# Patient Record
Sex: FEMALE | Race: WHITE | NOT HISPANIC OR LATINO | Employment: FULL TIME | URBAN - METROPOLITAN AREA
[De-identification: names, ages, dates, MRNs, and addresses within clinical notes are randomized per-mention and may not be internally consistent; named-entity substitution may affect disease eponyms.]

---

## 2018-11-24 ENCOUNTER — OFFICE VISIT (OUTPATIENT)
Dept: URGENT CARE | Facility: CLINIC | Age: 53
End: 2018-11-24
Payer: COMMERCIAL

## 2018-11-24 VITALS
BODY MASS INDEX: 35.57 KG/M2 | OXYGEN SATURATION: 98 % | SYSTOLIC BLOOD PRESSURE: 138 MMHG | HEART RATE: 66 BPM | WEIGHT: 181.19 LBS | HEIGHT: 60 IN | TEMPERATURE: 98 F | DIASTOLIC BLOOD PRESSURE: 84 MMHG | RESPIRATION RATE: 14 BRPM

## 2018-11-24 DIAGNOSIS — J32.9 SINUSITIS, UNSPECIFIED CHRONICITY, UNSPECIFIED LOCATION: Primary | ICD-10-CM

## 2018-11-24 PROCEDURE — 99204 OFFICE O/P NEW MOD 45 MIN: CPT | Mod: 25,S$GLB,, | Performed by: NURSE PRACTITIONER

## 2018-11-24 PROCEDURE — 96372 THER/PROPH/DIAG INJ SC/IM: CPT | Mod: S$GLB,,, | Performed by: NURSE PRACTITIONER

## 2018-11-24 PROCEDURE — 3008F BODY MASS INDEX DOCD: CPT | Mod: CPTII,S$GLB,, | Performed by: NURSE PRACTITIONER

## 2018-11-24 RX ORDER — FLUTICASONE PROPIONATE 50 MCG
2 SPRAY, SUSPENSION (ML) NASAL DAILY
Qty: 1 BOTTLE | Refills: 0 | Status: ON HOLD | OUTPATIENT
Start: 2018-11-24 | End: 2019-10-30 | Stop reason: CLARIF

## 2018-11-24 RX ORDER — RAMIPRIL 10 MG/1
10 CAPSULE ORAL DAILY
COMMUNITY
End: 2021-08-10 | Stop reason: SINTOL

## 2018-11-24 RX ORDER — ATENOLOL 25 MG/1
25 TABLET ORAL DAILY
COMMUNITY
End: 2021-02-11

## 2018-11-24 RX ORDER — PREDNISONE 20 MG/1
20 TABLET ORAL 2 TIMES DAILY
Qty: 10 TABLET | Refills: 0 | Status: SHIPPED | OUTPATIENT
Start: 2018-11-24 | End: 2018-11-29

## 2018-11-24 RX ORDER — PROMETHAZINE HYDROCHLORIDE AND DEXTROMETHORPHAN HYDROBROMIDE 6.25; 15 MG/5ML; MG/5ML
5 SYRUP ORAL EVERY 4 HOURS PRN
Qty: 240 ML | Refills: 0 | Status: SHIPPED | OUTPATIENT
Start: 2018-11-24 | End: 2018-12-04

## 2018-11-24 RX ORDER — DEXAMETHASONE SODIUM PHOSPHATE 4 MG/ML
8 INJECTION, SOLUTION INTRA-ARTICULAR; INTRALESIONAL; INTRAMUSCULAR; INTRAVENOUS; SOFT TISSUE
Status: COMPLETED | OUTPATIENT
Start: 2018-11-24 | End: 2018-11-24

## 2018-11-24 RX ORDER — ASPIRIN 81 MG/1
81 TABLET ORAL DAILY
Status: ON HOLD | COMMUNITY
End: 2019-11-01 | Stop reason: HOSPADM

## 2018-11-24 RX ADMIN — DEXAMETHASONE SODIUM PHOSPHATE 8 MG: 4 INJECTION, SOLUTION INTRA-ARTICULAR; INTRALESIONAL; INTRAMUSCULAR; INTRAVENOUS; SOFT TISSUE at 12:11

## 2018-11-24 NOTE — PROGRESS NOTES
Subjective:       Patient ID: Brunilda Bob is a 53 y.o. female.    Vitals:  height is 5' (1.524 m) and weight is 82.2 kg (181 lb 3.2 oz). Her oral temperature is 97.9 °F (36.6 °C). Her blood pressure is 138/84 and her pulse is 66. Her respiration is 14 and oxygen saturation is 98%.     Chief Complaint: Cough; Sore Throat; Chest Congestion; Fever; Generalized Body Aches; and Diarrhea    Presents with sinus pressure and congestion, cough and chest congestion. States she has a lot of brown mucus when she coughs. Reports subjective fever, did not measure      Cough   This is a new problem. The current episode started in the past 7 days. The cough is productive of brown sputum. Associated symptoms include chills, a fever, postnasal drip and a sore throat. Pertinent negatives include no chest pain, headaches, myalgias, rash or shortness of breath.   Sore Throat    This is a new problem. The current episode started in the past 7 days. Associated symptoms include congestion, coughing and diarrhea. Pertinent negatives include no headaches, shortness of breath or vomiting.   Fever    This is a new problem. Associated symptoms include congestion, coughing, diarrhea and a sore throat. Pertinent negatives include no chest pain, headaches, nausea, rash, urinary pain or vomiting.   Diarrhea    This is a new problem. The current episode started in the past 7 days. Associated symptoms include chills, coughing and a fever. Pertinent negatives include no arthralgias, headaches, myalgias or vomiting.       Constitution: Positive for chills, sweating, fatigue and fever.   HENT: Positive for congestion, postnasal drip, sinus pain, sinus pressure and sore throat.    Neck: Negative for painful lymph nodes.   Cardiovascular: Negative for chest pain and leg swelling.   Eyes: Negative for double vision and blurred vision.   Respiratory: Positive for cough. Negative for shortness of breath.    Gastrointestinal: Positive for  diarrhea. Negative for nausea and vomiting.   Genitourinary: Negative for dysuria, frequency, urgency and history of kidney stones.   Musculoskeletal: Negative for joint pain, joint swelling, muscle cramps and muscle ache.   Skin: Negative for color change, pale, rash and bruising.   Allergic/Immunologic: Negative for seasonal allergies.   Neurological: Negative for dizziness, history of vertigo, light-headedness, passing out and headaches.   Hematologic/Lymphatic: Negative for swollen lymph nodes.   Psychiatric/Behavioral: Negative for nervous/anxious, sleep disturbance and depression. The patient is not nervous/anxious.        Objective:      Physical Exam   Constitutional: She is oriented to person, place, and time. Vital signs are normal. She appears well-developed and well-nourished. She is cooperative.   HENT:   Head: Normocephalic.   Right Ear: Hearing, external ear and ear canal normal. A middle ear effusion is present.   Left Ear: Hearing, external ear and ear canal normal. A middle ear effusion is present.   Nose: Mucosal edema, rhinorrhea and sinus tenderness present. Right sinus exhibits maxillary sinus tenderness. Left sinus exhibits maxillary sinus tenderness.   Mouth/Throat: Uvula is midline and mucous membranes are normal. Posterior oropharyngeal erythema present.   Eyes: Conjunctivae, EOM and lids are normal. Pupils are equal, round, and reactive to light.   Neck: Trachea normal, normal range of motion, full passive range of motion without pain and phonation normal. Neck supple.   Cardiovascular: Normal rate, regular rhythm, normal heart sounds, intact distal pulses and normal pulses.   Pulmonary/Chest: Effort normal and breath sounds normal.   Abdominal: Soft. Normal appearance, normal aorta and bowel sounds are normal. There is no tenderness.   Musculoskeletal: Normal range of motion.   Neurological: She is alert and oriented to person, place, and time. She has normal strength. GCS eye subscore is  4. GCS verbal subscore is 5. GCS motor subscore is 6.   Skin: Skin is warm, dry and intact. Capillary refill takes less than 2 seconds.   Psychiatric: She has a normal mood and affect. Her speech is normal and behavior is normal. Judgment and thought content normal. Cognition and memory are normal.   Nursing note and vitals reviewed.      Assessment:       1. Sinusitis, unspecified chronicity, unspecified location        Plan:         Sinusitis, unspecified chronicity, unspecified location  -     dexamethasone injection 8 mg  -     predniSONE (DELTASONE) 20 MG tablet; Take 1 tablet (20 mg total) by mouth 2 (two) times daily. for 5 days  Dispense: 10 tablet; Refill: 0  -     fluticasone (FLONASE) 50 mcg/actuation nasal spray; 2 sprays (100 mcg total) by Each Nare route once daily.  Dispense: 1 Bottle; Refill: 0  -     promethazine-dextromethorphan (PROMETHAZINE-DM) 6.25-15 mg/5 mL Syrp; Take 5 mLs by mouth every 4 (four) hours as needed.  Dispense: 240 mL; Refill: 0

## 2018-11-24 NOTE — PATIENT INSTRUCTIONS
Sinusitis (No Antibiotics)    The sinuses are air-filled spaces within the bones of the face. They connect to the inside of the nose. Sinusitis is an inflammation of the tissue lining the sinus cavity. Sinus inflammation can occur during a cold. It can also be due to allergies to pollens and other particles in the air. It can cause symptoms such as sinus congestion, headache, sore throat, facial swelling and fullness. It may also cause a low-grade fever. No infection is present, and no antibiotic treatment is needed.  Home care  · Drink plenty of water, hot tea, and other liquids. This may help thin mucus. It also may promote sinus drainage.  · Heat may help soothe painful areas of the face. Use a towel soaked in hot water. Or,  the shower and direct the hot spray onto your face. Using a vaporizer along with a menthol rub at night may also help.   · An expectorant containing guaifenesin may help thin the mucus and promote drainage from the sinuses.  · Over-the-counter decongestants may be used unless a similar medicine was prescribed. Nasal sprays work the fastest. Use one that contains phenylephrine or oxymetazoline. First blow the nose gently. Then use the spray. Do not use these medicines more often than directed on the label or symptoms may get worse. You may also use tablets containing pseudoephedrine. Avoid products that combine ingredients, because side effects may be increased. Read labels. You can also ask the pharmacist for help. (NOTE: Persons with high blood pressure should not use decongestants. They can raise blood pressure.)  · Over-the-counter antihistamines may help if allergies contributed to your sinusitis.    · Use acetaminophen or ibuprofen to control pain, unless another pain medicine was prescribed. (If you have chronic liver or kidney disease or ever had a stomach ulcer, talk with your doctor before using these medicines. Aspirin should never be used in anyone under 18 years of age  who is ill with a fever. It may cause severe liver damage.)  · Use nasal rinses or irrigation as instructed by your health care provider.  · Don't smoke. This can worsen symptoms.  Follow-up care  Follow up with your healthcare provider or our staff if you are not improving within the next week.  When to seek medical advice  Call your healthcare provider if any of these occur:  · Green or yellow discharge from the nose or into the throat  · Facial pain or headache becoming more severe  · Stiff neck  · Unusual drowsiness or confusion  · Swelling of the forehead or eyelids  · Vision problems, including blurred or double vision  · Fever of 100.4ºF (38ºC) or higher, or as directed by your healthcare provider  · Seizure  · Breathing problems  · Symptoms not resolving within 10 days  Date Last Reviewed: 4/13/2015  © 2780-1062 [employer]. [address] [place] [address]. All rights reserved. This information is not intended as a substitute for professional medical care. Always follow your healthcare professional's instructions.        Preventing Sinusitis    Colds, flu, and allergies make it more likely for you to get sinusitis. Do your best to prevent sinusitis by preventing these problems. Do what you can to avoid getting colds and other infections. Stay away from things that cause allergies (allergens). Keep your sinuses as moist as you can.  Tips for air travel  When traveling on an airplane, use saline nasal spray to keep your sinuses moist. Drink plenty of fluids. You may also want to take a decongestant before you get on the plane.   Prevent colds  Do what you can to avoid being exposed to colds and flu. When possible, take more time to rest when you feel something coming on.  · Wash your hands often. This is especially important during cold and flu season. Try not to touch your face.  · As much as possible, stay away from infected people.  · Follow these standbys for staying healthy: Eat  balanced meals, exercise regularly, and get plenty of sleep.  Stay away from allergens  First find out what things youre allergic to. Then take steps to stay away from allergens or irritants in the air such as dust, pollution, and pollen.  · Wear a mask when you clean. Or consider hiring a  to help you stay away from dust.  · Sit in the nonsmoking sections of restaurants.  · Don't go outdoors during peak pollution hours such as rush hour.  · Keep an air conditioner on during allergy season. Clean its filter regularly.  · Ask your healthcare provider about a referral to have an allergy evaluation. Or ask for a referral to see an allergy specialist.  Boost moisture  Keeping your sinuses moist makes your mucus thinner. This allows your sinuses to drain better. And this helps prevent infection. Ask your doctor about these suggestions:  · Use a humidifier. Clean it often to remove any mold or mildew.  · Drink several glasses of water a day.  · Stay away from drying beverages such as alcohol and coffee.  · Stay away from all types of smoke, which dries out sinus linings. This includes tobacco smoke and chemical smoke in workplace settings.  · Use saltwater rinses.  Date Last Reviewed: 10/1/2016  © 1177-0984 [employer]. [address] [place] [address], [place] [id number]. All rights reserved. This information is not intended as a substitute for professional medical care. Always follow your healthcare professional's instructions.        Self-Care for Sinusitis     Drinking plenty of water can help sinuses drain.   Sinusitis can often be managed with self-care. Self-care can keep sinuses moist and make you feel more comfortable. Remember to follow your doctor's instructions closely. This can make a big difference in getting your sinus problem under control.  Drink fluids  Drinking extra fluids helps thin your mucus. This lets it drain from your sinuses more easily. Have a glass of water every hour or two. A  humidifier helps in much the same way. Fluids can also offset the drying effects of certain medicines. If you use a humidifier, follow the product maker's instructions on how to use it. Clean it on a regular schedule.  Use saltwater rinses  Rinses help keep your sinuses and nose moist. Mix a teaspoon of salt in 8 ounces of fresh, warm water. Use a bulb syringe to gently squirt the water into your nose a few times a day. You can also buy ready-made saline nasal sprays.  Apply hot or cold packs  Applying heat to the area surrounding your sinuses may make you feel more comfortable. Use a hot water bottle or a hand towel dipped in hot water. Some people also find ice packs effective for relieving pain.  Medicines  Your doctor may prescribe medications to help treat your sinusitis. If you have an infection, antibiotics can help clear it up. If you are prescribed antibiotics, take all pills on schedule until they are gone, even if you feel better. Decongestants help relieve swelling. Use decongestant sprays for short periods only under the direction of your doctor. If you have allergies, your doctor may prescribe medications to help relieve them.   Date Last Reviewed: 10/1/2016  © 8491-8993 [employer]. [address] [place] [address], [place] [address]. All rights reserved. This information is not intended as a substitute for professional medical care. Always follow your healthcare professional's instructions.        Treating Chronic Sinusitis    The sinuses are hollow areas formed by the bones of the face. Sinuses make and drain mucus. This keeps the nasal passages clean and moist. When the sinuses become swollen (inflamed) or infected, the condition is called sinusitis. Symptoms may include:  · Thick, discolored drainage from the nose  · Nasal congestion  · Pain and pressure around the eyes, nose, cheeks, or forehead  · Headache  · Cough  · Thick mucus draining down the back of the throat (postnasal  drainage)  · Fever  · Loss of smell  With chronic sinusitis, the symptoms last more than 12 weeks.     Ongoing prevention  Its important to treat the cause of a sinus problem. If you have allergies, talk with your doctor about treatment. Or ask about getting an evaluation by an allergy specialist. If youre exposed to nasal irritants such as sawdust, use a filter mask. If you smoke, ask your doctor for help with quitting. Smoke irritates the sinuses and can make your sinus problem worse. If you live with smokers, ask them to consider quitting or only smoking outdoors.   Medicine  Medicines for sinusitis may include:  · Antibiotic medicines. You may need to take antibiotic medicine for a longer period. If bacteria aren't the cause, antibiotics won't help.  · Inhaled corticosteroid medicine. Nasal sprays or drops with steroids are often prescribed.  · Other medicines. You may need to take nasal sprays with antihistamines and decongestants, or saltwater (saline) sprays or drops. Your provider may also prescribe mucolytics or expectorants to loosen and clear mucus.  · Allergy shots (immunotherapy). If you have nasal allergies, shots may help reduce your sensitivity to allergens such as pollen, dust mites, or mold.   If your symptoms still do not get better, you may need more testing. This may include a CT scan of the sinuses.  Surgery  If other treatments dont solve the problem, you may need surgery. The type of surgery depends on what is causing your sinusitis. It also depends on which sinuses are involved. Your doctor will tell you more about your options. The types of surgery include:  · Endoscopic surgery. This is often used to clear blockages. The sinuses can then heal on their own. During the surgery, the doctor uses a thin, lighted tube (endoscope). The doctor puts the endoscope into your nose to see into the sinuses. This surgery can be done without incisions on the face.  · Open surgery. This is often used to  clean out a sinus lining that is very damaged. It lets the doctor reach areas that an endoscope may not reach.  Date Last Reviewed: 10/1/2016  © 6482-1044 [employer]. [address] [place] [address], [place] [address]. All rights reserved. This information is not intended as a substitute for professional medical care. Always follow your healthcare professional's instructions.

## 2019-10-30 ENCOUNTER — HOSPITAL ENCOUNTER (INPATIENT)
Facility: HOSPITAL | Age: 54
LOS: 2 days | Discharge: HOME | DRG: 605 | End: 2019-11-01
Attending: EMERGENCY MEDICINE | Admitting: INTERNAL MEDICINE
Payer: COMMERCIAL
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
## 2019-10-30 DIAGNOSIS — R52 PAIN: ICD-10-CM

## 2019-10-30 DIAGNOSIS — S20.01XA POSTTRAUMATIC HEMATOMA OF RIGHT BREAST, INITIAL ENCOUNTER: ICD-10-CM

## 2019-10-30 PROBLEM — K21.00 GERD WITH ESOPHAGITIS: Status: ACTIVE | Noted: 2019-10-30
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
## 2019-10-30 PROBLEM — I10 BENIGN ESSENTIAL HYPERTENSION: Status: ACTIVE | Noted: 2019-10-30

## 2019-10-30 LAB
ABO + RH BLD: NORMAL
ALBUMIN SERPL BCP-MCNC: 3.9 G/DL (ref 3.5–5.2)
ALP SERPL-CCNC: 82 U/L (ref 55–135)
ALT SERPL W/O P-5'-P-CCNC: 22 U/L (ref 10–44)
ANION GAP SERPL CALC-SCNC: 14 MMOL/L (ref 8–16)
ANION GAP SERPL CALC-SCNC: 6 MMOL/L (ref 8–16)
AST SERPL-CCNC: 23 U/L (ref 10–40)
BASOPHILS # BLD AUTO: 0.03 K/UL (ref 0–0.2)
BASOPHILS NFR BLD: 0.3 % (ref 0–1.9)
BILIRUB SERPL-MCNC: 0.6 MG/DL (ref 0.1–1)
BLD GP AB SCN CELLS X3 SERPL QL: NORMAL
BNP SERPL-MCNC: 37 PG/ML (ref 0–99)
BUN SERPL-MCNC: 21 MG/DL (ref 6–30)
BUN SERPL-MCNC: 23 MG/DL (ref 6–20)
CALCIUM SERPL-MCNC: 9.4 MG/DL (ref 8.7–10.5)
CHLORIDE SERPL-SCNC: 101 MMOL/L (ref 95–110)
CHLORIDE SERPL-SCNC: 102 MMOL/L (ref 95–110)
CO2 SERPL-SCNC: 31 MMOL/L (ref 23–29)
CREAT SERPL-MCNC: 0.9 MG/DL (ref 0.5–1.4)
CREAT SERPL-MCNC: 0.9 MG/DL (ref 0.5–1.4)
DIFFERENTIAL METHOD: ABNORMAL
EOSINOPHIL # BLD AUTO: 0.3 K/UL (ref 0–0.5)
EOSINOPHIL NFR BLD: 3.2 % (ref 0–8)
ERYTHROCYTE [DISTWIDTH] IN BLOOD BY AUTOMATED COUNT: 14.2 % (ref 11.5–14.5)
EST. GFR  (AFRICAN AMERICAN): >60 ML/MIN/1.73 M^2
EST. GFR  (NON AFRICAN AMERICAN): >60 ML/MIN/1.73 M^2
GLUCOSE SERPL-MCNC: 119 MG/DL (ref 70–110)
GLUCOSE SERPL-MCNC: 122 MG/DL (ref 70–110)
HCT VFR BLD AUTO: 37.8 % (ref 37–48.5)
HCT VFR BLD CALC: 36 %PCV (ref 36–54)
HGB BLD-MCNC: 11.8 G/DL (ref 12–16)
IMM GRANULOCYTES # BLD AUTO: 0.07 K/UL (ref 0–0.04)
IMM GRANULOCYTES NFR BLD AUTO: 0.8 % (ref 0–0.5)
INR PPP: 1.1
LIPASE SERPL-CCNC: 29 U/L (ref 4–60)
LYMPHOCYTES # BLD AUTO: 1.5 K/UL (ref 1–4.8)
LYMPHOCYTES NFR BLD: 17 % (ref 18–48)
MAGNESIUM SERPL-MCNC: 1.9 MG/DL (ref 1.6–2.6)
MCH RBC QN AUTO: 26.5 PG (ref 27–31)
MCHC RBC AUTO-ENTMCNC: 31.2 G/DL (ref 32–36)
MCV RBC AUTO: 85 FL (ref 82–98)
MONOCYTES # BLD AUTO: 0.6 K/UL (ref 0.3–1)
MONOCYTES NFR BLD: 6.9 % (ref 4–15)
NEUTROPHILS # BLD AUTO: 6.3 K/UL (ref 1.8–7.7)
NEUTROPHILS NFR BLD: 71.8 % (ref 38–73)
NRBC BLD-RTO: 0 /100 WBC
PLATELET # BLD AUTO: 319 K/UL (ref 150–350)
PMV BLD AUTO: 10 FL (ref 9.2–12.9)
POC IONIZED CALCIUM: 1.23 MMOL/L (ref 1.06–1.42)
POC TCO2 (MEASURED): 28 MMOL/L (ref 23–29)
POTASSIUM BLD-SCNC: 3.7 MMOL/L (ref 3.5–5.1)
POTASSIUM SERPL-SCNC: 3.8 MMOL/L (ref 3.5–5.1)
PROT SERPL-MCNC: 6.9 G/DL (ref 6–8.4)
PROTHROMBIN TIME: 13.3 SEC (ref 10.6–14.8)
RBC # BLD AUTO: 4.46 M/UL (ref 4–5.4)
SAMPLE: ABNORMAL
SODIUM BLD-SCNC: 139 MMOL/L (ref 136–145)
SODIUM SERPL-SCNC: 139 MMOL/L (ref 136–145)
TROPONIN I SERPL DL<=0.01 NG/ML-MCNC: <0.03 NG/ML (ref 0.02–0.04)
TROPONIN I SERPL DL<=0.01 NG/ML-MCNC: <0.03 NG/ML (ref 0.02–0.04)
TSH SERPL DL<=0.005 MIU/L-ACNC: 2.49 UIU/ML (ref 0.34–5.6)
WBC # BLD AUTO: 8.75 K/UL (ref 3.9–12.7)

## 2019-10-30 PROCEDURE — 83880 ASSAY OF NATRIURETIC PEPTIDE: CPT

## 2019-10-30 PROCEDURE — 84484 ASSAY OF TROPONIN QUANT: CPT

## 2019-10-30 PROCEDURE — 96374 THER/PROPH/DIAG INJ IV PUSH: CPT

## 2019-10-30 PROCEDURE — 96375 TX/PRO/DX INJ NEW DRUG ADDON: CPT

## 2019-10-30 PROCEDURE — 25500020 PHARM REV CODE 255: Performed by: EMERGENCY MEDICINE

## 2019-10-30 PROCEDURE — 93005 ELECTROCARDIOGRAM TRACING: CPT

## 2019-10-30 PROCEDURE — 86850 RBC ANTIBODY SCREEN: CPT

## 2019-10-30 PROCEDURE — 83735 ASSAY OF MAGNESIUM: CPT

## 2019-10-30 PROCEDURE — 99285 EMERGENCY DEPT VISIT HI MDM: CPT | Mod: 25

## 2019-10-30 PROCEDURE — 84443 ASSAY THYROID STIM HORMONE: CPT

## 2019-10-30 PROCEDURE — 83690 ASSAY OF LIPASE: CPT

## 2019-10-30 PROCEDURE — 85610 PROTHROMBIN TIME: CPT

## 2019-10-30 PROCEDURE — 85025 COMPLETE CBC W/AUTO DIFF WBC: CPT

## 2019-10-30 PROCEDURE — 80053 COMPREHEN METABOLIC PANEL: CPT

## 2019-10-30 PROCEDURE — 63600175 PHARM REV CODE 636 W HCPCS: Performed by: EMERGENCY MEDICINE

## 2019-10-30 PROCEDURE — 96376 TX/PRO/DX INJ SAME DRUG ADON: CPT

## 2019-10-30 PROCEDURE — 21400001 HC TELEMETRY ROOM

## 2019-10-30 PROCEDURE — 25000003 PHARM REV CODE 250: Performed by: INTERNAL MEDICINE

## 2019-10-30 PROCEDURE — 84484 ASSAY OF TROPONIN QUANT: CPT | Mod: 91

## 2019-10-30 RX ORDER — SODIUM,POTASSIUM PHOSPHATES 280-250MG
2 POWDER IN PACKET (EA) ORAL
Status: DISCONTINUED | OUTPATIENT
Start: 2019-10-30 | End: 2019-11-01 | Stop reason: HOSPADM

## 2019-10-30 RX ORDER — ACETAMINOPHEN 325 MG/1
650 TABLET ORAL EVERY 4 HOURS PRN
Status: DISCONTINUED | OUTPATIENT
Start: 2019-10-30 | End: 2019-11-01 | Stop reason: HOSPADM

## 2019-10-30 RX ORDER — POTASSIUM CHLORIDE 20 MEQ/15ML
40 SOLUTION ORAL
Status: DISCONTINUED | OUTPATIENT
Start: 2019-10-30 | End: 2019-11-01 | Stop reason: HOSPADM

## 2019-10-30 RX ORDER — LANOLIN ALCOHOL/MO/W.PET/CERES
800 CREAM (GRAM) TOPICAL
Status: DISCONTINUED | OUTPATIENT
Start: 2019-10-30 | End: 2019-11-01 | Stop reason: HOSPADM

## 2019-10-30 RX ORDER — MORPHINE SULFATE 2 MG/ML
2 INJECTION, SOLUTION INTRAMUSCULAR; INTRAVENOUS
Status: COMPLETED | OUTPATIENT
Start: 2019-10-30 | End: 2019-10-30

## 2019-10-30 RX ORDER — ACETAMINOPHEN 325 MG/1
650 TABLET ORAL EVERY 8 HOURS PRN
Status: DISCONTINUED | OUTPATIENT
Start: 2019-10-30 | End: 2019-11-01 | Stop reason: HOSPADM

## 2019-10-30 RX ORDER — ONDANSETRON 2 MG/ML
4 INJECTION INTRAMUSCULAR; INTRAVENOUS EVERY 8 HOURS PRN
Status: DISCONTINUED | OUTPATIENT
Start: 2019-10-30 | End: 2019-11-01 | Stop reason: HOSPADM

## 2019-10-30 RX ORDER — ONDANSETRON 2 MG/ML
4 INJECTION INTRAMUSCULAR; INTRAVENOUS
Status: COMPLETED | OUTPATIENT
Start: 2019-10-30 | End: 2019-10-30

## 2019-10-30 RX ORDER — MORPHINE SULFATE 2 MG/ML
2 INJECTION, SOLUTION INTRAMUSCULAR; INTRAVENOUS EVERY 4 HOURS PRN
Status: DISCONTINUED | OUTPATIENT
Start: 2019-10-30 | End: 2019-10-31

## 2019-10-30 RX ORDER — FAMOTIDINE 20 MG/1
20 TABLET, FILM COATED ORAL 2 TIMES DAILY
Status: DISCONTINUED | OUTPATIENT
Start: 2019-10-30 | End: 2019-11-01 | Stop reason: HOSPADM

## 2019-10-30 RX ORDER — HYDROCODONE BITARTRATE AND ACETAMINOPHEN 5; 325 MG/1; MG/1
1 TABLET ORAL EVERY 6 HOURS PRN
Status: DISCONTINUED | OUTPATIENT
Start: 2019-10-30 | End: 2019-11-01 | Stop reason: HOSPADM

## 2019-10-30 RX ADMIN — MORPHINE SULFATE 2 MG: 2 INJECTION, SOLUTION INTRAMUSCULAR; INTRAVENOUS at 08:10

## 2019-10-30 RX ADMIN — ONDANSETRON 4 MG: 2 INJECTION INTRAMUSCULAR; INTRAVENOUS at 06:10

## 2019-10-30 RX ADMIN — IOHEXOL 100 ML: 350 INJECTION, SOLUTION INTRAVENOUS at 06:10

## 2019-10-30 RX ADMIN — FAMOTIDINE 20 MG: 20 TABLET, FILM COATED ORAL at 11:10

## 2019-10-30 RX ADMIN — SODIUM CHLORIDE 1000 ML: 0.9 INJECTION, SOLUTION INTRAVENOUS at 09:10

## 2019-10-30 RX ADMIN — MORPHINE SULFATE 2 MG: 2 INJECTION, SOLUTION INTRAMUSCULAR; INTRAVENOUS at 06:10
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
## 2019-10-31 LAB
ANION GAP SERPL CALC-SCNC: 8 MMOL/L (ref 8–16)
BASOPHILS # BLD AUTO: 0.02 K/UL (ref 0–0.2)
BASOPHILS NFR BLD: 0.3 % (ref 0–1.9)
BUN SERPL-MCNC: 17 MG/DL (ref 6–20)
CALCIUM SERPL-MCNC: 8.9 MG/DL (ref 8.7–10.5)
CHLORIDE SERPL-SCNC: 102 MMOL/L (ref 95–110)
CO2 SERPL-SCNC: 29 MMOL/L (ref 23–29)
CREAT SERPL-MCNC: 0.8 MG/DL (ref 0.5–1.4)
DIFFERENTIAL METHOD: ABNORMAL
EOSINOPHIL # BLD AUTO: 0.1 K/UL (ref 0–0.5)
EOSINOPHIL NFR BLD: 0.9 % (ref 0–8)
ERYTHROCYTE [DISTWIDTH] IN BLOOD BY AUTOMATED COUNT: 14.2 % (ref 11.5–14.5)
EST. GFR  (AFRICAN AMERICAN): >60 ML/MIN/1.73 M^2
EST. GFR  (NON AFRICAN AMERICAN): >60 ML/MIN/1.73 M^2
GLUCOSE SERPL-MCNC: 110 MG/DL (ref 70–110)
HCT VFR BLD AUTO: 34.9 % (ref 37–48.5)
HGB BLD-MCNC: 10.7 G/DL (ref 12–16)
IMM GRANULOCYTES # BLD AUTO: 0.03 K/UL (ref 0–0.04)
IMM GRANULOCYTES NFR BLD AUTO: 0.4 % (ref 0–0.5)
LYMPHOCYTES # BLD AUTO: 1 K/UL (ref 1–4.8)
LYMPHOCYTES NFR BLD: 12.7 % (ref 18–48)
MCH RBC QN AUTO: 26 PG (ref 27–31)
MCHC RBC AUTO-ENTMCNC: 30.7 G/DL (ref 32–36)
MCV RBC AUTO: 85 FL (ref 82–98)
MONOCYTES # BLD AUTO: 0.7 K/UL (ref 0.3–1)
MONOCYTES NFR BLD: 8.5 % (ref 4–15)
NEUTROPHILS # BLD AUTO: 5.9 K/UL (ref 1.8–7.7)
NEUTROPHILS NFR BLD: 77.2 % (ref 38–73)
NRBC BLD-RTO: 0 /100 WBC
PLATELET # BLD AUTO: 282 K/UL (ref 150–350)
PMV BLD AUTO: 10 FL (ref 9.2–12.9)
POTASSIUM SERPL-SCNC: 4.4 MMOL/L (ref 3.5–5.1)
RBC # BLD AUTO: 4.11 M/UL (ref 4–5.4)
SODIUM SERPL-SCNC: 139 MMOL/L (ref 136–145)
TROPONIN I SERPL DL<=0.01 NG/ML-MCNC: <0.03 NG/ML (ref 0.02–0.04)
WBC # BLD AUTO: 7.61 K/UL (ref 3.9–12.7)

## 2019-10-31 PROCEDURE — 25000003 PHARM REV CODE 250: Performed by: INTERNAL MEDICINE

## 2019-10-31 PROCEDURE — 21400001 HC TELEMETRY ROOM

## 2019-10-31 PROCEDURE — 63600175 PHARM REV CODE 636 W HCPCS: Performed by: HOSPITALIST

## 2019-10-31 PROCEDURE — 80048 BASIC METABOLIC PNL TOTAL CA: CPT

## 2019-10-31 PROCEDURE — 85025 COMPLETE CBC W/AUTO DIFF WBC: CPT

## 2019-10-31 PROCEDURE — 99222 1ST HOSP IP/OBS MODERATE 55: CPT | Mod: ,,, | Performed by: SURGERY

## 2019-10-31 PROCEDURE — 99222 PR INITIAL HOSPITAL CARE,LEVL II: ICD-10-PCS | Mod: ,,, | Performed by: SURGERY

## 2019-10-31 PROCEDURE — 36415 COLL VENOUS BLD VENIPUNCTURE: CPT

## 2019-10-31 PROCEDURE — 84484 ASSAY OF TROPONIN QUANT: CPT

## 2019-10-31 PROCEDURE — 63600175 PHARM REV CODE 636 W HCPCS: Performed by: INTERNAL MEDICINE

## 2019-10-31 RX ORDER — ONDANSETRON 2 MG/ML
4 INJECTION INTRAMUSCULAR; INTRAVENOUS EVERY 4 HOURS PRN
Status: DISCONTINUED | OUTPATIENT
Start: 2019-10-31 | End: 2019-11-01 | Stop reason: HOSPADM

## 2019-10-31 RX ADMIN — MORPHINE SULFATE 2 MG: 2 INJECTION, SOLUTION INTRAMUSCULAR; INTRAVENOUS at 11:10

## 2019-10-31 RX ADMIN — HYDROCODONE BITARTRATE AND ACETAMINOPHEN 1 TABLET: 5; 325 TABLET ORAL at 11:10

## 2019-10-31 RX ADMIN — FAMOTIDINE 20 MG: 20 TABLET, FILM COATED ORAL at 09:10

## 2019-10-31 RX ADMIN — MORPHINE SULFATE 2 MG: 2 INJECTION, SOLUTION INTRAMUSCULAR; INTRAVENOUS at 05:10

## 2019-10-31 RX ADMIN — ACETAMINOPHEN 650 MG: 325 TABLET ORAL at 09:10

## 2019-10-31 RX ADMIN — HYDROCODONE BITARTRATE AND ACETAMINOPHEN 1 TABLET: 5; 325 TABLET ORAL at 03:10

## 2019-10-31 RX ADMIN — HYDROCODONE BITARTRATE AND ACETAMINOPHEN 1 TABLET: 5; 325 TABLET ORAL at 10:10

## 2019-10-31 RX ADMIN — ONDANSETRON 4 MG: 2 INJECTION INTRAMUSCULAR; INTRAVENOUS at 03:10

## 2019-10-31 RX ADMIN — HYDROCODONE BITARTRATE AND ACETAMINOPHEN 1 TABLET: 5; 325 TABLET ORAL at 05:10
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
## 2019-10-31 NOTE — ASSESSMENT & PLAN NOTE
CT chest with contrast extravasation from active arterial bleeding  Conservative management with ice  Pain control   Monitor clinically for any worsening swelling  Case discussed by ER provider with General surgery on call who agree with conservative management  Will follow up patient next a.m.; formal consult placed  Given history of thoracic aortic bleed repairs will continue to trend troponins x3
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
## 2019-10-31 NOTE — ASSESSMENT & PLAN NOTE
Trauma workup negative except for right breast hematoma  No head trauma - unlikely post-concussion syndrome   Neuro checks q.4 hours x 1 day

## 2019-10-31 NOTE — ASSESSMENT & PLAN NOTE
Conservative management with ice  Pain control   Appreciate surgical evaluation.  Plan as outlined
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
## 2019-10-31 NOTE — ED NOTES
Pain continues and swelling right breast increased with one ice pack present. Informed Dr. Sarmiento with additional pain meds and ice applied to right breas area. Hospitalist and spouse  @ bedside

## 2019-10-31 NOTE — SUBJECTIVE & OBJECTIVE
Past Medical History:   Diagnosis Date    Broken heart syndrome        Past Surgical History:   Procedure Laterality Date    CLAVICLE SURGERY  2012    TONSILLECTOMY         Review of patient's allergies indicates:   Allergen Reactions    Codeine Nausea And Vomiting    Crestor [rosuvastatin] Other (See Comments)     paralysis       No current facility-administered medications on file prior to encounter.      Current Outpatient Medications on File Prior to Encounter   Medication Sig    aspirin (ECOTRIN) 81 MG EC tablet Take 81 mg by mouth once daily.    atenolol (TENORMIN) 25 MG tablet Take 25 mg by mouth once daily.    fluticasone (FLONASE) 50 mcg/actuation nasal spray 2 sprays (100 mcg total) by Each Nare route once daily.    ramipril (ALTACE) 10 MG capsule Take 10 mg by mouth once daily.    ranitidine (ZANTAC) 75 MG tablet Take 75 mg by mouth as needed for Heartburn.     Family History     Reviewed and non-contributory        Tobacco Use    Smoking status: Never Smoker    Smokeless tobacco: Never Used   Substance and Sexual Activity    Alcohol use: Yes     Frequency: Monthly or less    Drug use: Not on file    Sexual activity: Not on file       Objective:     Vital Signs (Most Recent):  Temp: 98.2 °F (36.8 °C) (10/30/19 1800)  Pulse: 74 (10/30/19 2045)  Resp: 12 (10/30/19 2045)  BP: 107/65 (10/30/19 2045)  SpO2: (!) 94 % (10/30/19 2045) Vital Signs (24h Range):  Temp:  [98.2 °F (36.8 °C)] 98.2 °F (36.8 °C)  Pulse:  [65-81] 74  Resp:  [12-28] 12  SpO2:  [94 %-99 %] 94 %  BP: (107-176)/() 107/65     Weight: 83.9 kg (185 lb)  Body mass index is 36.13 kg/m².    Physical Exam   Constitutional: She is oriented to person, place, and time. She appears well-developed and well-nourished. She is cooperative.   HENT:   Head: Normocephalic and atraumatic.   Mouth/Throat: Oropharynx is clear and moist.   Eyes: Pupils are equal, round, and reactive to light. Conjunctivae and EOM are normal.   Neck: Neck  supple. No JVD present. No thyromegaly present.   Cardiovascular: Normal rate, regular rhythm, S1 normal, S2 normal and intact distal pulses. Exam reveals no gallop and no friction rub.   No murmur heard.  Pulmonary/Chest: Effort normal and breath sounds normal. No accessory muscle usage. No tachypnea. No respiratory distress. She has no wheezes. She has no rales.   Abdominal: Soft. Bowel sounds are normal. She exhibits no distension and no mass. There is no tenderness. There is no rebound and no guarding.   Musculoskeletal: Normal range of motion. She exhibits tenderness. She exhibits no edema or deformity.   Swelling and bruising of right breast involving the upper and outer quadrant. Area is exquisitely tender   Neurological: She is alert and oriented to person, place, and time. She has normal strength and normal reflexes. No cranial nerve deficit or sensory deficit.   Skin: Skin is warm and dry. No rash noted. There is erythema.   Right breast swelling and bruising    Psychiatric: She has a normal mood and affect. Her speech is normal and behavior is normal. Cognition and memory are normal.   Nursing note and vitals reviewed.        CRANIAL NERVES     CN III, IV, VI   Pupils are equal, round, and reactive to light.  Extraocular motions are normal.        Significant Labs:   CBC:   Recent Labs   Lab 10/30/19  1816 10/30/19  1821   WBC 8.75  --    HGB 11.8*  --    HCT 37.8 36     --      CMP:   Recent Labs   Lab 10/30/19  1816      K 3.8      CO2 31*   *   BUN 23*   CREATININE 0.9   CALCIUM 9.4   PROT 6.9   ALBUMIN 3.9   BILITOT 0.6   ALKPHOS 82   AST 23   ALT 22   ANIONGAP 6*   EGFRNONAA >60.0     Troponin:   Recent Labs   Lab 10/30/19  1816   TROPONINI <0.030       Significant Imaging: I have reviewed all pertinent imaging results/findings within the past 24 hours.      X-ray tibia fibula left:  Negative for acute fracture or dislocation    CT head:  Negative noncontrast head CT.     CT  cervical spine without contrast:  Negative for acute cervical spine fracture or subluxation.    CT chest, abdomen and pelvis with contrast:   Impression:       1. Large hematoma in the right breast, with evidence of active arterial bleeding.  RESULT NOTIFICATION: These findings were discussed by Dr Goff with, and acknowledged by TOD OBREGON MD at 19:07 hours.  2. Otherwise no evidence of acute traumatic injury in the chest, abdomen, or the pelvis.

## 2019-10-31 NOTE — SUBJECTIVE & OBJECTIVE
Interval History:  Breast pain has improved    Review of Systems patient denies fever chills shortness of breath or abdominal pain. She does have breast pain were hematoma is.  Right knee pain and left wrist pain.  Otherwise comprehensive review of systems are unchanged.  Objective:     Vital Signs (Most Recent):  Temp: 97.8 °F (36.6 °C) (10/31/19 0809)  Pulse: 67 (10/31/19 0809)  Resp: 18 (10/31/19 0809)  BP: (!) 160/71 (10/31/19 0809)  SpO2: 97 % (10/31/19 0809) Vital Signs (24h Range):  Temp:  [97.7 °F (36.5 °C)-98.2 °F (36.8 °C)] 97.8 °F (36.6 °C)  Pulse:  [61-82] 67  Resp:  [12-28] 18  SpO2:  [93 %-100 %] 97 %  BP: (101-176)/() 160/71     Weight: 84.3 kg (185 lb 12.5 oz)  Body mass index is 36.28 kg/m².    Intake/Output Summary (Last 24 hours) at 10/31/2019 1602  Last data filed at 10/31/2019 0600  Gross per 24 hour   Intake 300 ml   Output 1200 ml   Net -900 ml      Physical Exam patient appears in no acute distress.  Lying in bed  Neck is supple nontender  Lungs are clear  Heart is regular rate rhythm  Abdomen soft nontender  Extremities no edema  Neuro patient is alert oriented x3    Significant Labs:   BMP:   Recent Labs   Lab 10/30/19  1816 10/31/19  0457   * 110    139   K 3.8 4.4    102   CO2 31* 29   BUN 23* 17   CREATININE 0.9 0.8   CALCIUM 9.4 8.9   MG 1.9  --      CBC:   Recent Labs   Lab 10/30/19  1816 10/30/19  1821 10/31/19  0457   WBC 8.75  --  7.61   HGB 11.8*  --  10.7*   HCT 37.8 36 34.9*     --  282       Significant Imaging:  X-rays reviewed left wrist and right knee showed no evidence of fracture.

## 2019-11-01 ENCOUNTER — TELEPHONE (OUTPATIENT)
Dept: FAMILY MEDICINE | Facility: CLINIC | Age: 54
End: 2019-11-01

## 2019-11-01 VITALS
DIASTOLIC BLOOD PRESSURE: 77 MMHG | WEIGHT: 181 LBS | RESPIRATION RATE: 20 BRPM | OXYGEN SATURATION: 100 % | TEMPERATURE: 98 F | HEART RATE: 71 BPM | SYSTOLIC BLOOD PRESSURE: 122 MMHG | BODY MASS INDEX: 35.53 KG/M2 | HEIGHT: 60 IN

## 2019-11-01 LAB
ANION GAP SERPL CALC-SCNC: 6 MMOL/L (ref 8–16)
BASOPHILS # BLD AUTO: 0.02 K/UL (ref 0–0.2)
BASOPHILS NFR BLD: 0.3 % (ref 0–1.9)
BUN SERPL-MCNC: 21 MG/DL (ref 6–20)
CALCIUM SERPL-MCNC: 8.6 MG/DL (ref 8.7–10.5)
CHLORIDE SERPL-SCNC: 102 MMOL/L (ref 95–110)
CO2 SERPL-SCNC: 30 MMOL/L (ref 23–29)
CREAT SERPL-MCNC: 0.9 MG/DL (ref 0.5–1.4)
DIFFERENTIAL METHOD: ABNORMAL
EOSINOPHIL # BLD AUTO: 0.3 K/UL (ref 0–0.5)
EOSINOPHIL NFR BLD: 5.5 % (ref 0–8)
ERYTHROCYTE [DISTWIDTH] IN BLOOD BY AUTOMATED COUNT: 14.8 % (ref 11.5–14.5)
ERYTHROCYTE [DISTWIDTH] IN BLOOD BY AUTOMATED COUNT: 14.8 % (ref 11.5–14.5)
EST. GFR  (AFRICAN AMERICAN): >60 ML/MIN/1.73 M^2
EST. GFR  (NON AFRICAN AMERICAN): >60 ML/MIN/1.73 M^2
GLUCOSE SERPL-MCNC: 106 MG/DL (ref 70–110)
HCT VFR BLD AUTO: 30.7 % (ref 37–48.5)
HCT VFR BLD AUTO: 30.7 % (ref 37–48.5)
HGB BLD-MCNC: 9.5 G/DL (ref 12–16)
HGB BLD-MCNC: 9.5 G/DL (ref 12–16)
IMM GRANULOCYTES # BLD AUTO: 0.02 K/UL (ref 0–0.04)
IMM GRANULOCYTES NFR BLD AUTO: 0.3 % (ref 0–0.5)
LYMPHOCYTES # BLD AUTO: 1 K/UL (ref 1–4.8)
LYMPHOCYTES NFR BLD: 16.8 % (ref 18–48)
MCH RBC QN AUTO: 26.2 PG (ref 27–31)
MCH RBC QN AUTO: 26.2 PG (ref 27–31)
MCHC RBC AUTO-ENTMCNC: 30.9 G/DL (ref 32–36)
MCHC RBC AUTO-ENTMCNC: 30.9 G/DL (ref 32–36)
MCV RBC AUTO: 85 FL (ref 82–98)
MCV RBC AUTO: 85 FL (ref 82–98)
MONOCYTES # BLD AUTO: 0.5 K/UL (ref 0.3–1)
MONOCYTES NFR BLD: 8.7 % (ref 4–15)
NEUTROPHILS # BLD AUTO: 4.1 K/UL (ref 1.8–7.7)
NEUTROPHILS NFR BLD: 68.4 % (ref 38–73)
NRBC BLD-RTO: 0 /100 WBC
PLATELET # BLD AUTO: 257 K/UL (ref 150–350)
PLATELET # BLD AUTO: 257 K/UL (ref 150–350)
PMV BLD AUTO: 10.4 FL (ref 9.2–12.9)
PMV BLD AUTO: 10.4 FL (ref 9.2–12.9)
POTASSIUM SERPL-SCNC: 4 MMOL/L (ref 3.5–5.1)
RBC # BLD AUTO: 3.62 M/UL (ref 4–5.4)
RBC # BLD AUTO: 3.62 M/UL (ref 4–5.4)
SODIUM SERPL-SCNC: 138 MMOL/L (ref 136–145)
WBC # BLD AUTO: 5.97 K/UL (ref 3.9–12.7)
WBC # BLD AUTO: 5.97 K/UL (ref 3.9–12.7)

## 2019-11-01 PROCEDURE — 97116 GAIT TRAINING THERAPY: CPT

## 2019-11-01 PROCEDURE — 25000003 PHARM REV CODE 250: Performed by: INTERNAL MEDICINE

## 2019-11-01 PROCEDURE — 85025 COMPLETE CBC W/AUTO DIFF WBC: CPT

## 2019-11-01 PROCEDURE — 97161 PT EVAL LOW COMPLEX 20 MIN: CPT

## 2019-11-01 PROCEDURE — 36415 COLL VENOUS BLD VENIPUNCTURE: CPT

## 2019-11-01 PROCEDURE — 80048 BASIC METABOLIC PNL TOTAL CA: CPT

## 2019-11-01 RX ORDER — TRAMADOL HYDROCHLORIDE 50 MG/1
50 TABLET ORAL EVERY 6 HOURS PRN
Qty: 12 TABLET | Refills: 0
Start: 2019-11-01 | End: 2019-11-06 | Stop reason: SDUPTHER

## 2019-11-01 RX ADMIN — FAMOTIDINE 20 MG: 20 TABLET, FILM COATED ORAL at 08:11

## 2019-11-01 RX ADMIN — HYDROCODONE BITARTRATE AND ACETAMINOPHEN 1 TABLET: 5; 325 TABLET ORAL at 12:11

## 2019-11-01 RX ADMIN — HYDROCODONE BITARTRATE AND ACETAMINOPHEN 1 TABLET: 5; 325 TABLET ORAL at 05:11

## 2019-11-01 RX ADMIN — ACETAMINOPHEN 650 MG: 325 TABLET ORAL at 03:11
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
## 2019-11-01 NOTE — PLAN OF CARE
Problem: Physical Therapy Goal  Goal: Physical Therapy Goal  Description  Goals to be met by: 19    Patient will increase functional independence with mobility by performin. Supine to sit with [place]  2. Sit to supine with [place]  3. Sit to stand transfer with [place]  4. Bed to chair transfer with [place] using no AD.  5. Gait  x 150 feet with Modified [place] using no AD.      Outcome: Ongoing, Progressing
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
## 2019-11-06 ENCOUNTER — TELEPHONE (OUTPATIENT)
Dept: SURGERY | Facility: CLINIC | Age: 54
End: 2019-11-06

## 2019-11-06 ENCOUNTER — TELEPHONE (OUTPATIENT)
Dept: FAMILY MEDICINE | Facility: CLINIC | Age: 54
End: 2019-11-06

## 2019-11-06 DIAGNOSIS — Z12.31 VISIT FOR SCREENING MAMMOGRAM: Primary | ICD-10-CM

## 2019-11-06 RX ORDER — TRAMADOL HYDROCHLORIDE 50 MG/1
50 TABLET ORAL EVERY 6 HOURS PRN
Qty: 12 TABLET | Refills: 0 | Status: SHIPPED | OUTPATIENT
Start: 2019-11-06 | End: 2019-11-12 | Stop reason: SDUPTHER
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
## 2019-11-08 ENCOUNTER — OFFICE VISIT (OUTPATIENT)
Dept: SURGERY | Facility: CLINIC | Age: 54
End: 2019-11-08
Payer: COMMERCIAL

## 2019-11-08 VITALS
SYSTOLIC BLOOD PRESSURE: 138 MMHG | WEIGHT: 181 LBS | DIASTOLIC BLOOD PRESSURE: 84 MMHG | TEMPERATURE: 98 F | BODY MASS INDEX: 35.53 KG/M2 | HEIGHT: 60 IN | HEART RATE: 98 BPM

## 2019-11-08 DIAGNOSIS — S20.01XD POSTTRAUMATIC HEMATOMA OF RIGHT BREAST, SUBSEQUENT ENCOUNTER: Primary | ICD-10-CM

## 2019-11-08 PROCEDURE — 99213 OFFICE O/P EST LOW 20 MIN: CPT | Mod: S$GLB,,, | Performed by: SURGERY

## 2019-11-08 PROCEDURE — 99213 PR OFFICE/OUTPT VISIT, EST, LEVL III, 20-29 MIN: ICD-10-PCS | Mod: S$GLB,,, | Performed by: SURGERY
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
## 2019-11-11 DIAGNOSIS — E04.1 NONTOXIC UNINODULAR GOITER: Primary | ICD-10-CM

## 2019-11-12 ENCOUNTER — OFFICE VISIT (OUTPATIENT)
Dept: FAMILY MEDICINE | Facility: CLINIC | Age: 54
End: 2019-11-12
Payer: COMMERCIAL

## 2019-11-12 VITALS
HEIGHT: 60 IN | SYSTOLIC BLOOD PRESSURE: 138 MMHG | WEIGHT: 181 LBS | BODY MASS INDEX: 35.53 KG/M2 | HEART RATE: 80 BPM | DIASTOLIC BLOOD PRESSURE: 86 MMHG
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
## 2019-11-12 DIAGNOSIS — S20.01XA POSTTRAUMATIC HEMATOMA OF RIGHT BREAST, INITIAL ENCOUNTER: Primary | ICD-10-CM

## 2019-11-12 PROCEDURE — 3008F PR BODY MASS INDEX (BMI) DOCUMENTED: ICD-10-PCS | Mod: S$GLB,,, | Performed by: PHYSICIAN ASSISTANT

## 2019-11-12 PROCEDURE — 99214 OFFICE O/P EST MOD 30 MIN: CPT | Mod: S$GLB,,, | Performed by: PHYSICIAN ASSISTANT

## 2019-11-12 PROCEDURE — 3008F BODY MASS INDEX DOCD: CPT | Mod: S$GLB,,, | Performed by: PHYSICIAN ASSISTANT

## 2019-11-12 PROCEDURE — 1111F DSCHRG MED/CURRENT MED MERGE: CPT | Mod: S$GLB,,, | Performed by: PHYSICIAN ASSISTANT

## 2019-11-12 PROCEDURE — 99214 PR OFFICE/OUTPT VISIT, EST, LEVL IV, 30-39 MIN: ICD-10-PCS | Mod: S$GLB,,, | Performed by: PHYSICIAN ASSISTANT

## 2019-11-12 PROCEDURE — 1111F PR DISCHARGE MEDS RECONCILED W/ CURRENT OUTPATIENT MED LIST: ICD-10-PCS | Mod: S$GLB,,, | Performed by: PHYSICIAN ASSISTANT

## 2019-11-12 RX ORDER — TRAMADOL HYDROCHLORIDE 50 MG/1
50 TABLET ORAL EVERY 6 HOURS PRN
Qty: 12 TABLET | Refills: 0 | Status: CANCELLED | OUTPATIENT
Start: 2019-11-12

## 2019-11-12 RX ORDER — CLINDAMYCIN HYDROCHLORIDE 300 MG/1
CAPSULE ORAL
Refills: 0 | COMMUNITY
Start: 2019-11-08 | End: 2019-11-25 | Stop reason: ALTCHOICE

## 2019-11-12 RX ORDER — TRAMADOL HYDROCHLORIDE 50 MG/1
50 TABLET ORAL EVERY 6 HOURS PRN
Qty: 28 TABLET | Refills: 0 | Status: SHIPPED | OUTPATIENT
Start: 2019-11-12 | End: 2019-11-26

## 2019-11-12 RX ORDER — LEVOTHYROXINE SODIUM 50 UG/1
50 TABLET ORAL NIGHTLY
Status: ON HOLD | COMMUNITY
End: 2022-11-24 | Stop reason: HOSPADM

## 2019-11-12 NOTE — PATIENT INSTRUCTIONS
Chest Contusion    A contusion is a bruise to the skin, muscle, or ribs. It may cause pain, tenderness, and swelling. It may turn the skin purple until it heals. Contusions take a few days to a few weeks to heal.  Home care  Follow these guidelines when caring for yourself at home:  · Rest. Dont do any heavy lifting or strenuous activity. Dont do any activity that causes pain.  · Put an ice pack on the injured area. Do this for 20 minutes every 1 to 2 hours the first day. You can make an ice pack by wrapping a plastic bag of ice cubes in a thin towel. Continue to use the ice pack 3 to 4 times a day for the next 2 days. Then use the ice pack as needed to ease pain and swelling.  · After 1 to 2 days you may put a warm compress on the area. Do this for 10 minutes several times a day. A warm compress is a clean cloth thats damp with warm water.  · Hold a pillow to the affected area when you cough. This will help ease pain.  · You may use acetaminophen or ibuprofen to control pain, unless another pain medicine was prescribed. If you have chronic liver or kidney disease, talk with your health care provider before using these medicines. Also talk with your provider if youve had a stomach ulcer or GI bleeding.  Follow-up care  Follow up with your health care provider during the next week, or as advised.  When to seek medical advice  Call your health care provider right away if any of these occur:  · Shortness of breath, difficulty breathing, or breathing fast  · Chest pain gets worse when you breathe  · Severe pain that comes on suddenly or lasts more than an hour  · Dizziness, weakness, or fainting  · New abdominal pain or abdominal pain that gets worse  ·  Fever of 101ºF (38.3ºC) or higher, or as directed by your health care provider  Date Last Reviewed: 2/15/2015  © 9455-1277 [employer]. [address] [place] [address], [place] [address]. All rights reserved. This information is not intended as a substitute  for professional medical care. Always follow your healthcare professional's instructions.
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
## 2019-11-25 ENCOUNTER — OFFICE VISIT (OUTPATIENT)
Dept: SURGERY | Facility: CLINIC | Age: 54
End: 2019-11-25
Payer: COMMERCIAL

## 2019-11-25 VITALS
HEIGHT: 60 IN | BODY MASS INDEX: 35.53 KG/M2 | HEART RATE: 76 BPM | WEIGHT: 181 LBS | TEMPERATURE: 99 F | DIASTOLIC BLOOD PRESSURE: 84 MMHG | SYSTOLIC BLOOD PRESSURE: 132 MMHG

## 2019-11-25 DIAGNOSIS — S20.01XD POSTTRAUMATIC HEMATOMA OF RIGHT BREAST, SUBSEQUENT ENCOUNTER: Primary | ICD-10-CM

## 2019-11-25 PROCEDURE — 99212 PR OFFICE/OUTPT VISIT, EST, LEVL II, 10-19 MIN: ICD-10-PCS | Mod: S$GLB,,, | Performed by: SURGERY

## 2019-11-25 PROCEDURE — 99212 OFFICE O/P EST SF 10 MIN: CPT | Mod: S$GLB,,, | Performed by: SURGERY

## 2019-11-25 NOTE — LETTER
November 25, 2019      Sadiq Titus MD  [address]  [address]  [facility]  [place] [address]           [facility]  [address]  [place] [address]  Phone: [phone]  Fax: [phone]          Patient: Brunilda Bob   MR Number: [id number]   YOB: 1965   Date of Visit: 11/25/2019       Dear Dr. Sadiq Titus:    Thank you for referring Brunilda Bob to me for evaluation. Attached you will find relevant portions of my assessment and plan of care.    If you have questions, please do not hesitate to call me. I look forward to following Brunilda Bob along with you.    Sincerely,    Patrick Felder MD    Enclosure  CC:  No Recipients    If you would like to receive this communication electronically, please contact [email or web address] or [phone] to request more information on [employer] access.    For providers and/or their staff who would like to refer a patient to [facility], please contact us through our one-stop-shop provider referral line, [facility] [place], at [phone].    If you feel you have received this communication in error or would no longer like to receive these types of communications, please e-mail [email or web address]

## 2019-11-25 NOTE — PROGRESS NOTES
Patient presents for office follow-up.  She is feeling much better with much less pain.    Patient examined. Still has a hematoma in the right breast which is progressing as expected.  No evidence of skin necrosis or any other deterioration.    Impression/plan:  Hematoma of the right breast.  Will obtain right breast ultrasound to establish a new baseline and plan to follow that up with another ultrasound 3 months from now.  Will decide on follow-up here in the office based on progression of patient's symptoms and results of these next 2 ultrasounds.

## 2019-12-11 ENCOUNTER — HOSPITAL ENCOUNTER (OUTPATIENT)
Dept: RADIOLOGY | Facility: HOSPITAL | Age: 54
Discharge: HOME | End: 2019-12-11
Attending: INTERNAL MEDICINE
Payer: COMMERCIAL

## 2019-12-11 ENCOUNTER — HOSPITAL ENCOUNTER (OUTPATIENT)
Dept: RADIOLOGY | Facility: HOSPITAL | Age: 54
Discharge: HOME | End: 2019-12-11
Attending: SURGERY
Payer: COMMERCIAL

## 2019-12-11 DIAGNOSIS — E04.1 NONTOXIC UNINODULAR GOITER: ICD-10-CM

## 2019-12-11 DIAGNOSIS — S20.01XD POSTTRAUMATIC HEMATOMA OF RIGHT BREAST, SUBSEQUENT ENCOUNTER: ICD-10-CM

## 2019-12-11 PROCEDURE — 76641 ULTRASOUND BREAST COMPLETE: CPT | Mod: TC,PO,RT

## 2019-12-11 PROCEDURE — 76536 US EXAM OF HEAD AND NECK: CPT | Mod: TC,PO

## 2019-12-12 ENCOUNTER — OFFICE VISIT (OUTPATIENT)
Dept: FAMILY MEDICINE | Facility: CLINIC | Age: 54
End: 2019-12-12
Payer: COMMERCIAL

## 2019-12-12 VITALS
DIASTOLIC BLOOD PRESSURE: 88 MMHG | HEART RATE: 72 BPM | SYSTOLIC BLOOD PRESSURE: 134 MMHG | WEIGHT: 179 LBS | BODY MASS INDEX: 35.14 KG/M2 | HEIGHT: 60 IN | TEMPERATURE: 98 F

## 2019-12-12 DIAGNOSIS — L03.116 CELLULITIS OF LEFT LOWER EXTREMITY: ICD-10-CM

## 2019-12-12 DIAGNOSIS — K21.00 GERD WITH ESOPHAGITIS: ICD-10-CM

## 2019-12-12 DIAGNOSIS — I10 BENIGN ESSENTIAL HYPERTENSION: Primary | ICD-10-CM

## 2019-12-12 DIAGNOSIS — J32.9 SINUSITIS, UNSPECIFIED CHRONICITY, UNSPECIFIED LOCATION: ICD-10-CM

## 2019-12-12 PROCEDURE — 99214 PR OFFICE/OUTPT VISIT, EST, LEVL IV, 30-39 MIN: ICD-10-PCS | Mod: 25,S$GLB,, | Performed by: NURSE PRACTITIONER

## 2019-12-12 PROCEDURE — 96372 THER/PROPH/DIAG INJ SC/IM: CPT | Mod: S$GLB,,, | Performed by: NURSE PRACTITIONER

## 2019-12-12 PROCEDURE — 3008F PR BODY MASS INDEX (BMI) DOCUMENTED: ICD-10-PCS | Mod: S$GLB,,, | Performed by: NURSE PRACTITIONER

## 2019-12-12 PROCEDURE — 96372 PR INJECTION,THERAP/PROPH/DIAG2ST, IM OR SUBCUT: ICD-10-PCS | Mod: S$GLB,,, | Performed by: NURSE PRACTITIONER

## 2019-12-12 PROCEDURE — 3008F BODY MASS INDEX DOCD: CPT | Mod: S$GLB,,, | Performed by: NURSE PRACTITIONER

## 2019-12-12 PROCEDURE — 99214 OFFICE O/P EST MOD 30 MIN: CPT | Mod: 25,S$GLB,, | Performed by: NURSE PRACTITIONER

## 2019-12-12 RX ORDER — SULFAMETHOXAZOLE AND TRIMETHOPRIM 800; 160 MG/1; MG/1
1 TABLET ORAL 2 TIMES DAILY
Qty: 20 TABLET | Refills: 0 | Status: SHIPPED | OUTPATIENT
Start: 2019-12-12 | End: 2020-01-07

## 2019-12-12 RX ORDER — DEXAMETHASONE SODIUM PHOSPHATE 4 MG/ML
8 INJECTION, SOLUTION INTRA-ARTICULAR; INTRALESIONAL; INTRAMUSCULAR; INTRAVENOUS; SOFT TISSUE ONCE
Status: COMPLETED | OUTPATIENT
Start: 2019-12-12 | End: 2019-12-12

## 2019-12-12 RX ADMIN — DEXAMETHASONE SODIUM PHOSPHATE 8 MG: 4 INJECTION, SOLUTION INTRA-ARTICULAR; INTRALESIONAL; INTRAMUSCULAR; INTRAVENOUS; SOFT TISSUE at 11:12

## 2019-12-12 NOTE — PROGRESS NOTES
SUBJECTIVE:    Patient ID: Brunilda Bob is a 54 y.o. female.    Chief Complaint: Sinus Problem (did not bring bottles SW)    Presents with complaints of not feeling well x 3 weeks. Seen at [facility] urgent care. Given rocephin, steroids and amoxil. Feeling better but not 100%. Still has productive cough. No fever. Sore throat. Has hoarseness.   Was in [facility] October 30. Has sore to left ankle that will not heal. Still has drainage from site. No pain.       Admission on 10/30/2019, Discharged on 11/01/2019   Component Date Value Ref Range Status    BNP 10/30/2019 37  0 - 99 pg/mL Final    WBC 10/30/2019 8.75  3.90 - 12.70 K/uL Final    RBC 10/30/2019 4.46  4.00 - 5.40 M/uL Final    Hemoglobin 10/30/2019 11.8* 12.0 - 16.0 g/dL Final    Hematocrit 10/30/2019 37.8  37.0 - 48.5 % Final    Mean Corpuscular Volume 10/30/2019 85  82 - 98 fL Final    Mean Corpuscular Hemoglobin 10/30/2019 26.5* 27.0 - 31.0 pg Final    Mean Corpuscular Hemoglobin Conc 10/30/2019 31.2* 32.0 - 36.0 g/dL Final    RDW 10/30/2019 14.2  11.5 - 14.5 % Final    Platelets 10/30/2019 319  150 - 350 K/uL Final    MPV 10/30/2019 10.0  9.2 - 12.9 fL Final    Immature Granulocytes 10/30/2019 0.8* 0.0 - 0.5 % Final    Gran # (ANC) 10/30/2019 6.3  1.8 - 7.7 K/uL Final    Immature Grans (Abs) 10/30/2019 0.07* 0.00 - 0.04 K/uL Final    Lymph # 10/30/2019 1.5  1.0 - 4.8 K/uL Final    Mono # 10/30/2019 0.6  0.3 - 1.0 K/uL Final    Eos # 10/30/2019 0.3  0.0 - 0.5 K/uL Final    Baso # 10/30/2019 0.03  0.00 - 0.20 K/uL Final    nRBC 10/30/2019 0  0 /100 WBC Final    Gran% 10/30/2019 71.8  38.0 - 73.0 % Final    Lymph% 10/30/2019 17.0* 18.0 - 48.0 % Final    Mono% 10/30/2019 6.9  4.0 - 15.0 % Final    Eosinophil% 10/30/2019 3.2  0.0 - 8.0 % Final    Basophil% 10/30/2019 0.3  0.0 - 1.9 % Final    Differential Method 10/30/2019 Automated   Final    Sodium 10/30/2019 139  136 - 145 mmol/L Final    Potassium 10/30/2019 3.8  3.5 - 5.1  mmol/L Final    Chloride 10/30/2019 102  95 - 110 mmol/L Final    CO2 10/30/2019 31* 23 - 29 mmol/L Final    Glucose 10/30/2019 122* 70 - 110 mg/dL Final    BUN, Bld 10/30/2019 23* 6 - 20 mg/dL Final    Creatinine 10/30/2019 0.9  0.5 - 1.4 mg/dL Final    Calcium 10/30/2019 9.4  8.7 - 10.5 mg/dL Final    Total Protein 10/30/2019 6.9  6.0 - 8.4 g/dL Final    Albumin 10/30/2019 3.9  3.5 - 5.2 g/dL Final    Total Bilirubin 10/30/2019 0.6  0.1 - 1.0 mg/dL Final    Alkaline Phosphatase 10/30/2019 82  55 - 135 U/L Final    AST 10/30/2019 23  10 - 40 U/L Final    ALT 10/30/2019 22  10 - 44 U/L Final    Anion Gap 10/30/2019 6* 8 - 16 mmol/L Final    eGFR if African American 10/30/2019 >60.0  >60 mL/min/1.73 m^2 Final    eGFR if non African American 10/30/2019 >60.0  >60 mL/min/1.73 m^2 Final    Magnesium 10/30/2019 1.9  1.6 - 2.6 mg/dL Final    PT 10/30/2019 13.3  10.6 - 14.8 sec Final    INR 10/30/2019 1.1   Final    Troponin I 10/30/2019 <0.030  0.020 - 0.040 ng/mL Final    TSH 10/30/2019 2.490  0.340 - 5.600 uIU/mL Final    Lipase 10/30/2019 29  4 - 60 U/L Final    POC Glucose 10/30/2019 119* 70 - 110 mg/dL Final    POC BUN 10/30/2019 21  6 - 30 mg/dL Final    POC Creatinine 10/30/2019 0.9  0.5 - 1.4 mg/dL Final    POC Sodium 10/30/2019 139  136 - 145 mmol/L Final    POC Potassium 10/30/2019 3.7  3.5 - 5.1 mmol/L Final    POC Chloride 10/30/2019 101  95 - 110 mmol/L Final    POC TCO2 (MEASURED) 10/30/2019 28  23 - 29 mmol/L Final    POC Anion Gap 10/30/2019 14  8 - 16 mmol/L Final    POC Ionized Calcium 10/30/2019 1.23  1.06 - 1.42 mmol/L Final    POC Hematocrit 10/30/2019 36  36 - 54 %PCV Final    Sample 10/30/2019 VENOUS   Final    Group & Rh 10/30/2019 A POS   Final    Indirect Surya 10/30/2019 NEG   Final    Troponin I 10/30/2019 <0.030  0.020 - 0.040 ng/mL Final    Sodium 10/31/2019 139  136 - 145 mmol/L Final    Potassium 10/31/2019 4.4  3.5 - 5.1 mmol/L Final    Chloride  10/31/2019 102  95 - 110 mmol/L Final    CO2 10/31/2019 29  23 - 29 mmol/L Final    Glucose 10/31/2019 110  70 - 110 mg/dL Final    BUN, Bld 10/31/2019 17  6 - 20 mg/dL Final    Creatinine 10/31/2019 0.8  0.5 - 1.4 mg/dL Final    Calcium 10/31/2019 8.9  8.7 - 10.5 mg/dL Final    Anion Gap 10/31/2019 8  8 - 16 mmol/L Final    eGFR if African American 10/31/2019 >60.0  >60 mL/min/1.73 m^2 Final    eGFR if non African American 10/31/2019 >60.0  >60 mL/min/1.73 m^2 Final    WBC 10/31/2019 7.61  3.90 - 12.70 K/uL Final    RBC 10/31/2019 4.11  4.00 - 5.40 M/uL Final    Hemoglobin 10/31/2019 10.7* 12.0 - 16.0 g/dL Final    Hematocrit 10/31/2019 34.9* 37.0 - 48.5 % Final    Mean Corpuscular Volume 10/31/2019 85  82 - 98 fL Final    Mean Corpuscular Hemoglobin 10/31/2019 26.0* 27.0 - 31.0 pg Final    Mean Corpuscular Hemoglobin Conc 10/31/2019 30.7* 32.0 - 36.0 g/dL Final    RDW 10/31/2019 14.2  11.5 - 14.5 % Final    Platelets 10/31/2019 282  150 - 350 K/uL Final    MPV 10/31/2019 10.0  9.2 - 12.9 fL Final    Immature Granulocytes 10/31/2019 0.4  0.0 - 0.5 % Final    Gran # (ANC) 10/31/2019 5.9  1.8 - 7.7 K/uL Final    Immature Grans (Abs) 10/31/2019 0.03  0.00 - 0.04 K/uL Final    Lymph # 10/31/2019 1.0  1.0 - 4.8 K/uL Final    Mono # 10/31/2019 0.7  0.3 - 1.0 K/uL Final    Eos # 10/31/2019 0.1  0.0 - 0.5 K/uL Final    Baso # 10/31/2019 0.02  0.00 - 0.20 K/uL Final    nRBC 10/31/2019 0  0 /100 WBC Final    Gran% 10/31/2019 77.2* 38.0 - 73.0 % Final    Lymph% 10/31/2019 12.7* 18.0 - 48.0 % Final    Mono% 10/31/2019 8.5  4.0 - 15.0 % Final    Eosinophil% 10/31/2019 0.9  0.0 - 8.0 % Final    Basophil% 10/31/2019 0.3  0.0 - 1.9 % Final    Differential Method 10/31/2019 Automated   Final    Troponin I 10/31/2019 <0.030  0.020 - 0.040 ng/mL Final    Sodium 11/01/2019 138  136 - 145 mmol/L Final    Potassium 11/01/2019 4.0  3.5 - 5.1 mmol/L Final    Chloride 11/01/2019 102  95 - 110 mmol/L  Final    CO2 11/01/2019 30* 23 - 29 mmol/L Final    Glucose 11/01/2019 106  70 - 110 mg/dL Final    BUN, Bld 11/01/2019 21* 6 - 20 mg/dL Final    Creatinine 11/01/2019 0.9  0.5 - 1.4 mg/dL Final    Calcium 11/01/2019 8.6* 8.7 - 10.5 mg/dL Final    Anion Gap 11/01/2019 6* 8 - 16 mmol/L Final    eGFR if African American 11/01/2019 >60.0  >60 mL/min/1.73 m^2 Final    eGFR if non African American 11/01/2019 >60.0  >60 mL/min/1.73 m^2 Final    WBC 11/01/2019 5.97  3.90 - 12.70 K/uL Final    RBC 11/01/2019 3.62* 4.00 - 5.40 M/uL Final    Hemoglobin 11/01/2019 9.5* 12.0 - 16.0 g/dL Final    Hematocrit 11/01/2019 30.7* 37.0 - 48.5 % Final    Mean Corpuscular Volume 11/01/2019 85  82 - 98 fL Final    Mean Corpuscular Hemoglobin 11/01/2019 26.2* 27.0 - 31.0 pg Final    Mean Corpuscular Hemoglobin Conc 11/01/2019 30.9* 32.0 - 36.0 g/dL Final    RDW 11/01/2019 14.8* 11.5 - 14.5 % Final    Platelets 11/01/2019 257  150 - 350 K/uL Final    MPV 11/01/2019 10.4  9.2 - 12.9 fL Final    Immature Granulocytes 11/01/2019 0.3  0.0 - 0.5 % Final    Gran # (ANC) 11/01/2019 4.1  1.8 - 7.7 K/uL Final    Immature Grans (Abs) 11/01/2019 0.02  0.00 - 0.04 K/uL Final    Lymph # 11/01/2019 1.0  1.0 - 4.8 K/uL Final    Mono # 11/01/2019 0.5  0.3 - 1.0 K/uL Final    Eos # 11/01/2019 0.3  0.0 - 0.5 K/uL Final    Baso # 11/01/2019 0.02  0.00 - 0.20 K/uL Final    nRBC 11/01/2019 0  0 /100 WBC Final    Gran% 11/01/2019 68.4  38.0 - 73.0 % Final    Lymph% 11/01/2019 16.8* 18.0 - 48.0 % Final    Mono% 11/01/2019 8.7  4.0 - 15.0 % Final    Eosinophil% 11/01/2019 5.5  0.0 - 8.0 % Final    Basophil% 11/01/2019 0.3  0.0 - 1.9 % Final    Differential Method 11/01/2019 Automated   Final    WBC 11/01/2019 5.97  3.90 - 12.70 K/uL Final    RBC 11/01/2019 3.62* 4.00 - 5.40 M/uL Final    Hemoglobin 11/01/2019 9.5* 12.0 - 16.0 g/dL Final    Hematocrit 11/01/2019 30.7* 37.0 - 48.5 % Final    Mean Corpuscular Volume 11/01/2019 85   82 - 98 fL Final    Mean Corpuscular Hemoglobin 11/01/2019 26.2* 27.0 - 31.0 pg Final    Mean Corpuscular Hemoglobin Conc 11/01/2019 30.9* 32.0 - 36.0 g/dL Final    RDW 11/01/2019 14.8* 11.5 - 14.5 % Final    Platelets 11/01/2019 257  150 - 350 K/uL Final    MPV 11/01/2019 10.4  9.2 - 12.9 fL Final       Past Medical History:   Diagnosis Date    Broken heart syndrome      Past Surgical History:   Procedure Laterality Date    CLAVICLE SURGERY  2012    TONSILLECTOMY       No family history on file.    Marital Status:   Alcohol History:  reports that she drinks alcohol.  Tobacco History:  reports that she has never smoked. She has never used smokeless tobacco.  Drug History:  has no drug history on file.    Review of patient's allergies indicates:   Allergen Reactions    Codeine Nausea And Vomiting    Crestor [rosuvastatin] Other (See Comments)     paralysis    Strawberry flavor      Other reaction(s): Unknown       Current Outpatient Medications:     atenolol (TENORMIN) 25 MG tablet, Take 25 mg by mouth once daily., Disp: , Rfl:     ergocalciferol, vitamin D2, (VITAMIN D2 ORAL), Take 5,000 mg by mouth nightly., Disp: , Rfl:     iron carb,gl/FA/B12/C/docusate (FERRALET 90 DUAL-IRON DELIVERY ORAL), Take 90 mg by mouth nightly., Disp: , Rfl:     levothyroxine (UNITHROID) 50 MCG tablet, Take 50 mcg by mouth once daily., Disp: , Rfl:     ramipril (ALTACE) 10 MG capsule, Take 10 mg by mouth once daily., Disp: , Rfl:     ranitidine (ZANTAC) 75 MG tablet, Take 75 mg by mouth as needed for Heartburn., Disp: , Rfl:     sulfamethoxazole-trimethoprim 800-160mg (BACTRIM DS) 800-160 mg Tab, Take 1 tablet by mouth 2 (two) times daily., Disp: 20 tablet, Rfl: 0  No current facility-administered medications for this visit.     Review of Systems   Constitutional: Negative for chills and fever.   HENT: Positive for congestion and sinus pressure. Negative for ear discharge, ear pain and sore throat.    Eyes:  Negative for discharge.   Respiratory: Positive for cough. Negative for shortness of breath.    Cardiovascular: Negative for chest pain and leg swelling.   Skin: Positive for wound.          Objective:      Vitals:    12/12/19 0930   BP: 134/88   Pulse: 72   Temp: 98.3 °F (36.8 °C)   Weight: 81.2 kg (179 lb)   Height: 5' (1.524 m)     Body mass index is 34.96 kg/m².  Physical Exam   Constitutional: She appears well-developed and well-nourished. She does not appear ill.   HENT:   Right Ear: External ear normal.   Left Ear: External ear normal.   Nose: Rhinorrhea present. Right sinus exhibits frontal sinus tenderness. Left sinus exhibits frontal sinus tenderness.   Mouth/Throat: Mucous membranes are normal. Posterior oropharyngeal erythema present. No tonsillar exudate.   Cardiovascular: Normal rate, regular rhythm and normal heart sounds.   Pulmonary/Chest: Breath sounds normal.   Lymphadenopathy:     She has no cervical adenopathy.   Skin:              Assessment:       1. Benign essential hypertension    2. Sinusitis, unspecified chronicity, unspecified location    3. Cellulitis of left lower extremity    4. GERD with esophagitis         Plan:       Benign essential hypertension    Sinusitis, unspecified chronicity, unspecified location  -     dexamethasone injection 8 mg    Cellulitis of left lower extremity  Comments:  keep site clean and dry  Orders:  -     sulfamethoxazole-trimethoprim 800-160mg (BACTRIM DS) 800-160 mg Tab; Take 1 tablet by mouth 2 (two) times daily.  Dispense: 20 tablet; Refill: 0    GERD with esophagitis      Follow up in about 1 week (around 12/19/2019) for Follow up.

## 2019-12-18 ENCOUNTER — OFFICE VISIT (OUTPATIENT)
Dept: FAMILY MEDICINE | Facility: CLINIC | Age: 54
End: 2019-12-18
Payer: COMMERCIAL

## 2019-12-18 VITALS
HEART RATE: 76 BPM | HEIGHT: 60 IN | WEIGHT: 177 LBS | SYSTOLIC BLOOD PRESSURE: 98 MMHG | BODY MASS INDEX: 34.75 KG/M2 | DIASTOLIC BLOOD PRESSURE: 68 MMHG

## 2019-12-18 DIAGNOSIS — I10 BENIGN ESSENTIAL HYPERTENSION: Primary | ICD-10-CM

## 2019-12-18 DIAGNOSIS — R60.9 EDEMA, UNSPECIFIED TYPE: ICD-10-CM

## 2019-12-18 DIAGNOSIS — S81.801D OPEN WOUND OF RIGHT LOWER EXTREMITY, SUBSEQUENT ENCOUNTER: ICD-10-CM

## 2019-12-18 DIAGNOSIS — K21.00 GERD WITH ESOPHAGITIS: ICD-10-CM

## 2019-12-18 DIAGNOSIS — J06.9 UPPER RESPIRATORY TRACT INFECTION, UNSPECIFIED TYPE: ICD-10-CM

## 2019-12-18 PROCEDURE — 99214 PR OFFICE/OUTPT VISIT, EST, LEVL IV, 30-39 MIN: ICD-10-PCS | Mod: S$GLB,,, | Performed by: NURSE PRACTITIONER

## 2019-12-18 PROCEDURE — 99214 OFFICE O/P EST MOD 30 MIN: CPT | Mod: S$GLB,,, | Performed by: NURSE PRACTITIONER

## 2019-12-18 PROCEDURE — 3008F BODY MASS INDEX DOCD: CPT | Mod: S$GLB,,, | Performed by: NURSE PRACTITIONER

## 2019-12-18 PROCEDURE — 3008F PR BODY MASS INDEX (BMI) DOCUMENTED: ICD-10-PCS | Mod: S$GLB,,, | Performed by: NURSE PRACTITIONER

## 2019-12-18 RX ORDER — FUROSEMIDE 20 MG/1
20 TABLET ORAL DAILY
Qty: 30 TABLET | Refills: 0 | Status: SHIPPED | OUTPATIENT
Start: 2019-12-18 | End: 2020-07-13
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
## 2019-12-26 ENCOUNTER — TELEPHONE (OUTPATIENT)
Dept: FAMILY MEDICINE | Facility: CLINIC | Age: 54
End: 2019-12-26

## 2019-12-26 DIAGNOSIS — S81.809D NON-HEALING WOUND OF LOWER EXTREMITY, UNSPECIFIED LATERALITY, SUBSEQUENT ENCOUNTER: Primary | ICD-10-CM

## 2019-12-26 NOTE — TELEPHONE ENCOUNTER
Spoke with pt, no impovements on the wound. Pt ok with you ordering the doppler and wound care. I told her they would call her to schedule.

## 2019-12-26 NOTE — TELEPHONE ENCOUNTER
----- Message from Mariano Steve sent at 12/26/2019  9:38 AM CST -----  Contact: Brunilda Spann  Pt would like to come in today . She says it's in regards to the wound on her leg. She says she isn't sure if Ana wants to see the wound first before referring0 her to Wound Care? She says she isn't sure if the lasix is working or not.   Pt# [phone]

## 2019-12-30 ENCOUNTER — TELEPHONE (OUTPATIENT)
Dept: FAMILY MEDICINE | Facility: CLINIC | Age: 54
End: 2019-12-30

## 2019-12-30 NOTE — TELEPHONE ENCOUNTER
----- Message from Azul Zapien sent at 12/30/2019 11:56 AM CST -----  vm- patient calling stating that the sierra study that seanjade rehmanpadma is written incorrectly and they wont schedule it call back number is [phone]

## 2019-12-31 ENCOUNTER — HOSPITAL ENCOUNTER (OUTPATIENT)
Dept: RADIOLOGY | Facility: HOSPITAL | Age: 54
Discharge: HOME | End: 2019-12-31
Attending: NURSE PRACTITIONER
Payer: COMMERCIAL

## 2019-12-31 DIAGNOSIS — S81.809D NON-HEALING WOUND OF LOWER EXTREMITY, UNSPECIFIED LATERALITY, SUBSEQUENT ENCOUNTER: ICD-10-CM

## 2019-12-31 PROCEDURE — 93925 LOWER EXTREMITY STUDY: CPT | Mod: TC

## 2020-01-06 ENCOUNTER — OFFICE VISIT (OUTPATIENT)
Dept: WOUND CARE | Facility: HOSPITAL | Age: 55
End: 2020-01-06
Attending: DENTIST
Payer: COMMERCIAL

## 2020-01-06 VITALS
HEART RATE: 78 BPM | SYSTOLIC BLOOD PRESSURE: 139 MMHG | TEMPERATURE: 98 F | RESPIRATION RATE: 17 BRPM | DIASTOLIC BLOOD PRESSURE: 86 MMHG

## 2020-01-06 DIAGNOSIS — L97.822 NON-PRS CHRONIC ULCER OTH PRT L LOW LEG W FAT LAYER EXPOSED: Primary | ICD-10-CM

## 2020-01-06 PROCEDURE — 11042 DBRDMT SUBQ TIS 1ST 20SQCM/<: CPT | Performed by: INTERNAL MEDICINE

## 2020-01-06 PROCEDURE — 99203 OFFICE O/P NEW LOW 30 MIN: CPT | Mod: 25 | Performed by: INTERNAL MEDICINE

## 2020-01-06 RX ORDER — LORATADINE 10 MG/1
10 TABLET ORAL NIGHTLY
Status: ON HOLD | COMMUNITY
End: 2022-11-24 | Stop reason: HOSPADM

## 2020-01-06 RX ORDER — LANOLIN ALCOHOL/MO/W.PET/CERES
400 CREAM (GRAM) TOPICAL NIGHTLY
Status: ON HOLD | COMMUNITY
End: 2022-11-24 | Stop reason: HOSPADM

## 2020-01-06 RX ORDER — ASCORBIC ACID 500 MG
2 TABLET ORAL DAILY
COMMUNITY
End: 2022-06-30

## 2020-01-06 RX ORDER — MULTIVITAMIN
1 TABLET ORAL NIGHTLY
Status: ON HOLD | COMMUNITY
End: 2022-11-24 | Stop reason: HOSPADM

## 2020-01-06 RX ORDER — ACETAMINOPHEN 120 MG/1
500 SUPPOSITORY RECTAL EVERY 4 HOURS PRN
COMMUNITY
End: 2020-01-07

## 2020-01-06 RX ORDER — ONDANSETRON HYDROCHLORIDE 8 MG/1
8 TABLET, FILM COATED ORAL EVERY 8 HOURS PRN
COMMUNITY
End: 2022-06-30
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
## 2020-01-07 ENCOUNTER — OFFICE VISIT (OUTPATIENT)
Dept: FAMILY MEDICINE | Facility: CLINIC | Age: 55
End: 2020-01-07
Payer: COMMERCIAL

## 2020-01-07 VITALS
HEIGHT: 60 IN | HEART RATE: 72 BPM | DIASTOLIC BLOOD PRESSURE: 78 MMHG | BODY MASS INDEX: 35.7 KG/M2 | SYSTOLIC BLOOD PRESSURE: 132 MMHG | WEIGHT: 181.81 LBS

## 2020-01-07 DIAGNOSIS — J06.9 UPPER RESPIRATORY TRACT INFECTION, UNSPECIFIED TYPE: ICD-10-CM

## 2020-01-07 DIAGNOSIS — I10 BENIGN ESSENTIAL HYPERTENSION: ICD-10-CM

## 2020-01-07 DIAGNOSIS — S20.01XA POSTTRAUMATIC HEMATOMA OF RIGHT BREAST, INITIAL ENCOUNTER: Primary | ICD-10-CM

## 2020-01-07 PROCEDURE — 99213 PR OFFICE/OUTPT VISIT, EST, LEVL III, 20-29 MIN: ICD-10-PCS | Mod: S$GLB,,, | Performed by: PHYSICIAN ASSISTANT

## 2020-01-07 PROCEDURE — 3078F PR MOST RECENT DIASTOLIC BLOOD PRESSURE < 80 MM HG: ICD-10-PCS | Mod: S$GLB,,, | Performed by: PHYSICIAN ASSISTANT

## 2020-01-07 PROCEDURE — 3008F BODY MASS INDEX DOCD: CPT | Mod: S$GLB,,, | Performed by: PHYSICIAN ASSISTANT

## 2020-01-07 PROCEDURE — 3075F SYST BP GE 130 - 139MM HG: CPT | Mod: S$GLB,,, | Performed by: PHYSICIAN ASSISTANT

## 2020-01-07 PROCEDURE — 3078F DIAST BP <80 MM HG: CPT | Mod: S$GLB,,, | Performed by: PHYSICIAN ASSISTANT

## 2020-01-07 PROCEDURE — 99213 OFFICE O/P EST LOW 20 MIN: CPT | Mod: S$GLB,,, | Performed by: PHYSICIAN ASSISTANT

## 2020-01-07 PROCEDURE — 3075F PR MOST RECENT SYSTOLIC BLOOD PRESS GE 130-139MM HG: ICD-10-PCS | Mod: S$GLB,,, | Performed by: PHYSICIAN ASSISTANT

## 2020-01-07 PROCEDURE — 3008F PR BODY MASS INDEX (BMI) DOCUMENTED: ICD-10-PCS | Mod: S$GLB,,, | Performed by: PHYSICIAN ASSISTANT

## 2020-01-07 RX ORDER — ACETAMINOPHEN 500 MG
1000 TABLET ORAL 3 TIMES DAILY
Status: ON HOLD | COMMUNITY
End: 2022-07-02 | Stop reason: HOSPADM

## 2020-01-07 NOTE — PROGRESS NOTES
"  SUBJECTIVE:    Patient ID: Brunilda Bob is a 54 y.o. female.    Chief Complaint: Hypertension (2 month follow up.....mlr) and Sore Throat (Pt reports sore throat and "ear crackling".....mlr)    [age] year-old white female presents for 2 month checkup.  I saw her a few months back after she sustained a posttraumatic hematoma to her right breast.  She has been seen since that time twice by Ana Galindo and her pressure seemed to be normal at those visits.  Today she reports that she has had some upper respiratory symptoms that include a sore throat and ear cracking. She says that she has been back on her normal BP meds since she is off toradol. Has machine at home and says that it has been looking good. Checked by me after she settled down in the room and it appears to be very well controlled. She was also at the woundcare specialist yesterday and had left lower leg wound debrided. Wrapped now to followup in one week.      Admission on 10/30/2019, Discharged on 11/01/2019   Component Date Value Ref Range Status    BNP 10/30/2019 37  0 - 99 pg/mL Final    WBC 10/30/2019 8.75  3.90 - 12.70 K/uL Final    RBC 10/30/2019 4.46  4.00 - 5.40 M/uL Final    Hemoglobin 10/30/2019 11.8* 12.0 - 16.0 g/dL Final    Hematocrit 10/30/2019 37.8  37.0 - 48.5 % Final    Mean Corpuscular Volume 10/30/2019 85  82 - 98 fL Final    Mean Corpuscular Hemoglobin 10/30/2019 26.5* 27.0 - 31.0 pg Final    Mean Corpuscular Hemoglobin Conc 10/30/2019 31.2* 32.0 - 36.0 g/dL Final    RDW 10/30/2019 14.2  11.5 - 14.5 % Final    Platelets 10/30/2019 319  150 - 350 K/uL Final    MPV 10/30/2019 10.0  9.2 - 12.9 fL Final    Immature Granulocytes 10/30/2019 0.8* 0.0 - 0.5 % Final    Gran # (ANC) 10/30/2019 6.3  1.8 - 7.7 K/uL Final    Immature Grans (Abs) 10/30/2019 0.07* 0.00 - 0.04 K/uL Final    Lymph # 10/30/2019 1.5  1.0 - 4.8 K/uL Final    Mono # 10/30/2019 0.6  0.3 - 1.0 K/uL Final    Eos # 10/30/2019 0.3  0.0 - 0.5 " K/uL Final    Baso # 10/30/2019 0.03  0.00 - 0.20 K/uL Final    nRBC 10/30/2019 0  0 /100 WBC Final    Gran% 10/30/2019 71.8  38.0 - 73.0 % Final    Lymph% 10/30/2019 17.0* 18.0 - 48.0 % Final    Mono% 10/30/2019 6.9  4.0 - 15.0 % Final    Eosinophil% 10/30/2019 3.2  0.0 - 8.0 % Final    Basophil% 10/30/2019 0.3  0.0 - 1.9 % Final    Differential Method 10/30/2019 Automated   Final    Sodium 10/30/2019 139  136 - 145 mmol/L Final    Potassium 10/30/2019 3.8  3.5 - 5.1 mmol/L Final    Chloride 10/30/2019 102  95 - 110 mmol/L Final    CO2 10/30/2019 31* 23 - 29 mmol/L Final    Glucose 10/30/2019 122* 70 - 110 mg/dL Final    BUN, Bld 10/30/2019 23* 6 - 20 mg/dL Final    Creatinine 10/30/2019 0.9  0.5 - 1.4 mg/dL Final    Calcium 10/30/2019 9.4  8.7 - 10.5 mg/dL Final    Total Protein 10/30/2019 6.9  6.0 - 8.4 g/dL Final    Albumin 10/30/2019 3.9  3.5 - 5.2 g/dL Final    Total Bilirubin 10/30/2019 0.6  0.1 - 1.0 mg/dL Final    Alkaline Phosphatase 10/30/2019 82  55 - 135 U/L Final    AST 10/30/2019 23  10 - 40 U/L Final    ALT 10/30/2019 22  10 - 44 U/L Final    Anion Gap 10/30/2019 6* 8 - 16 mmol/L Final    eGFR if African American 10/30/2019 >60.0  >60 mL/min/1.73 m^2 Final    eGFR if non African American 10/30/2019 >60.0  >60 mL/min/1.73 m^2 Final    Magnesium 10/30/2019 1.9  1.6 - 2.6 mg/dL Final    PT 10/30/2019 13.3  10.6 - 14.8 sec Final    INR 10/30/2019 1.1   Final    Troponin I 10/30/2019 <0.030  0.020 - 0.040 ng/mL Final    TSH 10/30/2019 2.490  0.340 - 5.600 uIU/mL Final    Lipase 10/30/2019 29  4 - 60 U/L Final    POC Glucose 10/30/2019 119* 70 - 110 mg/dL Final    POC BUN 10/30/2019 21  6 - 30 mg/dL Final    POC Creatinine 10/30/2019 0.9  0.5 - 1.4 mg/dL Final    POC Sodium 10/30/2019 139  136 - 145 mmol/L Final    POC Potassium 10/30/2019 3.7  3.5 - 5.1 mmol/L Final    POC Chloride 10/30/2019 101  95 - 110 mmol/L Final    POC TCO2 (MEASURED) 10/30/2019 28  23 - 29  mmol/L Final    POC Anion Gap 10/30/2019 14  8 - 16 mmol/L Final    POC Ionized Calcium 10/30/2019 1.23  1.06 - 1.42 mmol/L Final    POC Hematocrit 10/30/2019 36  36 - 54 %PCV Final    Sample 10/30/2019 VENOUS   Final    Group & Rh 10/30/2019 A POS   Final    Indirect Surya 10/30/2019 NEG   Final    Troponin I 10/30/2019 <0.030  0.020 - 0.040 ng/mL Final    Sodium 10/31/2019 139  136 - 145 mmol/L Final    Potassium 10/31/2019 4.4  3.5 - 5.1 mmol/L Final    Chloride 10/31/2019 102  95 - 110 mmol/L Final    CO2 10/31/2019 29  23 - 29 mmol/L Final    Glucose 10/31/2019 110  70 - 110 mg/dL Final    BUN, Bld 10/31/2019 17  6 - 20 mg/dL Final    Creatinine 10/31/2019 0.8  0.5 - 1.4 mg/dL Final    Calcium 10/31/2019 8.9  8.7 - 10.5 mg/dL Final    Anion Gap 10/31/2019 8  8 - 16 mmol/L Final    eGFR if African American 10/31/2019 >60.0  >60 mL/min/1.73 m^2 Final    eGFR if non African American 10/31/2019 >60.0  >60 mL/min/1.73 m^2 Final    WBC 10/31/2019 7.61  3.90 - 12.70 K/uL Final    RBC 10/31/2019 4.11  4.00 - 5.40 M/uL Final    Hemoglobin 10/31/2019 10.7* 12.0 - 16.0 g/dL Final    Hematocrit 10/31/2019 34.9* 37.0 - 48.5 % Final    Mean Corpuscular Volume 10/31/2019 85  82 - 98 fL Final    Mean Corpuscular Hemoglobin 10/31/2019 26.0* 27.0 - 31.0 pg Final    Mean Corpuscular Hemoglobin Conc 10/31/2019 30.7* 32.0 - 36.0 g/dL Final    RDW 10/31/2019 14.2  11.5 - 14.5 % Final    Platelets 10/31/2019 282  150 - 350 K/uL Final    MPV 10/31/2019 10.0  9.2 - 12.9 fL Final    Immature Granulocytes 10/31/2019 0.4  0.0 - 0.5 % Final    Gran # (ANC) 10/31/2019 5.9  1.8 - 7.7 K/uL Final    Immature Grans (Abs) 10/31/2019 0.03  0.00 - 0.04 K/uL Final    Lymph # 10/31/2019 1.0  1.0 - 4.8 K/uL Final    Mono # 10/31/2019 0.7  0.3 - 1.0 K/uL Final    Eos # 10/31/2019 0.1  0.0 - 0.5 K/uL Final    Baso # 10/31/2019 0.02  0.00 - 0.20 K/uL Final    nRBC 10/31/2019 0  0 /100 WBC Final    Gran% 10/31/2019  77.2* 38.0 - 73.0 % Final    Lymph% 10/31/2019 12.7* 18.0 - 48.0 % Final    Mono% 10/31/2019 8.5  4.0 - 15.0 % Final    Eosinophil% 10/31/2019 0.9  0.0 - 8.0 % Final    Basophil% 10/31/2019 0.3  0.0 - 1.9 % Final    Differential Method 10/31/2019 Automated   Final    Troponin I 10/31/2019 <0.030  0.020 - 0.040 ng/mL Final    Sodium 11/01/2019 138  136 - 145 mmol/L Final    Potassium 11/01/2019 4.0  3.5 - 5.1 mmol/L Final    Chloride 11/01/2019 102  95 - 110 mmol/L Final    CO2 11/01/2019 30* 23 - 29 mmol/L Final    Glucose 11/01/2019 106  70 - 110 mg/dL Final    BUN, Bld 11/01/2019 21* 6 - 20 mg/dL Final    Creatinine 11/01/2019 0.9  0.5 - 1.4 mg/dL Final    Calcium 11/01/2019 8.6* 8.7 - 10.5 mg/dL Final    Anion Gap 11/01/2019 6* 8 - 16 mmol/L Final    eGFR if African American 11/01/2019 >60.0  >60 mL/min/1.73 m^2 Final    eGFR if non African American 11/01/2019 >60.0  >60 mL/min/1.73 m^2 Final    WBC 11/01/2019 5.97  3.90 - 12.70 K/uL Final    RBC 11/01/2019 3.62* 4.00 - 5.40 M/uL Final    Hemoglobin 11/01/2019 9.5* 12.0 - 16.0 g/dL Final    Hematocrit 11/01/2019 30.7* 37.0 - 48.5 % Final    Mean Corpuscular Volume 11/01/2019 85  82 - 98 fL Final    Mean Corpuscular Hemoglobin 11/01/2019 26.2* 27.0 - 31.0 pg Final    Mean Corpuscular Hemoglobin Conc 11/01/2019 30.9* 32.0 - 36.0 g/dL Final    RDW 11/01/2019 14.8* 11.5 - 14.5 % Final    Platelets 11/01/2019 257  150 - 350 K/uL Final    MPV 11/01/2019 10.4  9.2 - 12.9 fL Final    Immature Granulocytes 11/01/2019 0.3  0.0 - 0.5 % Final    Gran # (ANC) 11/01/2019 4.1  1.8 - 7.7 K/uL Final    Immature Grans (Abs) 11/01/2019 0.02  0.00 - 0.04 K/uL Final    Lymph # 11/01/2019 1.0  1.0 - 4.8 K/uL Final    Mono # 11/01/2019 0.5  0.3 - 1.0 K/uL Final    Eos # 11/01/2019 0.3  0.0 - 0.5 K/uL Final    Baso # 11/01/2019 0.02  0.00 - 0.20 K/uL Final    nRBC 11/01/2019 0  0 /100 WBC Final    Gran% 11/01/2019 68.4  38.0 - 73.0 % Final    Lymph%  11/01/2019 16.8* 18.0 - 48.0 % Final    Mono% 11/01/2019 8.7  4.0 - 15.0 % Final    Eosinophil% 11/01/2019 5.5  0.0 - 8.0 % Final    Basophil% 11/01/2019 0.3  0.0 - 1.9 % Final    Differential Method 11/01/2019 Automated   Final    WBC 11/01/2019 5.97  3.90 - 12.70 K/uL Final    RBC 11/01/2019 3.62* 4.00 - 5.40 M/uL Final    Hemoglobin 11/01/2019 9.5* 12.0 - 16.0 g/dL Final    Hematocrit 11/01/2019 30.7* 37.0 - 48.5 % Final    Mean Corpuscular Volume 11/01/2019 85  82 - 98 fL Final    Mean Corpuscular Hemoglobin 11/01/2019 26.2* 27.0 - 31.0 pg Final    Mean Corpuscular Hemoglobin Conc 11/01/2019 30.9* 32.0 - 36.0 g/dL Final    RDW 11/01/2019 14.8* 11.5 - 14.5 % Final    Platelets 11/01/2019 257  150 - 350 K/uL Final    MPV 11/01/2019 10.4  9.2 - 12.9 fL Final       Past Medical History:   Diagnosis Date    Broken heart syndrome     Hypertension      Past Surgical History:   Procedure Laterality Date    CLAVICLE SURGERY  2012    TONSILLECTOMY       History reviewed. No pertinent family history.    Marital Status:   Alcohol History:  reports that she drinks alcohol.  Tobacco History:  reports that she has never smoked. She has never used smokeless tobacco.  Drug History:  reports that she does not use drugs.    Review of patient's allergies indicates:   Allergen Reactions    Crestor [rosuvastatin] Other (See Comments)     Paralysis    Codeine Nausea And Vomiting    Strawberries [strawberry] Hives       Current Outpatient Medications:     acetaminophen (TYLENOL) 500 MG tablet, Take 1,000 mg by mouth 3 (three) times daily., Disp: , Rfl:     ascorbic acid, vitamin C, (VITAMIN C) 500 MG tablet, Take 2 mg by mouth once daily., Disp: , Rfl:     atenolol (TENORMIN) 25 MG tablet, Take 25 mg by mouth once daily., Disp: , Rfl:     ergocalciferol, vitamin D2, (VITAMIN D2 ORAL), Take 5,000 mg by mouth nightly., Disp: , Rfl:     furosemide (LASIX) 20 MG tablet, Take 1 tablet (20 mg total) by mouth  once daily., Disp: 30 tablet, Rfl: 0    iron carb,gl/FA/B12/C/docusate (FERRALET 90 DUAL-IRON DELIVERY ORAL), Take 90 mg by mouth 4 (four) times a week. , Disp: , Rfl:     Lactobacillus rhamnosus GG (CULTURELLE) 10 billion cell capsule, Take 1 capsule by mouth once daily., Disp: , Rfl:     levothyroxine (UNITHROID) 50 MCG tablet, Take 50 mcg by mouth once daily., Disp: , Rfl:     loratadine (CLARITIN) 10 mg tablet, Take 10 mg by mouth once daily., Disp: , Rfl:     magnesium oxide (MAG-OX) 400 mg (241.3 mg magnesium) tablet, Take 400 mg by mouth once daily., Disp: , Rfl:     multivitamin (ONE DAILY MULTIVITAMIN) per tablet, Take 1 tablet by mouth once daily., Disp: , Rfl:     ondansetron (ZOFRAN) 8 MG tablet, Take 8 mg by mouth every 8 (eight) hours as needed for Nausea., Disp: , Rfl:     ramipril (ALTACE) 10 MG capsule, Take 10 mg by mouth once daily., Disp: , Rfl:     ranitidine (ZANTAC) 75 MG tablet, Take 150 mg by mouth 2 (two) times daily. , Disp: , Rfl:     Review of Systems   Constitutional: Negative for chills, fatigue and fever.   HENT: Positive for congestion, rhinorrhea and sore throat. Negative for ear discharge, ear pain, sinus pressure, sinus pain, sneezing and trouble swallowing.    Eyes: Negative for pain, discharge, redness and itching.   Respiratory: Negative for cough, chest tightness and shortness of breath.    Cardiovascular: Negative for chest pain.   Gastrointestinal: Negative for abdominal distention and abdominal pain.   Neurological: Negative for weakness and headaches.          Objective:      Vitals:    01/07/20 0816 01/07/20 0848   BP: (!) 148/88 132/78   Pulse: 72    Weight: 82.5 kg (181 lb 12.8 oz)    Height: 5' (1.524 m)      Physical Exam   Constitutional: She appears well-developed and well-nourished. No distress.   HENT:   Head: Normocephalic and atraumatic.   Eyes: Pupils are equal, round, and reactive to light. Conjunctivae and EOM are normal. Right eye exhibits no  discharge. Left eye exhibits no discharge.   Neck: Normal range of motion. Neck supple. No thyromegaly present.   Cardiovascular: Normal rate, regular rhythm and normal heart sounds.   Pulmonary/Chest: Effort normal and breath sounds normal. No respiratory distress. She has no wheezes. She exhibits no tenderness.   Abdominal: Soft. Bowel sounds are normal.         Assessment:       1. Posttraumatic hematoma of right breast, initial encounter    2. Benign essential hypertension    3. Upper respiratory tract infection, unspecified type         Plan:       Posttraumatic hematoma of right breast, initial encounter  Comments:  She is scheduled to have a recheck ultrasound for DR. Felder next month    Benign essential hypertension  Comments:  BP checked by me and well controlled. I have reviewed recent OV pressures as well and all are at goal. NO changes today.    Upper respiratory tract infection, unspecified type  Comments:  treat conservatively. viral sxs. flonase, antihistamine. If sxs worsen will let me know.      Follow up in about 6 months (around 7/7/2020) for Annual Physical.        1/7/2020 Wilfredo Hernández PA-C

## 2020-01-13 ENCOUNTER — OFFICE VISIT (OUTPATIENT)
Dept: WOUND CARE | Facility: HOSPITAL | Age: 55
End: 2020-01-13
Attending: INTERNAL MEDICINE
Payer: COMMERCIAL

## 2020-01-13 VITALS
DIASTOLIC BLOOD PRESSURE: 70 MMHG | HEART RATE: 71 BPM | TEMPERATURE: 98 F | RESPIRATION RATE: 16 BRPM | SYSTOLIC BLOOD PRESSURE: 128 MMHG

## 2020-01-13 DIAGNOSIS — L97.822 NON-PRS CHRONIC ULCER OTH PRT L LOW LEG W FAT LAYER EXPOSED: Primary | ICD-10-CM
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
## 2020-01-21 ENCOUNTER — OFFICE VISIT (OUTPATIENT)
Dept: WOUND CARE | Facility: HOSPITAL | Age: 55
End: 2020-01-21
Attending: INTERNAL MEDICINE
Payer: COMMERCIAL

## 2020-01-21 VITALS
DIASTOLIC BLOOD PRESSURE: 78 MMHG | HEART RATE: 86 BPM | SYSTOLIC BLOOD PRESSURE: 126 MMHG | RESPIRATION RATE: 16 BRPM | TEMPERATURE: 97 F

## 2020-01-21 DIAGNOSIS — L97.822 NON-PRS CHRONIC ULCER OTH PRT L LOW LEG W FAT LAYER EXPOSED: Primary | ICD-10-CM

## 2020-01-21 PROCEDURE — 11042 DBRDMT SUBQ TIS 1ST 20SQCM/<: CPT | Performed by: INTERNAL MEDICINE
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
## 2020-01-27 ENCOUNTER — OFFICE VISIT (OUTPATIENT)
Dept: WOUND CARE | Facility: HOSPITAL | Age: 55
End: 2020-01-27
Attending: INTERNAL MEDICINE
Payer: COMMERCIAL

## 2020-01-27 VITALS
TEMPERATURE: 98 F | SYSTOLIC BLOOD PRESSURE: 128 MMHG | HEART RATE: 74 BPM | RESPIRATION RATE: 16 BRPM | DIASTOLIC BLOOD PRESSURE: 68 MMHG

## 2020-01-27 DIAGNOSIS — L97.822 NON-PRS CHRONIC ULCER OTH PRT L LOW LEG W FAT LAYER EXPOSED: Primary | ICD-10-CM

## 2020-01-27 PROCEDURE — 99213 OFFICE O/P EST LOW 20 MIN: CPT | Performed by: INTERNAL MEDICINE
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
## 2020-02-10 ENCOUNTER — OFFICE VISIT (OUTPATIENT)
Dept: WOUND CARE | Facility: HOSPITAL | Age: 55
End: 2020-02-10
Attending: INTERNAL MEDICINE
Payer: COMMERCIAL

## 2020-02-10 VITALS
SYSTOLIC BLOOD PRESSURE: 128 MMHG | RESPIRATION RATE: 16 BRPM | TEMPERATURE: 98 F | DIASTOLIC BLOOD PRESSURE: 78 MMHG | HEART RATE: 74 BPM

## 2020-02-10 DIAGNOSIS — L97.822 NON-PRS CHRONIC ULCER OTH PRT L LOW LEG W FAT LAYER EXPOSED: Primary | ICD-10-CM

## 2020-02-10 PROCEDURE — 99214 OFFICE O/P EST MOD 30 MIN: CPT | Performed by: INTERNAL MEDICINE
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
## 2020-03-02 ENCOUNTER — OFFICE VISIT (OUTPATIENT)
Dept: WOUND CARE | Facility: HOSPITAL | Age: 55
End: 2020-03-02
Attending: INTERNAL MEDICINE
Payer: COMMERCIAL

## 2020-03-02 ENCOUNTER — TELEPHONE (OUTPATIENT)
Dept: SURGERY | Facility: CLINIC | Age: 55
End: 2020-03-02

## 2020-03-02 VITALS
HEART RATE: 82 BPM | SYSTOLIC BLOOD PRESSURE: 124 MMHG | RESPIRATION RATE: 16 BRPM | DIASTOLIC BLOOD PRESSURE: 78 MMHG | TEMPERATURE: 98 F

## 2020-03-02 DIAGNOSIS — L97.822 NON-PRS CHRONIC ULCER OTH PRT L LOW LEG W FAT LAYER EXPOSED: Primary | ICD-10-CM

## 2020-03-02 DIAGNOSIS — S20.01XD POSTTRAUMATIC HEMATOMA OF RIGHT BREAST, SUBSEQUENT ENCOUNTER: Primary | ICD-10-CM

## 2020-03-02 PROCEDURE — 99213 OFFICE O/P EST LOW 20 MIN: CPT | Performed by: INTERNAL MEDICINE

## 2020-03-02 NOTE — TELEPHONE ENCOUNTER
Pt came to office requesting order for f/u right breast u/s to re-evaluate hematoma. Previous RVS pt. Order placed, please sign.
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
## 2020-03-06 ENCOUNTER — HOSPITAL ENCOUNTER (OUTPATIENT)
Dept: RADIOLOGY | Facility: HOSPITAL | Age: 55
Discharge: HOME | End: 2020-03-06
Attending: SURGERY
Payer: COMMERCIAL

## 2020-03-06 DIAGNOSIS — S20.01XD POSTTRAUMATIC HEMATOMA OF RIGHT BREAST, SUBSEQUENT ENCOUNTER: ICD-10-CM

## 2020-03-06 PROCEDURE — 76641 ULTRASOUND BREAST COMPLETE: CPT | Mod: TC,PO,RT

## 2020-03-09 ENCOUNTER — TELEPHONE (OUTPATIENT)
Dept: SURGERY | Facility: CLINIC | Age: 55
End: 2020-03-09

## 2020-03-09 NOTE — TELEPHONE ENCOUNTER
----- Message from Mi Valdez MD sent at 3/9/2020  1:49 PM CDT -----  Call patient - test result is ok, RTO to discuss

## 2020-03-23 ENCOUNTER — OFFICE VISIT (OUTPATIENT)
Dept: SURGERY | Facility: CLINIC | Age: 55
End: 2020-03-23
Payer: COMMERCIAL

## 2020-03-23 DIAGNOSIS — S20.01XD POSTTRAUMATIC HEMATOMA OF RIGHT BREAST, SUBSEQUENT ENCOUNTER: Primary | ICD-10-CM

## 2020-03-23 PROCEDURE — 99212 PR OFFICE/OUTPT VISIT, EST, LEVL II, 10-19 MIN: ICD-10-PCS | Mod: 95,,, | Performed by: SURGERY

## 2020-03-23 PROCEDURE — 99212 OFFICE O/P EST SF 10 MIN: CPT | Mod: 95,,, | Performed by: SURGERY

## 2020-03-23 RX ORDER — PANTOPRAZOLE SODIUM 40 MG/1
TABLET, DELAYED RELEASE ORAL
COMMUNITY
Start: 2020-02-06 | End: 2021-02-09

## 2020-03-23 NOTE — PROGRESS NOTES
Subjective:     The patient location is: car   The chief complaint leading to consultation is:  Follow up right breast ultrasound  Visit type: Virtual visit with synchronous audio and video       Patient ID: Brunilda Bob is a 54 y.o. female.    Chief Complaint: No chief complaint on file.      HPI:  Patient continues to feel mass in her right breast, perhaps a little smaller.  Still has some occasional tenderness with certain positions.    US -   FINDINGS:  There is an irregular hypoechoic focus seen at the 12-1 o'clock location of the right breast 6 cm distance from the nipple.  This measures approximately 3.1 x 1.4 x 4.4 cm in craniocaudal AP and transverse dimension.  This compares to approximately 4.2 x 3.9 x 6.8 cm respectively on 12/11/2019. findings are consistent with resolving hematoma.  In adjacent circumscribed echogenic focus at the 9-10 o'clock location 5 cm distance from the nipple is again seen.  This measures approximately 2.4 x 0.9 x 2.5 cm and craniocaudal AP and transverse dimension.  This is not significantly changed compared to the prior study which time it measured 2.3 x 1.2 x 2.9 cm respectively.  I suspect this is normal glandular tissue.    Review of Systems   Constitutional: Negative for appetite change, chills, fever and unexpected weight change.   HENT: Negative for hearing loss, rhinorrhea, sore throat and voice change.    Eyes: Negative for photophobia and visual disturbance.   Respiratory: Negative for cough, choking and shortness of breath.    Cardiovascular: Negative for chest pain, palpitations and leg swelling.   Gastrointestinal: Negative for abdominal pain, blood in stool, constipation, diarrhea, nausea and vomiting.   Endocrine: Negative for cold intolerance, heat intolerance, polydipsia and polyuria.   Musculoskeletal: Negative for arthralgias, back pain, joint swelling and neck stiffness.   Skin: Negative for color change, pallor and rash.   Neurological: Negative  for dizziness, seizures, syncope and headaches.   Hematological: Negative for adenopathy. Does not bruise/bleed easily.   Psychiatric/Behavioral: Negative for agitation, behavioral problems and confusion.       Objective:      Physical Exam   Constitutional: She is oriented to person, place, and time. She appears well-developed and well-nourished.  Non-toxic appearance. No distress.   HENT:   Head: Normocephalic and atraumatic. Head is without abrasion and without laceration.   Right Ear: External ear normal.   Left Ear: External ear normal.   Nose: Nose normal.   Mouth/Throat: Oropharynx is clear and moist.   Eyes: EOM are normal. No scleral icterus.   Pulmonary/Chest: Effort normal. No accessory muscle usage. No tachypnea. No respiratory distress.   Abdominal: Normal appearance.   Neurological: She is alert and oriented to person, place, and time.   Skin: Skin is intact.   Psychiatric: She has a normal mood and affect. Her speech is normal and behavior is normal.       Assessment/Plan:   Posttraumatic hematoma of right breast, subsequent encounter  -     Mammo Digital Diagnostic Bilat w/ Noam; Future; Expected date: 06/23/2020  -     US Breast Right Limited; Future; Expected date: 06/23/2020      US - reviewed    Hematoma is smaller but not resolved.  Hopefully, patient can tolerate mammogram in 3 more months as hematoma should be significantly smaller.  She was due in November 2019 but it was delayed due to the hematoma.      Follow up in about 3 months (around 6/23/2020) for F/U - Make appt after diagnostic tests.      Total time spent with patient: 10 min

## 2020-05-28 ENCOUNTER — TELEPHONE (OUTPATIENT)
Dept: SURGERY | Facility: CLINIC | Age: 55
End: 2020-05-28

## 2020-05-28 DIAGNOSIS — N63.12 UNSPECIFIED LUMP IN THE RIGHT BREAST, UPPER INNER QUADRANT: Primary | ICD-10-CM

## 2020-05-28 DIAGNOSIS — N63.10 UNSPECIFIED LUMP IN THE RIGHT BREAST, UNSPECIFIED QUADRANT: Primary | ICD-10-CM

## 2020-05-28 NOTE — TELEPHONE ENCOUNTER
----- Message from Fabby Caldera sent at 5/25/2020 10:28 AM CDT -----  Regarding: NON-PAYABLE DIAGNOSIS CODE  Please submit an order for DIAGNOSTIC MAMMOGRAM & BREAST ULTRASOUND with a payable Diagnosis Code  S20.01XD is non-payable for this exam.    Thank You,

## 2020-06-09 ENCOUNTER — TELEPHONE (OUTPATIENT)
Dept: FAMILY MEDICINE | Facility: CLINIC | Age: 55
End: 2020-06-09

## 2020-06-09 DIAGNOSIS — Z79.899 ENCOUNTER FOR LONG-TERM (CURRENT) USE OF OTHER MEDICATIONS: Primary | ICD-10-CM

## 2020-06-09 NOTE — TELEPHONE ENCOUNTER
----- Message from Talya Wilkinson sent at 6/9/2020  7:50 AM CDT -----  Contact: walked in  Ms. Yu came in to thinking she had a appt and she dont have appt till 7/7/20 she wants to know if she needs labs before. [phone]

## 2020-06-09 NOTE — TELEPHONE ENCOUNTER
I would also add a microalbumin creatinine ratio given that she has hypertension.  These look to be loaded for [facility] too if that is where she wants to go

## 2020-06-12 ENCOUNTER — HOSPITAL ENCOUNTER (OUTPATIENT)
Dept: RADIOLOGY | Facility: HOSPITAL | Age: 55
Discharge: HOME | End: 2020-06-12
Attending: SURGERY
Payer: COMMERCIAL

## 2020-06-12 VITALS — WEIGHT: 181.88 LBS | HEIGHT: 60 IN | BODY MASS INDEX: 35.71 KG/M2

## 2020-06-12 DIAGNOSIS — N63.12 UNSPECIFIED LUMP IN THE RIGHT BREAST, UPPER INNER QUADRANT: ICD-10-CM

## 2020-06-12 DIAGNOSIS — N63.10 UNSPECIFIED LUMP IN THE RIGHT BREAST, UNSPECIFIED QUADRANT: ICD-10-CM

## 2020-06-12 LAB
ALBUMIN/CREAT UR: ABNORMAL MCG/MG CREAT
APPEARANCE UR: CLEAR
BACTERIA #/AREA URNS HPF: NORMAL /HPF
BACTERIA UR CULT: NORMAL
BILIRUB UR QL STRIP: NEGATIVE
COLOR UR: YELLOW
CREAT UR-MCNC: 9 MG/DL (ref 20–275)
GLUCOSE UR QL STRIP: NEGATIVE
HGB UR QL STRIP: NEGATIVE
HYALINE CASTS #/AREA URNS LPF: NORMAL /LPF
KETONES UR QL STRIP: NEGATIVE
LEUKOCYTE ESTERASE UR QL STRIP: NEGATIVE
MICROALBUMIN UR-MCNC: <0.2 MG/DL
NITRITE UR QL STRIP: NEGATIVE
PH UR STRIP: 7.5 [PH] (ref 5–8)
PROT UR QL STRIP: NEGATIVE
RBC #/AREA URNS HPF: NORMAL /HPF
SP GR UR STRIP: 1 (ref 1–1.03)
SQUAMOUS #/AREA URNS HPF: NORMAL /HPF
WBC #/AREA URNS HPF: NORMAL /HPF

## 2020-06-12 PROCEDURE — 77062 BREAST TOMOSYNTHESIS BI: CPT | Mod: TC,PO

## 2020-06-12 PROCEDURE — 76642 ULTRASOUND BREAST LIMITED: CPT | Mod: TC,PO,RT

## 2020-06-18 ENCOUNTER — TELEPHONE (OUTPATIENT)
Dept: SURGERY | Facility: CLINIC | Age: 55
End: 2020-06-18

## 2020-06-18 NOTE — TELEPHONE ENCOUNTER
----- Message from Mi Valdez MD sent at 6/17/2020  6:15 PM CDT -----  MMG/US ok - looks like getting smaller.  RTO to discuss/examine

## 2020-06-30 ENCOUNTER — OFFICE VISIT (OUTPATIENT)
Dept: SURGERY | Facility: CLINIC | Age: 55
End: 2020-06-30
Payer: COMMERCIAL

## 2020-06-30 VITALS
SYSTOLIC BLOOD PRESSURE: 135 MMHG | WEIGHT: 181 LBS | HEIGHT: 60 IN | HEART RATE: 66 BPM | DIASTOLIC BLOOD PRESSURE: 82 MMHG | TEMPERATURE: 98 F | BODY MASS INDEX: 35.53 KG/M2

## 2020-06-30 DIAGNOSIS — N63.12 UNSPECIFIED LUMP IN THE RIGHT BREAST, UPPER INNER QUADRANT: ICD-10-CM

## 2020-06-30 DIAGNOSIS — S20.01XD POSTTRAUMATIC HEMATOMA OF RIGHT BREAST, SUBSEQUENT ENCOUNTER: Primary | ICD-10-CM

## 2020-06-30 PROCEDURE — 3008F BODY MASS INDEX DOCD: CPT | Mod: S$GLB,,, | Performed by: SURGERY

## 2020-06-30 PROCEDURE — 3075F PR MOST RECENT SYSTOLIC BLOOD PRESS GE 130-139MM HG: ICD-10-PCS | Mod: S$GLB,,, | Performed by: SURGERY

## 2020-06-30 PROCEDURE — 99213 PR OFFICE/OUTPT VISIT, EST, LEVL III, 20-29 MIN: ICD-10-PCS | Mod: S$GLB,,, | Performed by: SURGERY

## 2020-06-30 PROCEDURE — 3008F PR BODY MASS INDEX (BMI) DOCUMENTED: ICD-10-PCS | Mod: S$GLB,,, | Performed by: SURGERY

## 2020-06-30 PROCEDURE — 3075F SYST BP GE 130 - 139MM HG: CPT | Mod: S$GLB,,, | Performed by: SURGERY

## 2020-06-30 PROCEDURE — 99213 OFFICE O/P EST LOW 20 MIN: CPT | Mod: S$GLB,,, | Performed by: SURGERY

## 2020-06-30 PROCEDURE — 3079F PR MOST RECENT DIASTOLIC BLOOD PRESSURE 80-89 MM HG: ICD-10-PCS | Mod: S$GLB,,, | Performed by: SURGERY

## 2020-06-30 PROCEDURE — 3079F DIAST BP 80-89 MM HG: CPT | Mod: S$GLB,,, | Performed by: SURGERY

## 2020-06-30 NOTE — LETTER
June 30, 2020      Sadiq Titus MD  [address]  [address]  [facility]  [place] [address]           [facility]  [address]  [place] [address]  Phone: [phone]  Fax: [phone]          Patient: Brunilda Bob   MR Number: [id number]   YOB: 1965   Date of Visit: 6/30/2020       Dear Dr. Sadiq Titus:    Thank you for referring Brunilda Bob to me for evaluation. Attached you will find relevant portions of my assessment and plan of care.    If you have questions, please do not hesitate to call me. I look forward to following Brunilda Bob along with you.    Sincerely,    Mi Valdez MD    Enclosure  CC:  No Recipients    If you would like to receive this communication electronically, please contact [email or web address] or [phone] to request more information on [employer] access.    For providers and/or their staff who would like to refer a patient to [facility], please contact us through our one-stop-shop provider referral line, [facility], at [phone].    If you feel you have received this communication in error or would no longer like to receive these types of communications, please e-mail [email or web address]

## 2020-06-30 NOTE — PROGRESS NOTES
Subjective:       Patient ID: Brunilda Bob is a 54 y.o. female.    Chief Complaint: Other (3 month MMG FU)      HPI:  Here to follow-up after 3 month mammogram    Patient is having less tenderness to the area on the right breast and feels like the area is smaller and less prominent    MMG - interval decrease in size of right breast hematoma now measuring 2.3 cm.  In addition there is a small nodule at 9 to 10:00 position which is stable        Review of Systems   Constitutional: Negative for appetite change, chills, fever and unexpected weight change.   HENT: Negative for hearing loss, rhinorrhea, sore throat and voice change.    Eyes: Negative for photophobia and visual disturbance.   Respiratory: Negative for cough, choking and shortness of breath.    Cardiovascular: Negative for chest pain, palpitations and leg swelling.   Gastrointestinal: Negative for abdominal pain, blood in stool, constipation, diarrhea, nausea and vomiting.   Endocrine: Negative for cold intolerance, heat intolerance, polydipsia and polyuria.   Musculoskeletal: Negative for arthralgias, back pain, joint swelling and neck stiffness.   Skin: Negative for color change, pallor and rash.   Neurological: Negative for dizziness, seizures, syncope and headaches.   Hematological: Negative for adenopathy. Does not bruise/bleed easily.   Psychiatric/Behavioral: Negative for agitation, behavioral problems and confusion.       Objective:      Physical Exam  Constitutional:       General: She is not in acute distress.     Appearance: Normal appearance. She is well-developed. She is not toxic-appearing.   HENT:      Head: Normocephalic and atraumatic. No abrasion or laceration.      Right Ear: External ear normal.      Left Ear: External ear normal.      Nose: Nose normal.   Eyes:      Pupils: Pupils are equal, round, and reactive to light.   Neck:      Musculoskeletal: Normal range of motion.      Thyroid: No thyroid mass or thyromegaly.       Trachea: Trachea normal. No tracheal deviation.   Cardiovascular:      Rate and Rhythm: Normal rate and regular rhythm.   Pulmonary:      Effort: Pulmonary effort is normal. No tachypnea, accessory muscle usage or respiratory distress.   Chest:      Breasts: Breasts are symmetrical.         Right: Mass and skin change (mild bruising) present. No inverted nipple.         Left: No inverted nipple, mass or skin change.       Abdominal:      General: Bowel sounds are normal. There is no distension.      Palpations: Abdomen is soft. There is no mass.      Tenderness: There is no abdominal tenderness. Negative signs include Benites's sign and McBurney's sign.      Hernia: No hernia is present.   Lymphadenopathy:      Cervical: No cervical adenopathy.   Skin:     General: Skin is warm.   Neurological:      Mental Status: She is alert.      Coordination: Coordination normal.      Gait: Gait normal.   Psychiatric:         Speech: Speech normal.         Behavior: Behavior normal.         Assessment/Plan:   Posttraumatic hematoma of right breast, subsequent encounter  -     Mammo Digital Diagnostic Bilat w/ Noam; Future; Expected date: 06/15/2021    Unspecified lump in the right breast, upper inner quadrant  -     Mammo Digital Diagnostic Bilat w/ Noam; Future; Expected date: 06/15/2021      MMG/US reviewed      Follow up in about 1 year (around 6/30/2021) for F/U - Make appt after diagnostic tests.

## 2020-07-13 ENCOUNTER — OFFICE VISIT (OUTPATIENT)
Dept: FAMILY MEDICINE | Facility: CLINIC | Age: 55
End: 2020-07-13
Payer: COMMERCIAL

## 2020-07-13 VITALS
TEMPERATURE: 99 F | WEIGHT: 185 LBS | DIASTOLIC BLOOD PRESSURE: 80 MMHG | SYSTOLIC BLOOD PRESSURE: 124 MMHG | HEART RATE: 68 BPM | HEIGHT: 60 IN | BODY MASS INDEX: 36.32 KG/M2

## 2020-07-13 DIAGNOSIS — I10 BENIGN ESSENTIAL HYPERTENSION: Primary | ICD-10-CM

## 2020-07-13 DIAGNOSIS — B95.8 STAPHYLOCOCCUS INFECTION OF NOSE: ICD-10-CM

## 2020-07-13 DIAGNOSIS — J34.89 STAPHYLOCOCCUS INFECTION OF NOSE: ICD-10-CM

## 2020-07-13 PROCEDURE — 3074F PR MOST RECENT SYSTOLIC BLOOD PRESSURE < 130 MM HG: ICD-10-PCS | Mod: S$GLB,,, | Performed by: PHYSICIAN ASSISTANT

## 2020-07-13 PROCEDURE — 3079F PR MOST RECENT DIASTOLIC BLOOD PRESSURE 80-89 MM HG: ICD-10-PCS | Mod: S$GLB,,, | Performed by: PHYSICIAN ASSISTANT

## 2020-07-13 PROCEDURE — 3008F BODY MASS INDEX DOCD: CPT | Mod: S$GLB,,, | Performed by: PHYSICIAN ASSISTANT

## 2020-07-13 PROCEDURE — 99214 OFFICE O/P EST MOD 30 MIN: CPT | Mod: S$GLB,,, | Performed by: PHYSICIAN ASSISTANT

## 2020-07-13 PROCEDURE — 3008F PR BODY MASS INDEX (BMI) DOCUMENTED: ICD-10-PCS | Mod: S$GLB,,, | Performed by: PHYSICIAN ASSISTANT

## 2020-07-13 PROCEDURE — 99214 PR OFFICE/OUTPT VISIT, EST, LEVL IV, 30-39 MIN: ICD-10-PCS | Mod: S$GLB,,, | Performed by: PHYSICIAN ASSISTANT

## 2020-07-13 PROCEDURE — 3074F SYST BP LT 130 MM HG: CPT | Mod: S$GLB,,, | Performed by: PHYSICIAN ASSISTANT

## 2020-07-13 PROCEDURE — 3079F DIAST BP 80-89 MM HG: CPT | Mod: S$GLB,,, | Performed by: PHYSICIAN ASSISTANT

## 2020-07-13 RX ORDER — MUPIROCIN 20 MG/G
OINTMENT TOPICAL 3 TIMES DAILY
Qty: 30 G | Refills: 0 | Status: SHIPPED | OUTPATIENT
Start: 2020-07-13 | End: 2020-08-12

## 2020-07-13 NOTE — PATIENT INSTRUCTIONS
Established High Blood Pressure    High blood pressure (hypertension) is a chronic disease. Often, healthcare providers dont know what causes it. But it can be caused by certain health conditions and medicines.  If you have high blood pressure, you may not have any symptoms. If you do have symptoms, they may include headache, dizziness, changes in your vision, chest pain, and shortness of breath. But even without symptoms, high blood pressure thats not treated raises your risk for heart attack and stroke. High blood pressure is a serious health risk and shouldnt be ignored.  A blood pressure reading is made up of two numbers: a higher number over a lower number. The top number is the systolic pressure. The bottom number is the diastolic pressure. A normal blood pressure is a systolic pressure of  less than 120 over a diastolic pressure of less than 80. You will see your blood pressure readings written together. For example, a person with a systolic pressure of 188 and a diastolic pressure of 78 will have 118/78 written in the medical record.  High blood pressure is when either the top number is 140 or higher, or the bottom number is 90 or higher. This must be the result when taking your blood pressure a number of times. The blood pressures between normal and high are called prehypertension.  Home care  If you have high blood pressure, you should do what is listed below to lower your blood pressure. If you are taking medicines for high blood pressure, these methods may reduce or end your need for medicines in the future.  · Begin a weight-loss program if you are overweight.  · Cut back on how much salt you get in your diet. Heres how to do this:  ¨ Dont eat foods that have a lot of salt. These include olives, pickles, smoked meats, and salted potato chips.  ¨ Dont add salt to your food at the table.  ¨ Use only small amounts of salt when cooking.  · Start an exercise program. Talk with your healthcare  provider about the type of exercise program that would be best for you. It doesn't have to be hard. Even brisk walking for 20 minutes 3 times a week is a good form of exercise.  · Dont take medicines that stimulate the heart. This includes many over-the-counter cold and sinus decongestant pills and sprays, as well as diet pills. Check the warnings about hypertension on the label. Before buying any over-the-counter medicines or supplements, always ask the pharmacist about the product's potential interaction with your high blood pressure and your high blood pressure medicines.  · Stimulants such as amphetamine or cocaine could be deadly for someone with high blood pressure. Never take these.  · Limit how much caffeine you get in your diet. Switch to caffeine-free products.  · Stop smoking. If you are a long-time smoker, this can be hard. Talk to your healthcare provider about medicines and nicotine replacement options to help you. Also, enroll in a stop-smoking program to make it more likely that you will quit for good.  · Learn how to handle stress. This is an important part of any program to lower blood pressure. Learn about relaxation methods like meditation, yoga, or biofeedback.  · If your provider prescribed medicines, take them exactly as directed. Missing doses may cause your blood pressure get out of control.  · If you miss a dose or doses, check with your healthcare provider or pharmacist about what to do.  · Consider buying an automatic blood pressure machine. Ask your provider for a recommendation. You can get one of these at most pharmacies.     The American Heart Association recommends the following guidelines for home blood pressure monitoring:  · Don't smoke or drink coffee for 30 minutes before taking your blood pressure.  · Go to the bathroom before the test.  · Relax for 5 minutes before taking the measurement.  · Sit with your back supported (don't sit on a couch or soft chair); keep your feet on  the floor uncrossed. Place your arm on a solid flat surface (like a table) with the upper part of the arm at heart level. Place the middle of the cuff directly above the eye of the elbow. Check the monitor's instruction manual for an illustration.  · Take multiple readings. When you measure, take 2 to 3 readings one minute apart and record all of the results.  · Take your blood pressure at the same time every day, or as your healthcare provider recommends.  · Record the date, time, and blood pressure reading.  · Take the record with you to your next medical appointment. If your blood pressure monitor has a built-in memory, simply take the monitor with you to your next appointment.  · Call your provider if you have several high readings. Don't be frightened by a single high blood pressure reading, but if you get several high readings, check in with your healthcare provider.  · Note: When blood pressure reaches a systolic (top number) of 180 or higher OR diastolic (bottom number) of 110 or higher, seek emergency medical treatment.  Follow-up care  You will need to see your healthcare provider regularly. This is to check your blood pressure and to make changes to your medicines. Make a follow-up appointment as directed. Bring the record of your home blood pressure readings to the appointment.  When to seek medical advice  Call your healthcare provider right away if any of these occur:  · Blood pressure reaches a systolic (upper number) of 180 or higher OR a diastolic (bottom number) of 110 or higher  · Chest pain or shortness of breath  · Severe headache  · Throbbing or rushing sound in the ears  · Nosebleed  · Sudden severe pain in your belly (abdomen)  · Extreme drowsiness, confusion, or fainting  · Dizziness or spinning sensation (vertigo)  · Weakness of an arm or leg or one side of the face  · You have problems speaking or seeing   Date Last Reviewed: 12/1/2016  © 0655-9827 [employer]. [address] [place] [address]  [place] [address]. All rights reserved. This information is not intended as a substitute for professional medical care. Always follow your healthcare professional's instructions.

## 2020-07-13 NOTE — PROGRESS NOTES
SUBJECTIVE:    Patient ID: Brunilda Bob is a 54 y.o. female.    Chief Complaint: Follow-up (6mth, no bottles, brought list// SW)    This is a 54-year-old female who presents today for regular 6 month checkup.  Most recently we have been monitoring her pressure.  It appears to be very well managed today in clinic.  She reports good pressure readings at home as well.  She does have recent history of posttraumatic hematoma to the right breast.  Dr. Valdez is also following her from a surgical standpoint.  Appears to be much smaller on recent imaging.  Has not required evacuation.  Today she reports that she is doing pretty well overall. Does have a slight sore to the Rt inner nare. TTP with very minimal drainage.      Orders Only on 06/11/2020   Component Date Value Ref Range Status    Creatinine, Random Ur 06/11/2020 9* 20 - 275 mg/dL Final    Microalb, Ur 06/11/2020 <0.2  See Note: mg/dL Final    Microalb Creat Ratio 06/11/2020 NOTE  <30 mcg/mg creat Final    Color, UA 06/11/2020 YELLOW  YELLOW Final    Appearance, UA 06/11/2020 CLEAR  CLEAR Final    Specific [place], UA 06/11/2020 1.003  1.001 - 1.035 Final    pH, UA 06/11/2020 7.5  5.0 - 8.0 Final    Glucose, UA 06/11/2020 NEGATIVE  NEGATIVE Final    Bilirubin, UA 06/11/2020 NEGATIVE  NEGATIVE Final    Ketones, UA 06/11/2020 NEGATIVE  NEGATIVE Final    Occult Blood UA 06/11/2020 NEGATIVE  NEGATIVE Final    Protein, UA 06/11/2020 NEGATIVE  NEGATIVE Final    Nitrite, UA 06/11/2020 NEGATIVE  NEGATIVE Final    Leukocytes, UA 06/11/2020 NEGATIVE  NEGATIVE Final    WBC Casts, UA 06/11/2020 NONE SEEN  < OR = 5 /HPF Final    RBC Casts, UA 06/11/2020 NONE SEEN  < OR = 2 /HPF Final    Squam Epithel, UA 06/11/2020 NONE SEEN  < OR = 5 /HPF Final    Bacteria, UA 06/11/2020 NONE SEEN  NONE SEEN /HPF Final    Hyaline Casts, UA 06/11/2020 NONE SEEN  NONE SEEN /LPF Final    Reflexive Urine Culture 06/11/2020 NO CULTURE INDICATED   Final        Past Medical History:   Diagnosis Date    Broken heart syndrome     Hypertension      Past Surgical History:   Procedure Laterality Date    CLAVICLE SURGERY  2012    TONSILLECTOMY       Family History   Problem Relation Age of Onset    Colon cancer Mother     Uterine cancer Mother     Hypertension Father     Testicular cancer Son        Marital Status:   Alcohol History:  reports current alcohol use.  Tobacco History:  reports that she has never smoked. She has never used smokeless tobacco.  Drug History:  reports no history of drug use.    Review of patient's allergies indicates:   Allergen Reactions    Crestor [rosuvastatin] Other (See Comments)     Paralysis    Codeine Nausea And Vomiting    Strawberries [strawberry] Hives       Current Outpatient Medications:     acetaminophen (TYLENOL) 500 MG tablet, Take 1,000 mg by mouth 3 (three) times daily., Disp: , Rfl:     ascorbic acid, vitamin C, (VITAMIN C) 500 MG tablet, Take 2 mg by mouth once daily., Disp: , Rfl:     atenolol (TENORMIN) 25 MG tablet, Take 25 mg by mouth once daily., Disp: , Rfl:     ergocalciferol, vitamin D2, (VITAMIN D2 ORAL), Take 5,000 mg by mouth nightly., Disp: , Rfl:     Lactobacillus rhamnosus GG (CULTURELLE) 10 billion cell capsule, Take 1 capsule by mouth once daily., Disp: , Rfl:     levothyroxine (UNITHROID) 50 MCG tablet, Take 50 mcg by mouth once daily., Disp: , Rfl:     loratadine (CLARITIN) 10 mg tablet, Take 10 mg by mouth once daily., Disp: , Rfl:     magnesium oxide (MAG-OX) 400 mg (241.3 mg magnesium) tablet, Take 400 mg by mouth once daily., Disp: , Rfl:     multivitamin (ONE DAILY MULTIVITAMIN) per tablet, Take 1 tablet by mouth once daily., Disp: , Rfl:     ondansetron (ZOFRAN) 8 MG tablet, Take 8 mg by mouth every 8 (eight) hours as needed for Nausea., Disp: , Rfl:     pantoprazole (PROTONIX) 40 MG tablet, , Disp: , Rfl:     ramipril (ALTACE) 10 MG capsule, Take 10 mg by mouth once daily.,  Disp: , Rfl:     iron carb,gl/FA/B12/C/docusate (FERRALET 90 DUAL-IRON DELIVERY ORAL), Take 90 mg by mouth 4 (four) times a week. , Disp: , Rfl:     mupirocin (BACTROBAN) 2 % ointment, Apply topically 3 (three) times daily., Disp: 30 g, Rfl: 0    Review of Systems   Constitutional: Negative for appetite change, chills, fatigue, fever and unexpected weight change.   HENT: Negative for congestion.    Respiratory: Negative for cough, chest tightness and shortness of breath.    Cardiovascular: Negative for chest pain and palpitations.   Gastrointestinal: Negative for abdominal distention and abdominal pain.   Endocrine: Negative for cold intolerance and heat intolerance.   Genitourinary: Negative for difficulty urinating and dysuria.   Musculoskeletal: Negative for arthralgias and back pain.   Neurological: Negative for dizziness, weakness and headaches.          Objective:      Vitals:    07/13/20 0816   BP: 124/80   Pulse: 68   Temp: 98.6 °F (37 °C)   Weight: 83.9 kg (185 lb)   Height: 5' (1.524 m)     Physical Exam  Constitutional:       General: She is not in acute distress.     Appearance: She is well-developed.   HENT:      Head: Normocephalic and atraumatic.   Eyes:      Conjunctiva/sclera: Conjunctivae normal.      Pupils: Pupils are equal, round, and reactive to light.   Neck:      Musculoskeletal: Normal range of motion and neck supple.      Thyroid: No thyromegaly.   Cardiovascular:      Rate and Rhythm: Normal rate and regular rhythm.      Heart sounds: Normal heart sounds.   Pulmonary:      Effort: Pulmonary effort is normal.      Breath sounds: Normal breath sounds.   Abdominal:      General: Bowel sounds are normal. There is no distension.      Palpations: Abdomen is soft.      Tenderness: There is no abdominal tenderness.   Musculoskeletal: Normal range of motion.   Skin:     General: Skin is warm and dry.      Findings: No erythema.      Comments: Rt nare: sore present. TTP and with minimal drainage.    Neurological:      Mental Status: She is alert and oriented to person, place, and time.      Cranial Nerves: No cranial nerve deficit.           Assessment:       1. Benign essential hypertension    2. Staphylococcus infection of nose         Plan:       Benign essential hypertension  Comments:  Very well controlled. continue as is.    Staphylococcus infection of nose  Comments:  Going to recommend that she uses mupirocin BID. Let me know if sxs worsen.    Other orders  -     mupirocin (BACTROBAN) 2 % ointment; Apply topically 3 (three) times daily.  Dispense: 30 g; Refill: 0      Follow up in about 6 months (around 1/13/2021) for BP Check-Up.        7/13/2020 Wilfredo Hernández PA-C

## 2020-07-16 DIAGNOSIS — E04.2 NONTOXIC MULTINODULAR GOITER: ICD-10-CM

## 2020-07-16 DIAGNOSIS — Z78.0 ASYMPTOMATIC MENOPAUSAL STATE: ICD-10-CM

## 2020-07-16 DIAGNOSIS — Z13.820 ENCOUNTER FOR SCREENING FOR OSTEOPOROSIS: Primary | ICD-10-CM

## 2020-11-19 ENCOUNTER — TELEPHONE (OUTPATIENT)
Dept: SURGERY | Facility: CLINIC | Age: 55
End: 2020-11-19

## 2020-11-19 RX ORDER — CIPROFLOXACIN HYDROCHLORIDE 3 MG/ML
SOLUTION/ DROPS OPHTHALMIC
COMMUNITY
Start: 2020-10-24 | End: 2021-08-10

## 2020-12-23 ENCOUNTER — TELEPHONE (OUTPATIENT)
Dept: CARDIOLOGY | Facility: CLINIC | Age: 55
End: 2020-12-23

## 2020-12-23 NOTE — TELEPHONE ENCOUNTER
----- Message from Sana Cisneros sent at 12/23/2020 11:31 AM CST -----  Regarding: covid  [phone]  Questions about covid vaccine

## 2021-01-06 ENCOUNTER — HOSPITAL ENCOUNTER (OUTPATIENT)
Dept: RADIOLOGY | Facility: HOSPITAL | Age: 56
Discharge: HOME | End: 2021-01-06
Attending: INTERNAL MEDICINE
Payer: COMMERCIAL

## 2021-01-06 ENCOUNTER — TELEPHONE (OUTPATIENT)
Dept: FAMILY MEDICINE | Facility: CLINIC | Age: 56
End: 2021-01-06

## 2021-01-06 DIAGNOSIS — Z78.0 ASYMPTOMATIC MENOPAUSAL STATE: ICD-10-CM

## 2021-01-06 DIAGNOSIS — Z79.899 ENCOUNTER FOR LONG-TERM (CURRENT) USE OF OTHER MEDICATIONS: Primary | ICD-10-CM

## 2021-01-06 DIAGNOSIS — Z00.00 ROUTINE GENERAL MEDICAL EXAMINATION AT A HEALTH CARE FACILITY: ICD-10-CM

## 2021-01-06 DIAGNOSIS — E04.2 NONTOXIC MULTINODULAR GOITER: ICD-10-CM

## 2021-01-06 DIAGNOSIS — Z13.820 ENCOUNTER FOR SCREENING FOR OSTEOPOROSIS: ICD-10-CM

## 2021-01-06 PROCEDURE — 76536 US EXAM OF HEAD AND NECK: CPT | Mod: TC,PO

## 2021-01-09 LAB
ALBUMIN/CREAT UR: 4 MCG/MG CREAT
APPEARANCE UR: CLEAR
BACTERIA #/AREA URNS HPF: ABNORMAL /HPF
BACTERIA UR CULT: ABNORMAL
BILIRUB UR QL STRIP: NEGATIVE
CAOX CRY #/AREA URNS HPF: ABNORMAL /HPF
COLOR UR: ABNORMAL
CREAT UR-MCNC: 224 MG/DL (ref 20–275)
GLUCOSE UR QL STRIP: NEGATIVE
HGB UR QL STRIP: ABNORMAL
HYALINE CASTS #/AREA URNS LPF: ABNORMAL /LPF
KETONES UR QL STRIP: NEGATIVE
LEUKOCYTE ESTERASE UR QL STRIP: NEGATIVE
MICROALBUMIN UR-MCNC: 0.8 MG/DL
NITRITE UR QL STRIP: NEGATIVE
PH UR STRIP: 5.5 [PH] (ref 5–8)
PROT UR QL STRIP: NEGATIVE
RBC #/AREA URNS HPF: ABNORMAL /HPF
SP GR UR STRIP: 1.03 (ref 1–1.03)
SQUAMOUS #/AREA URNS HPF: ABNORMAL /HPF
WBC #/AREA URNS HPF: ABNORMAL /HPF

## 2021-01-11 ENCOUNTER — TELEPHONE (OUTPATIENT)
Dept: FAMILY MEDICINE | Facility: CLINIC | Age: 56
End: 2021-01-11

## 2021-01-12 ENCOUNTER — OFFICE VISIT (OUTPATIENT)
Dept: CARDIOLOGY | Facility: CLINIC | Age: 56
End: 2021-01-12
Payer: COMMERCIAL

## 2021-01-12 VITALS
DIASTOLIC BLOOD PRESSURE: 80 MMHG | HEART RATE: 63 BPM | BODY MASS INDEX: 37.69 KG/M2 | WEIGHT: 192 LBS | SYSTOLIC BLOOD PRESSURE: 132 MMHG | HEIGHT: 60 IN | RESPIRATION RATE: 18 BRPM | OXYGEN SATURATION: 98 %

## 2021-01-12 DIAGNOSIS — K21.00 GASTROESOPHAGEAL REFLUX DISEASE WITH ESOPHAGITIS WITHOUT HEMORRHAGE: ICD-10-CM

## 2021-01-12 DIAGNOSIS — E07.9 THYROID DYSFUNCTION: Primary | ICD-10-CM

## 2021-01-12 DIAGNOSIS — S20.01XA POSTTRAUMATIC HEMATOMA OF RIGHT BREAST, INITIAL ENCOUNTER: ICD-10-CM

## 2021-01-12 DIAGNOSIS — I10 BENIGN ESSENTIAL HYPERTENSION: ICD-10-CM

## 2021-01-12 PROCEDURE — 1126F PR PAIN SEVERITY QUANTIFIED, NO PAIN PRESENT: ICD-10-PCS | Mod: S$GLB,,, | Performed by: NURSE PRACTITIONER

## 2021-01-12 PROCEDURE — 3079F PR MOST RECENT DIASTOLIC BLOOD PRESSURE 80-89 MM HG: ICD-10-PCS | Mod: CPTII,S$GLB,, | Performed by: NURSE PRACTITIONER

## 2021-01-12 PROCEDURE — 3008F BODY MASS INDEX DOCD: CPT | Mod: CPTII,S$GLB,, | Performed by: NURSE PRACTITIONER

## 2021-01-12 PROCEDURE — 3008F PR BODY MASS INDEX (BMI) DOCUMENTED: ICD-10-PCS | Mod: CPTII,S$GLB,, | Performed by: NURSE PRACTITIONER

## 2021-01-12 PROCEDURE — 99204 PR OFFICE/OUTPT VISIT, NEW, LEVL IV, 45-59 MIN: ICD-10-PCS | Mod: S$GLB,,, | Performed by: NURSE PRACTITIONER

## 2021-01-12 PROCEDURE — 3075F SYST BP GE 130 - 139MM HG: CPT | Mod: CPTII,S$GLB,, | Performed by: NURSE PRACTITIONER

## 2021-01-12 PROCEDURE — 99204 OFFICE O/P NEW MOD 45 MIN: CPT | Mod: S$GLB,,, | Performed by: NURSE PRACTITIONER

## 2021-01-12 PROCEDURE — 1126F AMNT PAIN NOTED NONE PRSNT: CPT | Mod: S$GLB,,, | Performed by: NURSE PRACTITIONER

## 2021-01-12 PROCEDURE — 3079F DIAST BP 80-89 MM HG: CPT | Mod: CPTII,S$GLB,, | Performed by: NURSE PRACTITIONER

## 2021-01-12 PROCEDURE — 3075F PR MOST RECENT SYSTOLIC BLOOD PRESS GE 130-139MM HG: ICD-10-PCS | Mod: CPTII,S$GLB,, | Performed by: NURSE PRACTITIONER

## 2021-01-14 ENCOUNTER — OFFICE VISIT (OUTPATIENT)
Dept: FAMILY MEDICINE | Facility: CLINIC | Age: 56
End: 2021-01-14
Payer: COMMERCIAL

## 2021-01-14 ENCOUNTER — TELEPHONE (OUTPATIENT)
Dept: FAMILY MEDICINE | Facility: CLINIC | Age: 56
End: 2021-01-14

## 2021-01-14 VITALS
HEIGHT: 60 IN | DIASTOLIC BLOOD PRESSURE: 82 MMHG | SYSTOLIC BLOOD PRESSURE: 138 MMHG | HEART RATE: 76 BPM | BODY MASS INDEX: 37.3 KG/M2 | WEIGHT: 190 LBS

## 2021-01-14 DIAGNOSIS — R31.9 HEMATURIA OF UNKNOWN ETIOLOGY: Primary | ICD-10-CM

## 2021-01-14 DIAGNOSIS — R31.9 HEMATURIA OF UNKNOWN ETIOLOGY: ICD-10-CM

## 2021-01-14 DIAGNOSIS — E78.5 HYPERLIPIDEMIA, UNSPECIFIED HYPERLIPIDEMIA TYPE: ICD-10-CM

## 2021-01-14 DIAGNOSIS — I10 BENIGN ESSENTIAL HYPERTENSION: Primary | ICD-10-CM

## 2021-01-14 DIAGNOSIS — S20.01XA POSTTRAUMATIC HEMATOMA OF RIGHT BREAST, INITIAL ENCOUNTER: ICD-10-CM

## 2021-01-14 LAB
BILIRUB UR QL STRIP: NEGATIVE
GLUCOSE UR QL STRIP: NEGATIVE
KETONES UR QL STRIP: NEGATIVE
LEUKOCYTE ESTERASE UR QL STRIP: NEGATIVE
PH, POC UA: 6
POC BLOOD, URINE: POSITIVE
POC NITRATES, URINE: NEGATIVE
PROT UR QL STRIP: NEGATIVE
SP GR UR STRIP: 1.02 (ref 1–1.03)
UROBILINOGEN UR STRIP-ACNC: ABNORMAL (ref 0.1–1.1)

## 2021-01-14 PROCEDURE — 99214 PR OFFICE/OUTPT VISIT, EST, LEVL IV, 30-39 MIN: ICD-10-PCS | Mod: S$GLB,,, | Performed by: PHYSICIAN ASSISTANT

## 2021-01-14 PROCEDURE — 3075F SYST BP GE 130 - 139MM HG: CPT | Mod: S$GLB,,, | Performed by: PHYSICIAN ASSISTANT

## 2021-01-14 PROCEDURE — 3008F PR BODY MASS INDEX (BMI) DOCUMENTED: ICD-10-PCS | Mod: S$GLB,,, | Performed by: PHYSICIAN ASSISTANT

## 2021-01-14 PROCEDURE — 81003 URINALYSIS AUTO W/O SCOPE: CPT | Mod: QW,S$GLB,, | Performed by: PHYSICIAN ASSISTANT

## 2021-01-14 PROCEDURE — 99214 OFFICE O/P EST MOD 30 MIN: CPT | Mod: S$GLB,,, | Performed by: PHYSICIAN ASSISTANT

## 2021-01-14 PROCEDURE — 81003 POCT URINALYSIS, DIPSTICK, AUTOMATED, W/O SCOPE: ICD-10-PCS | Mod: QW,S$GLB,, | Performed by: PHYSICIAN ASSISTANT

## 2021-01-14 PROCEDURE — 3079F PR MOST RECENT DIASTOLIC BLOOD PRESSURE 80-89 MM HG: ICD-10-PCS | Mod: S$GLB,,, | Performed by: PHYSICIAN ASSISTANT

## 2021-01-14 PROCEDURE — 3075F PR MOST RECENT SYSTOLIC BLOOD PRESS GE 130-139MM HG: ICD-10-PCS | Mod: S$GLB,,, | Performed by: PHYSICIAN ASSISTANT

## 2021-01-14 PROCEDURE — 3008F BODY MASS INDEX DOCD: CPT | Mod: S$GLB,,, | Performed by: PHYSICIAN ASSISTANT

## 2021-01-14 PROCEDURE — 3079F DIAST BP 80-89 MM HG: CPT | Mod: S$GLB,,, | Performed by: PHYSICIAN ASSISTANT

## 2021-01-16 LAB — BACTERIA UR CULT: NORMAL

## 2021-01-18 LAB
CLINICAL INFO: NORMAL
PATH REPORT.FINAL DX SPEC: NORMAL
PATH REPORT.GROSS SPEC: NORMAL
PATHOLOGIST NAME: NORMAL
SCREENER: NORMAL
SPECIMEN SOURCE: NORMAL

## 2021-01-21 ENCOUNTER — TELEPHONE (OUTPATIENT)
Dept: FAMILY MEDICINE | Facility: CLINIC | Age: 56
End: 2021-01-21

## 2021-01-21 DIAGNOSIS — R31.9 HEMATURIA, UNSPECIFIED TYPE: Primary | ICD-10-CM

## 2021-02-24 ENCOUNTER — OFFICE VISIT (OUTPATIENT)
Dept: UROLOGY | Facility: CLINIC | Age: 56
End: 2021-02-24
Payer: COMMERCIAL

## 2021-02-24 VITALS — HEIGHT: 60 IN | WEIGHT: 190.06 LBS | BODY MASS INDEX: 37.31 KG/M2

## 2021-02-24 DIAGNOSIS — R31.9 HEMATURIA, UNSPECIFIED TYPE: Primary | ICD-10-CM

## 2021-02-24 DIAGNOSIS — R31.29 MICROSCOPIC HEMATURIA: ICD-10-CM

## 2021-02-24 LAB
BILIRUB SERPL-MCNC: ABNORMAL MG/DL
BLOOD URINE, POC: ABNORMAL
CLARITY, POC UA: CLEAR
COLOR, POC UA: YELLOW
GLUCOSE UR QL STRIP: ABNORMAL
KETONES UR QL STRIP: ABNORMAL
LEUKOCYTE ESTERASE URINE, POC: ABNORMAL
NITRITE, POC UA: ABNORMAL
PH, POC UA: 5.5
PROTEIN, POC: ABNORMAL
SPECIFIC GRAVITY, POC UA: 1.02
UROBILINOGEN, POC UA: 0.2

## 2021-02-24 PROCEDURE — 81002 URINALYSIS NONAUTO W/O SCOPE: CPT | Mod: S$GLB,,, | Performed by: UROLOGY

## 2021-02-24 PROCEDURE — 99999 PR PBB SHADOW E&M-EST. PATIENT-LVL IV: CPT | Mod: PBBFAC,,, | Performed by: UROLOGY

## 2021-02-24 PROCEDURE — 1126F PR PAIN SEVERITY QUANTIFIED, NO PAIN PRESENT: ICD-10-PCS | Mod: S$GLB,,, | Performed by: UROLOGY

## 2021-02-24 PROCEDURE — 88112 PR  CYTOPATH, CELL ENHANCE TECH: ICD-10-PCS | Mod: 26,,, | Performed by: PATHOLOGY

## 2021-02-24 PROCEDURE — 81002 POCT URINE DIPSTICK WITHOUT MICROSCOPE: ICD-10-PCS | Mod: S$GLB,,, | Performed by: UROLOGY

## 2021-02-24 PROCEDURE — 99204 PR OFFICE/OUTPT VISIT, NEW, LEVL IV, 45-59 MIN: ICD-10-PCS | Mod: 25,S$GLB,, | Performed by: UROLOGY

## 2021-02-24 PROCEDURE — 3008F BODY MASS INDEX DOCD: CPT | Mod: CPTII,S$GLB,, | Performed by: UROLOGY

## 2021-02-24 PROCEDURE — 3008F PR BODY MASS INDEX (BMI) DOCUMENTED: ICD-10-PCS | Mod: CPTII,S$GLB,, | Performed by: UROLOGY

## 2021-02-24 PROCEDURE — 99204 OFFICE O/P NEW MOD 45 MIN: CPT | Mod: 25,S$GLB,, | Performed by: UROLOGY

## 2021-02-24 PROCEDURE — 99999 PR PBB SHADOW E&M-EST. PATIENT-LVL IV: ICD-10-PCS | Mod: PBBFAC,,, | Performed by: UROLOGY

## 2021-02-24 PROCEDURE — 88112 CYTOPATH CELL ENHANCE TECH: CPT | Mod: 26,,, | Performed by: PATHOLOGY

## 2021-02-24 PROCEDURE — 1126F AMNT PAIN NOTED NONE PRSNT: CPT | Mod: S$GLB,,, | Performed by: UROLOGY

## 2021-02-24 PROCEDURE — 88112 CYTOPATH CELL ENHANCE TECH: CPT | Performed by: PATHOLOGY

## 2021-02-24 RX ORDER — ATORVASTATIN CALCIUM 10 MG/1
10 TABLET, FILM COATED ORAL NIGHTLY
COMMUNITY
Start: 2021-01-20 | End: 2022-10-13 | Stop reason: SDUPTHER

## 2021-02-25 LAB — FINAL PATHOLOGIC DIAGNOSIS: NORMAL

## 2021-03-04 ENCOUNTER — TELEPHONE (OUTPATIENT)
Dept: UROLOGY | Facility: CLINIC | Age: 56
End: 2021-03-04

## 2021-03-04 DIAGNOSIS — R31.9 HEMATURIA, UNSPECIFIED TYPE: Primary | ICD-10-CM

## 2021-03-04 DIAGNOSIS — R31.29 MICROSCOPIC HEMATURIA: ICD-10-CM

## 2021-03-09 ENCOUNTER — HOSPITAL ENCOUNTER (OUTPATIENT)
Dept: RADIOLOGY | Facility: HOSPITAL | Age: 56
Discharge: HOME | End: 2021-03-09
Attending: UROLOGY
Payer: COMMERCIAL

## 2021-03-09 DIAGNOSIS — R31.29 MICROSCOPIC HEMATURIA: ICD-10-CM

## 2021-03-09 PROCEDURE — 76770 US EXAM ABDO BACK WALL COMP: CPT | Mod: 26,,, | Performed by: RADIOLOGY

## 2021-03-09 PROCEDURE — 76770 US EXAM ABDO BACK WALL COMP: CPT | Mod: TC

## 2021-03-09 PROCEDURE — 76770 US RETROPERITONEAL COMPLETE: ICD-10-PCS | Mod: 26,,, | Performed by: RADIOLOGY

## 2021-03-22 ENCOUNTER — HOSPITAL ENCOUNTER (OUTPATIENT)
Dept: RADIOLOGY | Facility: HOSPITAL | Age: 56
Discharge: HOME | End: 2021-03-22
Attending: INTERNAL MEDICINE
Payer: COMMERCIAL

## 2021-03-22 DIAGNOSIS — Z78.0 ASYMPTOMATIC MENOPAUSAL STATE: ICD-10-CM

## 2021-03-22 DIAGNOSIS — E04.2 NONTOXIC MULTINODULAR GOITER: ICD-10-CM

## 2021-03-22 DIAGNOSIS — Z13.820 ENCOUNTER FOR SCREENING FOR OSTEOPOROSIS: ICD-10-CM

## 2021-03-22 PROCEDURE — 77080 DXA BONE DENSITY AXIAL: CPT | Mod: TC,PO

## 2021-04-13 ENCOUNTER — PROCEDURE VISIT (OUTPATIENT)
Dept: UROLOGY | Facility: CLINIC | Age: 56
End: 2021-04-13
Payer: COMMERCIAL

## 2021-04-13 VITALS — WEIGHT: 190.06 LBS | HEIGHT: 60 IN | BODY MASS INDEX: 37.31 KG/M2

## 2021-04-13 DIAGNOSIS — R33.9 INCOMPLETE BLADDER EMPTYING: ICD-10-CM

## 2021-04-13 DIAGNOSIS — R31.29 MICROSCOPIC HEMATURIA: ICD-10-CM

## 2021-04-13 LAB — POC RESIDUAL URINE VOLUME: 1 ML (ref 0–100)

## 2021-04-13 PROCEDURE — 52000 PR CYSTOURETHROSCOPY: ICD-10-PCS | Mod: S$GLB,,, | Performed by: UROLOGY

## 2021-04-13 PROCEDURE — 51798 US URINE CAPACITY MEASURE: CPT | Mod: S$GLB,,, | Performed by: UROLOGY

## 2021-04-13 PROCEDURE — 52000 CYSTOURETHROSCOPY: CPT | Mod: S$GLB,,, | Performed by: UROLOGY

## 2021-04-13 PROCEDURE — 51798 POCT BLADDER SCAN: ICD-10-PCS | Mod: S$GLB,,, | Performed by: UROLOGY

## 2021-05-04 ENCOUNTER — OFFICE VISIT (OUTPATIENT)
Dept: FAMILY MEDICINE | Facility: CLINIC | Age: 56
End: 2021-05-04
Payer: COMMERCIAL

## 2021-05-04 VITALS
SYSTOLIC BLOOD PRESSURE: 138 MMHG | BODY MASS INDEX: 36.52 KG/M2 | DIASTOLIC BLOOD PRESSURE: 80 MMHG | HEART RATE: 56 BPM | WEIGHT: 186 LBS | HEIGHT: 60 IN

## 2021-05-04 DIAGNOSIS — B02.9 HERPES ZOSTER WITHOUT COMPLICATION: Primary | ICD-10-CM

## 2021-05-04 PROCEDURE — 99213 OFFICE O/P EST LOW 20 MIN: CPT | Mod: S$GLB,,, | Performed by: PHYSICIAN ASSISTANT

## 2021-05-04 PROCEDURE — 3008F PR BODY MASS INDEX (BMI) DOCUMENTED: ICD-10-PCS | Mod: S$GLB,,, | Performed by: PHYSICIAN ASSISTANT

## 2021-05-04 PROCEDURE — 3008F BODY MASS INDEX DOCD: CPT | Mod: S$GLB,,, | Performed by: PHYSICIAN ASSISTANT

## 2021-05-04 PROCEDURE — 99213 PR OFFICE/OUTPT VISIT, EST, LEVL III, 20-29 MIN: ICD-10-PCS | Mod: S$GLB,,, | Performed by: PHYSICIAN ASSISTANT

## 2021-05-04 RX ORDER — VALACYCLOVIR HYDROCHLORIDE 1 G/1
1000 TABLET, FILM COATED ORAL EVERY 8 HOURS
Qty: 21 TABLET | Refills: 0 | Status: SHIPPED | OUTPATIENT
Start: 2021-05-04 | End: 2021-08-10

## 2021-05-06 ENCOUNTER — TELEPHONE (OUTPATIENT)
Dept: FAMILY MEDICINE | Facility: CLINIC | Age: 56
End: 2021-05-06

## 2021-05-06 DIAGNOSIS — B02.9 HERPES ZOSTER WITHOUT COMPLICATION: Primary | ICD-10-CM

## 2021-05-17 ENCOUNTER — OFFICE VISIT (OUTPATIENT)
Dept: FAMILY MEDICINE | Facility: CLINIC | Age: 56
End: 2021-05-17
Payer: COMMERCIAL

## 2021-05-17 VITALS
HEIGHT: 60 IN | TEMPERATURE: 98 F | WEIGHT: 190 LBS | DIASTOLIC BLOOD PRESSURE: 80 MMHG | HEART RATE: 80 BPM | BODY MASS INDEX: 37.3 KG/M2 | SYSTOLIC BLOOD PRESSURE: 138 MMHG

## 2021-05-17 DIAGNOSIS — J40 BRONCHITIS: ICD-10-CM

## 2021-05-17 DIAGNOSIS — S20.01XD CONTUSION OF RIGHT BREAST, SUBSEQUENT ENCOUNTER: Primary | ICD-10-CM

## 2021-05-17 DIAGNOSIS — J01.40 ACUTE NON-RECURRENT PANSINUSITIS: Primary | ICD-10-CM

## 2021-05-17 DIAGNOSIS — R11.0 NAUSEA: ICD-10-CM

## 2021-05-17 DIAGNOSIS — N63.12 UNSPECIFIED LUMP IN THE RIGHT BREAST, UPPER INNER QUADRANT: ICD-10-CM

## 2021-05-17 PROCEDURE — 96372 PR INJECTION,THERAP/PROPH/DIAG2ST, IM OR SUBCUT: ICD-10-PCS | Mod: S$GLB,,, | Performed by: NURSE PRACTITIONER

## 2021-05-17 PROCEDURE — 99213 OFFICE O/P EST LOW 20 MIN: CPT | Mod: 25,S$GLB,, | Performed by: NURSE PRACTITIONER

## 2021-05-17 PROCEDURE — 96372 THER/PROPH/DIAG INJ SC/IM: CPT | Mod: S$GLB,,, | Performed by: NURSE PRACTITIONER

## 2021-05-17 PROCEDURE — 99213 PR OFFICE/OUTPT VISIT, EST, LEVL III, 20-29 MIN: ICD-10-PCS | Mod: 25,S$GLB,, | Performed by: NURSE PRACTITIONER

## 2021-05-17 PROCEDURE — 3008F BODY MASS INDEX DOCD: CPT | Mod: S$GLB,,, | Performed by: NURSE PRACTITIONER

## 2021-05-17 PROCEDURE — 3008F PR BODY MASS INDEX (BMI) DOCUMENTED: ICD-10-PCS | Mod: S$GLB,,, | Performed by: NURSE PRACTITIONER

## 2021-05-17 RX ORDER — ONDANSETRON 4 MG/1
4 TABLET, ORALLY DISINTEGRATING ORAL EVERY 6 HOURS PRN
Qty: 20 TABLET | Refills: 1 | Status: SHIPPED | OUTPATIENT
Start: 2021-05-17 | End: 2021-10-19

## 2021-05-17 RX ORDER — METHYLPREDNISOLONE 4 MG/1
TABLET ORAL
Qty: 1 PACKAGE | Refills: 0 | Status: SHIPPED | OUTPATIENT
Start: 2021-05-17 | End: 2021-06-07

## 2021-05-17 RX ORDER — HYDROCODONE POLISTIREX AND CHLORPHENIRAMINE POLISTIREX 10; 8 MG/5ML; MG/5ML
5 SUSPENSION, EXTENDED RELEASE ORAL EVERY 12 HOURS PRN
Qty: 40 ML | Refills: 0 | Status: SHIPPED | OUTPATIENT
Start: 2021-05-17 | End: 2021-08-10

## 2021-05-17 RX ORDER — AMOXICILLIN AND CLAVULANATE POTASSIUM 875; 125 MG/1; MG/1
1 TABLET, FILM COATED ORAL EVERY 12 HOURS
Qty: 20 TABLET | Refills: 0 | Status: SHIPPED | OUTPATIENT
Start: 2021-05-17 | End: 2021-05-27

## 2021-05-17 RX ORDER — DEXAMETHASONE SODIUM PHOSPHATE 4 MG/ML
8 INJECTION, SOLUTION INTRA-ARTICULAR; INTRALESIONAL; INTRAMUSCULAR; INTRAVENOUS; SOFT TISSUE ONCE
Status: COMPLETED | OUTPATIENT
Start: 2021-05-17 | End: 2021-05-17

## 2021-05-17 RX ADMIN — DEXAMETHASONE SODIUM PHOSPHATE 8 MG: 4 INJECTION, SOLUTION INTRA-ARTICULAR; INTRALESIONAL; INTRAMUSCULAR; INTRAVENOUS; SOFT TISSUE at 10:05

## 2021-06-21 ENCOUNTER — TELEPHONE (OUTPATIENT)
Dept: SURGERY | Facility: CLINIC | Age: 56
End: 2021-06-21

## 2021-06-21 DIAGNOSIS — R92.8 ABNORMAL MAMMOGRAM: Primary | ICD-10-CM

## 2021-06-30 DIAGNOSIS — N64.59 INVERTED NIPPLE: Primary | ICD-10-CM

## 2021-07-01 DIAGNOSIS — N64.59 INVERTED NIPPLE: Primary | ICD-10-CM

## 2021-07-22 ENCOUNTER — HOSPITAL ENCOUNTER (OUTPATIENT)
Dept: RADIOLOGY | Facility: HOSPITAL | Age: 56
Discharge: HOME | End: 2021-07-22
Attending: OBSTETRICS & GYNECOLOGY
Payer: COMMERCIAL

## 2021-07-22 VITALS — WEIGHT: 190.06 LBS | HEIGHT: 60 IN | BODY MASS INDEX: 37.31 KG/M2

## 2021-07-22 DIAGNOSIS — N64.59 INVERTED NIPPLE: ICD-10-CM

## 2021-07-22 PROCEDURE — 77062 BREAST TOMOSYNTHESIS BI: CPT | Mod: TC,PO

## 2021-08-05 ENCOUNTER — TELEPHONE (OUTPATIENT)
Dept: FAMILY MEDICINE | Facility: CLINIC | Age: 56
End: 2021-08-05

## 2021-08-05 RX ORDER — DOXYCYCLINE 100 MG/1
100 CAPSULE ORAL EVERY 12 HOURS
Qty: 14 CAPSULE | Refills: 0 | Status: SHIPPED | OUTPATIENT
Start: 2021-08-05 | End: 2021-08-12

## 2021-08-05 RX ORDER — PREDNISONE 10 MG/1
20 TABLET ORAL DAILY
Qty: 10 TABLET | Refills: 0 | Status: SHIPPED | OUTPATIENT
Start: 2021-08-05 | End: 2021-08-10

## 2021-08-10 ENCOUNTER — OFFICE VISIT (OUTPATIENT)
Dept: CARDIOLOGY | Facility: CLINIC | Age: 56
End: 2021-08-10
Payer: COMMERCIAL

## 2021-08-10 VITALS
HEART RATE: 80 BPM | RESPIRATION RATE: 16 BRPM | HEIGHT: 60 IN | BODY MASS INDEX: 36.91 KG/M2 | WEIGHT: 188 LBS | SYSTOLIC BLOOD PRESSURE: 122 MMHG | DIASTOLIC BLOOD PRESSURE: 80 MMHG | OXYGEN SATURATION: 96 %

## 2021-08-10 DIAGNOSIS — E07.9 THYROID DYSFUNCTION: Primary | ICD-10-CM

## 2021-08-10 DIAGNOSIS — E78.5 DYSLIPIDEMIA: ICD-10-CM

## 2021-08-10 DIAGNOSIS — K21.00 GASTROESOPHAGEAL REFLUX DISEASE WITH ESOPHAGITIS WITHOUT HEMORRHAGE: ICD-10-CM

## 2021-08-10 DIAGNOSIS — J04.0 ACUTE LARYNGITIS: ICD-10-CM

## 2021-08-10 DIAGNOSIS — I10 BENIGN ESSENTIAL HYPERTENSION: ICD-10-CM

## 2021-08-10 PROBLEM — J06.9 URI (UPPER RESPIRATORY INFECTION): Status: ACTIVE | Noted: 2021-08-10

## 2021-08-10 PROCEDURE — 3079F PR MOST RECENT DIASTOLIC BLOOD PRESSURE 80-89 MM HG: ICD-10-PCS | Mod: CPTII,S$GLB,, | Performed by: INTERNAL MEDICINE

## 2021-08-10 PROCEDURE — 1159F PR MEDICATION LIST DOCUMENTED IN MEDICAL RECORD: ICD-10-PCS | Mod: CPTII,S$GLB,, | Performed by: INTERNAL MEDICINE

## 2021-08-10 PROCEDURE — 1126F AMNT PAIN NOTED NONE PRSNT: CPT | Mod: CPTII,S$GLB,, | Performed by: INTERNAL MEDICINE

## 2021-08-10 PROCEDURE — 1126F PR PAIN SEVERITY QUANTIFIED, NO PAIN PRESENT: ICD-10-PCS | Mod: CPTII,S$GLB,, | Performed by: INTERNAL MEDICINE

## 2021-08-10 PROCEDURE — 3008F PR BODY MASS INDEX (BMI) DOCUMENTED: ICD-10-PCS | Mod: CPTII,S$GLB,, | Performed by: INTERNAL MEDICINE

## 2021-08-10 PROCEDURE — 3008F BODY MASS INDEX DOCD: CPT | Mod: CPTII,S$GLB,, | Performed by: INTERNAL MEDICINE

## 2021-08-10 PROCEDURE — 1159F MED LIST DOCD IN RCRD: CPT | Mod: CPTII,S$GLB,, | Performed by: INTERNAL MEDICINE

## 2021-08-10 PROCEDURE — 1160F PR REVIEW ALL MEDS BY PRESCRIBER/CLIN PHARMACIST DOCUMENTED: ICD-10-PCS | Mod: CPTII,S$GLB,, | Performed by: INTERNAL MEDICINE

## 2021-08-10 PROCEDURE — 3074F SYST BP LT 130 MM HG: CPT | Mod: CPTII,S$GLB,, | Performed by: INTERNAL MEDICINE

## 2021-08-10 PROCEDURE — 3079F DIAST BP 80-89 MM HG: CPT | Mod: CPTII,S$GLB,, | Performed by: INTERNAL MEDICINE

## 2021-08-10 PROCEDURE — 99214 PR OFFICE/OUTPT VISIT, EST, LEVL IV, 30-39 MIN: ICD-10-PCS | Mod: S$GLB,,, | Performed by: INTERNAL MEDICINE

## 2021-08-10 PROCEDURE — 3074F PR MOST RECENT SYSTOLIC BLOOD PRESSURE < 130 MM HG: ICD-10-PCS | Mod: CPTII,S$GLB,, | Performed by: INTERNAL MEDICINE

## 2021-08-10 PROCEDURE — 99214 OFFICE O/P EST MOD 30 MIN: CPT | Mod: S$GLB,,, | Performed by: INTERNAL MEDICINE

## 2021-08-10 PROCEDURE — 1160F RVW MEDS BY RX/DR IN RCRD: CPT | Mod: CPTII,S$GLB,, | Performed by: INTERNAL MEDICINE

## 2021-08-10 RX ORDER — LOSARTAN POTASSIUM 50 MG/1
50 TABLET ORAL DAILY
Qty: 90 TABLET | Refills: 3 | Status: SHIPPED | OUTPATIENT
Start: 2021-08-10 | End: 2022-02-24

## 2021-08-11 ENCOUNTER — TELEPHONE (OUTPATIENT)
Dept: FAMILY MEDICINE | Facility: CLINIC | Age: 56
End: 2021-08-11

## 2021-08-17 ENCOUNTER — TELEPHONE (OUTPATIENT)
Dept: FAMILY MEDICINE | Facility: CLINIC | Age: 56
End: 2021-08-17

## 2021-09-07 DIAGNOSIS — E04.2 NONTOXIC MULTINODULAR GOITER: Primary | ICD-10-CM

## 2021-09-28 ENCOUNTER — TELEPHONE (OUTPATIENT)
Dept: FAMILY MEDICINE | Facility: CLINIC | Age: 56
End: 2021-09-28

## 2021-09-29 ENCOUNTER — TELEPHONE (OUTPATIENT)
Dept: FAMILY MEDICINE | Facility: CLINIC | Age: 56
End: 2021-09-29

## 2021-09-29 ENCOUNTER — HOSPITAL ENCOUNTER (OUTPATIENT)
Dept: RADIOLOGY | Facility: HOSPITAL | Age: 56
Discharge: HOME | End: 2021-09-29
Attending: NURSE PRACTITIONER
Payer: COMMERCIAL

## 2021-09-29 ENCOUNTER — OFFICE VISIT (OUTPATIENT)
Dept: FAMILY MEDICINE | Facility: CLINIC | Age: 56
End: 2021-09-29
Payer: COMMERCIAL

## 2021-09-29 VITALS
TEMPERATURE: 98 F | HEIGHT: 60 IN | DIASTOLIC BLOOD PRESSURE: 80 MMHG | BODY MASS INDEX: 37.5 KG/M2 | HEART RATE: 80 BPM | SYSTOLIC BLOOD PRESSURE: 138 MMHG | WEIGHT: 191 LBS

## 2021-09-29 DIAGNOSIS — Z23 NEED FOR INFLUENZA VACCINATION: ICD-10-CM

## 2021-09-29 DIAGNOSIS — R22.41 LEG MASS, RIGHT: ICD-10-CM

## 2021-09-29 DIAGNOSIS — R22.41 LEG MASS, RIGHT: Primary | ICD-10-CM

## 2021-09-29 DIAGNOSIS — I77.6 VASCULITIS: ICD-10-CM

## 2021-09-29 PROCEDURE — 3008F BODY MASS INDEX DOCD: CPT | Mod: S$GLB,,, | Performed by: NURSE PRACTITIONER

## 2021-09-29 PROCEDURE — 90471 FLU VACCINE - QUADRIVALENT (RECOMBINANT) PRESERVATIVE FREE: ICD-10-PCS | Mod: S$GLB,,, | Performed by: NURSE PRACTITIONER

## 2021-09-29 PROCEDURE — 4010F PR ACE/ARB THEARPY RXD/TAKEN: ICD-10-PCS | Mod: S$GLB,,, | Performed by: NURSE PRACTITIONER

## 2021-09-29 PROCEDURE — 90682 FLU VACCINE - QUADRIVALENT (RECOMBINANT) PRESERVATIVE FREE: ICD-10-PCS | Mod: S$GLB,,, | Performed by: NURSE PRACTITIONER

## 2021-09-29 PROCEDURE — 3008F PR BODY MASS INDEX (BMI) DOCUMENTED: ICD-10-PCS | Mod: S$GLB,,, | Performed by: NURSE PRACTITIONER

## 2021-09-29 PROCEDURE — 3066F PR DOCUMENTATION OF TREATMENT FOR NEPHROPATHY: ICD-10-PCS | Mod: S$GLB,,, | Performed by: NURSE PRACTITIONER

## 2021-09-29 PROCEDURE — 90471 IMMUNIZATION ADMIN: CPT | Mod: S$GLB,,, | Performed by: NURSE PRACTITIONER

## 2021-09-29 PROCEDURE — 99213 PR OFFICE/OUTPT VISIT, EST, LEVL III, 20-29 MIN: ICD-10-PCS | Mod: 25,S$GLB,, | Performed by: NURSE PRACTITIONER

## 2021-09-29 PROCEDURE — 99213 OFFICE O/P EST LOW 20 MIN: CPT | Mod: 25,S$GLB,, | Performed by: NURSE PRACTITIONER

## 2021-09-29 PROCEDURE — 1160F RVW MEDS BY RX/DR IN RCRD: CPT | Mod: S$GLB,,, | Performed by: NURSE PRACTITIONER

## 2021-09-29 PROCEDURE — 3066F NEPHROPATHY DOC TX: CPT | Mod: S$GLB,,, | Performed by: NURSE PRACTITIONER

## 2021-09-29 PROCEDURE — 76882 US LMTD JT/FCL EVL NVASC XTR: CPT | Mod: TC,RT

## 2021-09-29 PROCEDURE — 3061F NEG MICROALBUMINURIA REV: CPT | Mod: S$GLB,,, | Performed by: NURSE PRACTITIONER

## 2021-09-29 PROCEDURE — 4010F ACE/ARB THERAPY RXD/TAKEN: CPT | Mod: S$GLB,,, | Performed by: NURSE PRACTITIONER

## 2021-09-29 PROCEDURE — 3061F PR NEG MICROALBUMINURIA RESULT DOCUMENTED/REVIEW: ICD-10-PCS | Mod: S$GLB,,, | Performed by: NURSE PRACTITIONER

## 2021-09-29 PROCEDURE — 1160F PR REVIEW ALL MEDS BY PRESCRIBER/CLIN PHARMACIST DOCUMENTED: ICD-10-PCS | Mod: S$GLB,,, | Performed by: NURSE PRACTITIONER

## 2021-09-29 PROCEDURE — 90682 RIV4 VACC RECOMBINANT DNA IM: CPT | Mod: S$GLB,,, | Performed by: NURSE PRACTITIONER

## 2021-09-29 RX ORDER — CLINDAMYCIN HYDROCHLORIDE 300 MG/1
300 CAPSULE ORAL EVERY 8 HOURS
Qty: 30 CAPSULE | Refills: 0 | Status: SHIPPED | OUTPATIENT
Start: 2021-09-29 | End: 2021-10-09

## 2021-10-19 ENCOUNTER — OFFICE VISIT (OUTPATIENT)
Dept: FAMILY MEDICINE | Facility: CLINIC | Age: 56
End: 2021-10-19
Payer: COMMERCIAL

## 2021-10-19 ENCOUNTER — HOSPITAL ENCOUNTER (OUTPATIENT)
Dept: RADIOLOGY | Facility: HOSPITAL | Age: 56
Discharge: HOME | End: 2021-10-19
Attending: PHYSICIAN ASSISTANT
Payer: COMMERCIAL

## 2021-10-19 VITALS
BODY MASS INDEX: 37.03 KG/M2 | HEART RATE: 72 BPM | SYSTOLIC BLOOD PRESSURE: 120 MMHG | HEIGHT: 60 IN | DIASTOLIC BLOOD PRESSURE: 82 MMHG | WEIGHT: 188.63 LBS

## 2021-10-19 DIAGNOSIS — I80.01 CHRONIC SUPERFICIAL PHLEBITIS OF RIGHT LOWER EXTREMITY: Primary | ICD-10-CM

## 2021-10-19 DIAGNOSIS — M79.89 OTHER SPECIFIED SOFT TISSUE DISORDERS: ICD-10-CM

## 2021-10-19 DIAGNOSIS — I80.01 CHRONIC SUPERFICIAL PHLEBITIS OF RIGHT LOWER EXTREMITY: ICD-10-CM

## 2021-10-19 LAB
CREAT SERPL-MCNC: 1 MG/DL (ref 0.5–1.4)
SAMPLE: NORMAL

## 2021-10-19 PROCEDURE — 1160F PR REVIEW ALL MEDS BY PRESCRIBER/CLIN PHARMACIST DOCUMENTED: ICD-10-PCS | Mod: S$GLB,,, | Performed by: PHYSICIAN ASSISTANT

## 2021-10-19 PROCEDURE — 99214 PR OFFICE/OUTPT VISIT, EST, LEVL IV, 30-39 MIN: ICD-10-PCS | Mod: S$GLB,,, | Performed by: PHYSICIAN ASSISTANT

## 2021-10-19 PROCEDURE — 3008F PR BODY MASS INDEX (BMI) DOCUMENTED: ICD-10-PCS | Mod: S$GLB,,, | Performed by: PHYSICIAN ASSISTANT

## 2021-10-19 PROCEDURE — 4010F ACE/ARB THERAPY RXD/TAKEN: CPT | Mod: S$GLB,,, | Performed by: PHYSICIAN ASSISTANT

## 2021-10-19 PROCEDURE — 3079F DIAST BP 80-89 MM HG: CPT | Mod: S$GLB,,, | Performed by: PHYSICIAN ASSISTANT

## 2021-10-19 PROCEDURE — 3074F PR MOST RECENT SYSTOLIC BLOOD PRESSURE < 130 MM HG: ICD-10-PCS | Mod: S$GLB,,, | Performed by: PHYSICIAN ASSISTANT

## 2021-10-19 PROCEDURE — 99214 OFFICE O/P EST MOD 30 MIN: CPT | Mod: S$GLB,,, | Performed by: PHYSICIAN ASSISTANT

## 2021-10-19 PROCEDURE — 73701 CT LOWER EXTREMITY W/DYE: CPT | Mod: TC,PO,RT

## 2021-10-19 PROCEDURE — 3061F PR NEG MICROALBUMINURIA RESULT DOCUMENTED/REVIEW: ICD-10-PCS | Mod: S$GLB,,, | Performed by: PHYSICIAN ASSISTANT

## 2021-10-19 PROCEDURE — 3066F NEPHROPATHY DOC TX: CPT | Mod: S$GLB,,, | Performed by: PHYSICIAN ASSISTANT

## 2021-10-19 PROCEDURE — 3074F SYST BP LT 130 MM HG: CPT | Mod: S$GLB,,, | Performed by: PHYSICIAN ASSISTANT

## 2021-10-19 PROCEDURE — 3008F BODY MASS INDEX DOCD: CPT | Mod: S$GLB,,, | Performed by: PHYSICIAN ASSISTANT

## 2021-10-19 PROCEDURE — 3066F PR DOCUMENTATION OF TREATMENT FOR NEPHROPATHY: ICD-10-PCS | Mod: S$GLB,,, | Performed by: PHYSICIAN ASSISTANT

## 2021-10-19 PROCEDURE — 3079F PR MOST RECENT DIASTOLIC BLOOD PRESSURE 80-89 MM HG: ICD-10-PCS | Mod: S$GLB,,, | Performed by: PHYSICIAN ASSISTANT

## 2021-10-19 PROCEDURE — 1160F RVW MEDS BY RX/DR IN RCRD: CPT | Mod: S$GLB,,, | Performed by: PHYSICIAN ASSISTANT

## 2021-10-19 PROCEDURE — 4010F PR ACE/ARB THEARPY RXD/TAKEN: ICD-10-PCS | Mod: S$GLB,,, | Performed by: PHYSICIAN ASSISTANT

## 2021-10-19 PROCEDURE — 25500020 PHARM REV CODE 255: Mod: PO | Performed by: PHYSICIAN ASSISTANT

## 2021-10-19 PROCEDURE — 3061F NEG MICROALBUMINURIA REV: CPT | Mod: S$GLB,,, | Performed by: PHYSICIAN ASSISTANT

## 2021-10-19 RX ORDER — SULFAMETHOXAZOLE AND TRIMETHOPRIM 800; 160 MG/1; MG/1
1 TABLET ORAL 2 TIMES DAILY
Qty: 20 TABLET | Refills: 0 | Status: SHIPPED | OUTPATIENT
Start: 2021-10-19 | End: 2021-10-29

## 2021-10-19 RX ADMIN — IOHEXOL 100 ML: 350 INJECTION, SOLUTION INTRAVENOUS at 09:10

## 2021-10-25 ENCOUNTER — TELEPHONE (OUTPATIENT)
Dept: FAMILY MEDICINE | Facility: CLINIC | Age: 56
End: 2021-10-25
Payer: COMMERCIAL

## 2021-11-11 ENCOUNTER — PATIENT MESSAGE (OUTPATIENT)
Dept: FAMILY MEDICINE | Facility: CLINIC | Age: 56
End: 2021-11-11
Payer: COMMERCIAL

## 2021-11-29 ENCOUNTER — TELEPHONE (OUTPATIENT)
Dept: FAMILY MEDICINE | Facility: CLINIC | Age: 56
End: 2021-11-29
Payer: COMMERCIAL

## 2021-11-29 DIAGNOSIS — R22.41 LEG MASS, RIGHT: Primary | ICD-10-CM

## 2021-12-06 ENCOUNTER — TELEPHONE (OUTPATIENT)
Dept: CARDIOLOGY | Facility: CLINIC | Age: 56
End: 2021-12-06
Payer: COMMERCIAL

## 2021-12-06 NOTE — LETTER
2021    Brunilda Bob  [address]  [place] [address]             [facility]  [address], [address]  [place] [address]  Phone: [phone]  Fax: [phone] Patient: Brunilda Bob  : 1965  Referring Doctor: Dr. Munoz  Procedure: Biopsy Right Leg Mass    Current Outpatient Medications   Medication Sig    acetaminophen (TYLENOL) 500 MG tablet Take 1,000 mg by mouth 3 (three) times daily.    ascorbic acid, vitamin C, (VITAMIN C) 500 MG tablet Take 2 mg by mouth once daily.    atenoloL (TENORMIN) 25 MG tablet TAKE ONE TABLET BY MOUTH ONCE DAILY    atorvastatin (LIPITOR) 10 MG tablet     ergocalciferol, vitamin D2, (VITAMIN D2 ORAL) Take 5,000 mg by mouth nightly.    iron carb,gl/FA/B12/C/docusate (FERRALET 90 DUAL-IRON DELIVERY ORAL) Take 90 mg by mouth 4 (four) times a week.     Lactobacillus rhamnosus GG (CULTURELLE) 10 billion cell capsule Take 1 capsule by mouth once daily.    levothyroxine (UNITHROID) 50 MCG tablet Take 50 mcg by mouth once daily.    loratadine (CLARITIN) 10 mg tablet Take 10 mg by mouth once daily.    losartan (COZAAR) 50 MG tablet Take 1 tablet (50 mg total) by mouth once daily.    magnesium oxide (MAG-OX) 400 mg (241.3 mg magnesium) tablet Take 400 mg by mouth once daily.    multivitamin (ONE DAILY MULTIVITAMIN) per tablet Take 1 tablet by mouth once daily.    ondansetron (ZOFRAN) 8 MG tablet Take 8 mg by mouth every 8 (eight) hours as needed for Nausea.    pantoprazole (PROTONIX) 40 MG tablet TAKE ONE TABLET BY MOUTH ONCE DAILY    sars-cov-2, covid-19, (MODERNA COVID-19) 100 mcg/0.5 ml injection      No current facility-administered medications for this visit.       This patient has been assessed for risk factors for clearance of surgery with the following stipulations:    x__ No contraindications  _x__ Recommendations for antiplatelet/anticoagulant medications: NONE  _x__ Cleared for surgery with moderate  risks      If you have any questions regarding the above, please contact my office at [phone].    Sincerely,  Otilia Rojas NP

## 2021-12-06 NOTE — TELEPHONE ENCOUNTER
----- Message from Faizan Davis sent at 12/6/2021  9:40 AM CST -----  Type: Needs Medical Advice  Who Called: Patient  Symptoms (please be specific):   How long has patient had these symptoms:    Pharmacy name and phone #:    Best Call Back Number: [phone]  Additional Information: Pt requesting a call back concerning getting a medical clearance for a procedure. Pt denied first available for 04/19/22.Pt is trying to do her procedure in the next two weeks.

## 2021-12-28 ENCOUNTER — TELEPHONE (OUTPATIENT)
Dept: FAMILY MEDICINE | Facility: CLINIC | Age: 56
End: 2021-12-28
Payer: COMMERCIAL

## 2022-01-31 ENCOUNTER — TELEPHONE (OUTPATIENT)
Dept: FAMILY MEDICINE | Facility: CLINIC | Age: 57
End: 2022-01-31
Payer: COMMERCIAL

## 2022-01-31 ENCOUNTER — OFFICE VISIT (OUTPATIENT)
Dept: FAMILY MEDICINE | Facility: CLINIC | Age: 57
End: 2022-01-31
Payer: COMMERCIAL

## 2022-01-31 VITALS
WEIGHT: 190 LBS | BODY MASS INDEX: 37.3 KG/M2 | TEMPERATURE: 98 F | OXYGEN SATURATION: 97 % | DIASTOLIC BLOOD PRESSURE: 80 MMHG | SYSTOLIC BLOOD PRESSURE: 134 MMHG | HEART RATE: 84 BPM | HEIGHT: 60 IN

## 2022-01-31 DIAGNOSIS — R68.89 FLU-LIKE SYMPTOMS: ICD-10-CM

## 2022-01-31 DIAGNOSIS — J06.9 UPPER RESPIRATORY TRACT INFECTION, UNSPECIFIED TYPE: Primary | ICD-10-CM

## 2022-01-31 LAB
CTP QC/QA: YES
SARS-COV-2 RDRP RESP QL NAA+PROBE: NEGATIVE

## 2022-01-31 PROCEDURE — 99213 OFFICE O/P EST LOW 20 MIN: CPT | Mod: 25,S$GLB,, | Performed by: PHYSICIAN ASSISTANT

## 2022-01-31 PROCEDURE — 99213 PR OFFICE/OUTPT VISIT, EST, LEVL III, 20-29 MIN: ICD-10-PCS | Mod: 25,S$GLB,, | Performed by: PHYSICIAN ASSISTANT

## 2022-01-31 PROCEDURE — 3079F DIAST BP 80-89 MM HG: CPT | Mod: S$GLB,,, | Performed by: PHYSICIAN ASSISTANT

## 2022-01-31 PROCEDURE — 3075F PR MOST RECENT SYSTOLIC BLOOD PRESS GE 130-139MM HG: ICD-10-PCS | Mod: S$GLB,,, | Performed by: PHYSICIAN ASSISTANT

## 2022-01-31 PROCEDURE — U0002: ICD-10-PCS | Mod: QW,S$GLB,, | Performed by: PHYSICIAN ASSISTANT

## 2022-01-31 PROCEDURE — 3075F SYST BP GE 130 - 139MM HG: CPT | Mod: S$GLB,,, | Performed by: PHYSICIAN ASSISTANT

## 2022-01-31 PROCEDURE — 96372 PR INJECTION,THERAP/PROPH/DIAG2ST, IM OR SUBCUT: ICD-10-PCS | Mod: S$GLB,,, | Performed by: PHYSICIAN ASSISTANT

## 2022-01-31 PROCEDURE — 96372 THER/PROPH/DIAG INJ SC/IM: CPT | Mod: S$GLB,,, | Performed by: PHYSICIAN ASSISTANT

## 2022-01-31 PROCEDURE — 3008F BODY MASS INDEX DOCD: CPT | Mod: S$GLB,,, | Performed by: PHYSICIAN ASSISTANT

## 2022-01-31 PROCEDURE — 3008F PR BODY MASS INDEX (BMI) DOCUMENTED: ICD-10-PCS | Mod: S$GLB,,, | Performed by: PHYSICIAN ASSISTANT

## 2022-01-31 PROCEDURE — 3079F PR MOST RECENT DIASTOLIC BLOOD PRESSURE 80-89 MM HG: ICD-10-PCS | Mod: S$GLB,,, | Performed by: PHYSICIAN ASSISTANT

## 2022-01-31 PROCEDURE — U0002 COVID-19 LAB TEST NON-CDC: HCPCS | Mod: QW,S$GLB,, | Performed by: PHYSICIAN ASSISTANT

## 2022-01-31 RX ORDER — DEXAMETHASONE SODIUM PHOSPHATE 4 MG/ML
8 INJECTION, SOLUTION INTRA-ARTICULAR; INTRALESIONAL; INTRAMUSCULAR; INTRAVENOUS; SOFT TISSUE ONCE
Status: COMPLETED | OUTPATIENT
Start: 2022-01-31 | End: 2022-01-31

## 2022-01-31 RX ADMIN — DEXAMETHASONE SODIUM PHOSPHATE 8 MG: 4 INJECTION, SOLUTION INTRA-ARTICULAR; INTRALESIONAL; INTRAMUSCULAR; INTRAVENOUS; SOFT TISSUE at 04:01

## 2022-01-31 NOTE — TELEPHONE ENCOUNTER
----- Message from Annalisa Taylor sent at 1/31/2022 12:29 PM CST -----  Patient called and stated that she would like to be seen today she has chest congestion and sinus infection her eyes hurt she took a covid test and it came back negative  she would like to have an xray to make sure she does not have pneumonia please give her a call at [phone]

## 2022-01-31 NOTE — PROGRESS NOTES
SUBJECTIVE:    Patient ID: Brunilda Bob is a 56 y.o. female.    Chief Complaint: Sinusitis (Sinus and chest congestion//complains of left ear pain//covid rapid test ordered//covid negative at home test this morning//tc)    This is a 56-year-old female who presents today with chief complaint URI symptoms since Saturday morning.  Two days prior.  Reports positive contact at work with co-worker who works 1 cubicle over from her.  Reports terrible cough, congestion, left ear pain, headache and back pain.  Subjective fever at home.      Office Visit on 01/31/2022   Component Date Value Ref Range Status    POC Rapid COVID 01/31/2022 Negative  Negative Final     Acceptable 01/31/2022 Yes   Final   Hospital Outpatient Visit on 10/19/2021   Component Date Value Ref Range Status    POC Creatinine 10/19/2021 1.0  0.5 - 1.4 mg/dL Final    Sample 10/19/2021 VENOUS   Final       Past Medical History:   Diagnosis Date    Broken heart syndrome     Hypertension      Past Surgical History:   Procedure Laterality Date    CLAVICLE SURGERY  2012    TONSILLECTOMY       Family History   Problem Relation Age of Onset    Colon cancer Mother     Uterine cancer Mother     Hypertension Father     Testicular cancer Son        Marital Status:   Alcohol History:  reports current alcohol use.  Tobacco History:  reports that she has never smoked. She has never used smokeless tobacco.  Drug History:  reports no history of drug use.    Review of patient's allergies indicates:   Allergen Reactions    Crestor [rosuvastatin] Other (See Comments)     Paralysis    Codeine Nausea And Vomiting    Strawberries [strawberry] Hives       Current Outpatient Medications:     acetaminophen (TYLENOL) 500 MG tablet, Take 1,000 mg by mouth 3 (three) times daily., Disp: , Rfl:     ascorbic acid, vitamin C, (VITAMIN C) 500 MG tablet, Take 2 mg by mouth once daily., Disp: , Rfl:     atenoloL (TENORMIN) 25 MG  tablet, TAKE ONE TABLET BY MOUTH ONCE DAILY, Disp: 90 tablet, Rfl: 3    atorvastatin (LIPITOR) 10 MG tablet, , Disp: , Rfl:     ergocalciferol, vitamin D2, (VITAMIN D2 ORAL), Take 5,000 mg by mouth nightly., Disp: , Rfl:     iron carb,gl/FA/B12/C/docusate (FERRALET 90 DUAL-IRON DELIVERY ORAL), Take 90 mg by mouth 4 (four) times a week. , Disp: , Rfl:     Lactobacillus rhamnosus GG (CULTURELLE) 10 billion cell capsule, Take 1 capsule by mouth once daily., Disp: , Rfl:     levothyroxine (UNITHROID) 50 MCG tablet, Take 50 mcg by mouth once daily., Disp: , Rfl:     loratadine (CLARITIN) 10 mg tablet, Take 10 mg by mouth once daily., Disp: , Rfl:     losartan (COZAAR) 50 MG tablet, Take 1 tablet (50 mg total) by mouth once daily., Disp: 90 tablet, Rfl: 3    magnesium oxide (MAG-OX) 400 mg (241.3 mg magnesium) tablet, Take 400 mg by mouth once daily., Disp: , Rfl:     multivitamin (ONE DAILY MULTIVITAMIN) per tablet, Take 1 tablet by mouth once daily., Disp: , Rfl:     ondansetron (ZOFRAN) 8 MG tablet, Take 8 mg by mouth every 8 (eight) hours as needed for Nausea., Disp: , Rfl:     pantoprazole (PROTONIX) 40 MG tablet, TAKE ONE TABLET BY MOUTH ONCE DAILY, Disp: 90 tablet, Rfl: 3    sars-cov-2, covid-19, (MODERNA COVID-19) 100 mcg/0.5 ml injection, , Disp: , Rfl:     Current Facility-Administered Medications:     dexamethasone injection 8 mg, 8 mg, Intramuscular, Once, Wilfredo Hernández PA-C    Review of Systems   Constitutional: Negative for chills, fatigue and fever.   HENT: Positive for congestion, sinus pressure and sinus pain. Negative for ear discharge, ear pain, rhinorrhea, sneezing, sore throat and trouble swallowing.    Eyes: Negative for pain, discharge, redness and itching.   Respiratory: Positive for cough. Negative for chest tightness and shortness of breath.    Cardiovascular: Negative for chest pain.   Gastrointestinal: Negative for abdominal distention and abdominal pain.   Musculoskeletal:  Positive for back pain.   Neurological: Negative for weakness and headaches.          Objective:      Vitals:    01/31/22 1509   BP: 134/80   Pulse: 84   Temp: 97.9 °F (36.6 °C)   SpO2: 97%   Weight: 86.2 kg (190 lb)   Height: 5' (1.524 m)     Physical Exam  Constitutional:       General: She is not in acute distress.     Appearance: She is well-developed and well-nourished.   HENT:      Head: Normocephalic and atraumatic.   Eyes:      Extraocular Movements: EOM normal.      Conjunctiva/sclera: Conjunctivae normal.      Pupils: Pupils are equal, round, and reactive to light.   Neck:      Thyroid: No thyromegaly.   Cardiovascular:      Rate and Rhythm: Normal rate and regular rhythm.      Pulses: Intact distal pulses.      Heart sounds: Normal heart sounds.   Pulmonary:      Effort: Pulmonary effort is normal.      Breath sounds: Normal breath sounds.   Abdominal:      General: Bowel sounds are normal. There is no distension.      Palpations: Abdomen is soft.      Tenderness: There is no abdominal tenderness.   Musculoskeletal:         General: Normal range of motion.      Cervical back: Normal range of motion and neck supple.   Skin:     General: Skin is warm and dry.      Findings: No erythema.   Neurological:      Mental Status: She is alert and oriented to person, place, and time.      Cranial Nerves: No cranial nerve deficit.   Psychiatric:         Mood and Affect: Mood and affect normal.           Assessment:       1. Upper respiratory tract infection, unspecified type    2. Flu-like symptoms         Plan:       Upper respiratory tract infection, unspecified type  Comments:  Rapid COVID test negative.  Treat symptoms.  Decadron given in clinic.  Rest/push fluids.  Should symptoms worsen or persist would consider antibiotic then  Orders:  -     dexamethasone injection 8 mg    Flu-like symptoms  -     POCT COVID-19 Rapid Screening      Follow up if symptoms worsen or fail to improve.        1/31/2022 Wilfredo  JAY Hernández

## 2022-02-21 ENCOUNTER — TELEPHONE (OUTPATIENT)
Dept: CARDIOLOGY | Facility: CLINIC | Age: 57
End: 2022-02-21
Payer: COMMERCIAL

## 2022-02-21 NOTE — TELEPHONE ENCOUNTER
----- Message from Chavo Andujar sent at 2/21/2022 10:51 AM CST -----  Contact: pt  Pt has jury duty needs appt needs excuse to get out of Jury duty please call back pt     [phone]

## 2022-02-21 NOTE — TELEPHONE ENCOUNTER
----- Message from Duane Westbrook sent at 2/21/2022  2:25 PM CST -----  Type:  Needs Medical Advice    Who Called: pt   Symptoms (please be specific):   How long has patient had these symptoms:   Pharmacy name and phone #:    Would the patient rather a call back or a response via [facility]? Call back   Best Call Back Number: primary on file   Additional Information: requesting to adjust upcoming appt if possible, see prev in baskets

## 2022-02-22 ENCOUNTER — TELEPHONE (OUTPATIENT)
Dept: CARDIOLOGY | Facility: CLINIC | Age: 57
End: 2022-02-22
Payer: COMMERCIAL

## 2022-02-22 DIAGNOSIS — I10 HYPERTENSION, UNSPECIFIED TYPE: Primary | ICD-10-CM

## 2022-02-22 NOTE — TELEPHONE ENCOUNTER
----- Message from Tamie Barroso sent at 2/22/2022  2:28 PM CST -----  Pt came in wondering if she had lab work to do she did not , was wondering if some needs to be put in the computer

## 2022-02-24 ENCOUNTER — OFFICE VISIT (OUTPATIENT)
Dept: CARDIOLOGY | Facility: CLINIC | Age: 57
End: 2022-02-24
Payer: COMMERCIAL

## 2022-02-24 VITALS
DIASTOLIC BLOOD PRESSURE: 90 MMHG | SYSTOLIC BLOOD PRESSURE: 122 MMHG | HEART RATE: 60 BPM | WEIGHT: 188 LBS | BODY MASS INDEX: 36.91 KG/M2 | HEIGHT: 60 IN

## 2022-02-24 DIAGNOSIS — E78.5 DYSLIPIDEMIA: ICD-10-CM

## 2022-02-24 DIAGNOSIS — I10 HYPERTENSION, UNSPECIFIED TYPE: ICD-10-CM

## 2022-02-24 DIAGNOSIS — R07.89 OTHER CHEST PAIN: Primary | ICD-10-CM

## 2022-02-24 DIAGNOSIS — R94.31 NONSPECIFIC ABNORMAL ELECTROCARDIOGRAM (ECG) (EKG): ICD-10-CM

## 2022-02-24 DIAGNOSIS — E07.9 THYROID DYSFUNCTION: ICD-10-CM

## 2022-02-24 LAB
ALBUMIN SERPL-MCNC: 4 G/DL (ref 3.6–5.1)
ALBUMIN/GLOB SERPL: 1.7 (CALC) (ref 1–2.5)
ALP SERPL-CCNC: 86 U/L (ref 37–153)
ALT SERPL-CCNC: 16 U/L (ref 6–29)
AST SERPL-CCNC: 14 U/L (ref 10–35)
BASOPHILS # BLD AUTO: 18 CELLS/UL (ref 0–200)
BASOPHILS NFR BLD AUTO: 0.3 %
BILIRUB SERPL-MCNC: 0.5 MG/DL (ref 0.2–1.2)
BUN SERPL-MCNC: 23 MG/DL (ref 7–25)
BUN/CREAT SERPL: ABNORMAL (CALC) (ref 6–22)
CALCIUM SERPL-MCNC: 9.7 MG/DL (ref 8.6–10.4)
CHLORIDE SERPL-SCNC: 104 MMOL/L (ref 98–110)
CHOLEST SERPL-MCNC: 144 MG/DL
CHOLEST/HDLC SERPL: 3.1 (CALC)
CO2 SERPL-SCNC: 31 MMOL/L (ref 20–32)
CREAT SERPL-MCNC: 0.83 MG/DL (ref 0.5–1.05)
EOSINOPHIL # BLD AUTO: 118 CELLS/UL (ref 15–500)
EOSINOPHIL NFR BLD AUTO: 2 %
ERYTHROCYTE [DISTWIDTH] IN BLOOD BY AUTOMATED COUNT: 14.3 % (ref 11–15)
GLOBULIN SER CALC-MCNC: 2.4 G/DL (CALC) (ref 1.9–3.7)
GLUCOSE SERPL-MCNC: 112 MG/DL (ref 65–99)
HCT VFR BLD AUTO: 38.1 % (ref 35–45)
HDLC SERPL-MCNC: 46 MG/DL
HGB BLD-MCNC: 12 G/DL (ref 11.7–15.5)
LDLC SERPL CALC-MCNC: 80 MG/DL (CALC)
LYMPHOCYTES # BLD AUTO: 897 CELLS/UL (ref 850–3900)
LYMPHOCYTES NFR BLD AUTO: 15.2 %
MCH RBC QN AUTO: 26.5 PG (ref 27–33)
MCHC RBC AUTO-ENTMCNC: 31.5 G/DL (ref 32–36)
MCV RBC AUTO: 84.3 FL (ref 80–100)
MONOCYTES # BLD AUTO: 431 CELLS/UL (ref 200–950)
MONOCYTES NFR BLD AUTO: 7.3 %
NEUTROPHILS # BLD AUTO: 4437 CELLS/UL (ref 1500–7800)
NEUTROPHILS NFR BLD AUTO: 75.2 %
NONHDLC SERPL-MCNC: 98 MG/DL (CALC)
PLATELET # BLD AUTO: 240 THOUSAND/UL (ref 140–400)
PMV BLD REES-ECKER: 9.7 FL (ref 7.5–12.5)
POTASSIUM SERPL-SCNC: 4.5 MMOL/L (ref 3.5–5.3)
PROT SERPL-MCNC: 6.4 G/DL (ref 6.1–8.1)
RBC # BLD AUTO: 4.52 MILLION/UL (ref 3.8–5.1)
SODIUM SERPL-SCNC: 141 MMOL/L (ref 135–146)
TRIGL SERPL-MCNC: 99 MG/DL
TSH SERPL-ACNC: 1.84 MIU/L (ref 0.4–4.5)
WBC # BLD AUTO: 5.9 THOUSAND/UL (ref 3.8–10.8)

## 2022-02-24 PROCEDURE — 1159F PR MEDICATION LIST DOCUMENTED IN MEDICAL RECORD: ICD-10-PCS | Mod: CPTII,S$GLB,, | Performed by: NURSE PRACTITIONER

## 2022-02-24 PROCEDURE — 3080F DIAST BP >= 90 MM HG: CPT | Mod: CPTII,S$GLB,, | Performed by: NURSE PRACTITIONER

## 2022-02-24 PROCEDURE — 93010 EKG 12-LEAD: ICD-10-PCS | Mod: S$GLB,,, | Performed by: GENERAL PRACTICE

## 2022-02-24 PROCEDURE — 99214 OFFICE O/P EST MOD 30 MIN: CPT | Mod: S$GLB,,, | Performed by: NURSE PRACTITIONER

## 2022-02-24 PROCEDURE — 1160F PR REVIEW ALL MEDS BY PRESCRIBER/CLIN PHARMACIST DOCUMENTED: ICD-10-PCS | Mod: CPTII,S$GLB,, | Performed by: NURSE PRACTITIONER

## 2022-02-24 PROCEDURE — 3008F BODY MASS INDEX DOCD: CPT | Mod: CPTII,S$GLB,, | Performed by: NURSE PRACTITIONER

## 2022-02-24 PROCEDURE — 4010F ACE/ARB THERAPY RXD/TAKEN: CPT | Mod: CPTII,S$GLB,, | Performed by: NURSE PRACTITIONER

## 2022-02-24 PROCEDURE — 93005 EKG 12-LEAD: ICD-10-PCS | Mod: S$GLB,,, | Performed by: NURSE PRACTITIONER

## 2022-02-24 PROCEDURE — 3008F PR BODY MASS INDEX (BMI) DOCUMENTED: ICD-10-PCS | Mod: CPTII,S$GLB,, | Performed by: NURSE PRACTITIONER

## 2022-02-24 PROCEDURE — 93005 ELECTROCARDIOGRAM TRACING: CPT | Mod: S$GLB,,, | Performed by: NURSE PRACTITIONER

## 2022-02-24 PROCEDURE — 3074F SYST BP LT 130 MM HG: CPT | Mod: CPTII,S$GLB,, | Performed by: NURSE PRACTITIONER

## 2022-02-24 PROCEDURE — 3080F PR MOST RECENT DIASTOLIC BLOOD PRESSURE >= 90 MM HG: ICD-10-PCS | Mod: CPTII,S$GLB,, | Performed by: NURSE PRACTITIONER

## 2022-02-24 PROCEDURE — 99214 PR OFFICE/OUTPT VISIT, EST, LEVL IV, 30-39 MIN: ICD-10-PCS | Mod: S$GLB,,, | Performed by: NURSE PRACTITIONER

## 2022-02-24 PROCEDURE — 1159F MED LIST DOCD IN RCRD: CPT | Mod: CPTII,S$GLB,, | Performed by: NURSE PRACTITIONER

## 2022-02-24 PROCEDURE — 4010F PR ACE/ARB THEARPY RXD/TAKEN: ICD-10-PCS | Mod: CPTII,S$GLB,, | Performed by: NURSE PRACTITIONER

## 2022-02-24 PROCEDURE — 3074F PR MOST RECENT SYSTOLIC BLOOD PRESSURE < 130 MM HG: ICD-10-PCS | Mod: CPTII,S$GLB,, | Performed by: NURSE PRACTITIONER

## 2022-02-24 PROCEDURE — 93010 ELECTROCARDIOGRAM REPORT: CPT | Mod: S$GLB,,, | Performed by: GENERAL PRACTICE

## 2022-02-24 PROCEDURE — 1160F RVW MEDS BY RX/DR IN RCRD: CPT | Mod: CPTII,S$GLB,, | Performed by: NURSE PRACTITIONER

## 2022-02-24 RX ORDER — ISOSORBIDE MONONITRATE 30 MG/1
30 TABLET, EXTENDED RELEASE ORAL DAILY
Qty: 30 TABLET | Refills: 11 | Status: SHIPPED | OUTPATIENT
Start: 2022-02-24 | End: 2022-05-03

## 2022-02-24 RX ORDER — RAMIPRIL 10 MG/1
10 CAPSULE ORAL DAILY
COMMUNITY
End: 2022-02-24

## 2022-02-24 RX ORDER — LOSARTAN POTASSIUM 50 MG/1
50 TABLET ORAL DAILY
Qty: 90 TABLET | Refills: 3 | Status: ON HOLD | OUTPATIENT
Start: 2022-02-24 | End: 2022-07-04 | Stop reason: SDUPTHER

## 2022-02-24 NOTE — PROGRESS NOTES
Subjective:    Patient ID:  Brunilda Bob is a 56 y.o. female patient here for evaluation Hypertension      History of Present Illness:         Ms. Vazquez is here today for a follow-up visit.  She has been having atypical like chest pains that radiate to the back she also has an abnormal EKG today.  She did have COVID 2 times in believes that this all started after this.  She denies any recent fever cough chills or congestion.  No neck pain no nausea vomiting.  Does have some fatigue and tiredness some mild edema in the legs.    Review of patient's allergies indicates:   Allergen Reactions    Crestor [rosuvastatin] Other (See Comments)     Paralysis    Codeine Nausea And Vomiting    Strawberries [strawberry] Hives       Past Medical History:   Diagnosis Date    Broken heart syndrome     Hypertension      Past Surgical History:   Procedure Laterality Date    CLAVICLE SURGERY  2012    TONSILLECTOMY       Social History     Tobacco Use    Smoking status: Never Smoker    Smokeless tobacco: Never Used   Substance Use Topics    Alcohol use: Yes    Drug use: Never        Review of Systems:    As noted in HPI in addition                   Objective        Vitals:    02/24/22 1520   BP: (!) 122/90   Pulse: 60       LIPIDS - LAST 2   Lab Results   Component Value Date    CHOL 144 02/23/2022    CHOL 200 (H) 05/10/2008    HDL 46 (L) 02/23/2022    HDL 52 05/10/2008    LDLCALC 80 02/23/2022    LDLCALC 134.4 (H) 05/10/2008    TRIG 99 02/23/2022    TRIG 68 05/10/2008    CHOLHDL 3.1 02/23/2022    CHOLHDL 26.0 05/10/2008       CBC - LAST 2  Lab Results   Component Value Date    WBC 5.9 02/23/2022    WBC 5.97 11/01/2019    WBC 5.97 11/01/2019    RBC 4.52 02/23/2022    RBC 3.62 (L) 11/01/2019    RBC 3.62 (L) 11/01/2019    HGB 12.0 02/23/2022    HGB 9.5 (L) 11/01/2019    HGB 9.5 (L) 11/01/2019    HCT 38.1 02/23/2022    HCT 30.7 (L) 11/01/2019    HCT 30.7 (L) 11/01/2019    MCV 84.3 02/23/2022    MCV 85  11/01/2019    MCV 85 11/01/2019    MCH 26.5 (L) 02/23/2022    MCH 26.2 (L) 11/01/2019    MCH 26.2 (L) 11/01/2019    MCHC 31.5 (L) 02/23/2022    MCHC 30.9 (L) 11/01/2019    MCHC 30.9 (L) 11/01/2019    RDW 14.3 02/23/2022    RDW 14.8 (H) 11/01/2019    RDW 14.8 (H) 11/01/2019     02/23/2022     11/01/2019     11/01/2019    MPV 9.7 02/23/2022    MPV 10.4 11/01/2019    MPV 10.4 11/01/2019    GRAN 4.1 11/01/2019    GRAN 68.4 11/01/2019    LYMPH 897 02/23/2022    LYMPH 15.2 02/23/2022    MONO 431 02/23/2022    MONO 7.3 02/23/2022    BASO 18 02/23/2022    BASO 0.02 11/01/2019    NRBC 0 11/01/2019    NRBC 0 10/31/2019       CHEMISTRY & LIVER FUNCTION - LAST 2  Lab Results   Component Value Date     02/23/2022     11/01/2019    K 4.5 02/23/2022    K 4.0 11/01/2019     02/23/2022     11/01/2019    CO2 31 02/23/2022    CO2 30 (H) 11/01/2019    ANIONGAP 6 (L) 11/01/2019    ANIONGAP 8 10/31/2019    BUN 23 02/23/2022    BUN 21 (H) 11/01/2019    CREATININE 0.83 02/23/2022    CREATININE 0.9 11/01/2019     (H) 02/23/2022     11/01/2019    CALCIUM 9.7 02/23/2022    CALCIUM 8.6 (L) 11/01/2019    MG 1.9 10/30/2019    ALBUMIN 4.0 02/23/2022    ALBUMIN 3.9 10/30/2019    PROT 6.4 02/23/2022    PROT 6.9 10/30/2019    ALKPHOS 82 10/30/2019    ALKPHOS 103 05/10/2008    ALT 16 02/23/2022    ALT 22 10/30/2019    AST 14 02/23/2022    AST 23 10/30/2019    BILITOT 0.5 02/23/2022    BILITOT 0.6 10/30/2019        CARDIAC PROFILE - LAST 2  Lab Results   Component Value Date    BNP 37 10/30/2019    TROPONINI <0.030 10/31/2019    TROPONINI <0.030 10/30/2019        COAGULATION - LAST 2  Lab Results   Component Value Date    LABPT 13.3 10/30/2019    INR 1.1 10/30/2019       ENDOCRINE & PSA - LAST 2  Lab Results   Component Value Date    MICROALBUR 0.8 01/08/2021    MICROALBUR <0.2 06/11/2020    TSH 1.84 02/23/2022    TSH 2.490 10/30/2019        ECHOCARDIOGRAM RESULTS  No results found for this or  any previous visit.      CURRENT/PREVIOUS VISIT EKG  Results for orders placed or performed during the hospital encounter of 10/30/19   EKG 12-lead    Collection Time: 10/30/19  6:12 PM    Narrative    Test Reason : V87.7XXA,    Vent. Rate : 079 BPM     Atrial Rate : 079 BPM     P-R Int : 174 ms          QRS Dur : 088 ms      QT Int : 352 ms       P-R-T Axes : 019 -21 050 degrees     QTc Int : 403 ms    Normal sinus rhythm  Nonspecific ST and T wave abnormality  Abnormal ECG  No previous ECGs available  Confirmed by Ermias MARTINEZ, Stephane ([id number]) on 10/31/2019 8:47:24 AM    Referred By: AMRIT   SELF           Confirmed By:Stephane Monson MD         PHYSICAL EXAM  CONSTITUTIONAL: Well built, well nourished in no apparent distress  NECK: no carotid bruit, no JVD  LUNGS: CTA  CHEST WALL: no tenderness  HEART: regular rate and rhythm, S1, S2 normal, no murmur, click, rub or gallop   ABDOMEN: soft, non-tender; bowel sounds normal; no masses,  no organomegaly  EXTREMITIES: Extremities normal, no edema, no calf tenderness noted  NEURO: AAO X 3    I HAVE REVIEWED :    The vital signs, nurses notes, and all the pertinent radiology and labs.        Current Outpatient Medications   Medication Instructions    acetaminophen (TYLENOL) 1,000 mg, Oral, 3 times daily    ascorbic acid (vitamin C) (VITAMIN C) 2 mg, Oral, Daily    atenoloL (TENORMIN) 25 MG tablet TAKE ONE TABLET BY MOUTH ONCE DAILY    atorvastatin (LIPITOR) 10 MG tablet No dose, route, or frequency recorded.    ergocalciferol, vitamin D2, (VITAMIN D2 ORAL) 5,000 mg, Oral, Nightly    iron carb,gl/FA/B12/C/docusate (FERRALET 90 DUAL-IRON DELIVERY ORAL) 90 mg, Oral, Four times weekly    isosorbide mononitrate (IMDUR) 30 mg, Oral, Daily    Lactobacillus rhamnosus GG (CULTURELLE) 10 billion cell capsule 1 capsule, Oral, Daily    levothyroxine (SYNTHROID) 50 mcg, Oral, Daily    loratadine (CLARITIN) 10 mg, Oral, Daily    losartan (COZAAR) 50 mg, Oral, Daily     magnesium oxide (MAG-OX) 400 mg, Oral, Daily    multivitamin (THERAGRAN) per tablet 1 tablet, Oral, Daily    ondansetron (ZOFRAN) 8 mg, Oral, Every 8 hours PRN    pantoprazole (PROTONIX) 40 MG tablet TAKE ONE TABLET BY MOUTH ONCE DAILY    sars-cov-2, covid-19, (MODERNA COVID-19) 100 mcg/0.5 ml injection No dose, route, or frequency recorded.          Assessment & Plan     Hypertension  Continue atenolol 25 mg daily    losartan 50 mg daily  Add isosorbide mononitrate 30 mg daily    Dyslipidemia  Continue Lipitor 10 mg daily    Nonspecific abnormal electrocardiogram (ECG) (EKG)  EKG has T-wave inversion in anterior lateral leads  Now with atypical like chest pain  Recommend isosorbide mononitrate 30 mg daily  Obtain stress test and echo cardiac    Thyroid dysfunction  Continue current regimen and have her follow-up with her endocrinologist    Other chest pain  See the above          No follow-ups on file.

## 2022-02-24 NOTE — ASSESSMENT & PLAN NOTE
EKG has T-wave inversion in anterior lateral leads  Now with atypical like chest pain  Recommend isosorbide mononitrate 30 mg daily  Obtain stress test and echo cardiac   Cigarettes

## 2022-02-28 ENCOUNTER — HOSPITAL ENCOUNTER (OUTPATIENT)
Dept: RADIOLOGY | Facility: HOSPITAL | Age: 57
Discharge: HOME | End: 2022-02-28
Attending: INTERNAL MEDICINE
Payer: COMMERCIAL

## 2022-02-28 DIAGNOSIS — E04.2 NONTOXIC MULTINODULAR GOITER: ICD-10-CM

## 2022-02-28 PROCEDURE — 76536 US EXAM OF HEAD AND NECK: CPT | Mod: TC,PO

## 2022-03-24 ENCOUNTER — TELEPHONE (OUTPATIENT)
Dept: FAMILY MEDICINE | Facility: CLINIC | Age: 57
End: 2022-03-24
Payer: COMMERCIAL

## 2022-03-24 NOTE — TELEPHONE ENCOUNTER
Spoke with pt, about scheduling an appointment with Dr. Titus for a B/P check. Appointment made 06/21/2022 @ 11:20. Pt acknowledge understanding.

## 2022-04-27 ENCOUNTER — HOSPITAL ENCOUNTER (OUTPATIENT)
Dept: CARDIOLOGY | Facility: CLINIC | Age: 57
Discharge: HOME | End: 2022-04-27
Attending: NURSE PRACTITIONER
Payer: COMMERCIAL

## 2022-04-27 ENCOUNTER — HOSPITAL ENCOUNTER (OUTPATIENT)
Dept: RADIOLOGY | Facility: CLINIC | Age: 57
Discharge: HOME | End: 2022-04-27
Attending: NURSE PRACTITIONER
Payer: COMMERCIAL

## 2022-04-27 DIAGNOSIS — R07.89 OTHER CHEST PAIN: ICD-10-CM

## 2022-04-27 PROCEDURE — 78452 HT MUSCLE IMAGE SPECT MULT: CPT | Mod: S$GLB,,, | Performed by: INTERNAL MEDICINE

## 2022-04-27 PROCEDURE — 93015 CV STRESS TEST SUPVJ I&R: CPT | Mod: S$GLB,,, | Performed by: INTERNAL MEDICINE

## 2022-04-27 PROCEDURE — 93015 STRESS TEST WITH MYOCARDIAL PERFUSION (CUPID ONLY): ICD-10-PCS | Mod: S$GLB,,, | Performed by: INTERNAL MEDICINE

## 2022-04-27 PROCEDURE — A9502 TC99M TETROFOSMIN: HCPCS | Mod: S$GLB,,, | Performed by: INTERNAL MEDICINE

## 2022-04-27 PROCEDURE — 78452 STRESS TEST WITH MYOCARDIAL PERFUSION (CUPID ONLY): ICD-10-PCS | Mod: S$GLB,,, | Performed by: INTERNAL MEDICINE

## 2022-04-27 PROCEDURE — A9502 STRESS TEST WITH MYOCARDIAL PERFUSION (CUPID ONLY): ICD-10-PCS | Mod: S$GLB,,, | Performed by: INTERNAL MEDICINE

## 2022-04-28 LAB
CV STRESS BASE HR: 65 BPM
DIASTOLIC BLOOD PRESSURE: 90 MMHG
EJECTION FRACTION- HIGH: 65 %
END DIASTOLIC INDEX-HIGH: 158 ML/M2
END DIASTOLIC INDEX-LOW: 94 ML/M2
END SYSTOLIC INDEX-HIGH: 71 ML/M2
END SYSTOLIC INDEX-LOW: 33 ML/M2
NUC STRESS DIASTOLIC VOLUME INDEX: 69
NUC STRESS EJECTION FRACTION: 79 %
NUC STRESS SYSTOLIC VOLUME INDEX: 14
OHS CV CPX 1 MINUTE RECOVERY HEART RATE: 122 BPM
OHS CV CPX 85 PERCENT MAX PREDICTED HEART RATE MALE: 132
OHS CV CPX ESTIMATED METS: 10
OHS CV CPX MAX PREDICTED HEART RATE: 155
OHS CV CPX PATIENT IS FEMALE: 1
OHS CV CPX PATIENT IS MALE: 0
OHS CV CPX PEAK DIASTOLIC BLOOD PRESSURE: 90 MMHG
OHS CV CPX PEAK HEAR RATE: 148 BPM
OHS CV CPX PEAK RATE PRESSURE PRODUCT: NORMAL
OHS CV CPX PEAK SYSTOLIC BLOOD PRESSURE: 164 MMHG
OHS CV CPX PERCENT MAX PREDICTED HEART RATE ACHIEVED: 96
OHS CV CPX RATE PRESSURE PRODUCT PRESENTING: 7800
RETIRED EF AND QEF - SEE NOTES: 53 %
STRESS ECHO POST EXERCISE DUR MIN: 8 MINUTES
STRESS ECHO POST EXERCISE DUR SEC: 20 SECONDS
SYSTOLIC BLOOD PRESSURE: 120 MMHG

## 2022-05-03 ENCOUNTER — OFFICE VISIT (OUTPATIENT)
Dept: CARDIOLOGY | Facility: CLINIC | Age: 57
End: 2022-05-03
Payer: COMMERCIAL

## 2022-05-03 VITALS
SYSTOLIC BLOOD PRESSURE: 118 MMHG | BODY MASS INDEX: 35.53 KG/M2 | HEART RATE: 72 BPM | HEIGHT: 60 IN | DIASTOLIC BLOOD PRESSURE: 80 MMHG | WEIGHT: 181 LBS

## 2022-05-03 DIAGNOSIS — R94.31 ABNORMAL ELECTROCARDIOGRAM (ECG) (EKG): ICD-10-CM

## 2022-05-03 DIAGNOSIS — E07.9 THYROID DYSFUNCTION: ICD-10-CM

## 2022-05-03 DIAGNOSIS — R94.31 NONSPECIFIC ABNORMAL ELECTROCARDIOGRAM (ECG) (EKG): Primary | ICD-10-CM

## 2022-05-03 DIAGNOSIS — E78.5 DYSLIPIDEMIA: ICD-10-CM

## 2022-05-03 PROCEDURE — 3008F PR BODY MASS INDEX (BMI) DOCUMENTED: ICD-10-PCS | Mod: CPTII,S$GLB,, | Performed by: NURSE PRACTITIONER

## 2022-05-03 PROCEDURE — 1160F RVW MEDS BY RX/DR IN RCRD: CPT | Mod: CPTII,S$GLB,, | Performed by: NURSE PRACTITIONER

## 2022-05-03 PROCEDURE — 1160F PR REVIEW ALL MEDS BY PRESCRIBER/CLIN PHARMACIST DOCUMENTED: ICD-10-PCS | Mod: CPTII,S$GLB,, | Performed by: NURSE PRACTITIONER

## 2022-05-03 PROCEDURE — 3079F PR MOST RECENT DIASTOLIC BLOOD PRESSURE 80-89 MM HG: ICD-10-PCS | Mod: CPTII,S$GLB,, | Performed by: NURSE PRACTITIONER

## 2022-05-03 PROCEDURE — 1159F MED LIST DOCD IN RCRD: CPT | Mod: CPTII,S$GLB,, | Performed by: NURSE PRACTITIONER

## 2022-05-03 PROCEDURE — 3008F BODY MASS INDEX DOCD: CPT | Mod: CPTII,S$GLB,, | Performed by: NURSE PRACTITIONER

## 2022-05-03 PROCEDURE — 99214 PR OFFICE/OUTPT VISIT, EST, LEVL IV, 30-39 MIN: ICD-10-PCS | Mod: S$GLB,,, | Performed by: NURSE PRACTITIONER

## 2022-05-03 PROCEDURE — 3074F SYST BP LT 130 MM HG: CPT | Mod: CPTII,S$GLB,, | Performed by: NURSE PRACTITIONER

## 2022-05-03 PROCEDURE — 3074F PR MOST RECENT SYSTOLIC BLOOD PRESSURE < 130 MM HG: ICD-10-PCS | Mod: CPTII,S$GLB,, | Performed by: NURSE PRACTITIONER

## 2022-05-03 PROCEDURE — 3079F DIAST BP 80-89 MM HG: CPT | Mod: CPTII,S$GLB,, | Performed by: NURSE PRACTITIONER

## 2022-05-03 PROCEDURE — 4010F PR ACE/ARB THEARPY RXD/TAKEN: ICD-10-PCS | Mod: CPTII,S$GLB,, | Performed by: NURSE PRACTITIONER

## 2022-05-03 PROCEDURE — 4010F ACE/ARB THERAPY RXD/TAKEN: CPT | Mod: CPTII,S$GLB,, | Performed by: NURSE PRACTITIONER

## 2022-05-03 PROCEDURE — 1159F PR MEDICATION LIST DOCUMENTED IN MEDICAL RECORD: ICD-10-PCS | Mod: CPTII,S$GLB,, | Performed by: NURSE PRACTITIONER

## 2022-05-03 PROCEDURE — 99214 OFFICE O/P EST MOD 30 MIN: CPT | Mod: S$GLB,,, | Performed by: NURSE PRACTITIONER

## 2022-05-03 NOTE — ASSESSMENT & PLAN NOTE
Now with No RI on Stress  Continues to have chest pain  Obtain Cardiac CTA  If negative follow up GI

## 2022-05-03 NOTE — PROGRESS NOTES
Subjective:    Patient ID:  Brunilda Bob is a 56 y.o. female patient here for evaluation Results and Hypertension      History of Present Illness:     Ms. Spann is here today for her follow up visit. She is still having atypical CP. Stress test is negative for RI. Imdur has not helped much. We will stop this. She denies SOB or palpitations.        Review of patient's allergies indicates:   Allergen Reactions    Crestor [rosuvastatin] Other (See Comments)     Paralysis    Codeine Nausea And Vomiting    Strawberries [strawberry] Hives       Past Medical History:   Diagnosis Date    Broken heart syndrome     Hypertension      Past Surgical History:   Procedure Laterality Date    CLAVICLE SURGERY  2012    TONSILLECTOMY       Social History     Tobacco Use    Smoking status: Never Smoker    Smokeless tobacco: Never Used   Substance Use Topics    Alcohol use: Yes    Drug use: Never        Review of Systems:    As noted in HPI in addition      REVIEW OF SYSTEMS  CARDIOVASCULAR: No recent chest pain, palpitations, arm, neck, or jaw pain  RESPIRATORY: No recent fever, cough chills, SOB or congestion  : No blood in the urine  GI: No Nausea, vomiting, constipation, diarrhea, blood, or reflux.  MUSCULOSKELETAL: No myalgias  NEURO: No lightheadedness or dizziness  EYES: No Double vision, blurry, vision or headache              Objective        Vitals:    05/03/22 1305   BP: 118/80   Pulse: 72       LIPIDS - LAST 2   Lab Results   Component Value Date    CHOL 144 02/23/2022    CHOL 200 (H) 05/10/2008    HDL 46 (L) 02/23/2022    HDL 52 05/10/2008    LDLCALC 80 02/23/2022    LDLCALC 134.4 (H) 05/10/2008    TRIG 99 02/23/2022    TRIG 68 05/10/2008    CHOLHDL 3.1 02/23/2022    CHOLHDL 26.0 05/10/2008       CBC - LAST 2  Lab Results   Component Value Date    WBC 5.9 02/23/2022    WBC 5.97 11/01/2019    WBC 5.97 11/01/2019    RBC 4.52 02/23/2022    RBC 3.62 (L) 11/01/2019    RBC 3.62 (L) 11/01/2019     HGB 12.0 02/23/2022    HGB 9.5 (L) 11/01/2019    HGB 9.5 (L) 11/01/2019    HCT 38.1 02/23/2022    HCT 30.7 (L) 11/01/2019    HCT 30.7 (L) 11/01/2019    MCV 84.3 02/23/2022    MCV 85 11/01/2019    MCV 85 11/01/2019    MCH 26.5 (L) 02/23/2022    MCH 26.2 (L) 11/01/2019    MCH 26.2 (L) 11/01/2019    MCHC 31.5 (L) 02/23/2022    MCHC 30.9 (L) 11/01/2019    MCHC 30.9 (L) 11/01/2019    RDW 14.3 02/23/2022    RDW 14.8 (H) 11/01/2019    RDW 14.8 (H) 11/01/2019     02/23/2022     11/01/2019     11/01/2019    MPV 9.7 02/23/2022    MPV 10.4 11/01/2019    MPV 10.4 11/01/2019    GRAN 4.1 11/01/2019    GRAN 68.4 11/01/2019    LYMPH 897 02/23/2022    LYMPH 15.2 02/23/2022    MONO 431 02/23/2022    MONO 7.3 02/23/2022    BASO 18 02/23/2022    BASO 0.02 11/01/2019    NRBC 0 11/01/2019    NRBC 0 10/31/2019       CHEMISTRY & LIVER FUNCTION - LAST 2  Lab Results   Component Value Date     02/23/2022     11/01/2019    K 4.5 02/23/2022    K 4.0 11/01/2019     02/23/2022     11/01/2019    CO2 31 02/23/2022    CO2 30 (H) 11/01/2019    ANIONGAP 6 (L) 11/01/2019    ANIONGAP 8 10/31/2019    BUN 23 02/23/2022    BUN 21 (H) 11/01/2019    CREATININE 0.83 02/23/2022    CREATININE 0.9 11/01/2019     (H) 02/23/2022     11/01/2019    CALCIUM 9.7 02/23/2022    CALCIUM 8.6 (L) 11/01/2019    MG 1.9 10/30/2019    ALBUMIN 4.0 02/23/2022    ALBUMIN 3.9 10/30/2019    PROT 6.4 02/23/2022    PROT 6.9 10/30/2019    ALKPHOS 82 10/30/2019    ALKPHOS 103 05/10/2008    ALT 16 02/23/2022    ALT 22 10/30/2019    AST 14 02/23/2022    AST 23 10/30/2019    BILITOT 0.5 02/23/2022    BILITOT 0.6 10/30/2019        CARDIAC PROFILE - LAST 2  Lab Results   Component Value Date    BNP 37 10/30/2019    TROPONINI <0.030 10/31/2019    TROPONINI <0.030 10/30/2019        COAGULATION - LAST 2  Lab Results   Component Value Date    LABPT 13.3 10/30/2019    INR 1.1 10/30/2019       ENDOCRINE & PSA - LAST 2  Lab Results    Component Value Date    MICROALBUR 0.8 01/08/2021    MICROALBUR <0.2 06/11/2020    TSH 1.84 02/23/2022    TSH 2.490 10/30/2019        ECHOCARDIOGRAM RESULTS  No results found for this or any previous visit.      CURRENT/PREVIOUS VISIT EKG  Results for orders placed or performed in visit on 02/24/22   IN OFFICE EKG 12-LEAD (to [place])    Collection Time: 02/24/22  3:34 PM    Narrative    Test Reason : R07.89,    Vent. Rate : 058 BPM     Atrial Rate : 058 BPM     P-R Int : 174 ms          QRS Dur : 084 ms      QT Int : 410 ms       P-R-T Axes : 047 -05 042 degrees     QTc Int : 402 ms    Sinus bradycardia  T wave abnormality, consider lateral ischemia  Abnormal ECG  When compared with ECG of 30-OCT-2019 18:12,  T wave inversion now evident in Lateral leads  Confirmed by Leonardo MARTINEZ, Rickey MIN ([id number]) on 2/27/2022 3:16:06 PM    Referred By:             Confirmed By:Rickey Patterson MD     No valid procedures specified.   Results for orders placed during the hospital encounter of 04/27/22    Nuclear Stress - Cardiology Interpreted    Interpretation Summary    Normal myocardial perfusion scan. There is no evidence of myocardial ischemia or infarction.    There is a moderate to severe intensity perfusion abnormality in the anterior wall of the left ventricle, secondary to breast attenuation.    The gated perfusion images showed an ejection fraction of 79% post stress. Normal ejection fraction is greater than 53%.    There is normal wall motion post stress.    LV cavity size is  and normal at stress.    The EKG portion of this study is negative for ischemia.    The patient reported no chest pain during the stress test.    There were no arrhythmias during stress.      PHYSICAL EXAM  CONSTITUTIONAL: Well built, well nourished in no apparent distress  NECK: no carotid bruit, no JVD  LUNGS: CTA  CHEST WALL: no tenderness  HEART: regular rate and rhythm, S1, S2 normal, no murmur, click, rub or gallop   ABDOMEN: soft,  non-tender; bowel sounds normal; no masses,  no organomegaly  EXTREMITIES: Extremities normal, no edema, no calf tenderness noted  NEURO: AAO X 3    I HAVE REVIEWED :    The vital signs, nurses notes, and all the pertinent radiology and labs.        Current Outpatient Medications   Medication Instructions    acetaminophen (TYLENOL) 1,000 mg, Oral, 3 times daily    ascorbic acid (vitamin C) (VITAMIN C) 2 mg, Oral, Daily    atenoloL (TENORMIN) 25 MG tablet TAKE ONE TABLET BY MOUTH ONCE DAILY    atorvastatin (LIPITOR) 10 MG tablet No dose, route, or frequency recorded.    ergocalciferol, vitamin D2, (VITAMIN D2 ORAL) 5,000 mg, Oral, Nightly    iron carb,gl/FA/B12/C/docusate (FERRALET 90 DUAL-IRON DELIVERY ORAL) 90 mg, Oral, Four times weekly    Lactobacillus rhamnosus GG (CULTURELLE) 10 billion cell capsule 1 capsule, Oral, Daily    levothyroxine (SYNTHROID) 50 mcg, Oral, Daily    loratadine (CLARITIN) 10 mg, Oral, Daily    losartan (COZAAR) 50 mg, Oral, Daily    magnesium oxide (MAG-OX) 400 mg, Oral, Daily    multivitamin (THERAGRAN) per tablet 1 tablet, Oral, Daily    ondansetron (ZOFRAN) 8 mg, Oral, Every 8 hours PRN    pantoprazole (PROTONIX) 40 MG tablet TAKE ONE TABLET BY MOUTH ONCE DAILY    sars-cov-2, covid-19, (MODERNA COVID-19) 100 mcg/0.5 ml injection No dose, route, or frequency recorded.          Assessment & Plan     Hypertension  Continue current regimen BP Stable      Dyslipidemia  Continue Lipitor 10 mg daily    Nonspecific abnormal electrocardiogram (ECG) (EKG)  Now with No RI on Stress  Continues to have chest pain  Obtain Cardiac CTA  If negative follow up GI      Thyroid dysfunction  Continue current regimen and have her follow-up with her endocrinologist          No follow-ups on file.

## 2022-05-04 ENCOUNTER — TELEPHONE (OUTPATIENT)
Dept: RADIOLOGY | Facility: HOSPITAL | Age: 57
End: 2022-05-04

## 2022-05-04 NOTE — NURSING
CTA Cardiac scheduled @ [facility] on 5/19 @ 9am with arival @ 815.  Pre-testing instructions given and understanding verbalized.

## 2022-06-02 ENCOUNTER — OFFICE VISIT (OUTPATIENT)
Dept: CARDIOLOGY | Facility: CLINIC | Age: 57
End: 2022-06-02
Payer: COMMERCIAL

## 2022-06-02 VITALS
HEIGHT: 60 IN | BODY MASS INDEX: 35.34 KG/M2 | SYSTOLIC BLOOD PRESSURE: 120 MMHG | WEIGHT: 180 LBS | HEART RATE: 80 BPM | DIASTOLIC BLOOD PRESSURE: 98 MMHG

## 2022-06-02 DIAGNOSIS — E78.5 DYSLIPIDEMIA: ICD-10-CM

## 2022-06-02 DIAGNOSIS — R07.89 OTHER CHEST PAIN: ICD-10-CM

## 2022-06-02 DIAGNOSIS — R94.31 NONSPECIFIC ABNORMAL ELECTROCARDIOGRAM (ECG) (EKG): ICD-10-CM

## 2022-06-02 DIAGNOSIS — I10 PRIMARY HYPERTENSION: Primary | ICD-10-CM

## 2022-06-02 DIAGNOSIS — E07.9 THYROID DYSFUNCTION: ICD-10-CM

## 2022-06-02 PROCEDURE — 1159F MED LIST DOCD IN RCRD: CPT | Mod: CPTII,S$GLB,, | Performed by: NURSE PRACTITIONER

## 2022-06-02 PROCEDURE — 3080F PR MOST RECENT DIASTOLIC BLOOD PRESSURE >= 90 MM HG: ICD-10-PCS | Mod: CPTII,S$GLB,, | Performed by: NURSE PRACTITIONER

## 2022-06-02 PROCEDURE — 1159F PR MEDICATION LIST DOCUMENTED IN MEDICAL RECORD: ICD-10-PCS | Mod: CPTII,S$GLB,, | Performed by: NURSE PRACTITIONER

## 2022-06-02 PROCEDURE — 4010F ACE/ARB THERAPY RXD/TAKEN: CPT | Mod: CPTII,S$GLB,, | Performed by: NURSE PRACTITIONER

## 2022-06-02 PROCEDURE — 3008F BODY MASS INDEX DOCD: CPT | Mod: CPTII,S$GLB,, | Performed by: NURSE PRACTITIONER

## 2022-06-02 PROCEDURE — 3074F PR MOST RECENT SYSTOLIC BLOOD PRESSURE < 130 MM HG: ICD-10-PCS | Mod: CPTII,S$GLB,, | Performed by: NURSE PRACTITIONER

## 2022-06-02 PROCEDURE — 99214 PR OFFICE/OUTPT VISIT, EST, LEVL IV, 30-39 MIN: ICD-10-PCS | Mod: S$GLB,,, | Performed by: NURSE PRACTITIONER

## 2022-06-02 PROCEDURE — 99214 OFFICE O/P EST MOD 30 MIN: CPT | Mod: S$GLB,,, | Performed by: NURSE PRACTITIONER

## 2022-06-02 PROCEDURE — 3008F PR BODY MASS INDEX (BMI) DOCUMENTED: ICD-10-PCS | Mod: CPTII,S$GLB,, | Performed by: NURSE PRACTITIONER

## 2022-06-02 PROCEDURE — 4010F PR ACE/ARB THEARPY RXD/TAKEN: ICD-10-PCS | Mod: CPTII,S$GLB,, | Performed by: NURSE PRACTITIONER

## 2022-06-02 PROCEDURE — 3080F DIAST BP >= 90 MM HG: CPT | Mod: CPTII,S$GLB,, | Performed by: NURSE PRACTITIONER

## 2022-06-02 PROCEDURE — 3074F SYST BP LT 130 MM HG: CPT | Mod: CPTII,S$GLB,, | Performed by: NURSE PRACTITIONER

## 2022-06-02 NOTE — PROGRESS NOTES
Subjective:    Patient ID:  Brunilda Bob is a 56 y.o. female patient here for evaluation Hypertension      History of Present Illness:     Ms. Vazquez is here today for her follow up visit. She denies SOB.She has atypical CP. She has gerd like symptoms.  She denies blood in the urine or stool. NO recent fever or chills. NO arm neck or jaw pain. No lightheaded or dizziness. Could not complete CTA 2/2 [place] wide storage of contrast. ECHO is WNL.        Review of patient's allergies indicates:   Allergen Reactions    Crestor [rosuvastatin] Other (See Comments)     Paralysis    Codeine Nausea And Vomiting    Strawberries [strawberry] Hives       Past Medical History:   Diagnosis Date    Broken heart syndrome     Hypertension      Past Surgical History:   Procedure Laterality Date    CLAVICLE SURGERY  2012    TONSILLECTOMY       Social History     Tobacco Use    Smoking status: Never Smoker    Smokeless tobacco: Never Used   Substance Use Topics    Alcohol use: Yes    Drug use: Never        Review of Systems:    As noted in HPI in addition      REVIEW OF SYSTEMS  CARDIOVASCULAR: atypical cp  RESPIRATORY: No recent fever, cough chills, SOB or congestion  : No blood in the urine  GI: No Nausea, vomiting, constipation, diarrhea, blood, +reflux.  MUSCULOSKELETAL: No myalgias  NEURO: No lightheadedness or dizziness  EYES: No Double vision, blurry, vision or headache              Objective        Vitals:    06/02/22 0917   BP: (!) 120/98   Pulse: 80       LIPIDS - LAST 2   Lab Results   Component Value Date    CHOL 144 02/23/2022    CHOL 200 (H) 05/10/2008    HDL 46 (L) 02/23/2022    HDL 52 05/10/2008    LDLCALC 80 02/23/2022    LDLCALC 134.4 (H) 05/10/2008    TRIG 99 02/23/2022    TRIG 68 05/10/2008    CHOLHDL 3.1 02/23/2022    CHOLHDL 26.0 05/10/2008       CBC - LAST 2  Lab Results   Component Value Date    WBC 5.9 02/23/2022    WBC 5.97 11/01/2019    WBC 5.97 11/01/2019    RBC 4.52  02/23/2022    RBC 3.62 (L) 11/01/2019    RBC 3.62 (L) 11/01/2019    HGB 12.0 02/23/2022    HGB 9.5 (L) 11/01/2019    HGB 9.5 (L) 11/01/2019    HCT 38.1 02/23/2022    HCT 30.7 (L) 11/01/2019    HCT 30.7 (L) 11/01/2019    MCV 84.3 02/23/2022    MCV 85 11/01/2019    MCV 85 11/01/2019    MCH 26.5 (L) 02/23/2022    MCH 26.2 (L) 11/01/2019    MCH 26.2 (L) 11/01/2019    MCHC 31.5 (L) 02/23/2022    MCHC 30.9 (L) 11/01/2019    MCHC 30.9 (L) 11/01/2019    RDW 14.3 02/23/2022    RDW 14.8 (H) 11/01/2019    RDW 14.8 (H) 11/01/2019     02/23/2022     11/01/2019     11/01/2019    MPV 9.7 02/23/2022    MPV 10.4 11/01/2019    MPV 10.4 11/01/2019    GRAN 4.1 11/01/2019    GRAN 68.4 11/01/2019    LYMPH 897 02/23/2022    LYMPH 15.2 02/23/2022    MONO 431 02/23/2022    MONO 7.3 02/23/2022    BASO 18 02/23/2022    BASO 0.02 11/01/2019    NRBC 0 11/01/2019    NRBC 0 10/31/2019       CHEMISTRY & LIVER FUNCTION - LAST 2  Lab Results   Component Value Date     02/23/2022     11/01/2019    K 4.5 02/23/2022    K 4.0 11/01/2019     02/23/2022     11/01/2019    CO2 31 02/23/2022    CO2 30 (H) 11/01/2019    ANIONGAP 6 (L) 11/01/2019    ANIONGAP 8 10/31/2019    BUN 23 02/23/2022    BUN 21 (H) 11/01/2019    CREATININE 0.83 02/23/2022    CREATININE 0.9 11/01/2019     (H) 02/23/2022     11/01/2019    CALCIUM 9.7 02/23/2022    CALCIUM 8.6 (L) 11/01/2019    MG 1.9 10/30/2019    ALBUMIN 4.0 02/23/2022    ALBUMIN 3.9 10/30/2019    PROT 6.4 02/23/2022    PROT 6.9 10/30/2019    ALKPHOS 82 10/30/2019    ALKPHOS 103 05/10/2008    ALT 16 02/23/2022    ALT 22 10/30/2019    AST 14 02/23/2022    AST 23 10/30/2019    BILITOT 0.5 02/23/2022    BILITOT 0.6 10/30/2019        CARDIAC PROFILE - LAST 2  Lab Results   Component Value Date    BNP 37 10/30/2019    TROPONINI <0.030 10/31/2019    TROPONINI <0.030 10/30/2019        COAGULATION - LAST 2  Lab Results   Component Value Date    LABPT 13.3 10/30/2019     INR 1.1 10/30/2019       ENDOCRINE & PSA - LAST 2  Lab Results   Component Value Date    MICROALBUR 0.8 01/08/2021    MICROALBUR <0.2 06/11/2020    TSH 1.84 02/23/2022    TSH 2.490 10/30/2019        ECHOCARDIOGRAM RESULTS  Results for orders placed during the hospital encounter of 04/27/22    Echo    Interpretation Summary  · The left ventricle is normal in size with mild concentric hypertrophy and normal systolic function.  · The estimated ejection fraction is 60%.  · Normal left ventricular diastolic function.  · Normal right ventricular size with normal right ventricular systolic function.  · Normal central venous pressure (3 mmHg).  · The estimated PA systolic pressure is 26 mmHg.      CURRENT/PREVIOUS VISIT EKG  Results for orders placed or performed in visit on 02/24/22   IN OFFICE EKG 12-LEAD (to [employer])    Collection Time: 02/24/22  3:34 PM    Narrative    Test Reason : R07.89,    Vent. Rate : 058 BPM     Atrial Rate : 058 BPM     P-R Int : 174 ms          QRS Dur : 084 ms      QT Int : 410 ms       P-R-T Axes : 047 -05 042 degrees     QTc Int : 402 ms    Sinus bradycardia  T wave abnormality, consider lateral ischemia  Abnormal ECG  When compared with ECG of 30-OCT-2019 18:12,  T wave inversion now evident in Lateral leads  Confirmed by Leonardo MARTINEZ, Rickey MIN ([id number]) on 2/27/2022 3:16:06 PM    Referred By:             Confirmed By:Rickey Patterson MD     No valid procedures specified.   Results for orders placed during the hospital encounter of 04/27/22    Nuclear Stress - Cardiology Interpreted    Interpretation Summary    Normal myocardial perfusion scan. There is no evidence of myocardial ischemia or infarction.    There is a moderate to severe intensity perfusion abnormality in the anterior wall of the left ventricle, secondary to breast attenuation.    The gated perfusion images showed an ejection fraction of 79% post stress. Normal ejection fraction is greater than 53%.    There is normal wall motion  post stress.    LV cavity size is  and normal at stress.    The EKG portion of this study is negative for ischemia.    The patient reported no chest pain during the stress test.    There were no arrhythmias during stress.        PHYSICAL EXAM  CONSTITUTIONAL: Well built, well nourished in no apparent distress  NECK: no carotid bruit, no JVD  LUNGS: CTA  CHEST WALL: no tenderness  HEART: regular rate and rhythm, S1, S2 normal, no murmur, click, rub or gallop   ABDOMEN: soft, non-tender; bowel sounds normal; no masses,  no organomegaly  EXTREMITIES: Extremities normal, no edema, no calf tenderness noted  NEURO: AAO X 3    I HAVE REVIEWED :    The vital signs, nurses notes, and all the pertinent radiology and labs.        Current Outpatient Medications   Medication Instructions    acetaminophen (TYLENOL) 1,000 mg, Oral, 3 times daily    ascorbic acid (vitamin C) (VITAMIN C) 2 mg, Oral, Daily    atenoloL (TENORMIN) 25 MG tablet TAKE ONE TABLET BY MOUTH ONCE DAILY    atorvastatin (LIPITOR) 10 mg, Oral, Daily    ergocalciferol, vitamin D2, (VITAMIN D2 ORAL) 5,000 mg, Oral, Nightly    iron carb,gl/FA/B12/C/docusate (FERRALET 90 DUAL-IRON DELIVERY ORAL) 90 mg, Oral, Four times weekly    Lactobacillus rhamnosus GG (CULTURELLE) 10 billion cell capsule 1 capsule, Oral, Daily    levothyroxine (SYNTHROID) 50 mcg, Oral, Daily    loratadine (CLARITIN) 10 mg, Oral, Daily    losartan (COZAAR) 50 mg, Oral, Daily    magnesium oxide (MAG-OX) 400 mg, Oral, Daily    multivitamin (THERAGRAN) per tablet 1 tablet, Oral, Daily    ondansetron (ZOFRAN) 8 mg, Oral, Every 8 hours PRN    pantoprazole (PROTONIX) 40 MG tablet TAKE ONE TABLET BY MOUTH ONCE DAILY    sars-cov-2, covid-19, (MODERNA COVID-19) 100 mcg/0.5 ml injection No dose, route, or frequency recorded.          Assessment & Plan     Nonspecific abnormal electrocardiogram (ECG) (EKG)  Myoview is negative      Other chest pain  Recommend to increase protonix to PO  BID X 7 days and follow up with GI    Dyslipidemia  Continue Lipitor daily    Thyroid dysfunction  Continue Synthroid daily    Hypertension  Continue Atenolol and Losartan at present dosing  Low salt diet and keep log          No follow-ups on file.

## 2022-06-07 ENCOUNTER — TELEPHONE (OUTPATIENT)
Dept: RADIOLOGY | Facility: HOSPITAL | Age: 57
End: 2022-06-07

## 2022-06-07 NOTE — NURSING
CTA Cardiac scheduled @ [facility] on 6/9 @ 10am with arrival @9:15. Pre-testing instructions given and understanding verbalized.

## 2022-06-17 ENCOUNTER — TELEPHONE (OUTPATIENT)
Dept: RADIOLOGY | Facility: HOSPITAL | Age: 57
End: 2022-06-17

## 2022-06-21 ENCOUNTER — OFFICE VISIT (OUTPATIENT)
Dept: FAMILY MEDICINE | Facility: CLINIC | Age: 57
End: 2022-06-21
Payer: COMMERCIAL

## 2022-06-21 VITALS
HEIGHT: 60 IN | HEART RATE: 60 BPM | BODY MASS INDEX: 35.93 KG/M2 | SYSTOLIC BLOOD PRESSURE: 128 MMHG | WEIGHT: 183 LBS | DIASTOLIC BLOOD PRESSURE: 84 MMHG

## 2022-06-21 DIAGNOSIS — K21.00 GASTROESOPHAGEAL REFLUX DISEASE WITH ESOPHAGITIS WITHOUT HEMORRHAGE: ICD-10-CM

## 2022-06-21 DIAGNOSIS — R07.89 OTHER CHEST PAIN: ICD-10-CM

## 2022-06-21 DIAGNOSIS — E78.5 DYSLIPIDEMIA: ICD-10-CM

## 2022-06-21 DIAGNOSIS — E07.9 THYROID DYSFUNCTION: ICD-10-CM
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
## 2022-06-21 DIAGNOSIS — L90.5 FIBROSIS OF SKIN OF LOWER EXTREMITY: ICD-10-CM

## 2022-06-21 DIAGNOSIS — I10 PRIMARY HYPERTENSION: ICD-10-CM

## 2022-06-21 DIAGNOSIS — S20.01XA POSTTRAUMATIC HEMATOMA OF RIGHT BREAST, INITIAL ENCOUNTER: ICD-10-CM

## 2022-06-21 DIAGNOSIS — Z00.00 WELLNESS EXAMINATION: Primary | ICD-10-CM

## 2022-06-21 DIAGNOSIS — Z12.31 OTHER SCREENING MAMMOGRAM: ICD-10-CM

## 2022-06-21 PROCEDURE — 99396 PREV VISIT EST AGE 40-64: CPT | Mod: S$GLB,,, | Performed by: FAMILY MEDICINE

## 2022-06-21 PROCEDURE — 4010F ACE/ARB THERAPY RXD/TAKEN: CPT | Mod: CPTII,S$GLB,, | Performed by: FAMILY MEDICINE

## 2022-06-21 PROCEDURE — 3008F BODY MASS INDEX DOCD: CPT | Mod: CPTII,S$GLB,, | Performed by: FAMILY MEDICINE

## 2022-06-21 PROCEDURE — 3008F PR BODY MASS INDEX (BMI) DOCUMENTED: ICD-10-PCS | Mod: CPTII,S$GLB,, | Performed by: FAMILY MEDICINE

## 2022-06-21 PROCEDURE — 3074F SYST BP LT 130 MM HG: CPT | Mod: CPTII,S$GLB,, | Performed by: FAMILY MEDICINE

## 2022-06-21 PROCEDURE — 3079F PR MOST RECENT DIASTOLIC BLOOD PRESSURE 80-89 MM HG: ICD-10-PCS | Mod: CPTII,S$GLB,, | Performed by: FAMILY MEDICINE

## 2022-06-21 PROCEDURE — 3074F PR MOST RECENT SYSTOLIC BLOOD PRESSURE < 130 MM HG: ICD-10-PCS | Mod: CPTII,S$GLB,, | Performed by: FAMILY MEDICINE

## 2022-06-21 PROCEDURE — 1159F MED LIST DOCD IN RCRD: CPT | Mod: CPTII,S$GLB,, | Performed by: FAMILY MEDICINE

## 2022-06-21 PROCEDURE — 99396 PR PREVENTIVE VISIT,EST,40-64: ICD-10-PCS | Mod: S$GLB,,, | Performed by: FAMILY MEDICINE

## 2022-06-21 PROCEDURE — 3079F DIAST BP 80-89 MM HG: CPT | Mod: CPTII,S$GLB,, | Performed by: FAMILY MEDICINE

## 2022-06-21 PROCEDURE — 1159F PR MEDICATION LIST DOCUMENTED IN MEDICAL RECORD: ICD-10-PCS | Mod: CPTII,S$GLB,, | Performed by: FAMILY MEDICINE

## 2022-06-21 PROCEDURE — 4010F PR ACE/ARB THEARPY RXD/TAKEN: ICD-10-PCS | Mod: CPTII,S$GLB,, | Performed by: FAMILY MEDICINE

## 2022-06-25 PROBLEM — L90.5: Status: ACTIVE | Noted: 2022-06-25

## 2022-06-25 PROBLEM — Z00.00 WELLNESS EXAMINATION: Status: ACTIVE | Noted: 2022-06-25
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
## 2022-06-30 ENCOUNTER — HOSPITAL ENCOUNTER (OUTPATIENT)
Dept: RADIOLOGY | Facility: HOSPITAL | Age: 57
Discharge: HOME | End: 2022-06-30
Attending: NURSE PRACTITIONER
Payer: COMMERCIAL

## 2022-06-30 ENCOUNTER — HOSPITAL ENCOUNTER (OUTPATIENT)
Facility: HOSPITAL | Age: 57
Discharge: ANOTHER HOSPITAL | End: 2022-07-02
Attending: EMERGENCY MEDICINE | Admitting: FAMILY MEDICINE
Payer: COMMERCIAL

## 2022-06-30 VITALS
OXYGEN SATURATION: 99 % | HEART RATE: 59 BPM | RESPIRATION RATE: 18 BRPM | SYSTOLIC BLOOD PRESSURE: 111 MMHG | DIASTOLIC BLOOD PRESSURE: 75 MMHG

## 2022-06-30 DIAGNOSIS — I72.9 PSEUDOANEURYSM: ICD-10-CM

## 2022-06-30 DIAGNOSIS — I25.3 PSEUDOANEURYSM OF LEFT VENTRICLE OF HEART: Primary | ICD-10-CM

## 2022-06-30 DIAGNOSIS — R94.31 ABNORMAL ELECTROCARDIOGRAM (ECG) (EKG): ICD-10-CM

## 2022-06-30 DIAGNOSIS — R07.9 CHEST PAIN: ICD-10-CM

## 2022-06-30 LAB
ALBUMIN SERPL BCP-MCNC: 3.9 G/DL (ref 3.5–5.2)
ALP SERPL-CCNC: 95 U/L (ref 55–135)
ALT SERPL W/O P-5'-P-CCNC: 24 U/L (ref 10–44)
ANION GAP SERPL CALC-SCNC: 7 MMOL/L (ref 8–16)
AST SERPL-CCNC: 24 U/L (ref 10–40)
BASOPHILS # BLD AUTO: 0.03 K/UL (ref 0–0.2)
BASOPHILS NFR BLD: 0.5 % (ref 0–1.9)
BILIRUB SERPL-MCNC: 0.3 MG/DL (ref 0.1–1)
BNP SERPL-MCNC: 120 PG/ML (ref 0–99)
BUN SERPL-MCNC: 22 MG/DL (ref 6–20)
CALCIUM SERPL-MCNC: 9 MG/DL (ref 8.7–10.5)
CHLORIDE SERPL-SCNC: 103 MMOL/L (ref 95–110)
CO2 SERPL-SCNC: 29 MMOL/L (ref 23–29)
CREAT SERPL-MCNC: 0.8 MG/DL (ref 0.5–1.4)
DIFFERENTIAL METHOD: ABNORMAL
EOSINOPHIL # BLD AUTO: 0.2 K/UL (ref 0–0.5)
EOSINOPHIL NFR BLD: 3.1 % (ref 0–8)
ERYTHROCYTE [DISTWIDTH] IN BLOOD BY AUTOMATED COUNT: 14.8 % (ref 11.5–14.5)
EST. GFR  (AFRICAN AMERICAN): >60 ML/MIN/1.73 M^2
EST. GFR  (NON AFRICAN AMERICAN): >60 ML/MIN/1.73 M^2
GLUCOSE SERPL-MCNC: 110 MG/DL (ref 70–110)
HCT VFR BLD AUTO: 37.4 % (ref 37–48.5)
HGB BLD-MCNC: 11.6 G/DL (ref 12–16)
IMM GRANULOCYTES # BLD AUTO: 0.02 K/UL (ref 0–0.04)
IMM GRANULOCYTES NFR BLD AUTO: 0.3 % (ref 0–0.5)
LYMPHOCYTES # BLD AUTO: 1.2 K/UL (ref 1–4.8)
LYMPHOCYTES NFR BLD: 20.1 % (ref 18–48)
MAGNESIUM SERPL-MCNC: 2 MG/DL (ref 1.6–2.6)
MCH RBC QN AUTO: 26.3 PG (ref 27–31)
MCHC RBC AUTO-ENTMCNC: 31 G/DL (ref 32–36)
MCV RBC AUTO: 85 FL (ref 82–98)
MONOCYTES # BLD AUTO: 0.5 K/UL (ref 0.3–1)
MONOCYTES NFR BLD: 7.7 % (ref 4–15)
NEUTROPHILS # BLD AUTO: 4.2 K/UL (ref 1.8–7.7)
NEUTROPHILS NFR BLD: 68.3 % (ref 38–73)
NRBC BLD-RTO: 0 /100 WBC
PLATELET # BLD AUTO: 201 K/UL (ref 150–450)
PMV BLD AUTO: 10.1 FL (ref 9.2–12.9)
POTASSIUM SERPL-SCNC: 3.9 MMOL/L (ref 3.5–5.1)
PROT SERPL-MCNC: 7 G/DL (ref 6–8.4)
RBC # BLD AUTO: 4.41 M/UL (ref 4–5.4)
SARS-COV-2 RDRP RESP QL NAA+PROBE: NEGATIVE
SODIUM SERPL-SCNC: 139 MMOL/L (ref 136–145)
TROPONIN I SERPL DL<=0.01 NG/ML-MCNC: <0.03 NG/ML
TROPONIN I SERPL DL<=0.01 NG/ML-MCNC: <0.03 NG/ML
WBC # BLD AUTO: 6.08 K/UL (ref 3.9–12.7)

## 2022-06-30 PROCEDURE — 99285 EMERGENCY DEPT VISIT HI MDM: CPT | Mod: 25

## 2022-06-30 PROCEDURE — G0378 HOSPITAL OBSERVATION PER HR: HCPCS

## 2022-06-30 PROCEDURE — 93005 ELECTROCARDIOGRAM TRACING: CPT | Performed by: INTERNAL MEDICINE

## 2022-06-30 PROCEDURE — 83880 ASSAY OF NATRIURETIC PEPTIDE: CPT | Performed by: STUDENT IN AN ORGANIZED HEALTH CARE EDUCATION/TRAINING PROGRAM

## 2022-06-30 PROCEDURE — U0002 COVID-19 LAB TEST NON-CDC: HCPCS | Performed by: EMERGENCY MEDICINE

## 2022-06-30 PROCEDURE — 25000003 PHARM REV CODE 250: Performed by: NURSE PRACTITIONER

## 2022-06-30 PROCEDURE — 93010 ELECTROCARDIOGRAM REPORT: CPT | Mod: ,,, | Performed by: INTERNAL MEDICINE

## 2022-06-30 PROCEDURE — 85025 COMPLETE CBC W/AUTO DIFF WBC: CPT | Performed by: STUDENT IN AN ORGANIZED HEALTH CARE EDUCATION/TRAINING PROGRAM

## 2022-06-30 PROCEDURE — 83735 ASSAY OF MAGNESIUM: CPT | Performed by: STUDENT IN AN ORGANIZED HEALTH CARE EDUCATION/TRAINING PROGRAM

## 2022-06-30 PROCEDURE — 93010 EKG 12-LEAD: ICD-10-PCS | Mod: ,,, | Performed by: INTERNAL MEDICINE

## 2022-06-30 PROCEDURE — 75574 CT ANGIO HRT W/3D IMAGE: CPT | Mod: TC

## 2022-06-30 PROCEDURE — 25000003 PHARM REV CODE 250: Performed by: FAMILY MEDICINE

## 2022-06-30 PROCEDURE — 25500020 PHARM REV CODE 255: Performed by: NURSE PRACTITIONER

## 2022-06-30 PROCEDURE — 80053 COMPREHEN METABOLIC PANEL: CPT | Performed by: STUDENT IN AN ORGANIZED HEALTH CARE EDUCATION/TRAINING PROGRAM

## 2022-06-30 PROCEDURE — 84484 ASSAY OF TROPONIN QUANT: CPT | Performed by: STUDENT IN AN ORGANIZED HEALTH CARE EDUCATION/TRAINING PROGRAM

## 2022-06-30 RX ORDER — HYDRALAZINE HYDROCHLORIDE 20 MG/ML
10 INJECTION INTRAMUSCULAR; INTRAVENOUS
Status: DISCONTINUED | OUTPATIENT
Start: 2022-06-30 | End: 2022-06-30

## 2022-06-30 RX ORDER — IPRATROPIUM BROMIDE AND ALBUTEROL SULFATE 2.5; .5 MG/3ML; MG/3ML
3 SOLUTION RESPIRATORY (INHALATION) EVERY 4 HOURS PRN
Status: DISCONTINUED | OUTPATIENT
Start: 2022-06-30 | End: 2022-07-02 | Stop reason: HOSPADM

## 2022-06-30 RX ORDER — PANTOPRAZOLE SODIUM 40 MG/1
40 TABLET, DELAYED RELEASE ORAL NIGHTLY
Status: DISCONTINUED | OUTPATIENT
Start: 2022-06-30 | End: 2022-07-02 | Stop reason: HOSPADM

## 2022-06-30 RX ORDER — ACETAMINOPHEN 325 MG/1
650 TABLET ORAL EVERY 8 HOURS PRN
Status: DISCONTINUED | OUTPATIENT
Start: 2022-06-30 | End: 2022-07-02 | Stop reason: HOSPADM

## 2022-06-30 RX ORDER — SODIUM CHLORIDE 0.9 % (FLUSH) 0.9 %
10 SYRINGE (ML) INJECTION
Status: DISCONTINUED | OUTPATIENT
Start: 2022-06-30 | End: 2022-07-02 | Stop reason: HOSPADM

## 2022-06-30 RX ORDER — NITROGLYCERIN 400 UG/1
1 SPRAY ORAL ONCE
Status: COMPLETED | OUTPATIENT
Start: 2022-06-30 | End: 2022-06-30

## 2022-06-30 RX ORDER — TALC
6 POWDER (GRAM) TOPICAL NIGHTLY PRN
Status: DISCONTINUED | OUTPATIENT
Start: 2022-06-30 | End: 2022-07-02 | Stop reason: HOSPADM

## 2022-06-30 RX ORDER — LEVOTHYROXINE SODIUM 25 UG/1
50 TABLET ORAL NIGHTLY
Status: DISCONTINUED | OUTPATIENT
Start: 2022-06-30 | End: 2022-07-02 | Stop reason: HOSPADM

## 2022-06-30 RX ORDER — IBUPROFEN 200 MG
16 TABLET ORAL
Status: DISCONTINUED | OUTPATIENT
Start: 2022-06-30 | End: 2022-07-02 | Stop reason: HOSPADM

## 2022-06-30 RX ORDER — ATORVASTATIN CALCIUM 10 MG/1
10 TABLET, FILM COATED ORAL NIGHTLY
Status: DISCONTINUED | OUTPATIENT
Start: 2022-06-30 | End: 2022-07-02 | Stop reason: HOSPADM

## 2022-06-30 RX ORDER — HYDROCODONE BITARTRATE AND ACETAMINOPHEN 5; 325 MG/1; MG/1
1 TABLET ORAL EVERY 4 HOURS PRN
Status: DISCONTINUED | OUTPATIENT
Start: 2022-06-30 | End: 2022-07-02 | Stop reason: HOSPADM

## 2022-06-30 RX ORDER — MORPHINE SULFATE 4 MG/ML
4 INJECTION, SOLUTION INTRAMUSCULAR; INTRAVENOUS EVERY 4 HOURS PRN
Status: DISCONTINUED | OUTPATIENT
Start: 2022-06-30 | End: 2022-07-02 | Stop reason: HOSPADM

## 2022-06-30 RX ORDER — ASPIRIN 325 MG
325 TABLET ORAL
Status: DISCONTINUED | OUTPATIENT
Start: 2022-06-30 | End: 2022-06-30

## 2022-06-30 RX ORDER — AMOXICILLIN 250 MG
1 CAPSULE ORAL 2 TIMES DAILY PRN
Status: DISCONTINUED | OUTPATIENT
Start: 2022-06-30 | End: 2022-07-02 | Stop reason: HOSPADM

## 2022-06-30 RX ORDER — SIMETHICONE 80 MG
1 TABLET,CHEWABLE ORAL 4 TIMES DAILY PRN
Status: DISCONTINUED | OUTPATIENT
Start: 2022-06-30 | End: 2022-07-02 | Stop reason: HOSPADM

## 2022-06-30 RX ORDER — POLYETHYLENE GLYCOL 3350 17 G/17G
17 POWDER, FOR SOLUTION ORAL 2 TIMES DAILY PRN
Status: DISCONTINUED | OUTPATIENT
Start: 2022-06-30 | End: 2022-07-02 | Stop reason: HOSPADM

## 2022-06-30 RX ORDER — IBUPROFEN 200 MG
24 TABLET ORAL
Status: DISCONTINUED | OUTPATIENT
Start: 2022-06-30 | End: 2022-07-02 | Stop reason: HOSPADM

## 2022-06-30 RX ORDER — ONDANSETRON 2 MG/ML
4 INJECTION INTRAMUSCULAR; INTRAVENOUS EVERY 6 HOURS PRN
Status: DISCONTINUED | OUTPATIENT
Start: 2022-06-30 | End: 2022-07-02 | Stop reason: HOSPADM

## 2022-06-30 RX ORDER — ATENOLOL 25 MG/1
25 TABLET ORAL NIGHTLY
Status: DISCONTINUED | OUTPATIENT
Start: 2022-06-30 | End: 2022-07-02 | Stop reason: HOSPADM

## 2022-06-30 RX ORDER — GLUCAGON 1 MG
1 KIT INJECTION
Status: DISCONTINUED | OUTPATIENT
Start: 2022-06-30 | End: 2022-07-02 | Stop reason: HOSPADM

## 2022-06-30 RX ORDER — NALOXONE HCL 0.4 MG/ML
0.02 VIAL (ML) INJECTION
Status: DISCONTINUED | OUTPATIENT
Start: 2022-06-30 | End: 2022-07-02 | Stop reason: HOSPADM

## 2022-06-30 RX ORDER — LOSARTAN POTASSIUM 50 MG/1
50 TABLET ORAL NIGHTLY
Status: DISCONTINUED | OUTPATIENT
Start: 2022-06-30 | End: 2022-07-02 | Stop reason: HOSPADM

## 2022-06-30 RX ADMIN — PANTOPRAZOLE SODIUM 40 MG: 40 TABLET, DELAYED RELEASE ORAL at 11:06

## 2022-06-30 RX ADMIN — ATORVASTATIN CALCIUM 10 MG: 10 TABLET, FILM COATED ORAL at 11:06

## 2022-06-30 RX ADMIN — IOHEXOL 90 ML: 350 INJECTION, SOLUTION INTRAVENOUS at 08:06

## 2022-06-30 RX ADMIN — LEVOTHYROXINE SODIUM 50 MCG: 0.03 TABLET ORAL at 11:06

## 2022-06-30 RX ADMIN — NITROGLYCERIN 1 SPRAY: 400 SPRAY ORAL at 08:06

## 2022-06-30 RX ADMIN — LOSARTAN POTASSIUM 50 MG: 50 TABLET, FILM COATED ORAL at 11:06

## 2022-06-30 RX ADMIN — ATENOLOL 25 MG: 25 TABLET ORAL at 11:06

## 2022-06-30 NOTE — ED PROVIDER NOTES
Encounter Date: 6/30/2022       History     Chief Complaint   Patient presents with    Chest Pain     Pt here with sharp intermittent left chest pain that radiates to the left shoulder blade x 2 months. Pt has followed up with Cards but found a pseudoaneurysm of the left ventricle on CT recently. She was told to come here to get admitted by Dr. MARILU Osborn.       56-year-old female history of hypertension, takotsubo cardiomyopathy, thoracic aortic bleed repair presents emergency room after receiving a call from her cardiologist referring her here.  She states that she underwent cardiac CT which demonstrated a left ventricular wall pseudoaneurysm with akinesis.  Patient reports 2 months of intermittent episodes of chest pain radiating to the left shoulder blade.  Patient is asymptomatic today.  She has no chest pain breath.  No abdominal pain, nausea, vomiting.  No recent trauma.        Review of patient's allergies indicates:   Allergen Reactions    Crestor [rosuvastatin] Other (See Comments)     Paralysis    Codeine Nausea And Vomiting    Strawberries [strawberry] Hives     Past Medical History:   Diagnosis Date    Broken heart syndrome     Hypertension      Past Surgical History:   Procedure Laterality Date    CLAVICLE SURGERY  2012    TONSILLECTOMY       Family History   Problem Relation Age of Onset    Colon cancer Mother     Uterine cancer Mother     Hypertension Father     Testicular cancer Son      Social History     Tobacco Use    Smoking status: Never Smoker    Smokeless tobacco: Never Used   Substance Use Topics    Alcohol use: Yes    Drug use: Never     Review of Systems   Constitutional: Negative for chills, fatigue and fever.   HENT: Negative for congestion, hearing loss, sore throat and trouble swallowing.    Eyes: Negative for visual disturbance.   Respiratory: Negative for cough, chest tightness and shortness of breath.    Cardiovascular: Positive for chest pain.   Gastrointestinal:  Negative for abdominal pain and nausea.   Endocrine: Negative for polyuria.   Genitourinary: Negative for difficulty urinating.   Musculoskeletal: Negative for arthralgias and myalgias.   Skin: Negative for rash.   Neurological: Negative for dizziness and headaches.   Psychiatric/Behavioral: The patient is not nervous/anxious.    All other systems reviewed and are negative.      Physical Exam     Initial Vitals [06/30/22 1722]   BP Pulse Resp Temp SpO2   (!) 173/106 62 18 98.7 °F (37.1 °C) 98 %      MAP       --         Physical Exam    Nursing note and vitals reviewed.  Constitutional: She appears well-developed and well-nourished.   HENT:   Head: Normocephalic and atraumatic.   Eyes: Conjunctivae and EOM are normal.   Neck: Neck supple.   Cardiovascular: Normal rate, regular rhythm, normal heart sounds and intact distal pulses.   Pulmonary/Chest: Breath sounds normal. No respiratory distress.   Abdominal: Abdomen is soft. She exhibits no distension. There is no abdominal tenderness.   Musculoskeletal:         General: Normal range of motion.      Cervical back: Neck supple.     Neurological: She is alert and oriented to person, place, and time. GCS score is 15. GCS eye subscore is 4. GCS verbal subscore is 5. GCS motor subscore is 6.   Skin: Skin is warm and dry. Capillary refill takes less than 2 seconds.   Psychiatric: She has a normal mood and affect. Her behavior is normal. Judgment and thought content normal.         ED Course   Procedures  Labs Reviewed   CBC W/ AUTO DIFFERENTIAL - Abnormal; Notable for the following components:       Result Value    Hemoglobin 11.6 (*)     MCH 26.3 (*)     MCHC 31.0 (*)     RDW 14.8 (*)     All other components within normal limits   COMPREHENSIVE METABOLIC PANEL - Abnormal; Notable for the following components:    BUN 22 (*)     Anion Gap 7 (*)     All other components within normal limits   B-TYPE NATRIURETIC PEPTIDE - Abnormal; Notable for the following components:      (*)     All other components within normal limits   TROPONIN I   TROPONIN I   MAGNESIUM   SARS-COV-2 RNA AMPLIFICATION, QUAL        ECG Results          EKG 12-lead (In process)  Result time 06/30/22 18:21:27    In process by Interface, Lab In [facility] (06/30/22 18:21:27)                 Narrative:    Test Reason : R07.9,    Vent. Rate : 055 BPM     Atrial Rate : 055 BPM     P-R Int : 180 ms          QRS Dur : 094 ms      QT Int : 396 ms       P-R-T Axes : 046 -19 055 degrees     QTc Int : 378 ms    Sinus bradycardia  Otherwise normal ECG  When compared with ECG of 24-FEB-2022 15:34,  T wave inversion no longer evident in Anterior leads    Referred By: [email or web address]   SELF           Confirmed By:                             Imaging Results          X-Ray Chest AP Portable (Final result)  Result time 06/30/22 18:03:20    Final result by Inna Pratt MD (06/30/22 18:03:20)                 Narrative:    XR CHEST 1 VIEW    CLINICAL HISTORY:  56 years Female Chest Pain    COMPARISON: None    FINDINGS: Cardiomediastinal silhouette is within normal limits. Lungs are normally expanded with no airspace consolidation. No pleural effusion or pneumothorax. No acute osseous abnormality.    IMPRESSION: No acute pulmonary process.      Electronically signed by:  Inna Pratt MD  6/30/2022 6:03 PM CDT Workstation: [id number]                               Medications   pantoprazole EC tablet 40 mg (has no administration in time range)   losartan tablet 50 mg (has no administration in time range)   levothyroxine tablet 50 mcg (has no administration in time range)   atorvastatin tablet 10 mg (has no administration in time range)   atenoloL tablet 25 mg (has no administration in time range)   sodium chloride 0.9% flush 10 mL (has no administration in time range)   albuterol-ipratropium 2.5 mg-0.5 mg/3 mL nebulizer solution 3 mL (has no administration in time range)   melatonin tablet 6 mg (has no administration in time  range)   ondansetron injection 4 mg (has no administration in time range)   polyethylene glycol packet 17 g (has no administration in time range)   senna-docusate 8.6-50 mg per tablet 1 tablet (has no administration in time range)   acetaminophen tablet 650 mg (has no administration in time range)   simethicone chewable tablet 80 mg (has no administration in time range)   HYDROcodone-acetaminophen 5-325 mg per tablet 1 tablet (has no administration in time range)   morphine injection 4 mg (has no administration in time range)   naloxone 0.4 mg/mL injection 0.02 mg (has no administration in time range)   glucose chewable tablet 16 g (has no administration in time range)   glucose chewable tablet 24 g (has no administration in time range)   dextrose 50% injection 12.5 g (has no administration in time range)   dextrose 50% injection 25 g (has no administration in time range)   glucagon (human recombinant) injection 1 mg (has no administration in time range)     Medical Decision Making:   Initial Assessment:   Nontoxic, well-appearing and in no acute distress.  ED Management:  48-year-old female presents emergency room for evaluation after cardiac CT demonstrated a left ventricular wall pseudoaneurysm.  Patient has had 2 months of intermittent episodes of chest pain radiating to her left shoulder.  Patient is asymptomatic on presentation she is nontoxic, well appearing and in no acute distress.  Labs are largely unremarkable.  No troponin elevation.  Chest x-ray normal.  EKG shows sinus bradycardia with no acute ischemic changes.  Discussed patient case with Hospital Medicine who agrees to admit cardiology consult.    Disposition:  Stable, admit.    I discuss this patient case with the cosigning physician, who agrees with diagnosis and plan of care. This note was written using the assistance of a dictation program and may contain grammatical errors.              ED Course as of 06/30/22 2206   Thu Jun 30, 2022   0145  EKG demonstrates sinus bradycardia rate of 55, no acute ischemic changes.  Normal intervals, normal axis. [AN]   1955 Discussed patient case with Hospital Medicine who agrees to admit. 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

## 2022-06-30 NOTE — PLAN OF CARE
Tolerated procedure well. No immediate complications. Tolerated juice and able to ambulate on own. Encouraged to increase fluid intake today. Did not have any post questions. Discharged to home via private vehicle.

## 2022-06-30 NOTE — ED NOTES
The patient stated she had a CT of her heart done today. She received a call from her cardiologist that stated they found a pseudo-aneurysm near her heart. The patient stated she is nervous, but is in no pain.

## 2022-07-01 ENCOUNTER — TELEPHONE (OUTPATIENT)
Dept: VASCULAR SURGERY | Facility: CLINIC | Age: 57
End: 2022-07-01
Payer: COMMERCIAL

## 2022-07-01 ENCOUNTER — CLINICAL SUPPORT (OUTPATIENT)
Dept: CARDIOLOGY | Facility: HOSPITAL | Age: 57
End: 2022-07-01
Attending: FAMILY MEDICINE
Payer: COMMERCIAL

## 2022-07-01 ENCOUNTER — TELEPHONE (OUTPATIENT)
Dept: FAMILY MEDICINE | Facility: CLINIC | Age: 57
End: 2022-07-01

## 2022-07-01 VITALS — BODY MASS INDEX: 35.73 KG/M2 | WEIGHT: 182 LBS | HEIGHT: 60 IN

## 2022-07-01 LAB
ANION GAP SERPL CALC-SCNC: 7 MMOL/L (ref 8–16)
AV INDEX (PROSTH): 0.72
AV MEAN GRADIENT: 7 MMHG
AV VALVE AREA: 2.55 CM2
BASOPHILS # BLD AUTO: 0.02 K/UL (ref 0–0.2)
BASOPHILS NFR BLD: 0.4 % (ref 0–1.9)
BSA FOR ECHO PROCEDURE: 1.87 M2
BUN SERPL-MCNC: 22 MG/DL (ref 6–20)
CALCIUM SERPL-MCNC: 8.7 MG/DL (ref 8.7–10.5)
CHLORIDE SERPL-SCNC: 103 MMOL/L (ref 95–110)
CO2 SERPL-SCNC: 29 MMOL/L (ref 23–29)
CREAT SERPL-MCNC: 0.8 MG/DL (ref 0.5–1.4)
DIFFERENTIAL METHOD: ABNORMAL
DOP CALC AO VTI: 40.55 CM
DOP CALC LVOT AREA: 3.6 CM2
DOP CALC LVOT DIAMETER: 2.13 CM
DOP CALC LVOT PEAK VEL: 119.78 M/S
DOP CALC LVOT STROKE VOLUME: 103.46 CM3
DOP CALCLVOT PEAK VEL VTI: 29.05 CM
E WAVE DECELERATION TIME: 215.02 MSEC
E/A RATIO: 0.87
EJECTION FRACTION: 64 %
EOSINOPHIL # BLD AUTO: 0.2 K/UL (ref 0–0.5)
EOSINOPHIL NFR BLD: 3.3 % (ref 0–8)
ERYTHROCYTE [DISTWIDTH] IN BLOOD BY AUTOMATED COUNT: 14.6 % (ref 11.5–14.5)
EST. GFR  (AFRICAN AMERICAN): >60 ML/MIN/1.73 M^2
EST. GFR  (NON AFRICAN AMERICAN): >60 ML/MIN/1.73 M^2
ESTIMATED AVG GLUCOSE: 131 MG/DL (ref 68–131)
FRACTIONAL SHORTENING: 34 % (ref 28–44)
GLUCOSE SERPL-MCNC: 121 MG/DL (ref 70–110)
HBA1C MFR BLD: 6.2 % (ref 4.5–6.2)
HCT VFR BLD AUTO: 37.1 % (ref 37–48.5)
HGB BLD-MCNC: 11.5 G/DL (ref 12–16)
IMM GRANULOCYTES # BLD AUTO: 0.02 K/UL (ref 0–0.04)
IMM GRANULOCYTES NFR BLD AUTO: 0.4 % (ref 0–0.5)
IVRT: 95.31 MSEC
LEFT INTERNAL DIMENSION IN SYSTOLE: 2.82 CM (ref 2.1–4)
LEFT VENTRICLE DIASTOLIC VOLUME INDEX: 44.3 ML/M2
LEFT VENTRICLE DIASTOLIC VOLUME: 79.29 ML
LEFT VENTRICLE SYSTOLIC VOLUME INDEX: 12.5 ML/M2
LEFT VENTRICLE SYSTOLIC VOLUME: 22.37 ML
LEFT VENTRICULAR INTERNAL DIMENSION IN DIASTOLE: 4.3 CM (ref 3.5–6)
LYMPHOCYTES # BLD AUTO: 1.2 K/UL (ref 1–4.8)
LYMPHOCYTES NFR BLD: 22.3 % (ref 18–48)
MAGNESIUM SERPL-MCNC: 2.1 MG/DL (ref 1.6–2.6)
MCH RBC QN AUTO: 26.6 PG (ref 27–31)
MCHC RBC AUTO-ENTMCNC: 31 G/DL (ref 32–36)
MCV RBC AUTO: 86 FL (ref 82–98)
MONOCYTES # BLD AUTO: 0.5 K/UL (ref 0.3–1)
MONOCYTES NFR BLD: 8.5 % (ref 4–15)
MV PEAK A VEL: 1.19 M/S
MV PEAK E VEL: 1.04 M/S
NEUTROPHILS # BLD AUTO: 3.5 K/UL (ref 1.8–7.7)
NEUTROPHILS NFR BLD: 65.1 % (ref 38–73)
NRBC BLD-RTO: 0 /100 WBC
PISA TR MAX VEL: 2.56 M/S
PLATELET # BLD AUTO: 178 K/UL (ref 150–450)
PMV BLD AUTO: 10.4 FL (ref 9.2–12.9)
POTASSIUM SERPL-SCNC: 3.6 MMOL/L (ref 3.5–5.1)
PULM VEIN S/D RATIO: 1.24
PV PEAK D VEL: 57.88 M/S
PV PEAK S VEL: 71.96 M/S
RA PRESSURE: 3 MMHG
RBC # BLD AUTO: 4.32 M/UL (ref 4–5.4)
RIGHT VENTRICULAR END-DIASTOLIC DIMENSION: 323 CM
SODIUM SERPL-SCNC: 139 MMOL/L (ref 136–145)
TR MAX PG: 26 MMHG
TROPONIN I SERPL DL<=0.01 NG/ML-MCNC: <0.03 NG/ML
TROPONIN I SERPL DL<=0.01 NG/ML-MCNC: <0.03 NG/ML
TV REST PULMONARY ARTERY PRESSURE: 29 MMHG
WBC # BLD AUTO: 5.43 K/UL (ref 3.9–12.7)

## 2022-07-01 PROCEDURE — 99223 1ST HOSP IP/OBS HIGH 75: CPT | Mod: 25,,, | Performed by: INTERNAL MEDICINE

## 2022-07-01 PROCEDURE — 83735 ASSAY OF MAGNESIUM: CPT | Performed by: FAMILY MEDICINE

## 2022-07-01 PROCEDURE — 93306 TTE W/DOPPLER COMPLETE: CPT

## 2022-07-01 PROCEDURE — 83036 HEMOGLOBIN GLYCOSYLATED A1C: CPT | Performed by: FAMILY MEDICINE

## 2022-07-01 PROCEDURE — 25000003 PHARM REV CODE 250: Performed by: FAMILY MEDICINE

## 2022-07-01 PROCEDURE — 25000003 PHARM REV CODE 250: Performed by: INTERNAL MEDICINE

## 2022-07-01 PROCEDURE — 93306 ECHO (CUPID ONLY): ICD-10-PCS | Mod: 26,,, | Performed by: INTERNAL MEDICINE

## 2022-07-01 PROCEDURE — 93306 TTE W/DOPPLER COMPLETE: CPT | Mod: 26,,, | Performed by: INTERNAL MEDICINE

## 2022-07-01 PROCEDURE — G0378 HOSPITAL OBSERVATION PER HR: HCPCS

## 2022-07-01 PROCEDURE — 80048 BASIC METABOLIC PNL TOTAL CA: CPT | Performed by: FAMILY MEDICINE

## 2022-07-01 PROCEDURE — 99223 PR INITIAL HOSPITAL CARE,LEVL III: ICD-10-PCS | Mod: 25,,, | Performed by: INTERNAL MEDICINE

## 2022-07-01 PROCEDURE — 85025 COMPLETE CBC W/AUTO DIFF WBC: CPT | Performed by: FAMILY MEDICINE

## 2022-07-01 PROCEDURE — 94761 N-INVAS EAR/PLS OXIMETRY MLT: CPT

## 2022-07-01 PROCEDURE — 84484 ASSAY OF TROPONIN QUANT: CPT | Mod: 91 | Performed by: FAMILY MEDICINE

## 2022-07-01 PROCEDURE — 36415 COLL VENOUS BLD VENIPUNCTURE: CPT | Performed by: FAMILY MEDICINE

## 2022-07-01 RX ORDER — LANOLIN ALCOHOL/MO/W.PET/CERES
800 CREAM (GRAM) TOPICAL
Status: DISCONTINUED | OUTPATIENT
Start: 2022-07-01 | End: 2022-07-02 | Stop reason: HOSPADM

## 2022-07-01 RX ORDER — POTASSIUM CHLORIDE 20 MEQ/1
40 TABLET, EXTENDED RELEASE ORAL
Status: DISCONTINUED | OUTPATIENT
Start: 2022-07-01 | End: 2022-07-02 | Stop reason: HOSPADM

## 2022-07-01 RX ORDER — MAGNESIUM SULFATE HEPTAHYDRATE 40 MG/ML
2 INJECTION, SOLUTION INTRAVENOUS
Status: DISCONTINUED | OUTPATIENT
Start: 2022-07-01 | End: 2022-07-02 | Stop reason: HOSPADM

## 2022-07-01 RX ORDER — POTASSIUM CHLORIDE 20 MEQ/1
20 TABLET, EXTENDED RELEASE ORAL
Status: DISCONTINUED | OUTPATIENT
Start: 2022-07-01 | End: 2022-07-02 | Stop reason: HOSPADM

## 2022-07-01 RX ORDER — MAGNESIUM SULFATE 1 G/100ML
1 INJECTION INTRAVENOUS
Status: DISCONTINUED | OUTPATIENT
Start: 2022-07-01 | End: 2022-07-02 | Stop reason: HOSPADM

## 2022-07-01 RX ORDER — IBUPROFEN 400 MG/1
800 TABLET ORAL EVERY 6 HOURS PRN
Status: DISCONTINUED | OUTPATIENT
Start: 2022-07-01 | End: 2022-07-02 | Stop reason: HOSPADM

## 2022-07-01 RX ORDER — MAGNESIUM SULFATE HEPTAHYDRATE 40 MG/ML
4 INJECTION, SOLUTION INTRAVENOUS
Status: DISCONTINUED | OUTPATIENT
Start: 2022-07-01 | End: 2022-07-02 | Stop reason: HOSPADM

## 2022-07-01 RX ADMIN — ACETAMINOPHEN 650 MG: 325 TABLET ORAL at 05:07

## 2022-07-01 RX ADMIN — ATENOLOL 25 MG: 25 TABLET ORAL at 08:07

## 2022-07-01 RX ADMIN — POTASSIUM CHLORIDE 40 MEQ: 1500 TABLET, EXTENDED RELEASE ORAL at 10:07

## 2022-07-01 RX ADMIN — LEVOTHYROXINE SODIUM 50 MCG: 0.03 TABLET ORAL at 08:07

## 2022-07-01 RX ADMIN — LOSARTAN POTASSIUM 50 MG: 50 TABLET, FILM COATED ORAL at 08:07

## 2022-07-01 RX ADMIN — ATORVASTATIN CALCIUM 10 MG: 10 TABLET, FILM COATED ORAL at 08:07

## 2022-07-01 RX ADMIN — IBUPROFEN 800 MG: 400 TABLET ORAL at 08:07

## 2022-07-01 RX ADMIN — PANTOPRAZOLE SODIUM 40 MG: 40 TABLET, DELAYED RELEASE ORAL at 08:07

## 2022-07-01 RX ADMIN — NITROGLYCERIN 0.5 INCH: 20 OINTMENT TOPICAL at 05:07

## 2022-07-01 NOTE — TELEPHONE ENCOUNTER
----- Message from Rimma Jung sent at 7/1/2022 10:32 AM CDT -----  Contact: Kaylene  Type:  Needs Medical Advice    Who Called:  Kaylene with [facility]     Would the patient rather a call back or a response via [facility]?  Call    Best Call Back Number: [phone]    Additional Information:  Needing a consult with Dr Deng    Please call

## 2022-07-01 NOTE — H&P
Critical access hospital Medicine   History & Physical     Patient Name: Brunilda Bob  MRN: [id number]  Admission Date: 6/30/2022  5:17 PM  Attending Physician: Tarsha Medrano MD  Primary Care Provider: Sadiq Titus MD  Face-to-Face encounter date: 06/30/2022    Patient information was obtained from patient, past medical records, ER physician, and ER records.     HISTORY OF PRESENT ILLNESS:     Brunilda Bob is a 56 y.o. White female   With PMH of recently diagnosed cardiac pseudoaneurysm,  who presents with chest pain.    No CP currently; no CP today.  CP onset 2 months ago  Occurring intermittently  Located L chest radiating to L shoulder  She has been following outpt with cardiology  Cardiac CT today revealed LV pseudoaneurysm   She was instructed by her cardiologist Dr GRISELDA Osborn to come to ER  No SOB  No dizziness  No n/v    REVIEW OF SYSTEMS:     All systems reviewed and are negative except as noted per above.    PAST MEDICAL HISTORY:     Past Medical History:   Diagnosis Date    Broken heart syndrome     Hypertension        PAST SURGICAL HISTORY:     Past Surgical History:   Procedure Laterality Date    CLAVICLE SURGERY  2012    TONSILLECTOMY         ALLERGIES:   Crestor [rosuvastatin], Codeine, and Strawberries [strawberry]    FAMILY HISTORY:     Family History   Problem Relation Age of Onset    Colon cancer Mother     Uterine cancer Mother     Hypertension Father     Testicular cancer Son        SOCIAL HISTORY:     Social History     Tobacco Use    Smoking status: Never Smoker    Smokeless tobacco: Never Used   Substance Use Topics    Alcohol use: Yes        Social History     Substance and Sexual Activity   Sexual Activity Yes    Partners: Male        HOME MEDICATIONS:     Prior to Admission medications    Medication Sig Start Date End Date Taking? Authorizing Provider   acetaminophen (TYLENOL) 500 MG tablet Take 1,000 mg by mouth 3 (three) times daily.    Yes Historical Provider   atenoloL (TENORMIN) 25 MG tablet TAKE ONE TABLET BY MOUTH ONCE DAILY  Patient taking differently: Take 25 mg by mouth every evening. 3/4/22  Yes Jay Osborn MD   atorvastatin (LIPITOR) 10 MG tablet Take 10 mg by mouth every evening. 1/20/21  Yes Historical Provider   ergocalciferol, vitamin D2, (VITAMIN D2 ORAL) Take 5,000 mg by mouth nightly.   Yes Historical Provider   iron carb,gl/FA/B12/C/docusate (FERRALET 90 DUAL-IRON DELIVERY ORAL) Take 90 mg by mouth 4 (four) times a week.    Yes Historical Provider   Lactobacillus rhamnosus GG (CULTURELLE) 10 billion cell capsule Take 1 capsule by mouth every evening.   Yes Historical Provider   levothyroxine (SYNTHROID) 50 MCG tablet Take 50 mcg by mouth every evening.   Yes Historical Provider   loratadine (CLARITIN) 10 mg tablet Take 10 mg by mouth every evening.   Yes Historical Provider   losartan (COZAAR) 50 MG tablet Take 1 tablet (50 mg total) by mouth once daily.  Patient taking differently: Take 50 mg by mouth every evening. 2/24/22 2/24/23 Yes Otilia Rojas NP   magnesium oxide (MAG-OX) 400 mg (241.3 mg magnesium) tablet Take 400 mg by mouth every evening.   Yes Historical Provider   multivitamin (THERAGRAN) per tablet Take 1 tablet by mouth every evening.   Yes Historical Provider   pantoprazole (PROTONIX) 40 MG tablet TAKE ONE TABLET BY MOUTH ONCE DAILY  Patient taking differently: Take 40 mg by mouth every evening. 3/7/22  Yes Otilia Rojas NP   ascorbic acid, vitamin C, (VITAMIN C) 500 MG tablet Take 2 mg by mouth once daily.  6/30/22  Historical Provider   ondansetron (ZOFRAN) 8 MG tablet Take 8 mg by mouth every 8 (eight) hours as needed for Nausea.  6/30/22  Historical Provider   sars-cov-2, covid-19, (MODERNA COVID-19) 100 mcg/0.5 ml injection  1/15/21 6/30/22  Historical Provider       PHYSICAL EXAM:     BP (!) 155/88 (BP Location: Right arm, Patient Position: Lying)   Pulse (!) 58   Temp 98 °F (36.7 °C) (Oral)    Resp 18   Ht 5' (1.524 m)   Wt 82.6 kg (182 lb 3.2 oz)   LMP  (LMP Unknown)   SpO2 97%   BMI 35.58 kg/m²     Gen: alert, responsive  HEENT:  Eyes - no pallor  External ears with no lesions  Nares patent  Mouth - lips chapped  CV: RRR  Lungs: nl effort  Abd: ND  Ext: no atrophy or edema  Skin: warm, dry  Neuro: grossly intact  Psych: pleasant     LABS AND IMAGING:     Labs Reviewed   CBC W/ AUTO DIFFERENTIAL - Abnormal; Notable for the following components:       Result Value    Hemoglobin 11.6 (*)     MCH 26.3 (*)     MCHC 31.0 (*)     RDW 14.8 (*)     All other components within normal limits   COMPREHENSIVE METABOLIC PANEL - Abnormal; Notable for the following components:    BUN 22 (*)     Anion Gap 7 (*)     All other components within normal limits   B-TYPE NATRIURETIC PEPTIDE - Abnormal; Notable for the following components:     (*)     All other components within normal limits   TROPONIN I   TROPONIN I   MAGNESIUM   SARS-COV-2 RNA AMPLIFICATION, QUAL     Imaging Results          X-Ray Chest AP Portable (Final result)  Result time 06/30/22 18:03:20    Final result by Inna Pratt MD (06/30/22 18:03:20)                 Narrative:    XR CHEST 1 VIEW    CLINICAL HISTORY:  56 years Female Chest Pain    COMPARISON: None    FINDINGS: Cardiomediastinal silhouette is within normal limits. Lungs are normally expanded with no airspace consolidation. No pleural effusion or pneumothorax. No acute osseous abnormality.    IMPRESSION: No acute pulmonary process.      Electronically signed by:  Inna Pratt MD  6/30/2022 6:03 PM CDT Workstation: [id number]                                ASSESSMENT & PLAN:   Brunilda Bob is a 56 y.o. 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        Plan    LV Pseudoaneurysm  - telemetry  - trending troponin  - echo  - afterload reduction, continue losartan  - cardiology consulted, thank you    Chronic conditions as noted above/below; home medications reviewed personally by me and restarted as appropriate  Electrolyte derangement:  Trending BMP; Mg; replacement prn  DVT ppx: lovenox held for possible procedure; SCDs  FULL CODE    Tarsha Medrano MD  [facility] Hospitalist  06/30/2022

## 2022-07-01 NOTE — PLAN OF CARE
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 No  Secondary Emergency Contact: Tom Santiago  Mobile Phone: [phone]  Relation: Son  Preferred language: English   needed? No    Discharge Plan A: (P) Home with family  Discharge Plan B: (P) Home with family    Met with pt and her spouse Juan Carlos at bedside and introduced role. Pt denies any discharge needs. CM to continue to monitor.     [id number] - [place], [place] - [address] [facility]  [address] [facility] [address]  Phone: [phone] Fax: [phone]      Initial Assessment (most recent)       Adult Discharge Assessment - 07/01/22 1245          Discharge Assessment    Assessment Type Discharge Planning Assessment     Confirmed/corrected address, phone number and insurance Yes (P)      Confirmed Demographics Correct on Facesheet (P)    Address:  [address] [place] [id number]     Home Phone:  [phone]    Source of Information patient;family (P)      Does patient/caregiver understand observation status Yes (P)      Lives With spouse;grandchild(gabi) (P)      Do you expect to return to your current living situation? Yes (P)      Do you have help at home or someone to help you manage your care at home? Yes (P)      Who are your caregiver(s) and their phone number(s)? Juan Carlos Vazquez (Spouse)   [phone] (Mobile) (P)      Prior to hospitilization  cognitive status: Alert/Oriented (P)      Current cognitive status: Alert/Oriented (P)      Walking or Climbing Stairs Difficulty none (P)      Dressing/Bathing Difficulty none (P)      Home Accessibility stairs to enter home (P)      Number of Stairs, Main Entrance one (P)      Equipment Currently Used at Home none (P)      Readmission within 30 days? No (P)      Patient currently being followed by outpatient case management? No (P)      Do you currently have service(s) that help you manage your care at home? No (P)      Do you take prescription medications? Yes (P)      Do you have prescription coverage? Yes (P)      Coverage [employer]BS (P)      Do you have any problems affording any of your prescribed medications? No (P)      Is the patient taking medications as prescribed? yes (P)      Who is going to help you get home at discharge? Pt's spouse Juan Carlos (P)      How do you get to doctors appointments? car, drives self (P)      Are you on dialysis? No (P)      Do you take coumadin? No (P)      Discharge Plan A Home with family (P)      Discharge Plan B Home with family (P)      DME Needed Upon Discharge  none (P)      Discharge Plan discussed with: Spouse/sig other;Patient (P)      Name(s) and Number(s) Juan Carlos Vazquez (Spouse)   [phone] (Mobile) (P)      Discharge Barriers Identified None (P)         Relationship/Environment    Name(s) of Who Lives With Patient Shade Rangel (P)

## 2022-07-01 NOTE — TELEPHONE ENCOUNTER
----- Message from José Rodriguez sent at 7/1/2022 12:41 PM CDT -----  Regarding: Call Back  Who Called: Michelle myrick          What is the reason for the call: calling in regards to a call back in regards to patient          Can patient be contacted on Mychart: n/a         Call back number: [phone]

## 2022-07-01 NOTE — PLAN OF CARE
Problem: Adult Inpatient Plan of Care  Goal: Plan of Care Review  Outcome: Ongoing, Progressing  Goal: Patient-Specific Goal (Individualized)  Outcome: Ongoing, Progressing  Goal: Absence of Hospital-Acquired Illness or Injury  Outcome: Ongoing, Progressing  Goal: Optimal Comfort and Wellbeing  Outcome: Ongoing, Progressing  Goal: Readiness for Transition of Care  Outcome: Ongoing, Progressing      [de-identified] : Physical exam shows the patient to be alert and oriented x3, capable of ambulation. The patient is well-developed and well-nourished in no apparent respiratory distress. Majority of the skin is intact bilaterally in the upper extremities without lymphadenopathy at the elbows.\par \par There is a negative Spurling test. There is no sensitivity or Tinel's over the axilla bilaterally in the area of the brachial plexus.\par \par The elbows have a symmetric and full range of motion with no pain upon forced flexion or extension bilaterally. There is no tenderness over the medial or lateral epicondyles bilaterally. The biceps and triceps are intact bilaterally with 5 over 5 strength. There is no joint effusion or instability of the medial and lateral collateral ligament complex bilaterally. There is no sensitivity of the median ulnar or radial nerves with provocative tests bilaterally.\par \par The wrists have a symmetric range of motion bilaterally. There is no tenderness over the scaphoid, scapholunate or lunotriquetral ligaments bilaterally. There is a negative Urbano test bilaterally. There is negative tenderness over the radial ulnar joint or TFCC and no evidence of instability bilaterally. No tenderness over the pisotriquetral or hamate hook or CMC joint bilaterally. There is 5 over 5 strength of the wrists bilaterally.\par \par There is a negative Finkelstein test bilaterally and no tenderness over the A1 pulleys. There is no tenderness or instability of the MP PIP or DIP joints in the digits bilaterally with no evidence of digital malrotation.\par \par There is no sensitivity or masses around the ulnar nerve at the level of the wrist bilaterally.\par \par \par There is 2+ pulses over the radial arteries bilaterally with good capillary refill.\par \par There is a negative Tinel's and a positive Phalen's test at 15 seconds bilaterally. There is mild thenar atrophy on the right and negative on the left but good opposition is present. The remaining intrinsic musculature has good strength bilaterally.\par \par Sensation is intact in the median nerve distribution on the affected side. Ulnar nerve sensation is grossly intact\par

## 2022-07-01 NOTE — CARE UPDATE
07/01/22 0713   Patient Assessment/Suction   Level of Consciousness (AVPU) alert   All Lung Fields Breath Sounds clear   PRE-TX-O2   O2 Device (Oxygen Therapy) room air   SpO2 95 %   Pulse Oximetry Type Intermittent   $ Pulse Oximetry - Multiple Charge Pulse Oximetry - Multiple   Pulse (!) 56   Resp 18

## 2022-07-01 NOTE — CONSULTS
[facility]  Department of Cardiology  Consult Note      PATIENT NAME: Brunilda Bob  MRN: [id number]  TODAY'S DATE: 07/01/2022  ADMIT DATE: 6/30/2022                          CONSULT REQUESTED BY: Oumar Aranda MD    SUBJECTIVE     PRINCIPAL PROBLEM: Pseudoaneurysm of left ventricle of heart      REASON FOR CONSULT:  LV Pseudoaneurysm    HPI:      56 year old female known to me. She has been having on and off chest pain for about 2 months. She underwent ECHO and stress both did not show a reason for chest pain, Described as elephant sitting on her chest. CTA ordered showed LV Pseudoaneurysm. Patient has a history fo Broken heart syndrome in 2012 but has been ok since this time. She was told to come to ER and be admitted. Patient will have repeat ECHO. Patient will be sent to [facility] for evaluation and treatment.        Review of patient's allergies indicates:   Allergen Reactions    Crestor [rosuvastatin] Other (See Comments)     Paralysis    Codeine Nausea And Vomiting    Strawberries [strawberry] Hives       Past Medical History:   Diagnosis Date    Broken heart syndrome     Hypertension      Past Surgical History:   Procedure Laterality Date    CLAVICLE SURGERY  2012    TONSILLECTOMY       Social History     Tobacco Use    Smoking status: Never Smoker    Smokeless tobacco: Never Used   Substance Use Topics    Alcohol use: Yes    Drug use: Never        REVIEW OF SYSTEMS  CONSTITUTIONAL: +fatigue   EYES: No double vision, No blurred vision  NEURO: No headaches, No dizziness  RESPIRATORY: Negative for cough, shortness of breath and wheezing.    CARDIOVASCULAR: +cp  GI: Negative for abdominal pain, No melena, diarrhea, nausea and vomiting.   : Negative for dysuria and frequency, Negative for hematuria  SKIN: Negative for bruising, Negative for edema or discoloration noted.   ENDOCRINE: Negative for polyphagia, Negative for heat intolerance, Negative for cold intolerance  PSYCHIATRIC:  Negative for depression, Negative for anxiety, Negative for memory loss  MUSCULOSKELETAL: Negative for neck pain, Negative for muscle weakness, Negative for back pain     OBJECTIVE     VITAL SIGNS (Most Recent)  Temp: 97.6 °F (36.4 °C) (07/01/22 0803)  Pulse: (!) 55 (07/01/22 0803)  Resp: 18 (07/01/22 0803)  BP: (!) 145/80 (07/01/22 0803)  SpO2: 98 % (07/01/22 0803)    VENTILATION STATUS  Resp: 18 (07/01/22 0803)  SpO2: 98 % (07/01/22 0803)       I & O (Last 24H):    Intake/Output Summary (Last 24 hours) at 7/1/2022 1101  Last data filed at 7/1/2022 1058  Gross per 24 hour   Intake 120 ml   Output 1000 ml   Net -880 ml       WEIGHTS  Wt Readings from Last 3 Encounters:   07/01/22 0334 82.6 kg (182 lb 3.2 oz)   06/30/22 2200 82.6 kg (182 lb 3.2 oz)   06/30/22 1722 81.6 kg (180 lb)   06/21/22 1135 83 kg (183 lb)   06/02/22 0917 81.6 kg (180 lb)       PHYSICAL EXAM  GENERAL: well built, well nourished, well-developed in no apparent distress alert and oriented.   HEENT: Normocephalic. Pupils normal and conjunctivae normal.  Mucous membranes normal, no cyanosis or icterus, trachea central,no pallor or icterus is noted..   NECK: No JVD. No bruit..   THYROID: Thyroid not enlarged. No nodules present..   CARDIAC: Regular rate and rhythm. S1 is normal.S2 is normal.No gallops, clicks or murmurs noted at this time.  CHEST ANATOMY: normal.   LUNGS: Clear to auscultation. No wheezing or rhonchi..   ABDOMEN: Soft no masses or organomegaly.  No abdomen pulsations or bruits.  Normal bowel sounds. No pulsations and no masses felt, No guarding or rebound.   URINARY: No french catheter   EXTREMITIES: No cyanosis, clubbing or edema noted at this time., no calf tenderness bilaterally.   PERIPHERAL VASCULAR SYSTEM: Good palpable distal pulses.   CENTRAL NERVOUS SYSTEM: No focal motor or sensory deficits noted.   SKIN: Skin without lesions, moist, well perfused.   MUSCLE STRENGTH & TONE: No noteable weakness, atrophy or abnormal movement.      HOME MEDICATIONS:  No current facility-administered medications on file prior to encounter.     Current Outpatient Medications on File Prior to Encounter   Medication Sig Dispense Refill    acetaminophen (TYLENOL) 500 MG tablet Take 1,000 mg by mouth 3 (three) times daily.      atenoloL (TENORMIN) 25 MG tablet TAKE ONE TABLET BY MOUTH ONCE DAILY (Patient taking differently: Take 25 mg by mouth every evening.) 90 tablet 3    atorvastatin (LIPITOR) 10 MG tablet Take 10 mg by mouth every evening.      ergocalciferol, vitamin D2, (VITAMIN D2 ORAL) Take 5,000 mg by mouth nightly.      iron carb,gl/FA/B12/C/docusate (FERRALET 90 DUAL-IRON DELIVERY ORAL) Take 90 mg by mouth 4 (four) times a week.       Lactobacillus rhamnosus GG (CULTURELLE) 10 billion cell capsule Take 1 capsule by mouth every evening.      levothyroxine (SYNTHROID) 50 MCG tablet Take 50 mcg by mouth every evening.      loratadine (CLARITIN) 10 mg tablet Take 10 mg by mouth every evening.      losartan (COZAAR) 50 MG tablet Take 1 tablet (50 mg total) by mouth once daily. (Patient taking differently: Take 50 mg by mouth every evening.) 90 tablet 3    magnesium oxide (MAG-OX) 400 mg (241.3 mg magnesium) tablet Take 400 mg by mouth every evening.      multivitamin (THERAGRAN) per tablet Take 1 tablet by mouth every evening.      pantoprazole (PROTONIX) 40 MG tablet TAKE ONE TABLET BY MOUTH ONCE DAILY (Patient taking differently: Take 40 mg by mouth every evening.) 90 tablet 3       SCHEDULED MEDS:   atenoloL  25 mg Oral QHS    atorvastatin  10 mg Oral QHS    levothyroxine  50 mcg Oral QHS    losartan  50 mg Oral QHS    pantoprazole  40 mg Oral QHS       CONTINUOUS INFUSIONS:    PRN MEDS:acetaminophen, albuterol-ipratropium, calcium chloride IVPB, calcium chloride IVPB, calcium chloride IVPB, dextrose 50%, dextrose 50%, glucagon (human recombinant), glucose, glucose, HYDROcodone-acetaminophen, magnesium oxide, magnesium sulfate IVPB, magnesium sulfate  IVPB, magnesium sulfate IVPB, magnesium sulfate IVPB, melatonin, morphine, naloxone, ondansetron, polyethylene glycol, potassium chloride, potassium chloride, potassium chloride, potassium chloride, senna-docusate 8.6-50 mg, simethicone, sodium chloride 0.9%, sodium phosphate IVPB, sodium phosphate IVPB, sodium phosphate IVPB, sodium phosphate IVPB, sodium phosphate IVPB    LABS AND DIAGNOSTICS     CBC LAST 3 DAYS  Recent Labs   Lab 06/30/22 1835 07/01/22  0255   WBC 6.08 5.43   RBC 4.41 4.32   HGB 11.6* 11.5*   HCT 37.4 37.1   MCV 85 86   MCH 26.3* 26.6*   MCHC 31.0* 31.0*   RDW 14.8* 14.6*    178   MPV 10.1 10.4   GRAN 68.3  4.2 65.1  3.5   LYMPH 20.1  1.2 22.3  1.2   MONO 7.7  0.5 8.5  0.5   BASO 0.03 0.02   NRBC 0 0       COAGULATION LAST 3 DAYS  No results for input(s): LABPT, INR, APTT in the last 168 hours.    CHEMISTRY LAST 3 DAYS  Recent Labs   Lab 06/30/22 1835 07/01/22  0255    139   K 3.9 3.6    103   CO2 29 29   ANIONGAP 7* 7*   BUN 22* 22*   CREATININE 0.8 0.8    121*   CALCIUM 9.0 8.7   MG 2.0 2.1   ALBUMIN 3.9  --    PROT 7.0  --    ALKPHOS 95  --    ALT 24  --    AST 24  --    BILITOT 0.3  --        CARDIAC PROFILE LAST 3 DAYS  Recent Labs   Lab 06/30/22 1835 06/30/22 2035 07/01/22  0256 07/01/22  0653   *  --   --   --    TROPONINI <0.030 <0.030 <0.030 <0.030       ENDOCRINE LAST 3 DAYS  No results for input(s): TSH, PROCAL in the last 168 hours.    LAST ARTERIAL BLOOD GAS  ABG  No results for input(s): PH, PO2, PCO2, HCO3, BE in the last 168 hours.    LAST 7 DAYS MICROBIOLOGY   Microbiology Results (last 7 days)       ** No results found for the last 168 hours. **            MOST RECENT IMAGING  X-Ray Chest AP Portable  XR CHEST 1 VIEW    CLINICAL HISTORY:  56 years Female Chest Pain    COMPARISON: None    FINDINGS: Cardiomediastinal silhouette is within normal limits. Lungs are normally expanded with no airspace consolidation. No pleural effusion or  pneumothorax. No acute osseous abnormality.    IMPRESSION: No acute pulmonary process.    Electronically signed by:  Inna Pratt MD  6/30/2022 6:03 PM CDT Workstation: [id number]  CTA Cardiac  Narrative: CMS MANDATED QUALITY DATA - CT RADIATION - 436    All CT scans at this facility utilize dose modulation, iterative reconstruction, and/or weight based dosing when appropriate to reduce radiation dose to as low as reasonably achievable.    EXAMINATION:  CTA CARDIAC    CLINICAL HISTORY:  ECG abnormal, high CAD risk; Abnormal electrocardiogram (ECG) (EKG)    PATIENT DATA:    Age: 57 y/o    Gender: F    Weight: 184 pounds    Heart Rate: 51 beats per minute    Blood pressure: 111/61    TECHNIQUE:  CT angiography of the coronary arteries obtained with 90 mL Omnipaque 350.    Retrospective ECG gating was utilized during image acquisition.    3D volume rendered imaging and multi-planar maximum intensity projection reformations were created at a separate workstation under the supervision of the radiologist and stored in the patient's permanent medical record.    Dose of IV metoprolol: None    Dose of sublingual nitroglycerin: 400 mcg    COMPARISON:  Myocardial perfusion scan 04/27/2022    FINDINGS:  Image quality: Excellent    Coronary artery calcium score: 0 Agatston method    Left main: 0    LAD: 0    Left circumflex: 0    RCA: 0    PDA: 0    CORONARY ARTERY ANATOMY:    Left Main: Left main coronary artery is a medium-size vessel which arises normally from the left aortic sinus.  It is patent with no plaque or stenosis.    Ramus intermedius: Not present.    Left anterior descending: Tiny focus of calcified plaque affects the proximal LAD.  LAD remains widely patent with no stenosis or additional plaque.  LAD gives rise to 2 patent diagonal branches.    Left circumflex: Left circumflex artery is patent without plaque or stenosis.  It gives rise to 3 patent marginal branches.    Right coronary: Right coronary artery  arises normally from the right aortic sinus and is patent with no plaque or stenosis.  RCA is dominant, giving rise to patent posterior descending artery and patent posterior left ventricular branch.    CARDIAC DIMENSIONS:    EF: 64 %    LV EDV: 140 mL    LV ESV: 50 mL    SV: 89 mL    Cardiac output: 4.5 liters/minute    Left ventricular wall motion: Akinesis of left ventricular apex    CARDIAC ANATOMY:    Ventricles: Left ventricular apical pseudoaneurysm measures 18 mm.    Atria: No abnormality identified.    Cardiac valves: 3 cusp aortic and mitral valves show no thickening or calcification.    Pericardium: Negative for pericardial effusion, thickening, or calcification.    Venous anatomy: Coronary sinus and its tributaries are unremarkable.    EXTRACARDIAC FINDINGS:    Thoracic aorta: Visualized thoracic aorta is of normal caliber.    Pulmonary artery: Visualized pulmonary artery is of normal caliber.    Multifocal left hepatic lobe cysts incidentally noted.  Impression: 1. Patient's coronary artery calcium score is 0.  This places the patient between 0 and 25th percentile for females between ages of 50 and 59.  This implies very low, generally less than 5%, risk of coronary artery disease.  2. Tiny focus of calcified plaque in proximal LAD, without LAD stenosis.  3. Negative for coronary artery atherosclerotic plaque or stenosis in left main, left circumflex, and right coronary arteries.  4. 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    Electronically signed by: Denny Resendiz MD  Date:    06/30/2022  Time:    10:38      ECHOCARDIOGRAM RESULTS (last 5)  Results for orders placed during the hospital encounter of 04/27/22    Echo    Interpretation Summary  · The left ventricle is normal in size with mild concentric hypertrophy and normal systolic function.  · The estimated ejection fraction is 60%.  · Normal left ventricular diastolic function.  · Normal right ventricular size with normal right  ventricular systolic function.  · Normal central venous pressure (3 mmHg).  · The estimated PA systolic pressure is 26 mmHg.      CURRENT/PREVIOUS VISIT EKG  Results for orders placed or performed during the hospital encounter of 06/30/22   EKG 12-lead    Collection Time: 06/30/22  5:30 PM    Narrative    Test Reason : R07.9,    Vent. Rate : 055 BPM     Atrial Rate : 055 BPM     P-R Int : 180 ms          QRS Dur : 094 ms      QT Int : 396 ms       P-R-T Axes : 046 -19 055 degrees     QTc Int : 378 ms    Sinus bradycardia  Otherwise normal ECG  When compared with ECG of 24-FEB-2022 15:34,  T wave inversion no longer evident in Anterior leads    Referred By: [email or web address]   SELF           Confirmed By:            ASSESSMENT/PLAN:     Active Hospital Problems    Diagnosis    *Pseudoaneurysm of left ventricle of heart    Dyslipidemia    Thyroid dysfunction    Hypertension       ASSESSMENT & PLAN:     1. Pseudoanerysm of LV  2. CP- Normal coronary arteries on CTA  3. Abnormal EKG  4. HTN  5. Thyroid Dysfunction  6. Dyslipidemia      RECOMMENDATIONS:      Patient will be transferred to the [facility] for evaluation and treatment   Cardiothoracic surgery has accepted the patient awaiting bed  Patient remains hemodynamically stable at this time.  Will add low-dose of topical nitrates for the time being  Continue current regimen for now        Otilia Rojas NP  [facility]  Department of Cardiology  Date of Service: 07/01/2022      I had extended discussions with the patient,  and family members in the room.  And at the request of  also discussed with CT surgeon at the [facility] further advise and evaluation.\and treatment.    Patient is also made aware of the limitations with the bed availability at the [facility]   I have personally interviewed and examined the patient, I have reviewed the Nurse Practitioner's history and physical, assessment, and plan. I agree with the findings and  armand.      Jay Osborn M.D.  [facility]  Department of Cardiology  Date of Service: 07/01/2022  11:01 AM

## 2022-07-02 ENCOUNTER — HOSPITAL ENCOUNTER (INPATIENT)
Facility: HOSPITAL | Age: 57
LOS: 2 days | Discharge: HOME | DRG: 316 | End: 2022-07-04
Attending: STUDENT IN AN ORGANIZED HEALTH CARE EDUCATION/TRAINING PROGRAM | Admitting: STUDENT IN AN ORGANIZED HEALTH CARE EDUCATION/TRAINING PROGRAM
Payer: COMMERCIAL

## 2022-07-02 VITALS
RESPIRATION RATE: 16 BRPM | DIASTOLIC BLOOD PRESSURE: 66 MMHG | SYSTOLIC BLOOD PRESSURE: 142 MMHG | HEART RATE: 56 BPM | HEIGHT: 60 IN | BODY MASS INDEX: 35.57 KG/M2 | WEIGHT: 181.19 LBS | OXYGEN SATURATION: 96 % | TEMPERATURE: 98 F

## 2022-07-02 DIAGNOSIS — L90.5 FIBROSIS OF SKIN OF LOWER EXTREMITY: ICD-10-CM

## 2022-07-02 DIAGNOSIS — I25.3 PSEUDOANEURYSM OF LEFT VENTRICLE OF HEART: Primary | ICD-10-CM

## 2022-07-02 DIAGNOSIS — I25.3 LEFT VENTRICULAR ANEURYSM: ICD-10-CM

## 2022-07-02 LAB
ANION GAP SERPL CALC-SCNC: 6 MMOL/L (ref 8–16)
BASOPHILS # BLD AUTO: 0.02 K/UL (ref 0–0.2)
BASOPHILS NFR BLD: 0.4 % (ref 0–1.9)
BUN SERPL-MCNC: 23 MG/DL (ref 6–20)
CALCIUM SERPL-MCNC: 8.7 MG/DL (ref 8.7–10.5)
CHLORIDE SERPL-SCNC: 104 MMOL/L (ref 95–110)
CO2 SERPL-SCNC: 28 MMOL/L (ref 23–29)
CREAT SERPL-MCNC: 0.8 MG/DL (ref 0.5–1.4)
DIFFERENTIAL METHOD: ABNORMAL
EOSINOPHIL # BLD AUTO: 0.2 K/UL (ref 0–0.5)
EOSINOPHIL NFR BLD: 3.7 % (ref 0–8)
ERYTHROCYTE [DISTWIDTH] IN BLOOD BY AUTOMATED COUNT: 14.7 % (ref 11.5–14.5)
EST. GFR  (AFRICAN AMERICAN): >60 ML/MIN/1.73 M^2
EST. GFR  (NON AFRICAN AMERICAN): >60 ML/MIN/1.73 M^2
GLUCOSE SERPL-MCNC: 100 MG/DL (ref 70–110)
HCT VFR BLD AUTO: 37.6 % (ref 37–48.5)
HGB BLD-MCNC: 11.5 G/DL (ref 12–16)
IMM GRANULOCYTES # BLD AUTO: 0.02 K/UL (ref 0–0.04)
IMM GRANULOCYTES NFR BLD AUTO: 0.4 % (ref 0–0.5)
LYMPHOCYTES # BLD AUTO: 1.2 K/UL (ref 1–4.8)
LYMPHOCYTES NFR BLD: 23.7 % (ref 18–48)
MAGNESIUM SERPL-MCNC: 2.2 MG/DL (ref 1.6–2.6)
MCH RBC QN AUTO: 26.2 PG (ref 27–31)
MCHC RBC AUTO-ENTMCNC: 30.6 G/DL (ref 32–36)
MCV RBC AUTO: 86 FL (ref 82–98)
MONOCYTES # BLD AUTO: 0.4 K/UL (ref 0.3–1)
MONOCYTES NFR BLD: 8.1 % (ref 4–15)
NEUTROPHILS # BLD AUTO: 3.3 K/UL (ref 1.8–7.7)
NEUTROPHILS NFR BLD: 63.7 % (ref 38–73)
NRBC BLD-RTO: 0 /100 WBC
PLATELET # BLD AUTO: 161 K/UL (ref 150–450)
PMV BLD AUTO: 10.3 FL (ref 9.2–12.9)
POTASSIUM SERPL-SCNC: 4.1 MMOL/L (ref 3.5–5.1)
RBC # BLD AUTO: 4.39 M/UL (ref 4–5.4)
SODIUM SERPL-SCNC: 138 MMOL/L (ref 136–145)
WBC # BLD AUTO: 5.2 K/UL (ref 3.9–12.7)

## 2022-07-02 PROCEDURE — 25000003 PHARM REV CODE 250: Performed by: STUDENT IN AN ORGANIZED HEALTH CARE EDUCATION/TRAINING PROGRAM

## 2022-07-02 PROCEDURE — 80048 BASIC METABOLIC PNL TOTAL CA: CPT | Performed by: FAMILY MEDICINE

## 2022-07-02 PROCEDURE — 25000003 PHARM REV CODE 250: Performed by: INTERNAL MEDICINE

## 2022-07-02 PROCEDURE — 25000003 PHARM REV CODE 250: Performed by: FAMILY MEDICINE

## 2022-07-02 PROCEDURE — 99900035 HC TECH TIME PER 15 MIN (STAT)

## 2022-07-02 PROCEDURE — G0378 HOSPITAL OBSERVATION PER HR: HCPCS

## 2022-07-02 PROCEDURE — 83735 ASSAY OF MAGNESIUM: CPT | Performed by: FAMILY MEDICINE

## 2022-07-02 PROCEDURE — 36415 COLL VENOUS BLD VENIPUNCTURE: CPT | Performed by: FAMILY MEDICINE

## 2022-07-02 PROCEDURE — 94761 N-INVAS EAR/PLS OXIMETRY MLT: CPT

## 2022-07-02 PROCEDURE — 20600001 HC STEP DOWN PRIVATE ROOM

## 2022-07-02 PROCEDURE — 85025 COMPLETE CBC W/AUTO DIFF WBC: CPT | Performed by: FAMILY MEDICINE

## 2022-07-02 PROCEDURE — 99900031 HC PATIENT EDUCATION (STAT)

## 2022-07-02 RX ORDER — LEVOTHYROXINE SODIUM 50 UG/1
50 TABLET ORAL NIGHTLY
Status: DISCONTINUED | OUTPATIENT
Start: 2022-07-02 | End: 2022-07-04 | Stop reason: HOSPADM

## 2022-07-02 RX ORDER — ATENOLOL 25 MG/1
25 TABLET ORAL DAILY
Status: DISCONTINUED | OUTPATIENT
Start: 2022-07-03 | End: 2022-07-03

## 2022-07-02 RX ORDER — LOSARTAN POTASSIUM 50 MG/1
50 TABLET ORAL DAILY
Status: DISCONTINUED | OUTPATIENT
Start: 2022-07-03 | End: 2022-07-04

## 2022-07-02 RX ORDER — ACETAMINOPHEN 325 MG/1
650 TABLET ORAL EVERY 6 HOURS PRN
Status: DISCONTINUED | OUTPATIENT
Start: 2022-07-02 | End: 2022-07-04 | Stop reason: HOSPADM

## 2022-07-02 RX ORDER — ATORVASTATIN CALCIUM 10 MG/1
10 TABLET, FILM COATED ORAL NIGHTLY
Status: DISCONTINUED | OUTPATIENT
Start: 2022-07-02 | End: 2022-07-04 | Stop reason: HOSPADM

## 2022-07-02 RX ORDER — SODIUM CHLORIDE 0.9 % (FLUSH) 0.9 %
10 SYRINGE (ML) INJECTION
Status: DISCONTINUED | OUTPATIENT
Start: 2022-07-02 | End: 2022-07-04 | Stop reason: HOSPADM

## 2022-07-02 RX ADMIN — NITROGLYCERIN 0.5 INCH: 20 OINTMENT TOPICAL at 12:07

## 2022-07-02 RX ADMIN — ATORVASTATIN CALCIUM 10 MG: 10 TABLET, FILM COATED ORAL at 10:07

## 2022-07-02 RX ADMIN — ACETAMINOPHEN 650 MG: 325 TABLET ORAL at 03:07

## 2022-07-02 RX ADMIN — LEVOTHYROXINE SODIUM 50 MCG: 50 TABLET ORAL at 10:07

## 2022-07-02 RX ADMIN — ACETAMINOPHEN 650 MG: 325 TABLET ORAL at 10:07

## 2022-07-02 RX ADMIN — NITROGLYCERIN 0.5 INCH: 20 OINTMENT TOPICAL at 06:07

## 2022-07-02 NOTE — PLAN OF CARE
Problem: Adult Inpatient Plan of Care  Goal: Plan of Care Review  7/2/2022 1321 by Apolonia Sabillon RN  Outcome: Ongoing, Progressing  7/2/2022 1112 by Apolonia Sabillon RN  Outcome: Ongoing, Progressing  Goal: Patient-Specific Goal (Individualized)  7/2/2022 1321 by Apolonia Sabillon RN  Outcome: Ongoing, Progressing  7/2/2022 1112 by Apolonia Sabillon RN  Outcome: Ongoing, Progressing  Goal: Absence of Hospital-Acquired Illness or Injury  7/2/2022 1321 by Apolonia Sabillon RN  Outcome: Ongoing, Progressing  7/2/2022 1112 by Apolonia Sabillon RN  Outcome: Ongoing, Progressing  Goal: Optimal Comfort and Wellbeing  7/2/2022 1321 by Apolonia Sabillon RN  Outcome: Ongoing, Progressing  7/2/2022 1112 by Apolonia Sabillon RN  Outcome: Ongoing, Progressing  Goal: Readiness for Transition of Care  7/2/2022 1321 by Apolonia Sabillon RN  Outcome: Ongoing, Progressing  7/2/2022 1112 by Apolonia Sabillon RN  Outcome: Ongoing, Progressing

## 2022-07-02 NOTE — PROGRESS NOTES
Report called to Aarti FLEMING at [facility]. Patient to transfer to room 314 over there.

## 2022-07-02 NOTE — LETTER
July 4, 2022               [facility]  [address]  [place]  [address]  Phone: [phone]  Fax: [phone] July 4, 2022     Patient: Brunilda Bob   YOB: 1965       To Whom It May Concern:    Brunilda Bob was admitted to the hospital on 6/30/22 and discharged on 7/4/2022 .  She may return to work on 7/11/2022.  If you have any questions or concerns, please don't hesitate to contact me.      Sincerely,        Mmii Odom DO  Department of Hospital Medicine

## 2022-07-02 NOTE — CARE UPDATE
07/02/22 0941   Patient Assessment/Suction   Level of Consciousness (AVPU) alert   All Lung Fields Breath Sounds clear   PRE-TX-O2   O2 Device (Oxygen Therapy) room air   SpO2 96 %   Pulse Oximetry Type Intermittent   $ Pulse Oximetry - Multiple Charge Pulse Oximetry - Multiple   Pulse (!) 58   Resp 15   Aerosol Therapy   $ Aerosol Therapy Charges PRN treatment not required   Education   $ Education Bronchodilator;15 min   Respiratory Evaluation   $ Care Plan Tech Time 15 min

## 2022-07-02 NOTE — PROGRESS NOTES
I called Dr. Callum gusman yesterday while he was in surgery at [facility] to ask him about his consult on this patient. An assistant of his, Nagi called me back and I asked him when he was going to be able to see this patient. He told me he was very busy with cases at [facility] and [place] today and if he would see her it would be late. The consult was then withdrawn and a transfer to [facility] was put in. [facility] does not have a bed available so Dr. Osborn put the consult to Dr. Callum gusman back in. This patient was not seen. [facility]. They still do not have a bed available for this patient. I asked the PT flow to please escalate this case as I believe that this patient needs a bed at this facility for surgery. They told me they would. Dr. Callum gusman just called me because I called him again to see if he was going to be able to see this patient and take her for surgery. He was pretty firm in the fact that he was very busy today and this weekend and he is all by himself and he as atleast 4 cases at [facility] today. He said he may not get to [facility] at all today and he may not be able to take her to surgery until Monday maybe Tuesday.

## 2022-07-02 NOTE — SUBJECTIVE & OBJECTIVE
Interval History:     Review of Systems   Constitutional:  Negative for activity change and appetite change.   HENT:  Negative for congestion and dental problem.    Eyes:  Negative for discharge and itching.   Respiratory:  Negative for shortness of breath.    Cardiovascular:  Positive for chest pain.   Gastrointestinal:  Negative for abdominal distention and abdominal pain.   Endocrine: Negative for cold intolerance.   Genitourinary:  Negative for difficulty urinating and dysuria.   Musculoskeletal:  Negative for arthralgias and back pain.   Skin:  Negative for color change.   Neurological:  Negative for dizziness and facial asymmetry.   Hematological:  Negative for adenopathy.   Psychiatric/Behavioral:  Negative for agitation and behavioral problems.    Objective:     Vital Signs (Most Recent):  Temp: 98.7 °F (37.1 °C) (07/01/22 1516)  Pulse: (!) 59 (07/01/22 1516)  Resp: 18 (07/01/22 1516)  BP: (!) 142/87 (07/01/22 1516)  SpO2: 99 % (07/01/22 1516)   Vital Signs (24h Range):  Temp:  [97.6 °F (36.4 °C)-98.7 °F (37.1 °C)] 98.7 °F (37.1 °C)  Pulse:  [55-63] 59  Resp:  [17-18] 18  SpO2:  [95 %-99 %] 99 %  BP: (123-168)/(66-88) 142/87     Weight: 82.6 kg (182 lb 3.2 oz)  Body mass index is 35.58 kg/m².    Intake/Output Summary (Last 24 hours) at 7/1/2022 1936  Last data filed at 7/1/2022 1605  Gross per 24 hour   Intake 120 ml   Output 1000 ml   Net -880 ml      Physical Exam  Vitals and nursing note reviewed.   Constitutional:       General: She is not in acute distress.  HENT:      Head: Atraumatic.      Right Ear: External ear normal.      Left Ear: External ear normal.      Nose: Nose normal.      Mouth/Throat:      Mouth: Mucous membranes are moist.   Eyes:      General: No scleral icterus.  Cardiovascular:      Rate and Rhythm: Normal rate.   Pulmonary:      Effort: Pulmonary effort is normal.   Abdominal:      General: There is no distension.   Musculoskeletal:         General: Normal range of motion.       Cervical back: Normal range of motion.   Skin:     General: Skin is warm.   Neurological:      Mental Status: She is alert and oriented to person, place, and time.   Psychiatric:         Behavior: Behavior normal.       Significant Labs: All pertinent labs within the past 24 hours have been reviewed.  CBC:   Recent Labs   Lab 06/30/22 1835 07/01/22  0255   WBC 6.08 5.43   HGB 11.6* 11.5*   HCT 37.4 37.1    178     CMP:   Recent Labs   Lab 06/30/22 1835 07/01/22  0255    139   K 3.9 3.6    103   CO2 29 29    121*   BUN 22* 22*   CREATININE 0.8 0.8   CALCIUM 9.0 8.7   PROT 7.0  --    ALBUMIN 3.9  --    BILITOT 0.3  --    ALKPHOS 95  --    AST 24  --    ALT 24  --    ANIONGAP 7* 7*   EGFRNONAA >60.0 >60.0       Significant Imaging: I have reviewed all pertinent imaging results/findings within the past 24 hours.

## 2022-07-02 NOTE — HOSPITAL COURSE
Patient got admitted with Pseudoaneurysm of left ventricle of the heart and associated chest pain  [place] [facility] care

## 2022-07-02 NOTE — PROGRESS NOTES
[facility]  Progress Note    Patient Name: Brunilda Bob  MRN: [id number]  Patient Class: OP- Observation   Admission Date: 6/30/2022  Length of Stay: 0 days  Attending Physician: Oumar Aranda MD  Primary Care Provider: Sadiq Titus MD        Subjective:     Principal Problem:Pseudoaneurysm of left ventricle of heart        HPI:  No notes on file    Overview/Hospital Course:  07/01  Pt awaiting transfer to [facility]  CTS accepted pt   Bed not available  She denies any issues except mild CP      Interval History:     Review of Systems   Constitutional:  Negative for activity change and appetite change.   HENT:  Negative for congestion and dental problem.    Eyes:  Negative for discharge and itching.   Respiratory:  Negative for shortness of breath.    Cardiovascular:  Positive for chest pain.   Gastrointestinal:  Negative for abdominal distention and abdominal pain.   Endocrine: Negative for cold intolerance.   Genitourinary:  Negative for difficulty urinating and dysuria.   Musculoskeletal:  Negative for arthralgias and back pain.   Skin:  Negative for color change.   Neurological:  Negative for dizziness and facial asymmetry.   Hematological:  Negative for adenopathy.   Psychiatric/Behavioral:  Negative for agitation and behavioral problems.    Objective:     Vital Signs (Most Recent):  Temp: 98.7 °F (37.1 °C) (07/01/22 1516)  Pulse: (!) 59 (07/01/22 1516)  Resp: 18 (07/01/22 1516)  BP: (!) 142/87 (07/01/22 1516)  SpO2: 99 % (07/01/22 1516)   Vital Signs (24h Range):  Temp:  [97.6 °F (36.4 °C)-98.7 °F (37.1 °C)] 98.7 °F (37.1 °C)  Pulse:  [55-63] 59  Resp:  [17-18] 18  SpO2:  [95 %-99 %] 99 %  BP: (123-168)/(66-88) 142/87     Weight: 82.6 kg (182 lb 3.2 oz)  Body mass index is 35.58 kg/m².    Intake/Output Summary (Last 24 hours) at 7/1/2022 1936  Last data filed at 7/1/2022 1605  Gross per 24 hour   Intake 120 ml   Output 1000 ml   Net -880 ml      Physical Exam  Vitals and  nursing note reviewed.   Constitutional:       General: She is not in acute distress.  HENT:      Head: Atraumatic.      Right Ear: External ear normal.      Left Ear: External ear normal.      Nose: Nose normal.      Mouth/Throat:      Mouth: Mucous membranes are moist.   Eyes:      General: No scleral icterus.  Cardiovascular:      Rate and Rhythm: Normal rate.   Pulmonary:      Effort: Pulmonary effort is normal.   Abdominal:      General: There is no distension.   Musculoskeletal:         General: Normal range of motion.      Cervical back: Normal range of motion.   Skin:     General: Skin is warm.   Neurological:      Mental Status: She is alert and oriented to person, place, and time.   Psychiatric:         Behavior: Behavior normal.       Significant Labs: All pertinent labs within the past 24 hours have been reviewed.  CBC:   Recent Labs   Lab 06/30/22 1835 07/01/22  0255   WBC 6.08 5.43   HGB 11.6* 11.5*   HCT 37.4 37.1    178     CMP:   Recent Labs   Lab 06/30/22 1835 07/01/22  0255    139   K 3.9 3.6    103   CO2 29 29    121*   BUN 22* 22*   CREATININE 0.8 0.8   CALCIUM 9.0 8.7   PROT 7.0  --    ALBUMIN 3.9  --    BILITOT 0.3  --    ALKPHOS 95  --    AST 24  --    ALT 24  --    ANIONGAP 7* 7*   EGFRNONAA >60.0 >60.0       Significant Imaging: I have reviewed all pertinent imaging results/findings within the past 24 hours.      Assessment/Plan:      * Pseudoaneurysm of left ventricle of heart  Awaiting transfer to [facility] under CTS team      Dyslipidemia  Stable       Thyroid dysfunction  Stable       Hypertension  Maintain present regime        VTE Risk Mitigation (From admission, onward)         Ordered     IP VTE HIGH RISK PATIENT  Once         06/30/22 2158     Place sequential compression device  Until discontinued         06/30/22 2158                Discharge Planning   LOI:      Code Status: Full Code   Is the patient medically ready for discharge?:     Reason for patient  still in hospital (select all that apply): Treatment  Discharge Plan A: Home with family                  Oumar Aranda MD  Department of Hospital Medicine   [facility]

## 2022-07-02 NOTE — PLAN OF CARE
Plan of care discussed with patient. Patient is free of fall/trauma/injury. Denies CP, SOB, or pain/discomfort. Currently NPO for possible test/ procedure as per CTS. All questions addressed. Will continue to monitor

## 2022-07-03 LAB
ANION GAP SERPL CALC-SCNC: 9 MMOL/L (ref 8–16)
ASCENDING AORTA: 3.19 CM
AV INDEX (PROSTH): 0.62
AV MEAN GRADIENT: 6 MMHG
AV PEAK GRADIENT: 10 MMHG
AV VALVE AREA: 1.82 CM2
AV VELOCITY RATIO: 0.69
BASOPHILS # BLD AUTO: 0.02 K/UL (ref 0–0.2)
BASOPHILS NFR BLD: 0.4 % (ref 0–1.9)
BSA FOR ECHO PROCEDURE: 1.86 M2
BUN SERPL-MCNC: 21 MG/DL (ref 6–20)
CALCIUM SERPL-MCNC: 9.4 MG/DL (ref 8.7–10.5)
CCP AB SER IA-ACNC: <0.5 U/ML
CHLORIDE SERPL-SCNC: 104 MMOL/L (ref 95–110)
CO2 SERPL-SCNC: 26 MMOL/L (ref 23–29)
CREAT SERPL-MCNC: 0.8 MG/DL (ref 0.5–1.4)
CRP SERPL-MCNC: 8.6 MG/L (ref 0–8.2)
CV ECHO LV RWT: 0.41 CM
DIFFERENTIAL METHOD: ABNORMAL
DOP CALC AO PEAK VEL: 1.58 M/S
DOP CALC AO VTI: 33.33 CM
DOP CALC LVOT AREA: 3 CM2
DOP CALC LVOT DIAMETER: 1.94 CM
DOP CALC LVOT PEAK VEL: 1.09 M/S
DOP CALC LVOT STROKE VOLUME: 60.8 CM3
DOP CALCLVOT PEAK VEL VTI: 20.58 CM
E WAVE DECELERATION TIME: 255.43 MSEC
E/A RATIO: 0.83
E/E' RATIO: 9.41 M/S
ECHO LV POSTERIOR WALL: 0.91 CM (ref 0.6–1.1)
EJECTION FRACTION: 65 %
EOSINOPHIL # BLD AUTO: 0.2 K/UL (ref 0–0.5)
EOSINOPHIL NFR BLD: 3 % (ref 0–8)
ERYTHROCYTE [DISTWIDTH] IN BLOOD BY AUTOMATED COUNT: 14.4 % (ref 11.5–14.5)
ERYTHROCYTE [SEDIMENTATION RATE] IN BLOOD BY PHOTOMETRIC METHOD: 38 MM/HR (ref 0–36)
EST. GFR  (AFRICAN AMERICAN): >60 ML/MIN/1.73 M^2
EST. GFR  (NON AFRICAN AMERICAN): >60 ML/MIN/1.73 M^2
FRACTIONAL SHORTENING: 37 % (ref 28–44)
GLUCOSE SERPL-MCNC: 99 MG/DL (ref 70–110)
HCT VFR BLD AUTO: 41 % (ref 37–48.5)
HGB BLD-MCNC: 12.5 G/DL (ref 12–16)
IMM GRANULOCYTES # BLD AUTO: 0.02 K/UL (ref 0–0.04)
IMM GRANULOCYTES NFR BLD AUTO: 0.4 % (ref 0–0.5)
INTERVENTRICULAR SEPTUM: 0.92 CM (ref 0.6–1.1)
IVRT: 119.89 MSEC
LA MAJOR: 5.78 CM
LA MINOR: 5.26 CM
LA WIDTH: 4.05 CM
LEFT ATRIUM SIZE: 3.57 CM
LEFT ATRIUM VOLUME INDEX MOD: 38 ML/M2
LEFT ATRIUM VOLUME INDEX: 37.8 ML/M2
LEFT ATRIUM VOLUME MOD: 68.02 CM3
LEFT ATRIUM VOLUME: 67.69 CM3
LEFT INTERNAL DIMENSION IN SYSTOLE: 2.83 CM (ref 2.1–4)
LEFT VENTRICLE DIASTOLIC VOLUME INDEX: 50.47 ML/M2
LEFT VENTRICLE DIASTOLIC VOLUME: 90.35 ML
LEFT VENTRICLE MASS INDEX: 75 G/M2
LEFT VENTRICLE SYSTOLIC VOLUME INDEX: 17 ML/M2
LEFT VENTRICLE SYSTOLIC VOLUME: 30.35 ML
LEFT VENTRICULAR INTERNAL DIMENSION IN DIASTOLE: 4.46 CM (ref 3.5–6)
LEFT VENTRICULAR MASS: 133.84 G
LV LATERAL E/E' RATIO: 8 M/S
LV SEPTAL E/E' RATIO: 11.43 M/S
LYMPHOCYTES # BLD AUTO: 1 K/UL (ref 1–4.8)
LYMPHOCYTES NFR BLD: 21.1 % (ref 18–48)
MAGNESIUM SERPL-MCNC: 2.2 MG/DL (ref 1.6–2.6)
MCH RBC QN AUTO: 27.1 PG (ref 27–31)
MCHC RBC AUTO-ENTMCNC: 30.5 G/DL (ref 32–36)
MCV RBC AUTO: 89 FL (ref 82–98)
MONOCYTES # BLD AUTO: 0.4 K/UL (ref 0.3–1)
MONOCYTES NFR BLD: 8.1 % (ref 4–15)
MV PEAK A VEL: 0.96 M/S
MV PEAK E VEL: 0.8 M/S
MV STENOSIS PRESSURE HALF TIME: 74.07 MS
MV VALVE AREA P 1/2 METHOD: 2.97 CM2
NEUTROPHILS # BLD AUTO: 3.3 K/UL (ref 1.8–7.7)
NEUTROPHILS NFR BLD: 67 % (ref 38–73)
NRBC BLD-RTO: 0 /100 WBC
PHOSPHATE SERPL-MCNC: 3.9 MG/DL (ref 2.7–4.5)
PLATELET # BLD AUTO: 179 K/UL (ref 150–450)
PMV BLD AUTO: 10.8 FL (ref 9.2–12.9)
POCT GLUCOSE: 80 MG/DL (ref 70–110)
POTASSIUM SERPL-SCNC: 4.7 MMOL/L (ref 3.5–5.1)
RA MAJOR: 4.18 CM
RA PRESSURE: 3 MMHG
RA WIDTH: 3.03 CM
RBC # BLD AUTO: 4.61 M/UL (ref 4–5.4)
RIGHT VENTRICULAR END-DIASTOLIC DIMENSION: 3.1 CM
RV TISSUE DOPPLER FREE WALL SYSTOLIC VELOCITY 1 (APICAL 4 CHAMBER VIEW): 9.15 CM/S
SINUS: 3.04 CM
SODIUM SERPL-SCNC: 139 MMOL/L (ref 136–145)
STJ: 2.56 CM
TDI LATERAL: 0.1 M/S
TDI SEPTAL: 0.07 M/S
TDI: 0.09 M/S
TRICUSPID ANNULAR PLANE SYSTOLIC EXCURSION: 2.04 CM
WBC # BLD AUTO: 4.94 K/UL (ref 3.9–12.7)

## 2022-07-03 PROCEDURE — 83735 ASSAY OF MAGNESIUM: CPT | Performed by: STUDENT IN AN ORGANIZED HEALTH CARE EDUCATION/TRAINING PROGRAM

## 2022-07-03 PROCEDURE — 86225 DNA ANTIBODY NATIVE: CPT

## 2022-07-03 PROCEDURE — 25500020 PHARM REV CODE 255: Performed by: INTERNAL MEDICINE

## 2022-07-03 PROCEDURE — 25000003 PHARM REV CODE 250: Performed by: STUDENT IN AN ORGANIZED HEALTH CARE EDUCATION/TRAINING PROGRAM

## 2022-07-03 PROCEDURE — 85025 COMPLETE CBC W/AUTO DIFF WBC: CPT | Performed by: STUDENT IN AN ORGANIZED HEALTH CARE EDUCATION/TRAINING PROGRAM

## 2022-07-03 PROCEDURE — 86235 NUCLEAR ANTIGEN ANTIBODY: CPT

## 2022-07-03 PROCEDURE — 80048 BASIC METABOLIC PNL TOTAL CA: CPT | Performed by: STUDENT IN AN ORGANIZED HEALTH CARE EDUCATION/TRAINING PROGRAM

## 2022-07-03 PROCEDURE — 84100 ASSAY OF PHOSPHORUS: CPT | Performed by: STUDENT IN AN ORGANIZED HEALTH CARE EDUCATION/TRAINING PROGRAM

## 2022-07-03 PROCEDURE — 85652 RBC SED RATE AUTOMATED: CPT

## 2022-07-03 PROCEDURE — 36415 COLL VENOUS BLD VENIPUNCTURE: CPT | Performed by: STUDENT IN AN ORGANIZED HEALTH CARE EDUCATION/TRAINING PROGRAM

## 2022-07-03 PROCEDURE — 86038 ANTINUCLEAR ANTIBODIES: CPT

## 2022-07-03 PROCEDURE — 86200 CCP ANTIBODY: CPT

## 2022-07-03 PROCEDURE — 86256 FLUORESCENT ANTIBODY TITER: CPT

## 2022-07-03 PROCEDURE — 99223 PR INITIAL HOSPITAL CARE,LEVL III: ICD-10-PCS | Mod: ,,, | Performed by: INTERNAL MEDICINE

## 2022-07-03 PROCEDURE — 36415 COLL VENOUS BLD VENIPUNCTURE: CPT

## 2022-07-03 PROCEDURE — 20600001 HC STEP DOWN PRIVATE ROOM

## 2022-07-03 PROCEDURE — 99223 1ST HOSP IP/OBS HIGH 75: CPT | Mod: ,,, | Performed by: INTERNAL MEDICINE

## 2022-07-03 PROCEDURE — 86140 C-REACTIVE PROTEIN: CPT

## 2022-07-03 RX ORDER — TALC
6 POWDER (GRAM) TOPICAL NIGHTLY PRN
Status: DISCONTINUED | OUTPATIENT
Start: 2022-07-03 | End: 2022-07-04 | Stop reason: HOSPADM

## 2022-07-03 RX ORDER — CHLORTHALIDONE 25 MG/1
25 TABLET ORAL DAILY
Status: DISCONTINUED | OUTPATIENT
Start: 2022-07-03 | End: 2022-07-04 | Stop reason: HOSPADM

## 2022-07-03 RX ORDER — ONDANSETRON 4 MG/1
4 TABLET, FILM COATED ORAL EVERY 8 HOURS PRN
Status: DISCONTINUED | OUTPATIENT
Start: 2022-07-03 | End: 2022-07-04 | Stop reason: HOSPADM

## 2022-07-03 RX ADMIN — ATORVASTATIN CALCIUM 10 MG: 10 TABLET, FILM COATED ORAL at 08:07

## 2022-07-03 RX ADMIN — CHLORTHALIDONE 25 MG: 25 TABLET ORAL at 02:07

## 2022-07-03 RX ADMIN — LEVOTHYROXINE SODIUM 50 MCG: 50 TABLET ORAL at 08:07

## 2022-07-03 RX ADMIN — ATENOLOL 25 MG: 25 TABLET ORAL at 09:07

## 2022-07-03 RX ADMIN — HUMAN ALBUMIN MICROSPHERES AND PERFLUTREN 0.11 MG: 10; .22 INJECTION, SOLUTION INTRAVENOUS at 01:07

## 2022-07-03 RX ADMIN — LOSARTAN POTASSIUM 50 MG: 50 TABLET, FILM COATED ORAL at 09:07

## 2022-07-03 RX ADMIN — ACETAMINOPHEN 650 MG: 325 TABLET ORAL at 08:07

## 2022-07-03 NOTE — ASSESSMENT & PLAN NOTE
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 Pt with no CAD and EF preserved thus not a surgical candidate at this time   -Will repeat TTE at [facility]   -Will manage pt medically at this time and monitor for any arrhythmias on tele   -Pt will likely need future TTE or cardiac imaging

## 2022-07-03 NOTE — PLAN OF CARE
Pt maintained free from falls/trauma/injuries and skin breakdown. Pt c/o moderate HA, PRN tylenol given and helped. Plan of care reviewed. Pt verbalized understanding. All questions and concerns addressed. Will continue to monitor.

## 2022-07-03 NOTE — H&P
[facility] - Cardiology Stepdown  Cardiology  History and Physical     Patient Name: Brunilda Bob  MRN: [id number]  Admission Date: 7/2/2022  Code Status: Full Code   Attending Provider: Farhad Choi MD   Primary Care Physician: Sadiq Titus MD  Principal Problem:<principal problem not specified>    Patient information was obtained from patient and ER records.     Subjective:     Chief Complaint:  Chest pain      HPI:  Brunilda Bob is a 56 y.o. female who was instructed by her cardiologist Dr GRISELDA Osborn to go to [facility] ER given pt having an ECHO performed that was concerning for a LV aneurysm vs pseudoaneurysm. Pt was reporting intermittent episodes of Chest pain for the last several months that was on the left side and went to left shoulder. Cardiac CT was also concerning for LV pseudoaneurysm     Pt denied SOB, dizziness, or any other issues at this time. CT surgery had accepted pt but deemed she was not a surgical candidate. Pt was HDS on my evaluation and she was denying chest pain, SOB, or any other issues. Pt reports she is very active and is able to walk nearly a mile with no issue. She denies any past cardiac history. She had a negative SPECT in 04/2022 7/1/22 TTE   · The left ventricle is normal in size with normal systolic function.  · Apical aneurysm with segmental thinning of the apical septum, apex and apical anterior wall is noted  · The estimated ejection fraction is 64%.  · Normal left ventricular diastolic function.  · There are segmental left ventricular wall motion abnormalities.  · Normal right ventricular size with normal right ventricular systolic function.  · Normal central venous pressure (3 mmHg).  · The estimated PA systolic pressure is 29 mmHg.    6/30/22 CTA Cardiac    1. Patient's coronary artery calcium score is 0.  This places the patient between 0 and 25th percentile for females between ages of 50 and 59.  This implies very low,  generally less than 5%, risk of coronary artery disease.  2. Tiny focus of calcified plaque in proximal LAD, without LAD stenosis.  3. Negative for coronary artery atherosclerotic plaque or stenosis in left main, left circumflex, and right coronary arteries.  4. 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        Past Medical History:   Diagnosis Date    Broken heart syndrome     Hypertension        Past Surgical History:   Procedure Laterality Date    CLAVICLE SURGERY  2012    TONSILLECTOMY         Review of patient's allergies indicates:   Allergen Reactions    Crestor [rosuvastatin] Other (See Comments)     Paralysis    Codeine Nausea And Vomiting    Strawberries [strawberry] Hives    Adhesive tape-silicones Rash       Current Facility-Administered Medications on File Prior to Encounter   Medication    [DISCONTINUED] acetaminophen tablet 650 mg    [DISCONTINUED] albuterol-ipratropium 2.5 mg-0.5 mg/3 mL nebulizer solution 3 mL    [DISCONTINUED] atenoloL tablet 25 mg    [DISCONTINUED] atorvastatin tablet 10 mg    [DISCONTINUED] calcium chloride 1 g in dextrose 5 % 100 mL IVPB    [DISCONTINUED] calcium chloride 1 g in dextrose 5 % 100 mL IVPB    [DISCONTINUED] calcium chloride 1 g in dextrose 5 % 100 mL IVPB    [DISCONTINUED] dextrose 50% injection 12.5 g    [DISCONTINUED] dextrose 50% injection 25 g    [DISCONTINUED] glucagon (human recombinant) injection 1 mg    [DISCONTINUED] glucose chewable tablet 16 g    [DISCONTINUED] glucose chewable tablet 24 g    [DISCONTINUED] HYDROcodone-acetaminophen 5-325 mg per tablet 1 tablet    [DISCONTINUED] ibuprofen tablet 800 mg    [DISCONTINUED] levothyroxine tablet 50 mcg    [DISCONTINUED] losartan tablet 50 mg    [DISCONTINUED] magnesium oxide tablet 800 mg    [DISCONTINUED] magnesium sulfate 2g in water 50mL IVPB (premix)    [DISCONTINUED] magnesium sulfate 2g in water 50mL IVPB (premix)    [DISCONTINUED] magnesium sulfate 2g in  water 50mL IVPB (premix)    [DISCONTINUED] magnesium sulfate in dextrose IVPB (premix) 1 g    [DISCONTINUED] melatonin tablet 6 mg    [DISCONTINUED] morphine injection 4 mg    [DISCONTINUED] naloxone 0.4 mg/mL injection 0.02 mg    [DISCONTINUED] nitroGLYCERIN 2% TD oint ointment 0.5 inch    [DISCONTINUED] ondansetron injection 4 mg    [DISCONTINUED] pantoprazole EC tablet 40 mg    [DISCONTINUED] polyethylene glycol packet 17 g    [DISCONTINUED] potassium chloride SA CR tablet 20 mEq    [DISCONTINUED] potassium chloride SA CR tablet 20 mEq    [DISCONTINUED] potassium chloride SA CR tablet 40 mEq    [DISCONTINUED] potassium chloride SA CR tablet 40 mEq    [DISCONTINUED] senna-docusate 8.6-50 mg per tablet 1 tablet    [DISCONTINUED] simethicone chewable tablet 80 mg    [DISCONTINUED] sodium chloride 0.9% flush 10 mL    [DISCONTINUED] sodium phosphate 15 mmol in dextrose 5 % 250 mL IVPB    [DISCONTINUED] sodium phosphate 15 mmol in dextrose 5 % 250 mL IVPB    [DISCONTINUED] sodium phosphate 20.01 mmol in dextrose 5 % 250 mL IVPB    [DISCONTINUED] sodium phosphate 20.01 mmol in dextrose 5 % 250 mL IVPB    [DISCONTINUED] sodium phosphate 30 mmol in dextrose 5 % 250 mL IVPB     Current Outpatient Medications on File Prior to Encounter   Medication Sig    atenoloL (TENORMIN) 25 MG tablet TAKE ONE TABLET BY MOUTH ONCE DAILY (Patient taking differently: Take 25 mg by mouth every evening.)    atorvastatin (LIPITOR) 10 MG tablet Take 10 mg by mouth every evening.    iron carb,gl/FA/B12/C/docusate (FERRALET 90 DUAL-IRON DELIVERY ORAL) Take 90 mg by mouth 4 (four) times a week.     Lactobacillus rhamnosus GG (CULTURELLE) 10 billion cell capsule Take 1 capsule by mouth every evening.    levothyroxine (SYNTHROID) 50 MCG tablet Take 50 mcg by mouth every evening.    loratadine (CLARITIN) 10 mg tablet Take 10 mg by mouth every evening.    losartan (COZAAR) 50 MG tablet Take 1 tablet (50 mg total) by mouth  once daily. (Patient taking differently: Take 50 mg by mouth every evening.)    magnesium oxide (MAG-OX) 400 mg (241.3 mg magnesium) tablet Take 400 mg by mouth every evening.    multivitamin (THERAGRAN) per tablet Take 1 tablet by mouth every evening.    pantoprazole (PROTONIX) 40 MG tablet TAKE ONE TABLET BY MOUTH ONCE DAILY (Patient taking differently: Take 40 mg by mouth every evening.)     Family History       Problem Relation (Age of Onset)    Colon cancer Mother    Hypertension Father    Testicular cancer Son    Uterine cancer Mother          Tobacco Use    Smoking status: Never Smoker    Smokeless tobacco: Never Used   Substance and Sexual Activity    Alcohol use: Yes    Drug use: Never    Sexual activity: Yes     Partners: Male     Review of Systems   Constitutional: Negative for fever and malaise/fatigue.   Eyes:  Negative for blurred vision and pain.   Cardiovascular:  Negative for chest pain, dyspnea on exertion, leg swelling, orthopnea, palpitations and paroxysmal nocturnal dyspnea.   Respiratory:  Negative for cough, shortness of breath, sputum production and wheezing.    Hematologic/Lymphatic: Negative for adenopathy and bleeding problem.   Skin:  Negative for rash.   Musculoskeletal:  Negative for back pain and neck pain.   Gastrointestinal:  Negative for abdominal pain, constipation, diarrhea, nausea and vomiting.   Genitourinary:  Negative for dysuria.   Neurological:  Negative for dizziness, headaches, light-headedness and weakness.   Objective:     Vital Signs (Most Recent):  Temp: 97.3 °F (36.3 °C) (07/03/22 0357)  Pulse: (!) 56 (07/03/22 0548)  Resp: 18 (07/03/22 0357)  BP: (!) 115/57 (07/03/22 0357)  SpO2: 96 % (07/03/22 0357)   Vital Signs (24h Range):  Temp:  [96.6 °F (35.9 °C)-98.3 °F (36.8 °C)] 97.3 °F (36.3 °C)  Pulse:  [54-84] 56  Resp:  [15-20] 18  SpO2:  [95 %-100 %] 96 %  BP: (115-158)/(57-79) 115/57     Weight: 82.2 kg (181 lb 3.5 oz)  Body mass index is 35.39 kg/m².    SpO2:  96 %  O2 Device (Oxygen Therapy): room air      Intake/Output Summary (Last 24 hours) at 7/3/2022 0554  Last data filed at 7/2/2022 2100  Gross per 24 hour   Intake 360 ml   Output --   Net 360 ml       Lines/Drains/Airways       Peripheral Intravenous Line  Duration                  Peripheral IV - Single Lumen 06/30/22 1840 20 G Left Antecubital 2 days                    Physical Exam  Constitutional:       General: She is not in acute distress.     Appearance: Normal appearance. She is not ill-appearing, toxic-appearing or diaphoretic.   HENT:      Head: Normocephalic and atraumatic.      Nose: Nose normal.   Eyes:      Extraocular Movements: Extraocular movements intact.      Pupils: Pupils are equal, round, and reactive to light.   Cardiovascular:      Rate and Rhythm: Normal rate and regular rhythm.      Heart sounds: No murmur heard.    No friction rub. No gallop.   Pulmonary:      Effort: Pulmonary effort is normal. No respiratory distress.      Breath sounds: Normal breath sounds. No wheezing or rales.   Abdominal:      General: Abdomen is flat. There is no distension.      Palpations: Abdomen is soft. There is no mass.      Tenderness: There is no abdominal tenderness.   Musculoskeletal:         General: No swelling. Normal range of motion.      Cervical back: Normal range of motion. No rigidity.      Right lower leg: No edema.      Left lower leg: No edema.   Skin:     General: Skin is warm and dry.      Coloration: Skin is not jaundiced.      Findings: No bruising.   Neurological:      General: No focal deficit present.      Mental Status: She is alert and oriented to person, place, and time.      Cranial Nerves: No cranial nerve deficit.      Motor: No weakness.       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 Pt with no CAD and EF preserved thus not a surgical candidate at this time   -Will repeat TTE at [facility]   -Will manage pt medically at this time and monitor for any arrhythmias on tele   -Pt will likely need future TTE or cardiac imaging     Dyslipidemia  Continue home statin     Thyroid dysfunction  Continue home Levothyroxine     Hypertension  Will continue home medications         VTE Risk Mitigation (From admission, onward)         Ordered     Place sequential compression device  Until discontinued         07/02/22 1538     IP VTE LOW RISK PATIENT  Once         07/02/22 1538                Kee Jaimes MD  Cardiology   [facility] - Cardiology Stepdown

## 2022-07-03 NOTE — HPI
Brunilda Bob is a 56 y.o. female who was instructed by her cardiologist Dr GRISELDA Osborn to go to [facility] given pt having an ECHO performed that was concerning for a LV aneurysm vs pseudoaneurysm. Pt was reporting intermittent episodes of Chest pain for the last several months that was on the left side and went to left shoulder. Cardiac CT was also concerning for LV pseudoaneurysm     Pt denied SOB, dizziness, or any other issues at this time. CT surgery had accepted pt but deemed she was not a surgical candidate. Pt was HDS on my evaluation and she was denying chest pain, SOB, or any other issues. Pt reports she is very active and is able to walk nearly a mile with no issue. She denies any past cardiac history. She had a negative SPECT in 04/2022 7/1/22 TTE   · The left ventricle is normal in size with normal systolic function.  · Apical aneurysm with segmental thinning of the apical septum, apex and apical anterior wall is noted  · The estimated ejection fraction is 64%.  · Normal left ventricular diastolic function.  · There are segmental left ventricular wall motion abnormalities.  · Normal right ventricular size with normal right ventricular systolic function.  · Normal central venous pressure (3 mmHg).  · The estimated PA systolic pressure is 29 mmHg.    6/30/22 CTA Cardiac    1. Patient's coronary artery calcium score is 0.  This places the patient between 0 and 25th percentile for females between ages of 50 and 59.  This implies very low, generally less than 5%, risk of coronary artery disease.  2. Tiny focus of calcified plaque in proximal LAD, without LAD stenosis.  3. Negative for coronary artery atherosclerotic plaque or stenosis in left main, left circumflex, and right coronary arteries.  4. 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

## 2022-07-03 NOTE — PLAN OF CARE
CTS Update       I saw Ms. Vazquez at bedside today. She states that she had Takotsubo's 10 years ago and has been following yearly with nuclear scans. Has intermittent chest pain not associated with activity. Found to have new finding of LV aneurysm but does not report having an MI. Coronaries are clean, function appears normal. During her admission in [place] she has reported no chest pain. EKG is unchanged, no troponin leak. With normal coronaries and nornmal function and a neck of the aneurysm at 1.8cm, I do not believe this warrants a high risk operation. I think further serial imaging and reading of the CTA by our radiology team would be warranted. I do not see any outside blush on CTA, and it appears to be an isolated LV aneurysm to me. It does not appear that she has clot that has formed around the aneurysm and no history of MI. I discussed that surgery would be an aneurysmectomy, but the risk of mortality is around 10% from this surgery, and at this point waiting is very reasonable. She understands this. I will follow her as an inpatient here. If she develops coronary disease in the future, rapid expansion of this aneurysm, intractable arryhtmia then I think that would be a reason to offer surgery.    Farhad Choi  CV Surgery

## 2022-07-03 NOTE — SUBJECTIVE & OBJECTIVE
Past Medical History:   Diagnosis Date    Broken heart syndrome     Hypertension        Past Surgical History:   Procedure Laterality Date    CLAVICLE SURGERY  2012    TONSILLECTOMY         Review of patient's allergies indicates:   Allergen Reactions    Crestor [rosuvastatin] Other (See Comments)     Paralysis    Codeine Nausea And Vomiting    Strawberries [strawberry] Hives    Adhesive tape-silicones Rash       Current Facility-Administered Medications on File Prior to Encounter   Medication    [DISCONTINUED] acetaminophen tablet 650 mg    [DISCONTINUED] albuterol-ipratropium 2.5 mg-0.5 mg/3 mL nebulizer solution 3 mL    [DISCONTINUED] atenoloL tablet 25 mg    [DISCONTINUED] atorvastatin tablet 10 mg    [DISCONTINUED] calcium chloride 1 g in dextrose 5 % 100 mL IVPB    [DISCONTINUED] calcium chloride 1 g in dextrose 5 % 100 mL IVPB    [DISCONTINUED] calcium chloride 1 g in dextrose 5 % 100 mL IVPB    [DISCONTINUED] dextrose 50% injection 12.5 g    [DISCONTINUED] dextrose 50% injection 25 g    [DISCONTINUED] glucagon (human recombinant) injection 1 mg    [DISCONTINUED] glucose chewable tablet 16 g    [DISCONTINUED] glucose chewable tablet 24 g    [DISCONTINUED] HYDROcodone-acetaminophen 5-325 mg per tablet 1 tablet    [DISCONTINUED] ibuprofen tablet 800 mg    [DISCONTINUED] levothyroxine tablet 50 mcg    [DISCONTINUED] losartan tablet 50 mg    [DISCONTINUED] magnesium oxide tablet 800 mg    [DISCONTINUED] magnesium sulfate 2g in water 50mL IVPB (premix)    [DISCONTINUED] magnesium sulfate 2g in water 50mL IVPB (premix)    [DISCONTINUED] magnesium sulfate 2g in water 50mL IVPB (premix)    [DISCONTINUED] magnesium sulfate in dextrose IVPB (premix) 1 g    [DISCONTINUED] melatonin tablet 6 mg    [DISCONTINUED] morphine injection 4 mg    [DISCONTINUED] naloxone 0.4 mg/mL injection 0.02 mg    [DISCONTINUED] nitroGLYCERIN 2% TD oint ointment 0.5 inch    [DISCONTINUED] ondansetron injection 4 mg    [DISCONTINUED]  pantoprazole EC tablet 40 mg    [DISCONTINUED] polyethylene glycol packet 17 g    [DISCONTINUED] potassium chloride SA CR tablet 20 mEq    [DISCONTINUED] potassium chloride SA CR tablet 20 mEq    [DISCONTINUED] potassium chloride SA CR tablet 40 mEq    [DISCONTINUED] potassium chloride SA CR tablet 40 mEq    [DISCONTINUED] senna-docusate 8.6-50 mg per tablet 1 tablet    [DISCONTINUED] simethicone chewable tablet 80 mg    [DISCONTINUED] sodium chloride 0.9% flush 10 mL    [DISCONTINUED] sodium phosphate 15 mmol in dextrose 5 % 250 mL IVPB    [DISCONTINUED] sodium phosphate 15 mmol in dextrose 5 % 250 mL IVPB    [DISCONTINUED] sodium phosphate 20.01 mmol in dextrose 5 % 250 mL IVPB    [DISCONTINUED] sodium phosphate 20.01 mmol in dextrose 5 % 250 mL IVPB    [DISCONTINUED] sodium phosphate 30 mmol in dextrose 5 % 250 mL IVPB     Current Outpatient Medications on File Prior to Encounter   Medication Sig    atenoloL (TENORMIN) 25 MG tablet TAKE ONE TABLET BY MOUTH ONCE DAILY (Patient taking differently: Take 25 mg by mouth every evening.)    atorvastatin (LIPITOR) 10 MG tablet Take 10 mg by mouth every evening.    iron carb,gl/FA/B12/C/docusate (FERRALET 90 DUAL-IRON DELIVERY ORAL) Take 90 mg by mouth 4 (four) times a week.     Lactobacillus rhamnosus GG (CULTURELLE) 10 billion cell capsule Take 1 capsule by mouth every evening.    levothyroxine (SYNTHROID) 50 MCG tablet Take 50 mcg by mouth every evening.    loratadine (CLARITIN) 10 mg tablet Take 10 mg by mouth every evening.    losartan (COZAAR) 50 MG tablet Take 1 tablet (50 mg total) by mouth once daily. (Patient taking differently: Take 50 mg by mouth every evening.)    magnesium oxide (MAG-OX) 400 mg (241.3 mg magnesium) tablet Take 400 mg by mouth every evening.    multivitamin (THERAGRAN) per tablet Take 1 tablet by mouth every evening.    pantoprazole (PROTONIX) 40 MG tablet TAKE ONE TABLET BY MOUTH ONCE DAILY (Patient taking differently: Take 40 mg by mouth  every evening.)     Family History       Problem Relation (Age of Onset)    Colon cancer Mother    Hypertension Father    Testicular cancer Son    Uterine cancer Mother          Tobacco Use    Smoking status: Never Smoker    Smokeless tobacco: Never Used   Substance and Sexual Activity    Alcohol use: Yes    Drug use: Never    Sexual activity: Yes     Partners: Male     Review of Systems   Constitutional: Negative for fever and malaise/fatigue.   Eyes:  Negative for blurred vision and pain.   Cardiovascular:  Negative for chest pain, dyspnea on exertion, leg swelling, orthopnea, palpitations and paroxysmal nocturnal dyspnea.   Respiratory:  Negative for cough, shortness of breath, sputum production and wheezing.    Hematologic/Lymphatic: Negative for adenopathy and bleeding problem.   Skin:  Negative for rash.   Musculoskeletal:  Negative for back pain and neck pain.   Gastrointestinal:  Negative for abdominal pain, constipation, diarrhea, nausea and vomiting.   Genitourinary:  Negative for dysuria.   Neurological:  Negative for dizziness, headaches, light-headedness and weakness.   Objective:     Vital Signs (Most Recent):  Temp: 97.3 °F (36.3 °C) (07/03/22 0357)  Pulse: (!) 56 (07/03/22 0548)  Resp: 18 (07/03/22 0357)  BP: (!) 115/57 (07/03/22 0357)  SpO2: 96 % (07/03/22 0357)   Vital Signs (24h Range):  Temp:  [96.6 °F (35.9 °C)-98.3 °F (36.8 °C)] 97.3 °F (36.3 °C)  Pulse:  [54-84] 56  Resp:  [15-20] 18  SpO2:  [95 %-100 %] 96 %  BP: (115-158)/(57-79) 115/57     Weight: 82.2 kg (181 lb 3.5 oz)  Body mass index is 35.39 kg/m².    SpO2: 96 %  O2 Device (Oxygen Therapy): room air      Intake/Output Summary (Last 24 hours) at 7/3/2022 0554  Last data filed at 7/2/2022 2100  Gross per 24 hour   Intake 360 ml   Output --   Net 360 ml       Lines/Drains/Airways       Peripheral Intravenous Line  Duration                  Peripheral IV - Single Lumen 06/30/22 1840 20 G Left Antecubital 2 days                    Physical  Exam  Constitutional:       General: She is not in acute distress.     Appearance: Normal appearance. She is not ill-appearing, toxic-appearing or diaphoretic.   HENT:      Head: Normocephalic and atraumatic.      Nose: Nose normal.   Eyes:      Extraocular Movements: Extraocular movements intact.      Pupils: Pupils are equal, round, and reactive to light.   Cardiovascular:      Rate and Rhythm: Normal rate and regular rhythm.      Heart sounds: No murmur heard.    No friction rub. No gallop.   Pulmonary:      Effort: Pulmonary effort is normal. No respiratory distress.      Breath sounds: Normal breath sounds. No wheezing or rales.   Abdominal:      General: Abdomen is flat. There is no distension.      Palpations: Abdomen is soft. There is no mass.      Tenderness: There is no abdominal tenderness.   Musculoskeletal:         General: No swelling. Normal range of motion.      Cervical back: Normal range of motion. No rigidity.      Right lower leg: No edema.      Left lower leg: No edema.   Skin:     General: Skin is warm and dry.      Coloration: Skin is not jaundiced.      Findings: No bruising.   Neurological:      General: No focal deficit present.      Mental Status: She is alert and oriented to person, place, and time.      Cranial Nerves: No cranial nerve deficit.      Motor: No weakness.       Significant Labs: BMP:   Recent Labs   Lab 07/02/22  0309         K 4.1      CO2 28   BUN 23*   CREATININE 0.8   CALCIUM 8.7   MG 2.2    and CBC   Recent Labs   Lab 07/02/22  0309   WBC 5.20   HGB 11.5*   HCT 37.6          Significant Imaging: Reviewed

## 2022-07-03 NOTE — DISCHARGE SUMMARY
[facility] Medicine  Discharge Summary      Patient Name: Brunilda Bob  MRN: [id number]  Patient Class: OP- Observation  Admission Date: 6/30/2022  Hospital Length of Stay: 0 days  Discharge Date and Time:  07/02/2022 7:37 PM  Attending Physician: No att. 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 Go on 7/11/2022.    Specialties: Family Medicine, Home Health Services, Hospice Services  Why: PHV appt scheduled for 7/11 @ 1:40PM with Kleber LOW  Contact information:  [address]  [address]  [facility] [place] [address]  [phone]             Jay Osborn MD Follow up in 1 month(s).    Specialties: Interventional Cardiology, Cardiology  Contact information:  [address]  [address]  [facility]  [place]  [address]  [phone]                       Patient Instructions:   No discharge procedures on file.    Significant Diagnostic Studies: Labs:   CMP   Recent Labs   Lab 07/01/22  0255 07/02/22  0309    138   K 3.6 4.1    104   CO2 29 28   * 100   BUN 22* 23*   CREATININE 0.8 0.8   CALCIUM 8.7 8.7   ANIONGAP 7* 6*   ESTGFRAFRICA >60.0 >60.0   EGFRNONAA >60.0 >60.0    and CBC   Recent Labs   Lab 07/01/22 0255 07/02/22  0309   WBC 5.43 5.20   HGB 11.5* 11.5*   HCT 37.1 37.6    161       Pending Diagnostic Studies:     None         Medications:  Reconciled Home Medications:      Medication List      CHANGE how you take these medications    atenoloL 25 MG tablet  Commonly known as: TENORMIN  TAKE ONE TABLET BY MOUTH ONCE DAILY  What changed: when to take this     losartan 50 MG tablet  Commonly known as: COZAAR  Take 1 tablet (50 mg total) by mouth once daily.  What changed: when to take this     pantoprazole 40 MG tablet  Commonly known as: PROTONIX  TAKE ONE TABLET BY MOUTH ONCE DAILY  What changed: when to take this        CONTINUE taking these medications    atorvastatin 10 MG tablet  Commonly known as: LIPITOR  Take 10 mg by mouth every evening.     FERRALET 90 DUAL-IRON DELIVERY ORAL  Take 90 mg by mouth 4 (four) times a week.     Lactobacillus rhamnosus GG 10 billion cell capsule  Commonly known as: CULTURELLE  Take 1 capsule by mouth every evening.     levothyroxine 50 MCG tablet  Commonly known as: SYNTHROID  Take 50 mcg by mouth every evening.     loratadine 10 mg tablet  Commonly known as: CLARITIN  Take 10 mg by mouth every evening.     magnesium oxide 400 mg (241.3 mg magnesium) tablet  Commonly known as: MAG-OX  Take 400 mg by mouth every evening.     multivitamin per tablet  Commonly known as: THERAGRAN  Take 1 tablet by mouth every evening.        STOP taking these medications    acetaminophen 500 MG tablet  Commonly known as: TYLENOL     ascorbic acid (vitamin C) 500 MG  tablet  Commonly known as: VITAMIN C     ondansetron 8 MG tablet  Commonly known as: ZOFRAN     sars-cov-2 (covid-19) 100 mcg/0.5 ml injection  Commonly known as: MODERNA COVID-19     VITAMIN D2 ORAL            Indwelling Lines/Drains at time of discharge:   Lines/Drains/Airways     None               Physical Exam  Cardiovascular:      Rate and Rhythm: Normal rate.   Neurological:      Mental Status: She is alert and oriented to person, place, and time.       Time spent on the discharge of patient: 23 minutes         Oumar Aranda MD  Department of Hospital Medicine  [facility]

## 2022-07-04 VITALS
OXYGEN SATURATION: 97 % | HEART RATE: 62 BPM | WEIGHT: 176.13 LBS | DIASTOLIC BLOOD PRESSURE: 69 MMHG | RESPIRATION RATE: 17 BRPM | HEIGHT: 60 IN | BODY MASS INDEX: 34.58 KG/M2 | SYSTOLIC BLOOD PRESSURE: 137 MMHG | TEMPERATURE: 98 F

## 2022-07-04 LAB
ALBUMIN SERPL BCP-MCNC: 3.5 G/DL (ref 3.5–5.2)
ALP SERPL-CCNC: 116 U/L (ref 55–135)
ALT SERPL W/O P-5'-P-CCNC: 20 U/L (ref 10–44)
ANION GAP SERPL CALC-SCNC: 7 MMOL/L (ref 8–16)
AST SERPL-CCNC: 18 U/L (ref 10–40)
BASOPHILS # BLD AUTO: 0.03 K/UL (ref 0–0.2)
BASOPHILS NFR BLD: 0.6 % (ref 0–1.9)
BILIRUB SERPL-MCNC: 0.4 MG/DL (ref 0.1–1)
BUN SERPL-MCNC: 19 MG/DL (ref 6–20)
CALCIUM SERPL-MCNC: 9.4 MG/DL (ref 8.7–10.5)
CHLORIDE SERPL-SCNC: 105 MMOL/L (ref 95–110)
CO2 SERPL-SCNC: 28 MMOL/L (ref 23–29)
CREAT SERPL-MCNC: 0.8 MG/DL (ref 0.5–1.4)
DIFFERENTIAL METHOD: ABNORMAL
EOSINOPHIL # BLD AUTO: 0.2 K/UL (ref 0–0.5)
EOSINOPHIL NFR BLD: 3.7 % (ref 0–8)
ERYTHROCYTE [DISTWIDTH] IN BLOOD BY AUTOMATED COUNT: 14.4 % (ref 11.5–14.5)
EST. GFR  (AFRICAN AMERICAN): >60 ML/MIN/1.73 M^2
EST. GFR  (NON AFRICAN AMERICAN): >60 ML/MIN/1.73 M^2
GLUCOSE SERPL-MCNC: 117 MG/DL (ref 70–110)
HCT VFR BLD AUTO: 40.1 % (ref 37–48.5)
HGB BLD-MCNC: 12.5 G/DL (ref 12–16)
IMM GRANULOCYTES # BLD AUTO: 0.01 K/UL (ref 0–0.04)
IMM GRANULOCYTES NFR BLD AUTO: 0.2 % (ref 0–0.5)
LYMPHOCYTES # BLD AUTO: 1.3 K/UL (ref 1–4.8)
LYMPHOCYTES NFR BLD: 24.2 % (ref 18–48)
MAGNESIUM SERPL-MCNC: 2 MG/DL (ref 1.6–2.6)
MCH RBC QN AUTO: 26.6 PG (ref 27–31)
MCHC RBC AUTO-ENTMCNC: 31.2 G/DL (ref 32–36)
MCV RBC AUTO: 85 FL (ref 82–98)
MONOCYTES # BLD AUTO: 0.5 K/UL (ref 0.3–1)
MONOCYTES NFR BLD: 8.3 % (ref 4–15)
NEUTROPHILS # BLD AUTO: 3.4 K/UL (ref 1.8–7.7)
NEUTROPHILS NFR BLD: 63 % (ref 38–73)
NRBC BLD-RTO: 0 /100 WBC
PLATELET # BLD AUTO: 198 K/UL (ref 150–450)
PMV BLD AUTO: 10.5 FL (ref 9.2–12.9)
POTASSIUM SERPL-SCNC: 4.6 MMOL/L (ref 3.5–5.1)
PROT SERPL-MCNC: 6.3 G/DL (ref 6–8.4)
RBC # BLD AUTO: 4.7 M/UL (ref 4–5.4)
SODIUM SERPL-SCNC: 140 MMOL/L (ref 136–145)
WBC # BLD AUTO: 5.42 K/UL (ref 3.9–12.7)

## 2022-07-04 PROCEDURE — 36415 COLL VENOUS BLD VENIPUNCTURE: CPT | Performed by: STUDENT IN AN ORGANIZED HEALTH CARE EDUCATION/TRAINING PROGRAM

## 2022-07-04 PROCEDURE — 83735 ASSAY OF MAGNESIUM: CPT | Performed by: STUDENT IN AN ORGANIZED HEALTH CARE EDUCATION/TRAINING PROGRAM

## 2022-07-04 PROCEDURE — 99239 HOSP IP/OBS DSCHRG MGMT >30: CPT | Mod: ,,, | Performed by: INTERNAL MEDICINE

## 2022-07-04 PROCEDURE — 85025 COMPLETE CBC W/AUTO DIFF WBC: CPT | Performed by: STUDENT IN AN ORGANIZED HEALTH CARE EDUCATION/TRAINING PROGRAM

## 2022-07-04 PROCEDURE — 80053 COMPREHEN METABOLIC PANEL: CPT | Performed by: STUDENT IN AN ORGANIZED HEALTH CARE EDUCATION/TRAINING PROGRAM

## 2022-07-04 PROCEDURE — 99239 PR HOSPITAL DISCHARGE DAY,>30 MIN: ICD-10-PCS | Mod: ,,, | Performed by: INTERNAL MEDICINE

## 2022-07-04 PROCEDURE — 25000003 PHARM REV CODE 250: Performed by: STUDENT IN AN ORGANIZED HEALTH CARE EDUCATION/TRAINING PROGRAM

## 2022-07-04 RX ORDER — LOSARTAN POTASSIUM 100 MG/1
100 TABLET ORAL DAILY
Qty: 180 TABLET | Refills: 0 | Status: SHIPPED | OUTPATIENT
Start: 2022-07-04 | End: 2022-10-13 | Stop reason: SDUPTHER

## 2022-07-04 RX ORDER — ERGOCALCIFEROL 1.25 MG/1
50000 CAPSULE ORAL NIGHTLY
Status: ON HOLD
Start: 2022-07-04 | End: 2022-11-24 | Stop reason: HOSPADM

## 2022-07-04 RX ORDER — LOSARTAN POTASSIUM 50 MG/1
100 TABLET ORAL DAILY
Status: DISCONTINUED | OUTPATIENT
Start: 2022-07-04 | End: 2022-07-04 | Stop reason: HOSPADM

## 2022-07-04 RX ORDER — ACETAMINOPHEN 500 MG
1000 TABLET ORAL 3 TIMES DAILY
Status: ON HOLD
Start: 2022-07-04 | End: 2022-11-24 | Stop reason: HOSPADM

## 2022-07-04 RX ORDER — CHLORTHALIDONE 25 MG/1
25 TABLET ORAL DAILY
Qty: 90 TABLET | Refills: 0 | Status: SHIPPED | OUTPATIENT
Start: 2022-07-05 | End: 2022-09-26 | Stop reason: SDUPTHER

## 2022-07-04 RX ADMIN — CHLORTHALIDONE 25 MG: 25 TABLET ORAL at 08:07

## 2022-07-04 RX ADMIN — LOSARTAN POTASSIUM 100 MG: 50 TABLET, FILM COATED ORAL at 08:07

## 2022-07-04 NOTE — PLAN OF CARE
[facility] - Cardiology Stepdown  Discharge Final Note    Primary Care Provider: Sadiq Titus MD    Expected Discharge Date: 7/4/2022    Final Discharge Note (most recent)     Final Note - 07/04/22 1620        Final Note    Assessment Type Final Discharge Note     Anticipated Discharge Disposition Home or Self Care     Hospital Resources/Appts/Education Provided Provided patient/caregiver with written discharge plan information;Appointments scheduled by Navigator/Coordinator                 Important Message from Medicare              DEONNA Morgan, RN    [phone]

## 2022-07-04 NOTE — PLAN OF CARE
Problem: Adult Inpatient Plan of Care  Goal: Plan of Care Review  7/4/2022 1411 by Micheline Ocasio RN  Outcome: Met  7/4/2022 1058 by Micheline Ocasio RN  Outcome: Ongoing, Progressing  Goal: Patient-Specific Goal (Individualized)  7/4/2022 1411 by Micheline Ocasio RN  Outcome: Met  7/4/2022 1058 by Micheline Ocasio RN  Outcome: Ongoing, Progressing  Goal: Absence of Hospital-Acquired Illness or Injury  7/4/2022 1411 by Micheline Ocasio RN  Outcome: Met  7/4/2022 1058 by Micheline Ocasio RN  Outcome: Ongoing, Progressing  Goal: Optimal Comfort and Wellbeing  7/4/2022 1411 by Micheline Ocasio RN  Outcome: Met  7/4/2022 1058 by Micheline Ocasio RN  Outcome: Ongoing, Progressing  Goal: Readiness for Transition of Care  7/4/2022 1411 by Micheline Ocasio RN  Outcome: Met  7/4/2022 1058 by Micheline Ocasio RN  Outcome: Ongoing, Progressing
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
## 2022-07-04 NOTE — PLAN OF CARE
[facility] - Cardiology Stepdown  Discharge Reassessment    Primary Care Provider: Sadiq Titus MD    Expected Discharge Date: 7/4/2022    Reassessment (most recent)     Discharge Reassessment - 07/04/22 1149        Discharge Reassessment    Assessment Type Discharge Planning Reassessment     Did the patient's condition or plan change since previous assessment? Yes     Discharge Plan discussed with: Patient     Communicated LOI with patient/caregiver Yes     Discharge Plan A Home with family     Discharge Plan B Home     DME Needed Upon Discharge  none     Discharge Barriers Identified None                 BREN MorganN, RN   Case Management [phone]
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
## 2022-07-05 ENCOUNTER — PATIENT OUTREACH (OUTPATIENT)
Dept: FAMILY MEDICINE | Facility: CLINIC | Age: 57
End: 2022-07-05

## 2022-07-05 LAB
ANA SER QL IF: NORMAL
DSDNA AB SER-ACNC: NORMAL [IU]/ML

## 2022-07-05 NOTE — PLAN OF CARE
Transferred from [facility] to [facility].        07/05/22 0940   Final Note   Assessment Type Final Discharge Note   Anticipated Discharge Disposition Short Term

## 2022-07-07 DIAGNOSIS — D86.85 CARDIAC SARCOIDOSIS: ICD-10-CM

## 2022-07-07 DIAGNOSIS — I25.3 LEFT VENTRICULAR ANEURYSM: Primary | ICD-10-CM

## 2022-07-07 LAB
ANTI-CENTROMERE ANTIBODY: NEGATIVE
CENTROMERE AB TITR SER IF: NORMAL TITER
ENA SCL70 AB SER-ACNC: 4 UNITS

## 2022-07-11 ENCOUNTER — OFFICE VISIT (OUTPATIENT)
Dept: FAMILY MEDICINE | Facility: CLINIC | Age: 57
End: 2022-07-11
Payer: COMMERCIAL

## 2022-07-11 VITALS
WEIGHT: 177 LBS | BODY MASS INDEX: 34.75 KG/M2 | DIASTOLIC BLOOD PRESSURE: 76 MMHG | SYSTOLIC BLOOD PRESSURE: 110 MMHG | HEIGHT: 60 IN | HEART RATE: 80 BPM

## 2022-07-11 DIAGNOSIS — Z09 HOSPITAL DISCHARGE FOLLOW-UP: Primary | ICD-10-CM

## 2022-07-11 DIAGNOSIS — I25.3 LEFT VENTRICULAR ANEURYSM: ICD-10-CM

## 2022-07-11 DIAGNOSIS — L90.5 FIBROSIS OF SKIN OF LOWER EXTREMITY: ICD-10-CM

## 2022-07-11 PROCEDURE — 3008F PR BODY MASS INDEX (BMI) DOCUMENTED: ICD-10-PCS | Mod: CPTII,S$GLB,, | Performed by: PHYSICIAN ASSISTANT

## 2022-07-11 PROCEDURE — 3008F BODY MASS INDEX DOCD: CPT | Mod: CPTII,S$GLB,, | Performed by: PHYSICIAN ASSISTANT

## 2022-07-11 PROCEDURE — 99214 PR OFFICE/OUTPT VISIT, EST, LEVL IV, 30-39 MIN: ICD-10-PCS | Mod: S$GLB,,, | Performed by: PHYSICIAN ASSISTANT

## 2022-07-11 PROCEDURE — 4010F ACE/ARB THERAPY RXD/TAKEN: CPT | Mod: CPTII,S$GLB,, | Performed by: PHYSICIAN ASSISTANT

## 2022-07-11 PROCEDURE — 1111F DSCHRG MED/CURRENT MED MERGE: CPT | Mod: CPTII,S$GLB,, | Performed by: PHYSICIAN ASSISTANT

## 2022-07-11 PROCEDURE — 3078F DIAST BP <80 MM HG: CPT | Mod: CPTII,S$GLB,, | Performed by: PHYSICIAN ASSISTANT

## 2022-07-11 PROCEDURE — 4010F PR ACE/ARB THEARPY RXD/TAKEN: ICD-10-PCS | Mod: CPTII,S$GLB,, | Performed by: PHYSICIAN ASSISTANT

## 2022-07-11 PROCEDURE — 3074F SYST BP LT 130 MM HG: CPT | Mod: CPTII,S$GLB,, | Performed by: PHYSICIAN ASSISTANT

## 2022-07-11 PROCEDURE — 3074F PR MOST RECENT SYSTOLIC BLOOD PRESSURE < 130 MM HG: ICD-10-PCS | Mod: CPTII,S$GLB,, | Performed by: PHYSICIAN ASSISTANT

## 2022-07-11 PROCEDURE — 3044F PR MOST RECENT HEMOGLOBIN A1C LEVEL <7.0%: ICD-10-PCS | Mod: CPTII,S$GLB,, | Performed by: PHYSICIAN ASSISTANT

## 2022-07-11 PROCEDURE — 1159F PR MEDICATION LIST DOCUMENTED IN MEDICAL RECORD: ICD-10-PCS | Mod: CPTII,S$GLB,, | Performed by: PHYSICIAN ASSISTANT

## 2022-07-11 PROCEDURE — 3044F HG A1C LEVEL LT 7.0%: CPT | Mod: CPTII,S$GLB,, | Performed by: PHYSICIAN ASSISTANT

## 2022-07-11 PROCEDURE — 1159F MED LIST DOCD IN RCRD: CPT | Mod: CPTII,S$GLB,, | Performed by: PHYSICIAN ASSISTANT

## 2022-07-11 PROCEDURE — 3078F PR MOST RECENT DIASTOLIC BLOOD PRESSURE < 80 MM HG: ICD-10-PCS | Mod: CPTII,S$GLB,, | Performed by: PHYSICIAN ASSISTANT

## 2022-07-11 PROCEDURE — 1111F PR DISCHARGE MEDS RECONCILED W/ CURRENT OUTPATIENT MED LIST: ICD-10-PCS | Mod: CPTII,S$GLB,, | Performed by: PHYSICIAN ASSISTANT

## 2022-07-11 PROCEDURE — 99214 OFFICE O/P EST MOD 30 MIN: CPT | Mod: S$GLB,,, | Performed by: PHYSICIAN ASSISTANT

## 2022-07-11 NOTE — PROGRESS NOTES
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 Disp: , Rfl:     loratadine (CLARITIN) 10 mg tablet, Take 10 mg by mouth every evening., Disp: , Rfl:     losartan (COZAAR) 100 MG tablet, Take 1 tablet (100 mg total) by mouth once daily., Disp: 180 tablet, Rfl: 0    magnesium oxide (MAG-OX) 400 mg (241.3 mg magnesium) tablet, Take 400 mg by mouth every evening., Disp: , Rfl:     multivitamin (THERAGRAN) per tablet, Take 1 tablet by mouth every evening., Disp: , Rfl:     pantoprazole (PROTONIX) 40 MG tablet, TAKE ONE TABLET BY MOUTH ONCE DAILY (Patient taking differently: Take 40 mg by mouth every evening.), Disp: 90 tablet, Rfl: 3    iron carb,gl/FA/B12/C/docusate (FERRALET 90 DUAL-IRON DELIVERY ORAL), Take 90 mg by mouth 4 (four) times a week. , Disp: , Rfl:     Lactobacillus rhamnosus GG (CULTURELLE) 10 billion cell capsule, Take 1 capsule by mouth every evening., Disp: , Rfl:     Review of Systems   Constitutional: Negative for appetite change, chills, fatigue, fever and unexpected weight change.   HENT: Negative for congestion, ear pain, sinus pain, sore throat and trouble swallowing.    Eyes: Negative for pain, discharge and visual disturbance.   Respiratory: Negative for apnea, cough, shortness of breath and wheezing.    Cardiovascular: Positive for chest pain. Negative for palpitations and leg swelling.   Gastrointestinal: Negative for abdominal pain, blood in  stool, constipation, diarrhea, nausea and vomiting.   Endocrine: Negative for heat intolerance, polydipsia and polyuria.   Genitourinary: Negative for difficulty urinating, dyspareunia, dysuria, frequency, hematuria and menstrual problem.   Musculoskeletal: Positive for arthralgias. Negative for back pain, gait problem, joint swelling and myalgias.   Allergic/Immunologic: Negative for environmental allergies, food allergies and immunocompromised state.   Neurological: Negative for dizziness, tremors, seizures, numbness and headaches.   Psychiatric/Behavioral: Negative for behavioral problems, confusion, hallucinations and suicidal ideas. The patient is not nervous/anxious.           Objective:      Vitals:    07/11/22 1357   BP: 110/76   Pulse: 80   Weight: 80.3 kg (177 lb)   Height: 5' (1.524 m)     Physical Exam  Vitals and nursing note reviewed.   Constitutional:       Appearance: She is well-developed.   HENT:      Head: Normocephalic and atraumatic.      Right Ear: External ear normal.      Left Ear: External ear normal.      Nose: Nose normal.   Eyes:      Pupils: Pupils are equal, round, and reactive to light.   Neck:      Thyroid: No thyromegaly.      Vascular: No carotid bruit.   Cardiovascular:      Rate and Rhythm: Normal rate and regular rhythm.      Heart sounds: Normal heart sounds. No murmur heard.  Pulmonary:      Effort: Pulmonary effort is normal.      Breath sounds: Normal breath sounds. No wheezing or rales.   Abdominal:      General: Bowel sounds are normal. There is no distension.      Palpations: Abdomen is soft.      Tenderness: There is no abdominal tenderness.   Musculoskeletal:         General: No tenderness or deformity. Normal range of motion.      Cervical back: Normal range of motion and neck supple.      Lumbar back: Normal. No spasms.      Comments: Bends 90 degrees at  waist shoulders and knees have full range of motion.   Lymphadenopathy:      Cervical: No cervical adenopathy.    Skin:     General: Skin is warm and dry.      Findings: No rash.      Comments: Right lower leg has fibrosis on the lateral calf   Neurological:      Mental Status: She is alert and oriented to person, place, and time.      Cranial Nerves: No cranial nerve deficit.      Coordination: Coordination normal.   Psychiatric:         Behavior: Behavior normal.         Thought Content: Thought content normal.         Judgment: Judgment normal.           Assessment:       1. 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 Left ventricular aneurysm         Plan:       Hospital discharge follow-up  Comments:  Hospital records are fully reviewed and medications are reconciled.    Fibrosis of skin of lower extremity  Comments:  possibly sarcoid manifestation? will need to pull old records and make available for rheumatology consulation.    Left ventricular aneurysm  Comments:  awaiting the MRI cardiac exam to be done Thurs. and will f/u with cardiac team at [facility].      Follow up in about 3 months (around 10/11/2022).        7/11/2022 Wilfredo Hernández PA-C

## 2022-07-14 ENCOUNTER — HOSPITAL ENCOUNTER (OUTPATIENT)
Dept: RADIOLOGY | Facility: HOSPITAL | Age: 57
Discharge: HOME | End: 2022-07-14
Attending: INTERNAL MEDICINE
Payer: COMMERCIAL

## 2022-07-14 DIAGNOSIS — I25.3 LEFT VENTRICULAR ANEURYSM: ICD-10-CM

## 2022-07-14 DIAGNOSIS — D86.85 CARDIAC SARCOIDOSIS: ICD-10-CM

## 2022-07-14 PROCEDURE — A9577 INJ MULTIHANCE: HCPCS | Performed by: INTERNAL MEDICINE

## 2022-07-14 PROCEDURE — 75561 CARDIAC MRI FOR MORPH W/DYE: CPT | Mod: TC

## 2022-07-14 PROCEDURE — 25500020 PHARM REV CODE 255: Performed by: INTERNAL MEDICINE

## 2022-07-14 PROCEDURE — 75561 MRI CARDIAC MORPHOLOGY FUNCTION W WO: ICD-10-PCS | Mod: 26,,, | Performed by: RADIOLOGY

## 2022-07-14 PROCEDURE — 75561 CARDIAC MRI FOR MORPH W/DYE: CPT | Mod: 26,,, | Performed by: RADIOLOGY

## 2022-07-14 PROCEDURE — 75561 CARDIAC MRI FOR MORPH W/DYE: CPT | Mod: 26,,, | Performed by: INTERNAL MEDICINE

## 2022-07-14 PROCEDURE — 75561 CV CARDIAC MRI MORPHOLOGY (CUPID ONLY): ICD-10-PCS | Mod: 26,,, | Performed by: INTERNAL MEDICINE

## 2022-07-14 RX ADMIN — GADOBENATE DIMEGLUMINE 20 ML: 529 INJECTION, SOLUTION INTRAVENOUS at 08:07

## 2022-07-25 ENCOUNTER — TELEPHONE (OUTPATIENT)
Dept: FAMILY MEDICINE | Facility: CLINIC | Age: 57
End: 2022-07-25

## 2022-07-25 ENCOUNTER — HOSPITAL ENCOUNTER (OUTPATIENT)
Dept: RADIOLOGY | Facility: HOSPITAL | Age: 57
Discharge: HOME | End: 2022-07-25
Attending: FAMILY MEDICINE
Payer: COMMERCIAL

## 2022-07-25 DIAGNOSIS — Z12.31 OTHER SCREENING MAMMOGRAM: ICD-10-CM

## 2022-07-25 PROCEDURE — 77067 SCR MAMMO BI INCL CAD: CPT | Mod: TC,PO

## 2022-07-25 NOTE — TELEPHONE ENCOUNTER
----- Message from Sadiq Titus MD sent at 7/25/2022 12:47 PM CDT -----  Call patient.  Mammogram was normal.  Repeat mammogram in 1 year.

## 2022-07-25 NOTE — TELEPHONE ENCOUNTER
----- Message from Dorys Coburn sent at 7/25/2022  3:13 PM CDT -----    12:59    Returning your call . pt's # [phone] GH

## 2022-07-28 ENCOUNTER — TELEPHONE (OUTPATIENT)
Dept: FAMILY MEDICINE | Facility: CLINIC | Age: 57
End: 2022-07-28

## 2022-07-28 NOTE — TELEPHONE ENCOUNTER
----- Message from Wilfredo Hernández PA-C sent at 7/26/2022  3:32 PM CDT -----  Regarding: FW: Cardiac MRI results  This patient had this study done through cardiology at [facility]. I can handle the followup MRI liver protocol but I dont see where the cardiology portion of the cardiac MRI was reported in the system. This was the most worrisome part given she was suspected to have left ventricular aneurism.  ----- Message -----  From: Billie Thompson LPN  Sent: 7/26/2022   2:27 PM CDT  To: Wilfredo Hernández PA-C  Subject: FW: Cardiac MRI results                            ----- Message -----  From: Mary Andrade RN  Sent: 7/25/2022   2:01 PM CDT  To: Romero Berrios MD, Sadiq Titus MD, #  Subject: Cardiac MRI results                              Hi,  I was contacted from another department about this patient (not a general cardiology patient).  Patient wanted to know whom she should contact to get the results of her cardiac MRI done on 7/14/22.     Please advise,      Mary,  General Cardiology Triage RN

## 2022-09-26 PROBLEM — Z00.00 WELLNESS EXAMINATION: Status: RESOLVED | Noted: 2022-06-25 | Resolved: 2022-09-26

## 2022-09-27 RX ORDER — CHLORTHALIDONE 25 MG/1
25 TABLET ORAL DAILY
Qty: 90 TABLET | Refills: 0 | Status: SHIPPED | OUTPATIENT
Start: 2022-09-27 | End: 2022-10-13 | Stop reason: SDUPTHER

## 2022-10-12 ENCOUNTER — TELEPHONE (OUTPATIENT)
Dept: CARDIOLOGY | Facility: CLINIC | Age: 57
End: 2022-10-12
Payer: COMMERCIAL

## 2022-10-12 NOTE — TELEPHONE ENCOUNTER
----- Message from Ellie Liu sent at 10/12/2022  9:25 AM CDT -----  Contact: pt  Schedule    Patient would like to schedule an appointment as a f/u appt from her surgery in July, please call pt at [phone]      Thanks

## 2022-10-13 ENCOUNTER — OFFICE VISIT (OUTPATIENT)
Dept: FAMILY MEDICINE | Facility: CLINIC | Age: 57
End: 2022-10-13
Payer: COMMERCIAL

## 2022-10-13 ENCOUNTER — OFFICE VISIT (OUTPATIENT)
Dept: RHEUMATOLOGY | Facility: CLINIC | Age: 57
End: 2022-10-13
Payer: COMMERCIAL

## 2022-10-13 ENCOUNTER — HOSPITAL ENCOUNTER (OUTPATIENT)
Dept: RADIOLOGY | Facility: HOSPITAL | Age: 57
Discharge: HOME | End: 2022-10-13
Attending: INTERNAL MEDICINE
Payer: COMMERCIAL

## 2022-10-13 VITALS
OXYGEN SATURATION: 99 % | HEART RATE: 90 BPM | HEIGHT: 60 IN | WEIGHT: 173.63 LBS | DIASTOLIC BLOOD PRESSURE: 72 MMHG | SYSTOLIC BLOOD PRESSURE: 104 MMHG | BODY MASS INDEX: 34.09 KG/M2 | RESPIRATION RATE: 20 BRPM

## 2022-10-13 VITALS
OXYGEN SATURATION: 98 % | HEART RATE: 80 BPM | SYSTOLIC BLOOD PRESSURE: 104 MMHG | BODY MASS INDEX: 33.96 KG/M2 | HEIGHT: 60 IN | WEIGHT: 173 LBS | DIASTOLIC BLOOD PRESSURE: 72 MMHG

## 2022-10-13 DIAGNOSIS — L90.5 FIBROSIS OF SKIN OF LOWER EXTREMITY: ICD-10-CM

## 2022-10-13 DIAGNOSIS — E66.9 CLASS 1 OBESITY WITH BODY MASS INDEX (BMI) OF 33.0 TO 33.9 IN ADULT, UNSPECIFIED OBESITY TYPE, UNSPECIFIED WHETHER SERIOUS COMORBIDITY PRESENT: ICD-10-CM

## 2022-10-13 DIAGNOSIS — I10 PRIMARY HYPERTENSION: Primary | ICD-10-CM

## 2022-10-13 DIAGNOSIS — L90.5 FIBROSIS OF SKIN OF LOWER EXTREMITY: Primary | ICD-10-CM

## 2022-10-13 DIAGNOSIS — K21.00 GASTROESOPHAGEAL REFLUX DISEASE WITH ESOPHAGITIS WITHOUT HEMORRHAGE: ICD-10-CM

## 2022-10-13 DIAGNOSIS — Z71.89 COUNSELING AND COORDINATION OF CARE: ICD-10-CM

## 2022-10-13 DIAGNOSIS — M25.50 ARTHRALGIA, UNSPECIFIED JOINT: ICD-10-CM

## 2022-10-13 DIAGNOSIS — K76.9 HEPATIC LESION: ICD-10-CM

## 2022-10-13 DIAGNOSIS — E78.5 DYSLIPIDEMIA: ICD-10-CM

## 2022-10-13 PROCEDURE — 3074F PR MOST RECENT SYSTOLIC BLOOD PRESSURE < 130 MM HG: ICD-10-PCS | Mod: CPTII,S$GLB,, | Performed by: PHYSICIAN ASSISTANT

## 2022-10-13 PROCEDURE — 3074F SYST BP LT 130 MM HG: CPT | Mod: CPTII,S$GLB,, | Performed by: INTERNAL MEDICINE

## 2022-10-13 PROCEDURE — 77077 JOINT SURVEY SINGLE VIEW: CPT | Mod: 26,,, | Performed by: INTERNAL MEDICINE

## 2022-10-13 PROCEDURE — 4010F ACE/ARB THERAPY RXD/TAKEN: CPT | Mod: CPTII,S$GLB,, | Performed by: PHYSICIAN ASSISTANT

## 2022-10-13 PROCEDURE — 3044F PR MOST RECENT HEMOGLOBIN A1C LEVEL <7.0%: ICD-10-PCS | Mod: CPTII,S$GLB,, | Performed by: INTERNAL MEDICINE

## 2022-10-13 PROCEDURE — 99205 OFFICE O/P NEW HI 60 MIN: CPT | Mod: S$GLB,,, | Performed by: INTERNAL MEDICINE

## 2022-10-13 PROCEDURE — 99205 PR OFFICE/OUTPT VISIT, NEW, LEVL V, 60-74 MIN: ICD-10-PCS | Mod: S$GLB,,, | Performed by: INTERNAL MEDICINE

## 2022-10-13 PROCEDURE — 73521 X-RAY EXAM HIPS BI 2 VIEWS: CPT | Mod: 26,,, | Performed by: INTERNAL MEDICINE

## 2022-10-13 PROCEDURE — 3078F DIAST BP <80 MM HG: CPT | Mod: CPTII,S$GLB,, | Performed by: INTERNAL MEDICINE

## 2022-10-13 PROCEDURE — 99999 PR PBB SHADOW E&M-EST. PATIENT-LVL V: CPT | Mod: PBBFAC,,, | Performed by: INTERNAL MEDICINE

## 2022-10-13 PROCEDURE — 3074F PR MOST RECENT SYSTOLIC BLOOD PRESSURE < 130 MM HG: ICD-10-PCS | Mod: CPTII,S$GLB,, | Performed by: INTERNAL MEDICINE

## 2022-10-13 PROCEDURE — 1159F MED LIST DOCD IN RCRD: CPT | Mod: CPTII,S$GLB,, | Performed by: PHYSICIAN ASSISTANT

## 2022-10-13 PROCEDURE — 99214 OFFICE O/P EST MOD 30 MIN: CPT | Mod: S$GLB,,, | Performed by: PHYSICIAN ASSISTANT

## 2022-10-13 PROCEDURE — 77077 XR ARTHRITIS SURVEY: ICD-10-PCS | Mod: 26,,, | Performed by: INTERNAL MEDICINE

## 2022-10-13 PROCEDURE — 4010F PR ACE/ARB THEARPY RXD/TAKEN: ICD-10-PCS | Mod: CPTII,S$GLB,, | Performed by: PHYSICIAN ASSISTANT

## 2022-10-13 PROCEDURE — 1159F MED LIST DOCD IN RCRD: CPT | Mod: CPTII,S$GLB,, | Performed by: INTERNAL MEDICINE

## 2022-10-13 PROCEDURE — 3044F HG A1C LEVEL LT 7.0%: CPT | Mod: CPTII,S$GLB,, | Performed by: PHYSICIAN ASSISTANT

## 2022-10-13 PROCEDURE — 99999 PR PBB SHADOW E&M-EST. PATIENT-LVL V: ICD-10-PCS | Mod: PBBFAC,,, | Performed by: INTERNAL MEDICINE

## 2022-10-13 PROCEDURE — 99214 PR OFFICE/OUTPT VISIT, EST, LEVL IV, 30-39 MIN: ICD-10-PCS | Mod: S$GLB,,, | Performed by: PHYSICIAN ASSISTANT

## 2022-10-13 PROCEDURE — 72202 X-RAY EXAM SI JOINTS 3/> VWS: CPT | Mod: 26,,, | Performed by: INTERNAL MEDICINE

## 2022-10-13 PROCEDURE — 3044F PR MOST RECENT HEMOGLOBIN A1C LEVEL <7.0%: ICD-10-PCS | Mod: CPTII,S$GLB,, | Performed by: PHYSICIAN ASSISTANT

## 2022-10-13 PROCEDURE — 77077 JOINT SURVEY SINGLE VIEW: CPT | Mod: TC

## 2022-10-13 PROCEDURE — 72202 XR SACROILIAC JOINTS COMPLETE: ICD-10-PCS | Mod: 26,,, | Performed by: INTERNAL MEDICINE

## 2022-10-13 PROCEDURE — 3074F SYST BP LT 130 MM HG: CPT | Mod: CPTII,S$GLB,, | Performed by: PHYSICIAN ASSISTANT

## 2022-10-13 PROCEDURE — 4010F PR ACE/ARB THEARPY RXD/TAKEN: ICD-10-PCS | Mod: CPTII,S$GLB,, | Performed by: INTERNAL MEDICINE

## 2022-10-13 PROCEDURE — 4010F ACE/ARB THERAPY RXD/TAKEN: CPT | Mod: CPTII,S$GLB,, | Performed by: INTERNAL MEDICINE

## 2022-10-13 PROCEDURE — 1159F PR MEDICATION LIST DOCUMENTED IN MEDICAL RECORD: ICD-10-PCS | Mod: CPTII,S$GLB,, | Performed by: PHYSICIAN ASSISTANT

## 2022-10-13 PROCEDURE — 72100 XR LUMBAR SPINE AP AND LATERAL: ICD-10-PCS | Mod: 26,,, | Performed by: RADIOLOGY

## 2022-10-13 PROCEDURE — 3078F DIAST BP <80 MM HG: CPT | Mod: CPTII,S$GLB,, | Performed by: PHYSICIAN ASSISTANT

## 2022-10-13 PROCEDURE — 3044F HG A1C LEVEL LT 7.0%: CPT | Mod: CPTII,S$GLB,, | Performed by: INTERNAL MEDICINE

## 2022-10-13 PROCEDURE — 73521 XR HIPS BILATERAL 2 VIEW INCL AP PELVIS: ICD-10-PCS | Mod: 26,,, | Performed by: INTERNAL MEDICINE

## 2022-10-13 PROCEDURE — 72100 X-RAY EXAM L-S SPINE 2/3 VWS: CPT | Mod: 26,,, | Performed by: RADIOLOGY

## 2022-10-13 PROCEDURE — 3078F PR MOST RECENT DIASTOLIC BLOOD PRESSURE < 80 MM HG: ICD-10-PCS | Mod: CPTII,S$GLB,, | Performed by: INTERNAL MEDICINE

## 2022-10-13 PROCEDURE — 3078F PR MOST RECENT DIASTOLIC BLOOD PRESSURE < 80 MM HG: ICD-10-PCS | Mod: CPTII,S$GLB,, | Performed by: PHYSICIAN ASSISTANT

## 2022-10-13 PROCEDURE — 1159F PR MEDICATION LIST DOCUMENTED IN MEDICAL RECORD: ICD-10-PCS | Mod: CPTII,S$GLB,, | Performed by: INTERNAL MEDICINE

## 2022-10-13 PROCEDURE — 72100 X-RAY EXAM L-S SPINE 2/3 VWS: CPT | Mod: TC,FY

## 2022-10-13 PROCEDURE — 73521 X-RAY EXAM HIPS BI 2 VIEWS: CPT | Mod: TC,FY

## 2022-10-13 PROCEDURE — 72202 X-RAY EXAM SI JOINTS 3/> VWS: CPT | Mod: TC,FY

## 2022-10-13 RX ORDER — ATORVASTATIN CALCIUM 10 MG/1
10 TABLET, FILM COATED ORAL NIGHTLY
Qty: 90 TABLET | Refills: 3 | Status: SHIPPED | OUTPATIENT
Start: 2022-10-13

## 2022-10-13 RX ORDER — ESOMEPRAZOLE MAGNESIUM 40 MG/1
40 CAPSULE, DELAYED RELEASE ORAL
Qty: 30 CAPSULE | Refills: 11 | Status: ON HOLD | OUTPATIENT
Start: 2022-10-13 | End: 2022-11-24 | Stop reason: SDUPTHER

## 2022-10-13 RX ORDER — LOSARTAN POTASSIUM 100 MG/1
100 TABLET ORAL DAILY
Qty: 90 TABLET | Refills: 3 | Status: ON HOLD | OUTPATIENT
Start: 2022-10-13 | End: 2022-11-24 | Stop reason: HOSPADM

## 2022-10-13 RX ORDER — CHLORTHALIDONE 25 MG/1
25 TABLET ORAL DAILY
Qty: 90 TABLET | Refills: 3 | Status: SHIPPED | OUTPATIENT
Start: 2022-10-13 | End: 2022-10-21 | Stop reason: SDUPTHER

## 2022-10-13 NOTE — PROGRESS NOTES
RHEUMATOLOGY OUTPATIENT CLINIC NOTE    10/13/2022    Attending Rheumatologist: Manish Lopez  Primary Care Provider: Sadiq Titus MD   Physician Requesting Consultation: Mimi Odom, DO  [address] [place] [address]  Chief Complaint/Reason For Consultation:  No chief complaint on file.      Subjective:       HPI  Brunilda Bob is a 56 y.o. White female with medical history noted below who presents for evaluation of skin fibrosis.     Seems about 18 months ago, patient developed right lower extremity rash. Unclear had biopsy showing Fat Necrosis. Notes the lesions has soften and gotten smaller. She reports otherwise no other rash. She notes Hx of MI and Pseudoaneurysm. Also mentions Hx of Sarcoid. She endorses joint pain in her lower back and hips. Typically worse after a long day and sitting position. Rest helps. Tylenol provides relief. She endorses fatigue. +Raynaud's (no ulcers), miscarriage x1, SOB with a lot of activity. No Alopecia, Oral/Nasal Ulcers, Photosensitivity, Dry Eyes, Dry Mouth, Pleuritis/serositis, Easy Bruising, LAD, Blood clots, GERD, Skin tightening, chest pain, fevers, unintentional wt loss.   No FHX of CTD.       Review of Systems   Constitutional:  Positive for fatigue. Negative for chills, fever and unexpected weight change.   HENT:  Negative for mouth sores.    Eyes:  Negative for redness and eye dryness.   Respiratory:  Negative for cough and shortness of breath.    Cardiovascular:  Negative for chest pain.   Gastrointestinal:  Negative for abdominal distention, constipation, diarrhea, nausea and vomiting.   Genitourinary:  Negative for vaginal dryness.   Musculoskeletal:  Positive for arthralgias and back pain. Negative for gait problem, joint swelling, leg pain, myalgias, neck pain, neck stiffness and joint deformity.   Integumentary:  Positive for rash.   Neurological:  Negative for weakness, numbness and headaches.   Hematological:   Negative for adenopathy. Does not bruise/bleed easily.   Psychiatric/Behavioral:  Negative for confusion, decreased concentration and sleep disturbance. The patient is not nervous/anxious.    All other systems reviewed and are negative.     Chronic comorbid conditions affecting medical decision making today:  Past Medical History:   Diagnosis Date    Broken heart syndrome     Hypertension      Past Surgical History:   Procedure Laterality Date    CLAVICLE SURGERY  2012    TONSILLECTOMY       Family History   Problem Relation Age of Onset    Colon cancer Mother     Uterine cancer Mother     Hypertension Father     Testicular cancer Son      Social History     Substance and Sexual Activity   Alcohol Use Yes     Social History     Tobacco Use   Smoking Status Never   Smokeless Tobacco Never     Social History     Substance and Sexual Activity   Drug Use Never       Current Outpatient Medications:     acetaminophen (TYLENOL) 500 MG tablet, Take 2 tablets (1,000 mg total) by mouth 3 (three) times daily., Disp: , Rfl:     atorvastatin (LIPITOR) 10 MG tablet, Take 1 tablet (10 mg total) by mouth every evening., Disp: 90 tablet, Rfl: 3    chlorthalidone (HYGROTEN) 25 MG Tab, Take 1 tablet (25 mg total) by mouth once daily., Disp: 90 tablet, Rfl: 3    ergocalciferol (VITAMIN D2) 50,000 unit Cap, Take 1 capsule (50,000 Units total) by mouth nightly., Disp: , Rfl:     esomeprazole (NEXIUM) 40 MG capsule, Take 1 capsule (40 mg total) by mouth before breakfast., Disp: 30 capsule, Rfl: 11    iron carb,gl/FA/B12/C/docusate (FERRALET 90 DUAL-IRON DELIVERY ORAL), Take 90 mg by mouth 4 (four) times a week. , Disp: , Rfl:     Lactobacillus rhamnosus GG (CULTURELLE) 10 billion cell capsule, Take 1 capsule by mouth every evening., Disp: , Rfl:     levothyroxine (SYNTHROID) 50 MCG tablet, Take 50 mcg by mouth every evening., Disp: , Rfl:     loratadine (CLARITIN) 10 mg tablet, Take 10 mg by mouth every evening., Disp: , Rfl:     losartan  (COZAAR) 100 MG tablet, Take 1 tablet (100 mg total) by mouth once daily., Disp: 90 tablet, Rfl: 3    magnesium oxide (MAG-OX) 400 mg (241.3 mg magnesium) tablet, Take 400 mg by mouth every evening., Disp: , Rfl:     multivitamin (THERAGRAN) per tablet, Take 1 tablet by mouth every evening., Disp: , Rfl:      Objective:         Vitals:    10/13/22 1028   BP: 104/72   Pulse: 90   Resp: 20     Physical Exam  Can make fist, no synovitis  Right hip pain with ROM testing  SLT Negative   Knee crepitus  Negative ankle/MTP  No tender points     Reviewed old and all outside pertinent medical records available.    All lab results personally reviewed and interpreted by me.  Lab Results   Component Value Date    WBC 5.42 07/04/2022    HGB 12.5 07/04/2022    HCT 40.1 07/04/2022    MCV 85 07/04/2022    MCH 26.6 (L) 07/04/2022    MCHC 31.2 (L) 07/04/2022    RDW 14.4 07/04/2022     07/04/2022    MPV 10.5 07/04/2022    NEUTROABS 4,437 02/23/2022       Lab Results   Component Value Date     07/04/2022    K 4.6 07/04/2022     07/04/2022    CO2 28 07/04/2022     (H) 07/04/2022    BUN 19 07/04/2022    CALCIUM 9.4 07/04/2022    PROT 6.3 07/04/2022    ALBUMIN 3.5 07/04/2022    BILITOT 0.4 07/04/2022    AST 18 07/04/2022    ALKPHOS 116 07/04/2022    ALT 20 07/04/2022       Lab Results   Component Value Date    COLORU Yellow 02/24/2021    APPEARANCEUA CLEAR 01/08/2021    SPECGRAV 1.020 02/24/2021    PHUR 5.5 02/24/2021    PROTEINUA NEGATIVE 01/08/2021    GLUCOSEU NEGATIVE 01/08/2021    KETONESU n 02/24/2021    LEUKOCYTESUR NEGATIVE 01/08/2021    NITRITE n 02/24/2021    UROBILINOGEN 0.2 02/24/2021       Lab Results   Component Value Date    CRP 8.6 (H) 07/03/2022       Lab Results   Component Value Date    SEDRATE 38 (H) 07/03/2022       Lab Results   Component Value Date    SEDRATE 38 (H) 07/03/2022       No components found for: 25OHVITDTOT, 58JIHLPT0, 03PUKNMR0, METHODNOTE    No results found for: URICACID    No  components found for: TSPOTTB        Imaging:  All imaging reviewed and independently interpreted by me.         ASSESSMENT / PLAN:     Brunilda Bob is a 56 y.o. White female with:      1. Fibrosis of skin of lower extremity  - patient has fat necrosis of right lower extremity with unclear origin, she reports Hx of Sarcoid but I cannot find any evidence in EMR, she has a hepatic lesion, I would go after a possible biopsy in order to support diagnosis and figure out a therapeutic option  - labs as below  - will consider possible DERM evaluation for another skin biopsy   - reassurance   - Ambulatory referral/consult to Rheumatology  - C-Reactive Protein; Future  - Sjogrens syndrome-B extractable nuclear antibody; Future  - CBC Auto Differential; Future  - Comprehensive Metabolic Panel; Future  - Rheumatoid Factor; Future  - Sjogrens syndrome-A extractable nuclear antibody; Future  - C4 Complement; Future  - C3 Complement; Future  - Anti-Histone Antibody; Future  - Sedimentation rate; Future  - Urinalysis; Future  - Hepatitis B Surface Ab, Qualitative; Future  - Vitamin D; Future  - Uric Acid; Future  - TSH; Future  - Hepatitis C Antibody; Future  - Hepatitis B Surface Antigen; Future  - CK; Future  - XR Arthritis Survey; Future  - Gamma GT; Future  - Quantiferon Gold TB; Future  - HIV 1/2 Ag/Ab (4th Gen); Future  - Angiotensin Converting Enzyme; Future  - Interleukin-2 Receptor; Future  - Immunoglobulins (IgG, IgA, IgM) Quantitative; Future  - IgG 1, 2, 3, and 4; Future  - Immunofixation Electrophoresis; Future  - Protein Electrophoresis, Serum; Future  - DRVVT; Future  - Cardiolipin antibody; Future  - Beta-2 Glycoprotein Abs (IgA, IgG, IgM); Future  - X-Ray Sacroiliac Joints Complete; Future  - X-Ray Lumbar Spine Ap And Lateral; Future  - THYROID PEROXIDASE ANTIBODY; Future  - X-Ray Hips Bilateral 2 View Inc AP Pelvis; Future    2. Hepatic lesion  - Cardiac MRI notes hepatic lesion sent to Hepatology,  would request MRI but will leave work up to Hepatology, though if possible would like a biopsy as the skin biopsy just shows fat necrosis, and need supporting data for possible Sarcoid  - Ambulatory referral/consult to Hepatology; Future    3. Arthralgia, unspecified joint  - patient endorses arthralgias likely OA  - will get Xrays  - wt loss  - reassurance and exercise     4. Other specified counseling  - over 10 minutes spent regarding below topics:  - Immunization counseling done.  - Weight loss counseling done.  - Nutrition and exercise counseling.  - Limitation of alcohol consumption.  - Regular exercise:  Aerobic and resistance.  - Medication counseling provided.    5. Obesity  - would benefit from decreasing at least 10% of body weight.  - recommended goal of losing 1 lb per week.  - consider nutritionist evaluation.      Follow up in about 3 months (around 1/13/2023).    Method of contact with patient concerns: [facility]    Disclaimer:  This note is prepared using voice recognition software and as such is likely to have errors and has not been proof read. Please contact me for questions.     Time spent: 60 minutes in face to face discussion concerning diagnosis, prognosis, review of lab and test results, benefits of treatment as well as management of disease, counseling of patient and coordination of care between various health care providers.  Greater than half the time spent was used for coordination of care and counseling of patient.    Manish Lopez M.D.  Rheumatology Department   [facility]

## 2022-10-13 NOTE — PROGRESS NOTES
SUBJECTIVE:    Patient ID: Brunilda Bob is a 56 y.o. female.    Chief Complaint: Follow-up (3 mo  f/u//brought med bottles//tc)    This is a 56-year-old female who presents today for 3 month follow-up.  Today she reports doing well from primary standpoint. Does report more belching at night worse when she gets down for bed. PPI daily with protonix. She had full cardiac eval over the summer regarding possibility of LV aneurism. Can see all imaging/workup in the chart. She is needing to f/u with her cardiologist, Dr. Osborn. Awaiting rheumatology eval today. DR. Lopez. 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       Admission on 07/02/2022, Discharged on 07/04/2022   Component Date Value Ref Range Status    Ascending aorta 07/03/2022 3.19  cm Final    STJ 07/03/2022 2.56  cm Final    AV mean gradient 07/03/2022 6  mmHg Final    Ao peak harley 07/03/2022 1.58  m/s Final    Ao VTI 07/03/2022 33.33  cm Final    IVRT 07/03/2022 119.89  msec Final    IVS 07/03/2022 0.92  0.6 - 1.1 cm Final    LA size 07/03/2022 3.57  cm Final    Left Atrium Major Axis 07/03/2022 5.78  cm Final    Left Atrium Minor Axis 07/03/2022 5.26  cm Final    LVIDd 07/03/2022 4.46  3.5 - 6.0 cm Final    LVIDs 07/03/2022 2.83  2.1 - 4.0 cm Final    LVOT diameter 07/03/2022 1.94  cm Final    LVOT peak VTI 07/03/2022 20.58  cm Final    Posterior Wall 07/03/2022 0.91  0.6 - 1.1 cm Final    MV Peak A Harley 07/03/2022 0.96  m/s Final    E wave deceleration time 07/03/2022 255.43  msec Final    MV Peak E Harley 07/03/2022 0.80  m/s Final    RA Major Axis 07/03/2022 4.18  cm Final    RA Width 07/03/2022 3.03  cm Final    RVDD 07/03/2022 3.10  cm Final    Sinus 07/03/2022 3.04  cm Final    TAPSE 07/03/2022 2.04  cm Final    TDI LATERAL 07/03/2022 0.10  m/s Final    TDI SEPTAL 07/03/2022 0.07  m/s Final    LA WIDTH 07/03/2022 4.05  cm Final    MV stenosis pressure 1/2 time 07/03/2022 74.07  ms Final    LV Diastolic Volume 07/03/2022 90.35  mL  Final    LV Systolic Volume 07/03/2022 30.35  mL Final    RV S' 07/03/2022 9.15  cm/s Final    LVOT peak dick 07/03/2022 1.09  m/s Final    LA volume (mod) 07/03/2022 68.02  cm3 Final    LV LATERAL E/E' RATIO 07/03/2022 8.00  m/s Final    LV SEPTAL E/E' RATIO 07/03/2022 11.43  m/s Final    FS 07/03/2022 37  % Final    LA volume 07/03/2022 67.69  cm3 Final    LV mass 07/03/2022 133.84  g Final    Left Ventricle Relative Wall Thick* 07/03/2022 0.41  cm Final    AV valve area 07/03/2022 1.82  cm2 Final    AV Velocity Ratio 07/03/2022 0.69   Final    AV index (prosthetic) 07/03/2022 0.62   Final    MV valve area p 1/2 method 07/03/2022 2.97  cm2 Final    E/A ratio 07/03/2022 0.83   Final    Mean e' 07/03/2022 0.09  m/s Final    LVOT area 07/03/2022 3.0  cm2 Final    LVOT stroke volume 07/03/2022 60.80  cm3 Final    AV peak gradient 07/03/2022 10  mmHg Final    E/E' ratio 07/03/2022 9.41  m/s Final    LV Systolic Volume Index 07/03/2022 17.0  mL/m2 Final    LV Diastolic Volume Index 07/03/2022 50.47  mL/m2 Final    LA Volume Index 07/03/2022 37.8  mL/m2 Final    LV Mass Index 07/03/2022 75  g/m2 Final    LA Volume Index (Mod) 07/03/2022 38.0  mL/m2 Final    BSA 07/03/2022 1.86  m2 Final    Right Atrial Pressure (from IVC) 07/03/2022 3  mmHg Final    EF 07/03/2022 65  % Final    Sodium 07/03/2022 139  136 - 145 mmol/L Final    Potassium 07/03/2022 4.7  3.5 - 5.1 mmol/L Final    Chloride 07/03/2022 104  95 - 110 mmol/L Final    CO2 07/03/2022 26  23 - 29 mmol/L Final    Glucose 07/03/2022 99  70 - 110 mg/dL Final    BUN 07/03/2022 21 (H)  6 - 20 mg/dL Final    Creatinine 07/03/2022 0.8  0.5 - 1.4 mg/dL Final    Calcium 07/03/2022 9.4  8.7 - 10.5 mg/dL Final    Anion Gap 07/03/2022 9  8 - 16 mmol/L Final    eGFR if African American 07/03/2022 >60.0  >60 mL/min/1.73 m^2 Final    eGFR if non African American 07/03/2022 >60.0  >60 mL/min/1.73 m^2 Final    WBC 07/03/2022 4.94  3.90 - 12.70 K/uL Final    RBC 07/03/2022 4.61  4.00  - 5.40 M/uL Final    Hemoglobin 07/03/2022 12.5  12.0 - 16.0 g/dL Final    Hematocrit 07/03/2022 41.0  37.0 - 48.5 % Final    MCV 07/03/2022 89  82 - 98 fL Final    MCH 07/03/2022 27.1  27.0 - 31.0 pg Final    MCHC 07/03/2022 30.5 (L)  32.0 - 36.0 g/dL Final    RDW 07/03/2022 14.4  11.5 - 14.5 % Final    Platelets 07/03/2022 179  150 - 450 K/uL Final    MPV 07/03/2022 10.8  9.2 - 12.9 fL Final    Immature Granulocytes 07/03/2022 0.4  0.0 - 0.5 % Final    Gran # (ANC) 07/03/2022 3.3  1.8 - 7.7 K/uL Final    Immature Grans (Abs) 07/03/2022 0.02  0.00 - 0.04 K/uL Final    Lymph # 07/03/2022 1.0  1.0 - 4.8 K/uL Final    Mono # 07/03/2022 0.4  0.3 - 1.0 K/uL Final    Eos # 07/03/2022 0.2  0.0 - 0.5 K/uL Final    Baso # 07/03/2022 0.02  0.00 - 0.20 K/uL Final    nRBC 07/03/2022 0  0 /100 WBC Final    Gran % 07/03/2022 67.0  38.0 - 73.0 % Final    Lymph % 07/03/2022 21.1  18.0 - 48.0 % Final    Mono % 07/03/2022 8.1  4.0 - 15.0 % Final    Eosinophil % 07/03/2022 3.0  0.0 - 8.0 % Final    Basophil % 07/03/2022 0.4  0.0 - 1.9 % Final    Differential Method 07/03/2022 Automated   Final    Magnesium 07/03/2022 2.2  1.6 - 2.6 mg/dL Final    Phosphorus 07/03/2022 3.9  2.7 - 4.5 mg/dL Final    HERB Screen 07/03/2022 Negative <1:80  Negative <1:80 Final    Anti-Centromere Antibody 07/03/2022 Negative  Negative Final    Centromere Antibody Titer 07/03/2022 Test Not Performed  <1:80 Titer Final    Sed Rate 07/03/2022 38 (H)  0 - 36 mm/Hr Final    CRP 07/03/2022 8.6 (H)  0.0 - 8.2 mg/L Final    CCP Antibodies 07/03/2022 <0.5  <5.0 U/mL Final    Scleroderma SCL- 07/03/2022 4  <20 UNITS Final    ds DNA Ab 07/03/2022 Negative 1:10  Negative 1:10 Final    POCT Glucose 07/02/2022 80  70 - 110 mg/dL Final    Sodium 07/04/2022 140  136 - 145 mmol/L Final    Potassium 07/04/2022 4.6  3.5 - 5.1 mmol/L Final    Chloride 07/04/2022 105  95 - 110 mmol/L Final    CO2 07/04/2022 28  23 - 29 mmol/L Final    Glucose 07/04/2022 117 (H)  70 - 110 mg/dL  Final    BUN 07/04/2022 19  6 - 20 mg/dL Final    Creatinine 07/04/2022 0.8  0.5 - 1.4 mg/dL Final    Calcium 07/04/2022 9.4  8.7 - 10.5 mg/dL Final    Total Protein 07/04/2022 6.3  6.0 - 8.4 g/dL Final    Albumin 07/04/2022 3.5  3.5 - 5.2 g/dL Final    Total Bilirubin 07/04/2022 0.4  0.1 - 1.0 mg/dL Final    Alkaline Phosphatase 07/04/2022 116  55 - 135 U/L Final    AST 07/04/2022 18  10 - 40 U/L Final    ALT 07/04/2022 20  10 - 44 U/L Final    Anion Gap 07/04/2022 7 (L)  8 - 16 mmol/L Final    eGFR if African American 07/04/2022 >60.0  >60 mL/min/1.73 m^2 Final    eGFR if non African American 07/04/2022 >60.0  >60 mL/min/1.73 m^2 Final    WBC 07/04/2022 5.42  3.90 - 12.70 K/uL Final    RBC 07/04/2022 4.70  4.00 - 5.40 M/uL Final    Hemoglobin 07/04/2022 12.5  12.0 - 16.0 g/dL Final    Hematocrit 07/04/2022 40.1  37.0 - 48.5 % Final    MCV 07/04/2022 85  82 - 98 fL Final    MCH 07/04/2022 26.6 (L)  27.0 - 31.0 pg Final    MCHC 07/04/2022 31.2 (L)  32.0 - 36.0 g/dL Final    RDW 07/04/2022 14.4  11.5 - 14.5 % Final    Platelets 07/04/2022 198  150 - 450 K/uL Final    MPV 07/04/2022 10.5  9.2 - 12.9 fL Final    Immature Granulocytes 07/04/2022 0.2  0.0 - 0.5 % Final    Gran # (ANC) 07/04/2022 3.4  1.8 - 7.7 K/uL Final    Immature Grans (Abs) 07/04/2022 0.01  0.00 - 0.04 K/uL Final    Lymph # 07/04/2022 1.3  1.0 - 4.8 K/uL Final    Mono # 07/04/2022 0.5  0.3 - 1.0 K/uL Final    Eos # 07/04/2022 0.2  0.0 - 0.5 K/uL Final    Baso # 07/04/2022 0.03  0.00 - 0.20 K/uL Final    nRBC 07/04/2022 0  0 /100 WBC Final    Gran % 07/04/2022 63.0  38.0 - 73.0 % Final    Lymph % 07/04/2022 24.2  18.0 - 48.0 % Final    Mono % 07/04/2022 8.3  4.0 - 15.0 % Final    Eosinophil % 07/04/2022 3.7  0.0 - 8.0 % Final    Basophil % 07/04/2022 0.6  0.0 - 1.9 % Final    Differential Method 07/04/2022 Automated   Final    Magnesium 07/04/2022 2.0  1.6 - 2.6 mg/dL Final   Admission on 06/30/2022, Discharged on 07/02/2022   Component Date Value Ref  Range Status    WBC 06/30/2022 6.08  3.90 - 12.70 K/uL Final    RBC 06/30/2022 4.41  4.00 - 5.40 M/uL Final    Hemoglobin 06/30/2022 11.6 (L)  12.0 - 16.0 g/dL Final    Hematocrit 06/30/2022 37.4  37.0 - 48.5 % Final    MCV 06/30/2022 85  82 - 98 fL Final    MCH 06/30/2022 26.3 (L)  27.0 - 31.0 pg Final    MCHC 06/30/2022 31.0 (L)  32.0 - 36.0 g/dL Final    RDW 06/30/2022 14.8 (H)  11.5 - 14.5 % Final    Platelets 06/30/2022 201  150 - 450 K/uL Final    MPV 06/30/2022 10.1  9.2 - 12.9 fL Final    Immature Granulocytes 06/30/2022 0.3  0.0 - 0.5 % Final    Gran # (ANC) 06/30/2022 4.2  1.8 - 7.7 K/uL Final    Immature Grans (Abs) 06/30/2022 0.02  0.00 - 0.04 K/uL Final    Lymph # 06/30/2022 1.2  1.0 - 4.8 K/uL Final    Mono # 06/30/2022 0.5  0.3 - 1.0 K/uL Final    Eos # 06/30/2022 0.2  0.0 - 0.5 K/uL Final    Baso # 06/30/2022 0.03  0.00 - 0.20 K/uL Final    nRBC 06/30/2022 0  0 /100 WBC Final    Gran % 06/30/2022 68.3  38.0 - 73.0 % Final    Lymph % 06/30/2022 20.1  18.0 - 48.0 % Final    Mono % 06/30/2022 7.7  4.0 - 15.0 % Final    Eosinophil % 06/30/2022 3.1  0.0 - 8.0 % Final    Basophil % 06/30/2022 0.5  0.0 - 1.9 % Final    Differential Method 06/30/2022 Automated   Final    Sodium 06/30/2022 139  136 - 145 mmol/L Final    Potassium 06/30/2022 3.9  3.5 - 5.1 mmol/L Final    Chloride 06/30/2022 103  95 - 110 mmol/L Final    CO2 06/30/2022 29  23 - 29 mmol/L Final    Glucose 06/30/2022 110  70 - 110 mg/dL Final    BUN 06/30/2022 22 (H)  6 - 20 mg/dL Final    Creatinine 06/30/2022 0.8  0.5 - 1.4 mg/dL Final    Calcium 06/30/2022 9.0  8.7 - 10.5 mg/dL Final    Total Protein 06/30/2022 7.0  6.0 - 8.4 g/dL Final    Albumin 06/30/2022 3.9  3.5 - 5.2 g/dL Final    Total Bilirubin 06/30/2022 0.3  0.1 - 1.0 mg/dL Final    Alkaline Phosphatase 06/30/2022 95  55 - 135 U/L Final    AST 06/30/2022 24  10 - 40 U/L Final    ALT 06/30/2022 24  10 - 44 U/L Final    Anion Gap 06/30/2022 7 (L)  8 - 16 mmol/L Final    eGFR if   06/30/2022 >60.0  >60 mL/min/1.73 m^2 Final    eGFR if non African American 06/30/2022 >60.0  >60 mL/min/1.73 m^2 Final    Troponin I 06/30/2022 <0.030  <=0.040 ng/mL Final    Troponin I 06/30/2022 <0.030  <=0.040 ng/mL Final    BNP 06/30/2022 120 (H)  0 - 99 pg/mL Final    Magnesium 06/30/2022 2.0  1.6 - 2.6 mg/dL Final    SARS-CoV-2 RNA, Amplification, Qual 06/30/2022 Negative  Negative Final    Hemoglobin A1C 07/01/2022 6.2  4.5 - 6.2 % Final    Estimated Avg Glucose 07/01/2022 131  68 - 131 mg/dL Final    Sodium 07/01/2022 139  136 - 145 mmol/L Final    Potassium 07/01/2022 3.6  3.5 - 5.1 mmol/L Final    Chloride 07/01/2022 103  95 - 110 mmol/L Final    CO2 07/01/2022 29  23 - 29 mmol/L Final    Glucose 07/01/2022 121 (H)  70 - 110 mg/dL Final    BUN 07/01/2022 22 (H)  6 - 20 mg/dL Final    Creatinine 07/01/2022 0.8  0.5 - 1.4 mg/dL Final    Calcium 07/01/2022 8.7  8.7 - 10.5 mg/dL Final    Anion Gap 07/01/2022 7 (L)  8 - 16 mmol/L Final    eGFR if African American 07/01/2022 >60.0  >60 mL/min/1.73 m^2 Final    eGFR if non African American 07/01/2022 >60.0  >60 mL/min/1.73 m^2 Final    Magnesium 07/01/2022 2.1  1.6 - 2.6 mg/dL Final    WBC 07/01/2022 5.43  3.90 - 12.70 K/uL Final    RBC 07/01/2022 4.32  4.00 - 5.40 M/uL Final    Hemoglobin 07/01/2022 11.5 (L)  12.0 - 16.0 g/dL Final    Hematocrit 07/01/2022 37.1  37.0 - 48.5 % Final    MCV 07/01/2022 86  82 - 98 fL Final    MCH 07/01/2022 26.6 (L)  27.0 - 31.0 pg Final    MCHC 07/01/2022 31.0 (L)  32.0 - 36.0 g/dL Final    RDW 07/01/2022 14.6 (H)  11.5 - 14.5 % Final    Platelets 07/01/2022 178  150 - 450 K/uL Final    MPV 07/01/2022 10.4  9.2 - 12.9 fL Final    Immature Granulocytes 07/01/2022 0.4  0.0 - 0.5 % Final    Gran # (ANC) 07/01/2022 3.5  1.8 - 7.7 K/uL Final    Immature Grans (Abs) 07/01/2022 0.02  0.00 - 0.04 K/uL Final    Lymph # 07/01/2022 1.2  1.0 - 4.8 K/uL Final    Mono # 07/01/2022 0.5  0.3 - 1.0 K/uL Final    Eos # 07/01/2022  0.2  0.0 - 0.5 K/uL Final    Baso # 07/01/2022 0.02  0.00 - 0.20 K/uL Final    nRBC 07/01/2022 0  0 /100 WBC Final    Gran % 07/01/2022 65.1  38.0 - 73.0 % Final    Lymph % 07/01/2022 22.3  18.0 - 48.0 % Final    Mono % 07/01/2022 8.5  4.0 - 15.0 % Final    Eosinophil % 07/01/2022 3.3  0.0 - 8.0 % Final    Basophil % 07/01/2022 0.4  0.0 - 1.9 % Final    Differential Method 07/01/2022 Automated   Final    BSA 07/01/2022 1.87  m2 Final    LVIDd 07/01/2022 4.30  3.5 - 6.0 cm Final    LVIDs 07/01/2022 2.82  2.1 - 4.0 cm Final    FS 07/01/2022 34  28 - 44 % Final    RVDD 07/01/2022 323.00  cm Final    AV mean gradient 07/01/2022 7  mmHg Final    AV valve area 07/01/2022 2.55  cm2 Final    AV index (prosthetic) 07/01/2022 0.72   Final    E/A ratio 07/01/2022 0.87   Final    E wave deceleration time 07/01/2022 215.02  msec Final    IVRT 07/01/2022 95.31  msec Final    Pulm vein S/D ratio 07/01/2022 1.24   Final    LVOT diameter 07/01/2022 2.13  cm Final    LVOT area 07/01/2022 3.6  cm2 Final    LVOT peak harley 07/01/2022 119.78  m/s Final    LVOT peak VTI 07/01/2022 29.05  cm Final    Ao VTI 07/01/2022 40.55  cm Final    LVOT stroke volume 07/01/2022 103.46  cm3 Final    MV Peak E Harley 07/01/2022 1.04  m/s Final    TR Max Harley 07/01/2022 2.56  m/s Final    MV Peak A Harley 07/01/2022 1.19  m/s Final    PV Peak S Harley 07/01/2022 71.96  m/s Final    PV Peak D Harley 07/01/2022 57.88  m/s Final    LV Systolic Volume 07/01/2022 22.37  mL Final    LV Systolic Volume Index 07/01/2022 12.5  mL/m2 Final    LV Diastolic Volume 07/01/2022 79.29  mL Final    LV Diastolic Volume Index 07/01/2022 44.30  mL/m2 Final    Triscuspid Valve Regurgitation Pea* 07/01/2022 26  mmHg Final    Right Atrial Pressure (from IVC) 07/01/2022 3  mmHg Final    EF 07/01/2022 64  % Final    TV rest pulmonary artery pressure 07/01/2022 29  mmHg Final    Troponin I 07/01/2022 <0.030  <=0.040 ng/mL Final    Troponin I 07/01/2022 <0.030  <=0.040 ng/mL Final    Sodium  07/02/2022 138  136 - 145 mmol/L Final    Potassium 07/02/2022 4.1  3.5 - 5.1 mmol/L Final    Chloride 07/02/2022 104  95 - 110 mmol/L Final    CO2 07/02/2022 28  23 - 29 mmol/L Final    Glucose 07/02/2022 100  70 - 110 mg/dL Final    BUN 07/02/2022 23 (H)  6 - 20 mg/dL Final    Creatinine 07/02/2022 0.8  0.5 - 1.4 mg/dL Final    Calcium 07/02/2022 8.7  8.7 - 10.5 mg/dL Final    Anion Gap 07/02/2022 6 (L)  8 - 16 mmol/L Final    eGFR if African American 07/02/2022 >60.0  >60 mL/min/1.73 m^2 Final    eGFR if non African American 07/02/2022 >60.0  >60 mL/min/1.73 m^2 Final    Magnesium 07/02/2022 2.2  1.6 - 2.6 mg/dL Final    WBC 07/02/2022 5.20  3.90 - 12.70 K/uL Final    RBC 07/02/2022 4.39  4.00 - 5.40 M/uL Final    Hemoglobin 07/02/2022 11.5 (L)  12.0 - 16.0 g/dL Final    Hematocrit 07/02/2022 37.6  37.0 - 48.5 % Final    MCV 07/02/2022 86  82 - 98 fL Final    MCH 07/02/2022 26.2 (L)  27.0 - 31.0 pg Final    MCHC 07/02/2022 30.6 (L)  32.0 - 36.0 g/dL Final    RDW 07/02/2022 14.7 (H)  11.5 - 14.5 % Final    Platelets 07/02/2022 161  150 - 450 K/uL Final    MPV 07/02/2022 10.3  9.2 - 12.9 fL Final    Immature Granulocytes 07/02/2022 0.4  0.0 - 0.5 % Final    Gran # (ANC) 07/02/2022 3.3  1.8 - 7.7 K/uL Final    Immature Grans (Abs) 07/02/2022 0.02  0.00 - 0.04 K/uL Final    Lymph # 07/02/2022 1.2  1.0 - 4.8 K/uL Final    Mono # 07/02/2022 0.4  0.3 - 1.0 K/uL Final    Eos # 07/02/2022 0.2  0.0 - 0.5 K/uL Final    Baso # 07/02/2022 0.02  0.00 - 0.20 K/uL Final    nRBC 07/02/2022 0  0 /100 WBC Final    Gran % 07/02/2022 63.7  38.0 - 73.0 % Final    Lymph % 07/02/2022 23.7  18.0 - 48.0 % Final    Mono % 07/02/2022 8.1  4.0 - 15.0 % Final    Eosinophil % 07/02/2022 3.7  0.0 - 8.0 % Final    Basophil % 07/02/2022 0.4  0.0 - 1.9 % Final    Differential Method 07/02/2022 Automated   Final   Hospital Outpatient Visit on 04/27/2022   Component Date Value Ref Range Status    AORTIC VALVE CUSP SEPERATION 04/27/2022 2.00  cm  Final    AV mean gradient 04/27/2022 4  mmHg Final    Ao VTI 04/27/2022 28.70  cm Final    Ao peak harley 04/27/2022 1.39  m/s Final    AV peak gradient 04/27/2022 8  mmHg Final    Ao root annulus 04/27/2022 2.80  cm Final    IVRT 04/27/2022 84.00  ms Final    IVS 04/27/2022 1.25 (A)  0.6 - 1.1 cm Final    LVIDd 04/27/2022 4.13  3.5 - 6.0 cm Final    LVIDs 04/27/2022 2.81  2.1 - 4.0 cm Final    LVOT diameter 04/27/2022 2.10  cm Final    LVOT peak VTI 04/27/2022 22.60  cm Final    LVOT peak harley 04/27/2022 1.07  m/s Final    Posterior Wall 04/27/2022 1.24 (A)  0.6 - 1.1 cm Final    Left Atrium Major Axis 04/27/2022 4.20  cm Final    LA size 04/27/2022 4.30  cm Final    E wave deceleration time 04/27/2022 222.00  ms Final    MV Peak A Harley 04/27/2022 1.14  m/s Final    MV Peak E Harley 04/27/2022 0.82  m/s Final    RVDD 04/27/2022 2.19  cm Final    Triscuspid Valve Regurgitation Pea* 04/27/2022 23  mmHg Final    BSA 04/27/2022 1.9  m2 Final    TDI SEPTAL 04/27/2022 0.06  m/s Final    LV LATERAL E/E' RATIO 04/27/2022 10.25  m/s Final    LV SEPTAL E/E' RATIO 04/27/2022 13.67  m/s Final    TDI LATERAL 04/27/2022 0.08  m/s Final    FS 04/27/2022 32  28 - 44 % Final    LV mass 04/27/2022 183.37  g Final    Left Ventricle Relative Wall Thick* 04/27/2022 0.60  cm Final    AV valve area 04/27/2022 2.73  cm2 Final    AV Velocity Ratio 04/27/2022 0.77   Final    AV index (prosthetic) 04/27/2022 0.79   Final    E/A ratio 04/27/2022 0.72   Final    Mean e' 04/27/2022 0.07  m/s Final    LVOT area 04/27/2022 3.5  cm2 Final    LVOT stroke volume 04/27/2022 78.24  cm3 Final    E/E' ratio 04/27/2022 11.71  m/s Final    TR Max Harley 04/27/2022 2.39  m/s Final    LV Mass Index 04/27/2022 101  g/m2 Final    Right Atrial Pressure (from IVC) 04/27/2022 3  mmHg Final    EF 04/27/2022 60  % Final    TV rest pulmonary artery pressure 04/27/2022 26  mmHg Final   Hospital Outpatient Visit on 04/27/2022   Component Date Value Ref Range Status    85% Max  Predicted HR 04/27/2022 132   Final    Max Predicted HR 04/27/2022 155   Final    [facility] CV CPX PATIENT IS MALE 04/27/2022 0.0   Final    [facility] CV CPX PATIENT IS FEMALE 04/27/2022 1.0   Final    EF + QEF 04/27/2022 53  % Final    Ejection Fraction- High Stress 04/27/2022 65  % Final    End diastolic index (mL/m2) 04/27/2022 94  mL/m2 Final    End diastolic index (mL/m2) 04/27/2022 158  mL/m2 Final    End systolic index (mL/m2) 04/27/2022 33  mL/m2 Final    End systolic index (mL/m2) 04/27/2022 71  mL/m2 Final    Nuc Stress EF 04/27/2022 79  % Final    Nuc Rest Diastolic Volume Index 04/27/2022 69   Final    Nuc Rest Systolic Volume Index 04/27/2022 14   Final    HR at rest 04/27/2022 65  bpm Final    Systolic blood pressure 04/27/2022 120  mmHg Final    Diastolic blood pressure 04/27/2022 90  mmHg Final    RPP 04/27/2022 7,800   Final    Exercise duration (min) 04/27/2022 8  minutes Final    Exercise duration (sec) 04/27/2022 20  seconds Final    Peak HR 04/27/2022 148  bpm Final    Peak Systolic BP 04/27/2022 164  mmHg Final    Peak Diatolic BP 04/27/2022 90  mmHg Final    Peak RPP 04/27/2022 24,272   Final    Estimated METs 04/27/2022 10   Final    % Max HR Achieved 04/27/2022 96   Final    1 Minute Recovery HR 04/27/2022 122  bpm Final       Past Medical History:   Diagnosis Date    Broken heart syndrome     Hypertension      Past Surgical History:   Procedure Laterality Date    CLAVICLE SURGERY  2012    TONSILLECTOMY       Family History   Problem Relation Age of Onset    Colon cancer Mother     Uterine cancer Mother     Hypertension Father     Testicular cancer Son        Marital Status:   Alcohol History:  reports current alcohol use.  Tobacco History:  reports that she has never smoked. She has never used smokeless tobacco.  Drug History:  reports no history of drug use.    Review of patient's allergies indicates:   Allergen Reactions    Crestor [rosuvastatin] Other (See Comments)     Paralysis    Codeine  Nausea And Vomiting    Strawberries [strawberry] Hives    Adhesive tape-silicones Rash       Current Outpatient Medications:     acetaminophen (TYLENOL) 500 MG tablet, Take 2 tablets (1,000 mg total) by mouth 3 (three) times daily., Disp: , Rfl:     ergocalciferol (VITAMIN D2) 50,000 unit Cap, Take 1 capsule (50,000 Units total) by mouth nightly., Disp: , Rfl:     iron carb,gl/FA/B12/C/docusate (FERRALET 90 DUAL-IRON DELIVERY ORAL), Take 90 mg by mouth 4 (four) times a week. , Disp: , Rfl:     levothyroxine (SYNTHROID) 50 MCG tablet, Take 50 mcg by mouth every evening., Disp: , Rfl:     loratadine (CLARITIN) 10 mg tablet, Take 10 mg by mouth every evening., Disp: , Rfl:     magnesium oxide (MAG-OX) 400 mg (241.3 mg magnesium) tablet, Take 400 mg by mouth every evening., Disp: , Rfl:     multivitamin (THERAGRAN) per tablet, Take 1 tablet by mouth every evening., Disp: , Rfl:     atorvastatin (LIPITOR) 10 MG tablet, Take 1 tablet (10 mg total) by mouth every evening., Disp: 90 tablet, Rfl: 3    chlorthalidone (HYGROTEN) 25 MG Tab, Take 1 tablet (25 mg total) by mouth once daily., Disp: 90 tablet, Rfl: 3    esomeprazole (NEXIUM) 40 MG capsule, Take 1 capsule (40 mg total) by mouth before breakfast., Disp: 30 capsule, Rfl: 11    Lactobacillus rhamnosus GG (CULTURELLE) 10 billion cell capsule, Take 1 capsule by mouth every evening., Disp: , Rfl:     losartan (COZAAR) 100 MG tablet, Take 1 tablet (100 mg total) by mouth once daily., Disp: 90 tablet, Rfl: 3    Review of Systems   Constitutional:  Negative for appetite change, chills, fatigue, fever and unexpected weight change.   HENT:  Negative for congestion.    Respiratory:  Negative for cough, chest tightness and shortness of breath.    Cardiovascular:  Negative for chest pain and palpitations.   Gastrointestinal:  Negative for abdominal distention and abdominal pain.   Endocrine: Negative for cold intolerance and heat intolerance.   Genitourinary:  Negative for  difficulty urinating and dysuria.   Musculoskeletal:  Negative for arthralgias and back pain.   Neurological:  Negative for dizziness, weakness and headaches.        Objective:      Vitals:    10/13/22 0819   BP: 104/72   Pulse: 80   SpO2: 98%   Weight: 78.5 kg (173 lb)   Height: 5' (1.524 m)     Physical Exam  Constitutional:       General: She is not in acute distress.     Appearance: She is well-developed.   HENT:      Head: Normocephalic and atraumatic.   Eyes:      Conjunctiva/sclera: Conjunctivae normal.      Pupils: Pupils are equal, round, and reactive to light.   Neck:      Thyroid: No thyromegaly.   Cardiovascular:      Rate and Rhythm: Normal rate and regular rhythm.      Heart sounds: Normal heart sounds.   Pulmonary:      Effort: Pulmonary effort is normal.      Breath sounds: Normal breath sounds.   Abdominal:      General: Bowel sounds are normal. There is no distension.      Palpations: Abdomen is soft.      Tenderness: There is no abdominal tenderness.   Musculoskeletal:         General: Normal range of motion.      Cervical back: Normal range of motion and neck supple.   Skin:     General: Skin is warm and dry.      Findings: No erythema.   Neurological:      Mental Status: She is alert and oriented to person, place, and time.      Cranial Nerves: No cranial nerve deficit.         Assessment:       1. Primary hypertension    2. Fibrosis of skin of lower extremity    3. Dyslipidemia    4. Gastroesophageal reflux disease with esophagitis without hemorrhage         Plan:       Primary hypertension  Comments:  BP remains very well controlled.  Orders:  -     chlorthalidone (HYGROTEN) 25 MG Tab; Take 1 tablet (25 mg total) by mouth once daily.  Dispense: 90 tablet; Refill: 3  -     losartan (COZAAR) 100 MG tablet; Take 1 tablet (100 mg total) by mouth once daily.  Dispense: 90 tablet; Refill: 3    Fibrosis of skin of lower extremity  Comments:  planning to see rheumatology today for further evaluation.  unclear if related to cardiac fibrosis    Dyslipidemia  Comments:  refills as needed  Orders:  -     atorvastatin (LIPITOR) 10 MG tablet; Take 1 tablet (10 mg total) by mouth every evening.  Dispense: 90 tablet; Refill: 3    Gastroesophageal reflux disease with esophagitis without hemorrhage  Comments:  worsened recently. will upgrade to nexium daily. await further evaluation with GI.  Orders:  -     esomeprazole (NEXIUM) 40 MG capsule; Take 1 capsule (40 mg total) by mouth before breakfast.  Dispense: 30 capsule; Refill: 11  -     Ambulatory referral/consult to Gastroenterology; Future; Expected date: 10/13/2022    Follow up in about 4 months (around 2/13/2023).        10/13/2022 Wilfredo Hernández PA-C

## 2022-10-18 ENCOUNTER — PATIENT MESSAGE (OUTPATIENT)
Dept: RHEUMATOLOGY | Facility: CLINIC | Age: 57
End: 2022-10-18
Payer: COMMERCIAL

## 2022-10-18 ENCOUNTER — PATIENT MESSAGE (OUTPATIENT)
Dept: FAMILY MEDICINE | Facility: CLINIC | Age: 57
End: 2022-10-18

## 2022-10-18 NOTE — TELEPHONE ENCOUNTER
Can we fax a referral to that que? I dont have a particular hepatologist in mind for them just whoever can see her soonest

## 2022-10-21 ENCOUNTER — OFFICE VISIT (OUTPATIENT)
Dept: CARDIOLOGY | Facility: CLINIC | Age: 57
End: 2022-10-21
Payer: COMMERCIAL

## 2022-10-21 VITALS
RESPIRATION RATE: 16 BRPM | HEART RATE: 82 BPM | OXYGEN SATURATION: 99 % | DIASTOLIC BLOOD PRESSURE: 80 MMHG | HEIGHT: 60 IN | WEIGHT: 172 LBS | SYSTOLIC BLOOD PRESSURE: 134 MMHG | BODY MASS INDEX: 33.77 KG/M2

## 2022-10-21 DIAGNOSIS — K21.00 GASTROESOPHAGEAL REFLUX DISEASE WITH ESOPHAGITIS WITHOUT HEMORRHAGE: ICD-10-CM

## 2022-10-21 DIAGNOSIS — I25.3 LEFT VENTRICULAR ANEURYSM: ICD-10-CM

## 2022-10-21 DIAGNOSIS — E07.9 THYROID DYSFUNCTION: ICD-10-CM

## 2022-10-21 DIAGNOSIS — R07.89 OTHER CHEST PAIN: ICD-10-CM

## 2022-10-21 DIAGNOSIS — I10 PRIMARY HYPERTENSION: ICD-10-CM

## 2022-10-21 DIAGNOSIS — E78.5 DYSLIPIDEMIA: ICD-10-CM

## 2022-10-21 PROCEDURE — 3075F SYST BP GE 130 - 139MM HG: CPT | Mod: CPTII,S$GLB,, | Performed by: INTERNAL MEDICINE

## 2022-10-21 PROCEDURE — 99214 PR OFFICE/OUTPT VISIT, EST, LEVL IV, 30-39 MIN: ICD-10-PCS | Mod: S$GLB,,, | Performed by: INTERNAL MEDICINE

## 2022-10-21 PROCEDURE — 1160F PR REVIEW ALL MEDS BY PRESCRIBER/CLIN PHARMACIST DOCUMENTED: ICD-10-PCS | Mod: CPTII,S$GLB,, | Performed by: INTERNAL MEDICINE

## 2022-10-21 PROCEDURE — 1159F MED LIST DOCD IN RCRD: CPT | Mod: CPTII,S$GLB,, | Performed by: INTERNAL MEDICINE

## 2022-10-21 PROCEDURE — 3075F PR MOST RECENT SYSTOLIC BLOOD PRESS GE 130-139MM HG: ICD-10-PCS | Mod: CPTII,S$GLB,, | Performed by: INTERNAL MEDICINE

## 2022-10-21 PROCEDURE — 3044F PR MOST RECENT HEMOGLOBIN A1C LEVEL <7.0%: ICD-10-PCS | Mod: CPTII,S$GLB,, | Performed by: INTERNAL MEDICINE

## 2022-10-21 PROCEDURE — 4010F PR ACE/ARB THEARPY RXD/TAKEN: ICD-10-PCS | Mod: CPTII,S$GLB,, | Performed by: INTERNAL MEDICINE

## 2022-10-21 PROCEDURE — 1159F PR MEDICATION LIST DOCUMENTED IN MEDICAL RECORD: ICD-10-PCS | Mod: CPTII,S$GLB,, | Performed by: INTERNAL MEDICINE

## 2022-10-21 PROCEDURE — 1160F RVW MEDS BY RX/DR IN RCRD: CPT | Mod: CPTII,S$GLB,, | Performed by: INTERNAL MEDICINE

## 2022-10-21 PROCEDURE — 99214 OFFICE O/P EST MOD 30 MIN: CPT | Mod: S$GLB,,, | Performed by: INTERNAL MEDICINE

## 2022-10-21 PROCEDURE — 4010F ACE/ARB THERAPY RXD/TAKEN: CPT | Mod: CPTII,S$GLB,, | Performed by: INTERNAL MEDICINE

## 2022-10-21 PROCEDURE — 3044F HG A1C LEVEL LT 7.0%: CPT | Mod: CPTII,S$GLB,, | Performed by: INTERNAL MEDICINE

## 2022-10-21 PROCEDURE — 3079F PR MOST RECENT DIASTOLIC BLOOD PRESSURE 80-89 MM HG: ICD-10-PCS | Mod: CPTII,S$GLB,, | Performed by: INTERNAL MEDICINE

## 2022-10-21 PROCEDURE — 3079F DIAST BP 80-89 MM HG: CPT | Mod: CPTII,S$GLB,, | Performed by: INTERNAL MEDICINE

## 2022-10-21 RX ORDER — SUCRALFATE 1 G/1
1 TABLET ORAL 2 TIMES DAILY
Qty: 180 TABLET | Refills: 3 | Status: ON HOLD | OUTPATIENT
Start: 2022-10-21 | End: 2022-12-03 | Stop reason: SDUPTHER

## 2022-10-21 RX ORDER — CHLORTHALIDONE 25 MG/1
25 TABLET ORAL DAILY
Qty: 90 TABLET | Refills: 3 | Status: ON HOLD | OUTPATIENT
Start: 2022-10-21 | End: 2022-11-24 | Stop reason: HOSPADM

## 2022-10-21 NOTE — PROGRESS NOTES
Subjective:    Patient ID:  Brunilda Bob is a 56 y.o. female patient here for evaluation Thyroid Problem and Hypertension      History of Present Illness:   Is a 56-year-old lady with history of arterial hypertension and thyroid dysfunction had extensive workup for are lesion the right leg.  She does have significant dyspeptic symptoms with increased belching and and burping as well as flatness.    Arm she has no occurrence of any are sustained exertional angina but she does have some vague discomfort along the left inframammary region.  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    No cough or congestion no fevers or chills she does suffer from seasonal allergies.          Review of patient's allergies indicates:   Allergen Reactions    Crestor [rosuvastatin] Other (See Comments)     Paralysis    Codeine Nausea And Vomiting    Strawberries [strawberry] Hives    Adhesive tape-silicones Rash       Past Medical History:   Diagnosis Date    Broken heart syndrome     Hypertension      Past Surgical History:   Procedure Laterality Date    CLAVICLE SURGERY  2012    TONSILLECTOMY       Social History     Tobacco Use    Smoking status: Never    Smokeless tobacco: Never   Substance Use Topics    Alcohol use: Yes    Drug use: Never        Review of Systems:    As noted in HPI in addition      REVIEW OF SYSTEMS  CARDIOVASCULAR: No recent chest pain, palpitations, arm, neck, or jaw pain  RESPIRATORY: No recent fever, cough chills, SOB or congestion  : No blood in the urine  GI: No Nausea, vomiting, constipation, diarrhea, blood, or reflux.  MUSCULOSKELETAL: No myalgias  NEURO: No lightheadedness or dizziness she does have some paresthesias in the left upper extremity  EYES: No Double vision, blurry, vision or headache              Objective        Vitals:    10/21/22 1030   BP: 134/80   Pulse: 82   Resp: 16       LIPIDS - LAST 2   Lab Results    Component Value Date    CHOL 144 02/23/2022    CHOL 200 (H) 05/10/2008    HDL 46 (L) 02/23/2022    HDL 52 05/10/2008    LDLCALC 80 02/23/2022    LDLCALC 134.4 (H) 05/10/2008    TRIG 99 02/23/2022    TRIG 68 05/10/2008    CHOLHDL 3.1 02/23/2022    CHOLHDL 26.0 05/10/2008       CBC - LAST 2  Lab Results   Component Value Date    WBC 7.03 10/13/2022    WBC 5.42 07/04/2022    RBC 4.33 10/13/2022    RBC 4.70 07/04/2022    HGB 11.7 (L) 10/13/2022    HGB 12.5 07/04/2022    HCT 36.9 (L) 10/13/2022    HCT 40.1 07/04/2022    MCV 85 10/13/2022    MCV 85 07/04/2022    MCH 27.0 10/13/2022    MCH 26.6 (L) 07/04/2022    MCHC 31.7 (L) 10/13/2022    MCHC 31.2 (L) 07/04/2022    RDW 14.0 10/13/2022    RDW 14.4 07/04/2022     10/13/2022     07/04/2022    MPV 10.8 10/13/2022    MPV 10.5 07/04/2022    GRAN 5.1 10/13/2022    GRAN 71.9 10/13/2022    LYMPH 1.3 10/13/2022    LYMPH 18.3 10/13/2022    MONO 0.4 10/13/2022    MONO 5.5 10/13/2022    BASO 0.04 10/13/2022    BASO 0.03 07/04/2022    NRBC 0 10/13/2022    NRBC 0 07/04/2022       CHEMISTRY & LIVER FUNCTION - LAST 2  Lab Results   Component Value Date     10/13/2022     07/04/2022    K 3.6 10/13/2022    K 4.6 07/04/2022     10/13/2022     07/04/2022    CO2 29 10/13/2022    CO2 28 07/04/2022    ANIONGAP 10 10/13/2022    ANIONGAP 7 (L) 07/04/2022    BUN 23 (H) 10/13/2022    BUN 19 07/04/2022    CREATININE 0.9 10/13/2022    CREATININE 0.8 07/04/2022     (H) 10/13/2022     (H) 07/04/2022    CALCIUM 10.1 10/13/2022    CALCIUM 9.4 07/04/2022    MG 2.0 07/04/2022    MG 2.2 07/03/2022    ALBUMIN 3.8 10/13/2022    ALBUMIN 3.5 07/04/2022    PROT 7.5 10/13/2022    PROT 6.3 07/04/2022    ALKPHOS 144 (H) 10/13/2022    ALKPHOS 116 07/04/2022    ALT 23 10/13/2022    ALT 20 07/04/2022    AST 25 10/13/2022    AST 18 07/04/2022    BILITOT 0.6 10/13/2022    BILITOT 0.4 07/04/2022        CARDIAC PROFILE - LAST 2  Lab Results   Component Value Date      (H) 06/30/2022    BNP 37 10/30/2019    CPK 55 10/13/2022    TROPONINI <0.030 07/01/2022    TROPONINI <0.030 07/01/2022        COAGULATION - LAST 2  Lab Results   Component Value Date    LABPT 13.3 10/30/2019    INR 1.1 10/30/2019       ENDOCRINE & PSA - LAST 2  Lab Results   Component Value Date    HGBA1C 6.2 07/01/2022    MICROALBUR 0.8 01/08/2021    MICROALBUR <0.2 06/11/2020    TSH 1.297 10/13/2022    TSH 1.84 02/23/2022        ECHOCARDIOGRAM RESULTS  Results for orders placed during the hospital encounter of 07/14/22    Cardiac MRI Morphology    Narrative  Table formatting from the original result was not included.      Cardiovascular Magnetic Resonance Study Report    Patient Name:  MORENITA HO Institution:  [facility]  Patient Sex:  F Study Date:  2022-07-14  YOB: 1965 Study Description:  MRI CARDIAC MORPHOLOGY FUNCTION W WO  Age:  56 Y. AccessionNumber:  [id number]  Patient ID:  [id number] Version:  5.11.4  Custom Patient ID:   Referring Physician:  EMMANUEL BALLARD  Height:  152 cm  Weight:  80.3 kg  BSA:  1.77 m²(Tasha)          Indication: cardiac sarcoid    Procedure:  1. T1 weighted dark blood without contrast  2. Cine SSFP  3. Phase Contrast CMR flow measurements  4. Late gadolinium enhancement utilizing 20 cc of Gadolinium (Multihance)      Study Quality: good    Rhythm and HR: regular and 60s    Breathhold quality: good    Interpretation:  1.  Left Ventricle:  The left ventricular volumes are normal. The LV apex is aneurysmal. The calculated LVEF is 58%.    LVEDV:   157cc  LVEDV:  89cc/m2 for BSA (nl 51-95cc)  LVESV:   65cc  LVESV:   37cc/m2 for BSA (nl 11-35cc)  LV mass: 63.2g/m2 (nl 34-70)  LVEF: 58%  Stroke volume:   92cc by ventricular trace  Stroke volume:   63cc by Q-flow analysis  Regurgitant volume:  4cc by Q-flow analysis  Total volume through the aortic valve: 67cc by Q-flow analysis    2.  Right Ventricle:  The right ventricular volumes  are normal.  The calculated RVEF is 48%.    RVEDV:  124cc  RVEDV:  70cc/m2 for BSA (nl 42-118cc)  RVESV:   65cc  RVESV:   37cc/m2 for BSA (nl 6-54cc)  RVEF:  48%  Stroke volume:  59 cc by ventricular trace  Stroke volume:  63cc by Q-flow analysis  Regurgitant volume:  4cc by Q-flow analysis  Total volume through the pulmonary valve: 67cc by Q-flow analysis    3.  Atria:  a.  Right:  The right atrium is normal in size with an area of  16cm2.  b.  Left:  The left atrium is normal in size. Volume measures  40cc/m2 by area length method.    4.  Cardiac Valves:  a.  Mitral:  The mitral valve is structurally normal.  b.  Aortic:  The aortic valve is structurally normal  c.  Pulmonic:  The pulmonic valve is not visualized on this study.  d.  Tricuspid:  The tricuspid valve is structurally normal.    5.  Pulmonic vasculature on axial T1 EKG gated images:  a.  RPA: 17mm  b.  LPA: 16mm  c.  MPA: 22mm    6.  Aorta on axial T1 EKG gated images:  a. Ascending aorta: 31mm  b.  Descending aorta: 22mm  c.  Aortic arch:  Left sided    7. T1 map:1130 ± 62  8. T2 map: 44 ± 7  9. T2 star Map: 31.19 ± 1.03 ms R²=0.994 [Fe] = 0.68 ± 0.03 mg/g dw.Please note that clinically important iron loading is defined by T2*<20ms and severe cardiac iron loading is considered present if the T2* <10ms    10.  Delayed hyperenhancement: There is transmural delayed gadolinium enhancement suggestive of infarction or fibrosis at the left ventricular apex.    11. Pericardium: No effusion          Conclusion:  1. LV volumes are normal. LV mass is in the normal range. LVEF = 58%.  2. RV volumes are normal. RVEF = 48%.  3. There is transmural delayed gadolinium enhancement suggestive of infarction or fibrosis at the left ventricular apex.  4. No evidence of LV thrombus      CURRENT/PREVIOUS VISIT EKG  Results for orders placed or performed during the hospital encounter of 06/30/22   EKG 12-lead    Collection Time: 06/30/22  5:30 PM    Narrative    Test Reason  : R07.9,    Vent. Rate : 055 BPM     Atrial Rate : 055 BPM     P-R Int : 180 ms          QRS Dur : 094 ms      QT Int : 396 ms       P-R-T Axes : 046 -19 055 degrees     QTc Int : 378 ms    Sinus bradycardia  Otherwise normal ECG  When compared with ECG of 24-FEB-2022 15:34,  T wave inversion no longer evident in Anterior leads  Confirmed by Sreekanth Osborn MD ([id number]) on 7/8/2022 5:23:00 PM    Referred By: [email or web address]   SELF           Confirmed By:Sreekanth Osborn MD     No valid procedures specified.   Results for orders placed during the hospital encounter of 04/27/22    Nuclear Stress - Cardiology Interpreted    Interpretation Summary    Normal myocardial perfusion scan. There is no evidence of myocardial ischemia or infarction.    There is a moderate to severe intensity perfusion abnormality in the anterior wall of the left ventricle, secondary to breast attenuation.    The gated perfusion images showed an ejection fraction of 79% post stress. Normal ejection fraction is greater than 53%.    There is normal wall motion post stress.    LV cavity size is  and normal at stress.    The EKG portion of this study is negative for ischemia.    The patient reported no chest pain during the stress test.    There were no arrhythmias during stress.    No valid procedures specified.    PHYSICAL EXAM  CONSTITUTIONAL: Well built, well nourished in no apparent distress  NECK: no carotid bruit, no JVD  LUNGS: CTA  CHEST WALL: no tenderness  HEART: regular rate and rhythm, S1, S2 normal, no murmur, click, rub or gallop   ABDOMEN: soft, non-tender; bowel sounds normal; no masses,  no organomegaly  EXTREMITIES: Extremities normal, no edema, no calf tenderness noted  NEURO: AAO X 3    I HAVE REVIEWED :    The vital signs, nurses notes, and all the pertinent radiology and labs.        Current Outpatient Medications   Medication Instructions    acetaminophen (TYLENOL) 1,000 mg, Oral, 3 times daily    atorvastatin (LIPITOR) 10 mg, Oral,  Nightly    chlorthalidone (HYGROTEN) 25 mg, Oral, Daily    ergocalciferol (VITAMIN D2) 50,000 Units, Oral, Nightly    esomeprazole (NEXIUM) 40 mg, Oral, Before breakfast    iron carb,gl/FA/B12/C/docusate (FERRALET 90 DUAL-IRON DELIVERY ORAL) 90 mg, Oral, Four times weekly    Lactobacillus rhamnosus GG (CULTURELLE) 10 billion cell capsule 1 capsule, Oral, Nightly    levothyroxine (SYNTHROID) 50 mcg, Oral, Nightly    loratadine (CLARITIN) 10 mg, Oral, Nightly    losartan (COZAAR) 100 mg, Oral, Daily    magnesium oxide (MAG-OX) 400 mg, Oral, Nightly    multivitamin (THERAGRAN) per tablet 1 tablet, Oral, Nightly          Assessment & Plan     Left ventricular aneurysm  She does have some apical scarring gadolinium arm MRI.  And no reversible ischemia seen on nuclear imaging study.  Ejection fraction is still well preserved.  Continue on present therapy.  No thrombus identified  If patient can take baby aspirin this is encouraged.  Defer causes significant dyspeptic symptoms may consider Plavix.  Hypertension  Blood pressure is stable at 130 4/80 mm Hg home recording of her blood pressure is even better around 110 systolic.    She is off atenolol at this time continue on arm chlorthalidone at 25 mg once a day and continue on losartan 100 mg daily blood pressure is stable with this.    Dyslipidemia  Continue on Lipitor arm at 10 mg daily maintain low-salt low-fat diet the goal is to keep her LDL cholesterol 70 or less    Other chest pain  She has atypical chest pains.  Probable from the ribcage and dyspeptic symptoms.  Continue on PPI agents    Thyroid dysfunction  Presently on levothyroxine 50 mcg a day maintain the same    GERD with esophagitis  She has history of GERD with esophagitis continue on PPI agents.  She may be a candidate for Carafate will try 1 g a.c. b.i.d..  As symptoms of persistent despite PPI agents.          No follow-ups on file.

## 2022-10-21 NOTE — ASSESSMENT & PLAN NOTE
Continue on Lipitor arm at 10 mg daily maintain low-salt low-fat diet the goal is to keep her LDL cholesterol 70 or less

## 2022-10-21 NOTE — ASSESSMENT & PLAN NOTE
Blood pressure is stable at 130 4/80 mm Hg home recording of her blood pressure is even better around 110 systolic.    She is off atenolol at this time continue on arm chlorthalidone at 25 mg once a day and continue on losartan 100 mg daily blood pressure is stable with this.

## 2022-10-21 NOTE — ASSESSMENT & PLAN NOTE
She has history of GERD with esophagitis continue on PPI agents.  She may be a candidate for Carafate will try 1 g a.c. b.i.d..  As symptoms of persistent despite PPI agents.

## 2022-10-21 NOTE — ASSESSMENT & PLAN NOTE
She has atypical chest pains.  Probable from the ribcage and dyspeptic symptoms.  Continue on PPI agents

## 2022-10-21 NOTE — ASSESSMENT & PLAN NOTE
She does have some apical scarring gadolinium arm MRI.  And no reversible ischemia seen on nuclear imaging study.  Ejection fraction is still well preserved.  Continue on present therapy.  No thrombus identified

## 2022-11-07 ENCOUNTER — TELEPHONE (OUTPATIENT)
Dept: CARDIOLOGY | Facility: CLINIC | Age: 57
End: 2022-11-07
Payer: COMMERCIAL

## 2022-11-07 NOTE — TELEPHONE ENCOUNTER
----- Message from Rukhsana Meléndez, Patient Care Assistant sent at 11/7/2022  2:37 PM CST -----  Contact: Pt  Type: Needs Medical Advice    Who Called: Pt  Best Call Back Number: [phone]  Inquiry/Question: Pt is calling to make aware that the [facility] will be sending over documents to get Cardiac Clearance for a procedure(EGD). Pt states she was just seen and would like to know if that appt can count towards her clearance. Please advise Thank you~

## 2022-11-09 ENCOUNTER — PATIENT MESSAGE (OUTPATIENT)
Dept: CARDIOLOGY | Facility: CLINIC | Age: 57
End: 2022-11-09
Payer: COMMERCIAL

## 2022-11-17 ENCOUNTER — TELEPHONE (OUTPATIENT)
Dept: FAMILY MEDICINE | Facility: CLINIC | Age: 57
End: 2022-11-17

## 2022-11-17 NOTE — TELEPHONE ENCOUNTER
----- Message from Annalisa Taylor sent at 11/17/2022  3:15 PM CST -----  Patient called and stated that she went to urgent care last Thursday and she would like to see if she can come in soon she still feeling bad and she stated that the seen something on her xrays . She stated that she still has a cough and they advised that she has bronchitis and the medicine she was given is still not working please give her a call at [phone]

## 2022-11-18 ENCOUNTER — OFFICE VISIT (OUTPATIENT)
Dept: FAMILY MEDICINE | Facility: CLINIC | Age: 57
End: 2022-11-18
Payer: COMMERCIAL

## 2022-11-18 ENCOUNTER — HOSPITAL ENCOUNTER (OUTPATIENT)
Dept: RADIOLOGY | Facility: HOSPITAL | Age: 57
Discharge: HOME | End: 2022-11-18
Attending: PHYSICIAN ASSISTANT
Payer: COMMERCIAL

## 2022-11-18 VITALS
SYSTOLIC BLOOD PRESSURE: 94 MMHG | WEIGHT: 168 LBS | HEART RATE: 108 BPM | HEIGHT: 60 IN | DIASTOLIC BLOOD PRESSURE: 64 MMHG | BODY MASS INDEX: 32.98 KG/M2 | TEMPERATURE: 99 F

## 2022-11-18 DIAGNOSIS — J40 BRONCHITIS: Primary | ICD-10-CM

## 2022-11-18 DIAGNOSIS — J40 BRONCHITIS: ICD-10-CM

## 2022-11-18 PROCEDURE — 4010F PR ACE/ARB THEARPY RXD/TAKEN: ICD-10-PCS | Mod: CPTII,S$GLB,, | Performed by: PHYSICIAN ASSISTANT

## 2022-11-18 PROCEDURE — 3074F SYST BP LT 130 MM HG: CPT | Mod: CPTII,S$GLB,, | Performed by: PHYSICIAN ASSISTANT

## 2022-11-18 PROCEDURE — 99214 OFFICE O/P EST MOD 30 MIN: CPT | Mod: S$GLB,,, | Performed by: PHYSICIAN ASSISTANT

## 2022-11-18 PROCEDURE — 3044F HG A1C LEVEL LT 7.0%: CPT | Mod: CPTII,S$GLB,, | Performed by: PHYSICIAN ASSISTANT

## 2022-11-18 PROCEDURE — 3074F PR MOST RECENT SYSTOLIC BLOOD PRESSURE < 130 MM HG: ICD-10-PCS | Mod: CPTII,S$GLB,, | Performed by: PHYSICIAN ASSISTANT

## 2022-11-18 PROCEDURE — 4010F ACE/ARB THERAPY RXD/TAKEN: CPT | Mod: CPTII,S$GLB,, | Performed by: PHYSICIAN ASSISTANT

## 2022-11-18 PROCEDURE — 3008F BODY MASS INDEX DOCD: CPT | Mod: CPTII,S$GLB,, | Performed by: PHYSICIAN ASSISTANT

## 2022-11-18 PROCEDURE — 71046 X-RAY EXAM CHEST 2 VIEWS: CPT | Mod: TC,PO

## 2022-11-18 PROCEDURE — 3044F PR MOST RECENT HEMOGLOBIN A1C LEVEL <7.0%: ICD-10-PCS | Mod: CPTII,S$GLB,, | Performed by: PHYSICIAN ASSISTANT

## 2022-11-18 PROCEDURE — 3078F PR MOST RECENT DIASTOLIC BLOOD PRESSURE < 80 MM HG: ICD-10-PCS | Mod: CPTII,S$GLB,, | Performed by: PHYSICIAN ASSISTANT

## 2022-11-18 PROCEDURE — 3008F PR BODY MASS INDEX (BMI) DOCUMENTED: ICD-10-PCS | Mod: CPTII,S$GLB,, | Performed by: PHYSICIAN ASSISTANT

## 2022-11-18 PROCEDURE — 99214 PR OFFICE/OUTPT VISIT, EST, LEVL IV, 30-39 MIN: ICD-10-PCS | Mod: S$GLB,,, | Performed by: PHYSICIAN ASSISTANT

## 2022-11-18 PROCEDURE — 3078F DIAST BP <80 MM HG: CPT | Mod: CPTII,S$GLB,, | Performed by: PHYSICIAN ASSISTANT

## 2022-11-18 RX ORDER — CODEINE PHOSPHATE AND GUAIFENESIN 10; 100 MG/5ML; MG/5ML
5 SOLUTION ORAL 3 TIMES DAILY PRN
Qty: 118 ML | Refills: 0 | Status: ON HOLD | OUTPATIENT
Start: 2022-11-18 | End: 2022-11-24 | Stop reason: HOSPADM

## 2022-11-18 RX ORDER — PREDNISONE 20 MG/1
20 TABLET ORAL 2 TIMES DAILY
Qty: 10 TABLET | Refills: 0 | Status: ON HOLD | OUTPATIENT
Start: 2022-11-18 | End: 2022-11-24 | Stop reason: HOSPADM

## 2022-11-18 RX ORDER — AMOXICILLIN AND CLAVULANATE POTASSIUM 875; 125 MG/1; MG/1
1 TABLET, FILM COATED ORAL EVERY 12 HOURS
Qty: 20 TABLET | Refills: 0 | Status: ON HOLD | OUTPATIENT
Start: 2022-11-18 | End: 2022-11-24 | Stop reason: HOSPADM

## 2022-11-18 NOTE — PROGRESS NOTES
"  SUBJECTIVE:    Patient ID: Brunilda Bob is a 56 y.o. female.    Chief Complaint: Cough (Did go [facility], was put on meds and they did chest x-ray, but it's not getting any better, sputum was green and now it's yellowish white, has very little appetite, no bottles// SW)    This is a 56-year-old female who presents today for 2 weeks + of c/c sx. Went to UC last wed and had an x ray that showed "some infiltrates". Given 10 days of doxycycline and has 2 days remaining.  also given same abx course and has cleared up since UC visit. Pt is concerned that she is dehydrated as she has lost her appetite for liquids and can't hold these down. Pt c/o congestion, cough, fatigue, and loss of appetite. Denies vomiting, diarrhea, ear pain. Pmhx significant for broken heart syndrome and chronic GI issues. Allergy to codeine.       Lab Visit on 10/13/2022   Component Date Value Ref Range Status    NIL 10/13/2022 0.12161  IU/mL Final    TB1 - Nil 10/13/2022 0.043  IU/mL Final    TB2 - Nil 10/13/2022 0.035  IU/mL Final    Mitogen - Nil 10/13/2022 4.344  IU/mL Final    TB Gold Plus 10/13/2022 Negative  Negative Final   Lab Visit on 10/13/2022   Component Date Value Ref Range Status    CRP 10/13/2022 16.1 (H)  0.0 - 8.2 mg/L Final    Anti-SSB Antibody 10/13/2022 0.05  0.00 - 0.99 Ratio Final    Anti-SSB Interpretation 10/13/2022 Negative  Negative Final    WBC 10/13/2022 7.03  3.90 - 12.70 K/uL Final    RBC 10/13/2022 4.33  4.00 - 5.40 M/uL Final    Hemoglobin 10/13/2022 11.7 (L)  12.0 - 16.0 g/dL Final    Hematocrit 10/13/2022 36.9 (L)  37.0 - 48.5 % Final    MCV 10/13/2022 85  82 - 98 fL Final    MCH 10/13/2022 27.0  27.0 - 31.0 pg Final    MCHC 10/13/2022 31.7 (L)  32.0 - 36.0 g/dL Final    RDW 10/13/2022 14.0  11.5 - 14.5 % Final    Platelets 10/13/2022 271  150 - 450 K/uL Final    MPV 10/13/2022 10.8  9.2 - 12.9 fL Final    Immature Granulocytes 10/13/2022 0.4  0.0 - 0.5 % Final    Gran # " (ANC) 10/13/2022 5.1  1.8 - 7.7 K/uL Final    Immature Grans (Abs) 10/13/2022 0.03  0.00 - 0.04 K/uL Final    Lymph # 10/13/2022 1.3  1.0 - 4.8 K/uL Final    Mono # 10/13/2022 0.4  0.3 - 1.0 K/uL Final    Eos # 10/13/2022 0.2  0.0 - 0.5 K/uL Final    Baso # 10/13/2022 0.04  0.00 - 0.20 K/uL Final    nRBC 10/13/2022 0  0 /100 WBC Final    Gran % 10/13/2022 71.9  38.0 - 73.0 % Final    Lymph % 10/13/2022 18.3  18.0 - 48.0 % Final    Mono % 10/13/2022 5.5  4.0 - 15.0 % Final    Eosinophil % 10/13/2022 3.3  0.0 - 8.0 % Final    Basophil % 10/13/2022 0.6  0.0 - 1.9 % Final    Differential Method 10/13/2022 Automated   Final    Sodium 10/13/2022 139  136 - 145 mmol/L Final    Potassium 10/13/2022 3.6  3.5 - 5.1 mmol/L Final    Chloride 10/13/2022 100  95 - 110 mmol/L Final    CO2 10/13/2022 29  23 - 29 mmol/L Final    Glucose 10/13/2022 153 (H)  70 - 110 mg/dL Final    BUN 10/13/2022 23 (H)  6 - 20 mg/dL Final    Creatinine 10/13/2022 0.9  0.5 - 1.4 mg/dL Final    Calcium 10/13/2022 10.1  8.7 - 10.5 mg/dL Final    Total Protein 10/13/2022 7.5  6.0 - 8.4 g/dL Final    Albumin 10/13/2022 3.8  3.5 - 5.2 g/dL Final    Total Bilirubin 10/13/2022 0.6  0.1 - 1.0 mg/dL Final    Alkaline Phosphatase 10/13/2022 144 (H)  55 - 135 U/L Final    AST 10/13/2022 25  10 - 40 U/L Final    ALT 10/13/2022 23  10 - 44 U/L Final    Anion Gap 10/13/2022 10  8 - 16 mmol/L Final    eGFR 10/13/2022 >60  >60 mL/min/1.73 m^2 Final    Rheumatoid Factor 10/13/2022 <13.0  0.0 - 15.0 IU/mL Final    Anti-SSA Antibody 10/13/2022 0.06  0.00 - 0.99 Ratio Final    Anti-SSA Interpretation 10/13/2022 Negative  Negative Final    Complement (C-4) 10/13/2022 36  11 - 44 mg/dL Final    Complement (C-3) 10/13/2022 135  50 - 180 mg/dL Final    Anti-Histone Antibody 10/13/2022 0.5  0.0 - 0.9 Units Final    Sed Rate 10/13/2022 28 (H)  0 - 20 mm/Hr Final    Specimen UA 10/13/2022 Urine, Clean Catch   Final    Color, UA 10/13/2022 Yellow  Yellow, Straw, [place] Final     Appearance, UA 10/13/2022 Clear  Clear Final    pH, UA 10/13/2022 7.0  5.0 - 8.0 Final    Specific Gravity, UA 10/13/2022 1.010  1.005 - 1.030 Final    Protein, UA 10/13/2022 Negative  Negative Final    Glucose, UA 10/13/2022 Negative  Negative Final    Ketones, UA 10/13/2022 Negative  Negative Final    Bilirubin (UA) 10/13/2022 Negative  Negative Final    Occult Blood UA 10/13/2022 Negative  Negative Final    Nitrite, UA 10/13/2022 Negative  Negative Final    Urobilinogen, UA 10/13/2022 Negative  <2.0 EU/dL Final    Leukocytes, UA 10/13/2022 2+ (A)  Negative Final    Hep B S Ab 10/13/2022 <3.00  mIU/mL Final    Hep B S Ab 10/13/2022 Non-reactive   Final    Vit D, 25-Hydroxy 10/13/2022 46  30 - 96 ng/mL Final    Uric Acid 10/13/2022 5.9 (H)  2.4 - 5.7 mg/dL Final    TSH 10/13/2022 1.297  0.400 - 4.000 uIU/mL Final    Hepatitis C Ab 10/13/2022 Non-reactive  Non-reactive Final    Hepatitis B Surface Ag 10/13/2022 Non-reactive  Non-reactive Final    CPK 10/13/2022 55  20 - 180 U/L Final    GGT 10/13/2022 58 (H)  8 - 55 U/L Final    HIV 1/2 Ag/Ab 10/13/2022 Non-reactive  Non-reactive Final    Angio Convert Enzyme 10/13/2022 31  16 - 85 U/L Final    Interleukin 2 (IL-2) 10/13/2022 660.9  175.3 - 858.2 pg/mL Final    IgG 10/13/2022 1112  650 - 1600 mg/dL Final    IgA 10/13/2022 233  40 - 350 mg/dL Final    IgM 10/13/2022 65  50 - 300 mg/dL Final    IgG 1 10/13/2022 546  382 - 929 mg/dL Final    IgG 2 10/13/2022 407  242 - 700 mg/dL Final    IgG 3 10/13/2022 69  22 - 176 mg/dL Final    IgG 4 10/13/2022 31  4 - 86 mg/dL Final    Immunofix Interp. 10/13/2022 SEE COMMENT   Final    Protein, Serum 10/13/2022 6.6  6.0 - 8.4 g/dL Final    Albumin 10/13/2022 3.77  3.35 - 5.55 g/dL Final    Alpha-1 10/13/2022 0.34  0.17 - 0.41 g/dL Final    Alpha-2 10/13/2022 0.75  0.43 - 0.99 g/dL Final    Beta 10/13/2022 0.75  0.50 - 1.10 g/dL Final    Gamma 10/13/2022 0.99  0.67 - 1.58 g/dL Final    PT Lupus Anticoagulant 10/13/2022 13.1  12.0  - 15.5 sec Final    PTT Lupus Anticoagulant 10/13/2022 37  32 - 48 sec Final    Thrombin Time 10/13/2022 Not Applicable  14.7 - 19.5 sec Final    Reptilase Time 10/13/2022 Not Applicable  <=21.9 sec Final    PTT Heparin Neutralized 10/13/2022 Not Applicable  32 - 48 sec Final    PTT-LA Mix 10/13/2022 Not Applicable  32 - 48 sec Final    PLATELET NEUTRALIZATION APTT 10/13/2022 Not Applicable  Negative Final    DRVVT Screen 10/13/2022 27 (L)  33 - 44 sec Final    dRVVT Incubated 1:1 Mix 10/13/2022 Not Applicable  33 - 44 sec Final    dRVVT Confirm 10/13/2022 Not Applicable  Negative ratio Final    Hex Phosph Neut Test 10/13/2022 Not Applicable  Negative Final    Lupus Anticoagulant Interpretation 10/13/2022 See Note   Final    APA Isotype IgG 10/13/2022 <9.40  0.00 - 14.99 GPL Final    APA Isotype IgM 10/13/2022 <9.40  0.00 - 12.49 MPL Final    Beta-2 Glyco 1 IgG 10/13/2022 <9  <=20 [facility] Final    Beta-2 Glyco 1 IgM 10/13/2022 <9  <=20 [facility] Final    Beta-2 Glyco 1 IgA 10/13/2022 <9  <=20 [facility] Final    Thyroperoxidase Antibodies 10/13/2022 <6.0  <6.0 IU/mL Final    RBC, UA 10/13/2022 4  0 - 4 /hpf Final    WBC, UA 10/13/2022 1  0 - 5 /hpf Final    Bacteria 10/13/2022 Few (A)  None-Occ /hpf Final    Squam Epithel, UA 10/13/2022 5  /hpf Final    Microscopic Comment 10/13/2022 SEE COMMENT   Final    Pathologist Interpretation CYNDI 10/13/2022 REVIEWED   Final    Pathologist Interpretation SPE 10/13/2022 REVIEWED   Final   Admission on 07/02/2022, Discharged on 07/04/2022   Component Date Value Ref Range Status    Ascending aorta 07/03/2022 3.19  cm Final    STJ 07/03/2022 2.56  cm Final    AV mean gradient 07/03/2022 6  mmHg Final    Ao peak dick 07/03/2022 1.58  m/s Final    Ao VTI 07/03/2022 33.33  cm Final    IVRT 07/03/2022 119.89  msec Final    IVS 07/03/2022 0.92  0.6 - 1.1 cm Final    LA size 07/03/2022 3.57  cm Final    Left Atrium Major Axis 07/03/2022 5.78  cm Final    Left Atrium Minor Axis 07/03/2022 5.26  cm Final     LVIDd 07/03/2022 4.46  3.5 - 6.0 cm Final    LVIDs 07/03/2022 2.83  2.1 - 4.0 cm Final    LVOT diameter 07/03/2022 1.94  cm Final    LVOT peak VTI 07/03/2022 20.58  cm Final    Posterior Wall 07/03/2022 0.91  0.6 - 1.1 cm Final    MV Peak A Harley 07/03/2022 0.96  m/s Final    E wave deceleration time 07/03/2022 255.43  msec Final    MV Peak E Harley 07/03/2022 0.80  m/s Final    RA Major Axis 07/03/2022 4.18  cm Final    RA Width 07/03/2022 3.03  cm Final    RVDD 07/03/2022 3.10  cm Final    Sinus 07/03/2022 3.04  cm Final    TAPSE 07/03/2022 2.04  cm Final    TDI LATERAL 07/03/2022 0.10  m/s Final    TDI SEPTAL 07/03/2022 0.07  m/s Final    LA WIDTH 07/03/2022 4.05  cm Final    MV stenosis pressure 1/2 time 07/03/2022 74.07  ms Final    LV Diastolic Volume 07/03/2022 90.35  mL Final    LV Systolic Volume 07/03/2022 30.35  mL Final    RV S' 07/03/2022 9.15  cm/s Final    LVOT peak harley 07/03/2022 1.09  m/s Final    LA volume (mod) 07/03/2022 68.02  cm3 Final    LV LATERAL E/E' RATIO 07/03/2022 8.00  m/s Final    LV SEPTAL E/E' RATIO 07/03/2022 11.43  m/s Final    FS 07/03/2022 37  % Final    LA volume 07/03/2022 67.69  cm3 Final    LV mass 07/03/2022 133.84  g Final    Left Ventricle Relative Wall Thick* 07/03/2022 0.41  cm Final    AV valve area 07/03/2022 1.82  cm2 Final    AV Velocity Ratio 07/03/2022 0.69   Final    AV index (prosthetic) 07/03/2022 0.62   Final    MV valve area p 1/2 method 07/03/2022 2.97  cm2 Final    E/A ratio 07/03/2022 0.83   Final    Mean e' 07/03/2022 0.09  m/s Final    LVOT area 07/03/2022 3.0  cm2 Final    LVOT stroke volume 07/03/2022 60.80  cm3 Final    AV peak gradient 07/03/2022 10  mmHg Final    E/E' ratio 07/03/2022 9.41  m/s Final    LV Systolic Volume Index 07/03/2022 17.0  mL/m2 Final    LV Diastolic Volume Index 07/03/2022 50.47  mL/m2 Final    LA Volume Index 07/03/2022 37.8  mL/m2 Final    LV Mass Index 07/03/2022 75  g/m2 Final    LA Volume Index (Mod) 07/03/2022 38.0  mL/m2  Final    BSA 07/03/2022 1.86  m2 Final    Right Atrial Pressure (from IVC) 07/03/2022 3  mmHg Final    EF 07/03/2022 65  % Final    Sodium 07/03/2022 139  136 - 145 mmol/L Final    Potassium 07/03/2022 4.7  3.5 - 5.1 mmol/L Final    Chloride 07/03/2022 104  95 - 110 mmol/L Final    CO2 07/03/2022 26  23 - 29 mmol/L Final    Glucose 07/03/2022 99  70 - 110 mg/dL Final    BUN 07/03/2022 21 (H)  6 - 20 mg/dL Final    Creatinine 07/03/2022 0.8  0.5 - 1.4 mg/dL Final    Calcium 07/03/2022 9.4  8.7 - 10.5 mg/dL Final    Anion Gap 07/03/2022 9  8 - 16 mmol/L Final    eGFR if African American 07/03/2022 >60.0  >60 mL/min/1.73 m^2 Final    eGFR if non African American 07/03/2022 >60.0  >60 mL/min/1.73 m^2 Final    WBC 07/03/2022 4.94  3.90 - 12.70 K/uL Final    RBC 07/03/2022 4.61  4.00 - 5.40 M/uL Final    Hemoglobin 07/03/2022 12.5  12.0 - 16.0 g/dL Final    Hematocrit 07/03/2022 41.0  37.0 - 48.5 % Final    MCV 07/03/2022 89  82 - 98 fL Final    MCH 07/03/2022 27.1  27.0 - 31.0 pg Final    MCHC 07/03/2022 30.5 (L)  32.0 - 36.0 g/dL Final    RDW 07/03/2022 14.4  11.5 - 14.5 % Final    Platelets 07/03/2022 179  150 - 450 K/uL Final    MPV 07/03/2022 10.8  9.2 - 12.9 fL Final    Immature Granulocytes 07/03/2022 0.4  0.0 - 0.5 % Final    Gran # (ANC) 07/03/2022 3.3  1.8 - 7.7 K/uL Final    Immature Grans (Abs) 07/03/2022 0.02  0.00 - 0.04 K/uL Final    Lymph # 07/03/2022 1.0  1.0 - 4.8 K/uL Final    Mono # 07/03/2022 0.4  0.3 - 1.0 K/uL Final    Eos # 07/03/2022 0.2  0.0 - 0.5 K/uL Final    Baso # 07/03/2022 0.02  0.00 - 0.20 K/uL Final    nRBC 07/03/2022 0  0 /100 WBC Final    Gran % 07/03/2022 67.0  38.0 - 73.0 % Final    Lymph % 07/03/2022 21.1  18.0 - 48.0 % Final    Mono % 07/03/2022 8.1  4.0 - 15.0 % Final    Eosinophil % 07/03/2022 3.0  0.0 - 8.0 % Final    Basophil % 07/03/2022 0.4  0.0 - 1.9 % Final    Differential Method 07/03/2022 Automated   Final    Magnesium 07/03/2022 2.2  1.6 - 2.6 mg/dL Final    Phosphorus  07/03/2022 3.9  2.7 - 4.5 mg/dL Final    HERB Screen 07/03/2022 Negative <1:80  Negative <1:80 Final    Anti-Centromere Antibody 07/03/2022 Negative  Negative Final    Centromere Antibody Titer 07/03/2022 Test Not Performed  <1:80 Titer Final    Sed Rate 07/03/2022 38 (H)  0 - 36 mm/Hr Final    CRP 07/03/2022 8.6 (H)  0.0 - 8.2 mg/L Final    CCP Antibodies 07/03/2022 <0.5  <5.0 U/mL Final    Scleroderma SCL- 07/03/2022 4  <20 UNITS Final    ds DNA Ab 07/03/2022 Negative 1:10  Negative 1:10 Final    POCT Glucose 07/02/2022 80  70 - 110 mg/dL Final    Sodium 07/04/2022 140  136 - 145 mmol/L Final    Potassium 07/04/2022 4.6  3.5 - 5.1 mmol/L Final    Chloride 07/04/2022 105  95 - 110 mmol/L Final    CO2 07/04/2022 28  23 - 29 mmol/L Final    Glucose 07/04/2022 117 (H)  70 - 110 mg/dL Final    BUN 07/04/2022 19  6 - 20 mg/dL Final    Creatinine 07/04/2022 0.8  0.5 - 1.4 mg/dL Final    Calcium 07/04/2022 9.4  8.7 - 10.5 mg/dL Final    Total Protein 07/04/2022 6.3  6.0 - 8.4 g/dL Final    Albumin 07/04/2022 3.5  3.5 - 5.2 g/dL Final    Total Bilirubin 07/04/2022 0.4  0.1 - 1.0 mg/dL Final    Alkaline Phosphatase 07/04/2022 116  55 - 135 U/L Final    AST 07/04/2022 18  10 - 40 U/L Final    ALT 07/04/2022 20  10 - 44 U/L Final    Anion Gap 07/04/2022 7 (L)  8 - 16 mmol/L Final    eGFR if African American 07/04/2022 >60.0  >60 mL/min/1.73 m^2 Final    eGFR if non African American 07/04/2022 >60.0  >60 mL/min/1.73 m^2 Final    WBC 07/04/2022 5.42  3.90 - 12.70 K/uL Final    RBC 07/04/2022 4.70  4.00 - 5.40 M/uL Final    Hemoglobin 07/04/2022 12.5  12.0 - 16.0 g/dL Final    Hematocrit 07/04/2022 40.1  37.0 - 48.5 % Final    MCV 07/04/2022 85  82 - 98 fL Final    MCH 07/04/2022 26.6 (L)  27.0 - 31.0 pg Final    MCHC 07/04/2022 31.2 (L)  32.0 - 36.0 g/dL Final    RDW 07/04/2022 14.4  11.5 - 14.5 % Final    Platelets 07/04/2022 198  150 - 450 K/uL Final    MPV 07/04/2022 10.5  9.2 - 12.9 fL Final    Immature Granulocytes  07/04/2022 0.2  0.0 - 0.5 % Final    Gran # (ANC) 07/04/2022 3.4  1.8 - 7.7 K/uL Final    Immature Grans (Abs) 07/04/2022 0.01  0.00 - 0.04 K/uL Final    Lymph # 07/04/2022 1.3  1.0 - 4.8 K/uL Final    Mono # 07/04/2022 0.5  0.3 - 1.0 K/uL Final    Eos # 07/04/2022 0.2  0.0 - 0.5 K/uL Final    Baso # 07/04/2022 0.03  0.00 - 0.20 K/uL Final    nRBC 07/04/2022 0  0 /100 WBC Final    Gran % 07/04/2022 63.0  38.0 - 73.0 % Final    Lymph % 07/04/2022 24.2  18.0 - 48.0 % Final    Mono % 07/04/2022 8.3  4.0 - 15.0 % Final    Eosinophil % 07/04/2022 3.7  0.0 - 8.0 % Final    Basophil % 07/04/2022 0.6  0.0 - 1.9 % Final    Differential Method 07/04/2022 Automated   Final    Magnesium 07/04/2022 2.0  1.6 - 2.6 mg/dL Final   Admission on 06/30/2022, Discharged on 07/02/2022   Component Date Value Ref Range Status    WBC 06/30/2022 6.08  3.90 - 12.70 K/uL Final    RBC 06/30/2022 4.41  4.00 - 5.40 M/uL Final    Hemoglobin 06/30/2022 11.6 (L)  12.0 - 16.0 g/dL Final    Hematocrit 06/30/2022 37.4  37.0 - 48.5 % Final    MCV 06/30/2022 85  82 - 98 fL Final    MCH 06/30/2022 26.3 (L)  27.0 - 31.0 pg Final    MCHC 06/30/2022 31.0 (L)  32.0 - 36.0 g/dL Final    RDW 06/30/2022 14.8 (H)  11.5 - 14.5 % Final    Platelets 06/30/2022 201  150 - 450 K/uL Final    MPV 06/30/2022 10.1  9.2 - 12.9 fL Final    Immature Granulocytes 06/30/2022 0.3  0.0 - 0.5 % Final    Gran # (ANC) 06/30/2022 4.2  1.8 - 7.7 K/uL Final    Immature Grans (Abs) 06/30/2022 0.02  0.00 - 0.04 K/uL Final    Lymph # 06/30/2022 1.2  1.0 - 4.8 K/uL Final    Mono # 06/30/2022 0.5  0.3 - 1.0 K/uL Final    Eos # 06/30/2022 0.2  0.0 - 0.5 K/uL Final    Baso # 06/30/2022 0.03  0.00 - 0.20 K/uL Final    nRBC 06/30/2022 0  0 /100 WBC Final    Gran % 06/30/2022 68.3  38.0 - 73.0 % Final    Lymph % 06/30/2022 20.1  18.0 - 48.0 % Final    Mono % 06/30/2022 7.7  4.0 - 15.0 % Final    Eosinophil % 06/30/2022 3.1  0.0 - 8.0 % Final    Basophil % 06/30/2022 0.5  0.0 - 1.9 % Final     Differential Method 06/30/2022 Automated   Final    Sodium 06/30/2022 139  136 - 145 mmol/L Final    Potassium 06/30/2022 3.9  3.5 - 5.1 mmol/L Final    Chloride 06/30/2022 103  95 - 110 mmol/L Final    CO2 06/30/2022 29  23 - 29 mmol/L Final    Glucose 06/30/2022 110  70 - 110 mg/dL Final    BUN 06/30/2022 22 (H)  6 - 20 mg/dL Final    Creatinine 06/30/2022 0.8  0.5 - 1.4 mg/dL Final    Calcium 06/30/2022 9.0  8.7 - 10.5 mg/dL Final    Total Protein 06/30/2022 7.0  6.0 - 8.4 g/dL Final    Albumin 06/30/2022 3.9  3.5 - 5.2 g/dL Final    Total Bilirubin 06/30/2022 0.3  0.1 - 1.0 mg/dL Final    Alkaline Phosphatase 06/30/2022 95  55 - 135 U/L Final    AST 06/30/2022 24  10 - 40 U/L Final    ALT 06/30/2022 24  10 - 44 U/L Final    Anion Gap 06/30/2022 7 (L)  8 - 16 mmol/L Final    eGFR if African American 06/30/2022 >60.0  >60 mL/min/1.73 m^2 Final    eGFR if non African American 06/30/2022 >60.0  >60 mL/min/1.73 m^2 Final    Troponin I 06/30/2022 <0.030  <=0.040 ng/mL Final    Troponin I 06/30/2022 <0.030  <=0.040 ng/mL Final    BNP 06/30/2022 120 (H)  0 - 99 pg/mL Final    Magnesium 06/30/2022 2.0  1.6 - 2.6 mg/dL Final    SARS-CoV-2 RNA, Amplification, Qual 06/30/2022 Negative  Negative Final    Hemoglobin A1C 07/01/2022 6.2  4.5 - 6.2 % Final    Estimated Avg Glucose 07/01/2022 131  68 - 131 mg/dL Final    Sodium 07/01/2022 139  136 - 145 mmol/L Final    Potassium 07/01/2022 3.6  3.5 - 5.1 mmol/L Final    Chloride 07/01/2022 103  95 - 110 mmol/L Final    CO2 07/01/2022 29  23 - 29 mmol/L Final    Glucose 07/01/2022 121 (H)  70 - 110 mg/dL Final    BUN 07/01/2022 22 (H)  6 - 20 mg/dL Final    Creatinine 07/01/2022 0.8  0.5 - 1.4 mg/dL Final    Calcium 07/01/2022 8.7  8.7 - 10.5 mg/dL Final    Anion Gap 07/01/2022 7 (L)  8 - 16 mmol/L Final    eGFR if African American 07/01/2022 >60.0  >60 mL/min/1.73 m^2 Final    eGFR if non African American 07/01/2022 >60.0  >60 mL/min/1.73 m^2 Final    Magnesium 07/01/2022 2.1   1.6 - 2.6 mg/dL Final    WBC 07/01/2022 5.43  3.90 - 12.70 K/uL Final    RBC 07/01/2022 4.32  4.00 - 5.40 M/uL Final    Hemoglobin 07/01/2022 11.5 (L)  12.0 - 16.0 g/dL Final    Hematocrit 07/01/2022 37.1  37.0 - 48.5 % Final    MCV 07/01/2022 86  82 - 98 fL Final    MCH 07/01/2022 26.6 (L)  27.0 - 31.0 pg Final    MCHC 07/01/2022 31.0 (L)  32.0 - 36.0 g/dL Final    RDW 07/01/2022 14.6 (H)  11.5 - 14.5 % Final    Platelets 07/01/2022 178  150 - 450 K/uL Final    MPV 07/01/2022 10.4  9.2 - 12.9 fL Final    Immature Granulocytes 07/01/2022 0.4  0.0 - 0.5 % Final    Gran # (ANC) 07/01/2022 3.5  1.8 - 7.7 K/uL Final    Immature Grans (Abs) 07/01/2022 0.02  0.00 - 0.04 K/uL Final    Lymph # 07/01/2022 1.2  1.0 - 4.8 K/uL Final    Mono # 07/01/2022 0.5  0.3 - 1.0 K/uL Final    Eos # 07/01/2022 0.2  0.0 - 0.5 K/uL Final    Baso # 07/01/2022 0.02  0.00 - 0.20 K/uL Final    nRBC 07/01/2022 0  0 /100 WBC Final    Gran % 07/01/2022 65.1  38.0 - 73.0 % Final    Lymph % 07/01/2022 22.3  18.0 - 48.0 % Final    Mono % 07/01/2022 8.5  4.0 - 15.0 % Final    Eosinophil % 07/01/2022 3.3  0.0 - 8.0 % Final    Basophil % 07/01/2022 0.4  0.0 - 1.9 % Final    Differential Method 07/01/2022 Automated   Final    BSA 07/01/2022 1.87  m2 Final    LVIDd 07/01/2022 4.30  3.5 - 6.0 cm Final    LVIDs 07/01/2022 2.82  2.1 - 4.0 cm Final    FS 07/01/2022 34  28 - 44 % Final    RVDD 07/01/2022 323.00  cm Final    AV mean gradient 07/01/2022 7  mmHg Final    AV valve area 07/01/2022 2.55  cm2 Final    AV index (prosthetic) 07/01/2022 0.72   Final    E/A ratio 07/01/2022 0.87   Final    E wave deceleration time 07/01/2022 215.02  msec Final    IVRT 07/01/2022 95.31  msec Final    Pulm vein S/D ratio 07/01/2022 1.24   Final    LVOT diameter 07/01/2022 2.13  cm Final    LVOT area 07/01/2022 3.6  cm2 Final    LVOT peak dick 07/01/2022 119.78  m/s Final    LVOT peak VTI 07/01/2022 29.05  cm Final    Ao VTI 07/01/2022 40.55  cm Final    LVOT stroke volume  07/01/2022 103.46  cm3 Final    MV Peak E Harley 07/01/2022 1.04  m/s Final    TR Max Harley 07/01/2022 2.56  m/s Final    MV Peak A Harley 07/01/2022 1.19  m/s Final    PV Peak S Harley 07/01/2022 71.96  m/s Final    PV Peak D Harley 07/01/2022 57.88  m/s Final    LV Systolic Volume 07/01/2022 22.37  mL Final    LV Systolic Volume Index 07/01/2022 12.5  mL/m2 Final    LV Diastolic Volume 07/01/2022 79.29  mL Final    LV Diastolic Volume Index 07/01/2022 44.30  mL/m2 Final    Triscuspid Valve Regurgitation Pea* 07/01/2022 26  mmHg Final    Right Atrial Pressure (from IVC) 07/01/2022 3  mmHg Final    EF 07/01/2022 64  % Final    TV rest pulmonary artery pressure 07/01/2022 29  mmHg Final    Troponin I 07/01/2022 <0.030  <=0.040 ng/mL Final    Troponin I 07/01/2022 <0.030  <=0.040 ng/mL Final    Sodium 07/02/2022 138  136 - 145 mmol/L Final    Potassium 07/02/2022 4.1  3.5 - 5.1 mmol/L Final    Chloride 07/02/2022 104  95 - 110 mmol/L Final    CO2 07/02/2022 28  23 - 29 mmol/L Final    Glucose 07/02/2022 100  70 - 110 mg/dL Final    BUN 07/02/2022 23 (H)  6 - 20 mg/dL Final    Creatinine 07/02/2022 0.8  0.5 - 1.4 mg/dL Final    Calcium 07/02/2022 8.7  8.7 - 10.5 mg/dL Final    Anion Gap 07/02/2022 6 (L)  8 - 16 mmol/L Final    eGFR if African American 07/02/2022 >60.0  >60 mL/min/1.73 m^2 Final    eGFR if non African American 07/02/2022 >60.0  >60 mL/min/1.73 m^2 Final    Magnesium 07/02/2022 2.2  1.6 - 2.6 mg/dL Final    WBC 07/02/2022 5.20  3.90 - 12.70 K/uL Final    RBC 07/02/2022 4.39  4.00 - 5.40 M/uL Final    Hemoglobin 07/02/2022 11.5 (L)  12.0 - 16.0 g/dL Final    Hematocrit 07/02/2022 37.6  37.0 - 48.5 % Final    MCV 07/02/2022 86  82 - 98 fL Final    MCH 07/02/2022 26.2 (L)  27.0 - 31.0 pg Final    MCHC 07/02/2022 30.6 (L)  32.0 - 36.0 g/dL Final    RDW 07/02/2022 14.7 (H)  11.5 - 14.5 % Final    Platelets 07/02/2022 161  150 - 450 K/uL Final    MPV 07/02/2022 10.3  9.2 - 12.9 fL Final    Immature Granulocytes 07/02/2022  0.4  0.0 - 0.5 % Final    Gran # (ANC) 07/02/2022 3.3  1.8 - 7.7 K/uL Final    Immature Grans (Abs) 07/02/2022 0.02  0.00 - 0.04 K/uL Final    Lymph # 07/02/2022 1.2  1.0 - 4.8 K/uL Final    Mono # 07/02/2022 0.4  0.3 - 1.0 K/uL Final    Eos # 07/02/2022 0.2  0.0 - 0.5 K/uL Final    Baso # 07/02/2022 0.02  0.00 - 0.20 K/uL Final    nRBC 07/02/2022 0  0 /100 WBC Final    Gran % 07/02/2022 63.7  38.0 - 73.0 % Final    Lymph % 07/02/2022 23.7  18.0 - 48.0 % Final    Mono % 07/02/2022 8.1  4.0 - 15.0 % Final    Eosinophil % 07/02/2022 3.7  0.0 - 8.0 % Final    Basophil % 07/02/2022 0.4  0.0 - 1.9 % Final    Differential Method 07/02/2022 Automated   Final       Past Medical History:   Diagnosis Date    Broken heart syndrome     Hypertension      Past Surgical History:   Procedure Laterality Date    CLAVICLE SURGERY  2012    TONSILLECTOMY       Family History   Problem Relation Age of Onset    Colon cancer Mother     Uterine cancer Mother     Hypertension Father     Testicular cancer Son        Marital Status:   Alcohol History:  reports current alcohol use.  Tobacco History:  reports that she has never smoked. She has never used smokeless tobacco.  Drug History:  reports no history of drug use.    Review of patient's allergies indicates:   Allergen Reactions    Crestor [rosuvastatin] Other (See Comments)     Paralysis    Codeine Nausea And Vomiting    Strawberries [strawberry] Hives    Adhesive tape-silicones Rash       Current Outpatient Medications:     acetaminophen (TYLENOL) 500 MG tablet, Take 2 tablets (1,000 mg total) by mouth 3 (three) times daily., Disp: , Rfl:     amoxicillin-clavulanate 875-125mg (AUGMENTIN) 875-125 mg per tablet, Take 1 tablet by mouth every 12 (twelve) hours. for 10 days, Disp: 20 tablet, Rfl: 0    atorvastatin (LIPITOR) 10 MG tablet, Take 1 tablet (10 mg total) by mouth every evening., Disp: 90 tablet, Rfl: 3    chlorthalidone (HYGROTEN) 25 MG Tab, Take 1 tablet (25 mg total) by  mouth once daily., Disp: 90 tablet, Rfl: 3    ergocalciferol (VITAMIN D2) 50,000 unit Cap, Take 1 capsule (50,000 Units total) by mouth nightly., Disp: , Rfl:     esomeprazole (NEXIUM) 40 MG capsule, Take 1 capsule (40 mg total) by mouth before breakfast., Disp: 30 capsule, Rfl: 11    guaiFENesin-codeine 100-10 mg/5 ml (TUSSI-ORGANIDIN NR)  mg/5 mL syrup, Take 5 mLs by mouth 3 (three) times daily as needed for Cough or Congestion., Disp: 118 mL, Rfl: 0    iron carb,gl/FA/B12/C/docusate (FERRALET 90 DUAL-IRON DELIVERY ORAL), Take 90 mg by mouth 4 (four) times a week. , Disp: , Rfl:     Lactobacillus rhamnosus GG (CULTURELLE) 10 billion cell capsule, Take 1 capsule by mouth every evening., Disp: , Rfl:     levothyroxine (SYNTHROID) 50 MCG tablet, Take 50 mcg by mouth every evening., Disp: , Rfl:     loratadine (CLARITIN) 10 mg tablet, Take 10 mg by mouth every evening., Disp: , Rfl:     losartan (COZAAR) 100 MG tablet, Take 1 tablet (100 mg total) by mouth once daily., Disp: 90 tablet, Rfl: 3    magnesium oxide (MAG-OX) 400 mg (241.3 mg magnesium) tablet, Take 400 mg by mouth every evening., Disp: , Rfl:     multivitamin (THERAGRAN) per tablet, Take 1 tablet by mouth every evening., Disp: , Rfl:     predniSONE (DELTASONE) 20 MG tablet, Take 1 tablet (20 mg total) by mouth 2 (two) times daily. for 5 days, Disp: 10 tablet, Rfl: 0    sucralfate (CARAFATE) 1 gram tablet, Take 1 tablet (1 g total) by mouth 2 (two) times daily., Disp: 180 tablet, Rfl: 3    Review of Systems   Constitutional:  Positive for activity change, appetite change, chills, diaphoresis and fatigue. Negative for fever.   HENT:  Positive for congestion, sneezing and sore throat. Negative for ear pain, sinus pressure and sinus pain.    Eyes:  Negative for pain and itching.   Respiratory:  Positive for cough, shortness of breath and wheezing.    Gastrointestinal:  Negative for abdominal pain, constipation, diarrhea, nausea and vomiting.    Genitourinary:  Negative for dysuria and flank pain.   Musculoskeletal:  Positive for myalgias.   Neurological:  Positive for headaches.        Objective:      Vitals:    11/18/22 1024   BP: 94/64   Pulse: 108   Temp: 98.6 °F (37 °C)   Weight: 76.2 kg (168 lb)   Height: 5' (1.524 m)     Physical Exam  Constitutional:       General: She is not in acute distress.     Appearance: She is well-developed.   HENT:      Head: Normocephalic and atraumatic.      Right Ear: Tympanic membrane, ear canal and external ear normal.      Left Ear: Tympanic membrane, ear canal and external ear normal.      Nose: Congestion and rhinorrhea present.      Mouth/Throat:      Mouth: Mucous membranes are dry.      Pharynx: Posterior oropharyngeal erythema present.      Comments: Lips dry, some signs of dehydration.  Eyes:      Conjunctiva/sclera: Conjunctivae normal.      Pupils: Pupils are equal, round, and reactive to light.   Neck:      Thyroid: No thyromegaly.   Cardiovascular:      Rate and Rhythm: Normal rate and regular rhythm.      Heart sounds: Normal heart sounds.      Comments: Mildly tachycardic and hypotensive on arrival (94/64 HR and 108 BP)  Pulmonary:      Breath sounds: Normal breath sounds.   Abdominal:      General: There is no distension.      Tenderness: There is no abdominal tenderness.   Musculoskeletal:         General: Normal range of motion.      Cervical back: Normal range of motion and neck supple. No tenderness.   Lymphadenopathy:      Cervical: No cervical adenopathy.   Skin:     General: Skin is warm and dry.      Capillary Refill: Capillary refill takes 2 to 3 seconds. Capillary refill slightly increased at approx 2.5     Findings: No erythema.      Comments: Decreased skin turgor   Neurological:      Mental Status: She is alert and oriented to person, place, and time.      Cranial Nerves: No cranial nerve deficit.         Assessment:       1. Bronchitis         Plan:       Bronchitis  Comments:  upgrade  abx and steroids. CXR now to followup questionable pna at . PUSH clear liquids. If no better or pna still present will get to ER for further eval  Orders:  -     X-Ray Chest PA And Lateral; Future; Expected date: 11/18/2022  -     amoxicillin-clavulanate 875-125mg (AUGMENTIN) 875-125 mg per tablet; Take 1 tablet by mouth every 12 (twelve) hours. for 10 days  Dispense: 20 tablet; Refill: 0  -     predniSONE (DELTASONE) 20 MG tablet; Take 1 tablet (20 mg total) by mouth 2 (two) times daily. for 5 days  Dispense: 10 tablet; Refill: 0  -     guaiFENesin-codeine 100-10 mg/5 ml (TUSSI-ORGANIDIN NR)  mg/5 mL syrup; Take 5 mLs by mouth 3 (three) times daily as needed for Cough or Congestion.  Dispense: 118 mL; Refill: 0    Follow up if symptoms worsen or fail to improve, for pending workup.        11/18/2022 Wilfredo Hernández PA-C

## 2022-11-21 ENCOUNTER — TELEPHONE (OUTPATIENT)
Dept: FAMILY MEDICINE | Facility: CLINIC | Age: 57
End: 2022-11-21

## 2022-11-21 NOTE — TELEPHONE ENCOUNTER
Spoke with patient regarding results verbatim per Wilfredo. Verbalized understanding. Patient requested Return to work note. Informed her it would be in My Portal under letters.

## 2022-11-21 NOTE — TELEPHONE ENCOUNTER
----- Message from Wilfredo Hernández PA-C sent at 11/18/2022  1:36 PM CST -----  No infiltrate is seen on x-ray.  No evidence of pneumonia.  Please take medication as prescribed in office today and if sxs are worsening over the weekend please let me know

## 2022-11-21 NOTE — LETTER
November 21, 2022      [facility]  [address]  [place] [address]  Phone: [phone]  Fax: [phone]       Patient: Brunilda Bob   YOB: 1965  Date of Visit: 11/18/2022    To Whom It May Concern:    Brissa Bob  was at [facility] on 11/18/2022. The patient may return to work/school on 11/21/2022 without restrictions. If you have any questions or concerns, or if I can be of further assistance, please do not hesitate to contact me.    Sincerely,    Mary Moss LPN

## 2022-11-23 ENCOUNTER — ANESTHESIA EVENT (OUTPATIENT)
Dept: SURGERY | Facility: HOSPITAL | Age: 57
DRG: 377 | End: 2022-11-23
Payer: COMMERCIAL

## 2022-11-23 ENCOUNTER — HOSPITAL ENCOUNTER (INPATIENT)
Facility: HOSPITAL | Age: 57
LOS: 1 days | Discharge: HOME | DRG: 377 | End: 2022-11-24
Attending: EMERGENCY MEDICINE
Payer: COMMERCIAL

## 2022-11-23 ENCOUNTER — ANESTHESIA (OUTPATIENT)
Dept: SURGERY | Facility: HOSPITAL | Age: 57
DRG: 377 | End: 2022-11-23
Payer: COMMERCIAL

## 2022-11-23 DIAGNOSIS — C78.7 PANCREATIC CANCER METASTASIZED TO LIVER: ICD-10-CM

## 2022-11-23 DIAGNOSIS — G89.3 CHRONIC PAIN DUE TO NEOPLASM: ICD-10-CM

## 2022-11-23 DIAGNOSIS — R11.2 DRUG-INDUCED NAUSEA AND VOMITING: ICD-10-CM

## 2022-11-23 DIAGNOSIS — K86.89 PANCREATIC MASS: ICD-10-CM

## 2022-11-23 DIAGNOSIS — T50.905A DRUG-INDUCED NAUSEA AND VOMITING: ICD-10-CM

## 2022-11-23 DIAGNOSIS — C78.7 METASTASIS TO LIVER: ICD-10-CM

## 2022-11-23 DIAGNOSIS — K21.00 GASTROESOPHAGEAL REFLUX DISEASE WITH ESOPHAGITIS WITHOUT HEMORRHAGE: Primary | ICD-10-CM

## 2022-11-23 DIAGNOSIS — D53.9 NON MEGALOBLASTIC NUTRITIONAL ANEMIA: ICD-10-CM

## 2022-11-23 DIAGNOSIS — K92.2 GI BLEED: ICD-10-CM

## 2022-11-23 DIAGNOSIS — R07.9 CHEST PAIN: ICD-10-CM

## 2022-11-23 DIAGNOSIS — T50.905A DRUG-INDUCED NAUSEA AND VOMITING: Primary | ICD-10-CM

## 2022-11-23 DIAGNOSIS — C25.9 PANCREATIC CANCER METASTASIZED TO LIVER: ICD-10-CM

## 2022-11-23 DIAGNOSIS — K92.2 GASTROINTESTINAL HEMORRHAGE, UNSPECIFIED GASTROINTESTINAL HEMORRHAGE TYPE: ICD-10-CM

## 2022-11-23 DIAGNOSIS — K21.00 GASTROESOPHAGEAL REFLUX DISEASE WITH ESOPHAGITIS WITHOUT HEMORRHAGE: ICD-10-CM

## 2022-11-23 DIAGNOSIS — R11.2 DRUG-INDUCED NAUSEA AND VOMITING: Primary | ICD-10-CM

## 2022-11-23 DIAGNOSIS — R10.11 RUQ PAIN: ICD-10-CM

## 2022-11-23 PROBLEM — E87.6 HYPOKALEMIA: Status: ACTIVE | Noted: 2022-11-23

## 2022-11-23 PROBLEM — R73.9 HYPERGLYCEMIA: Status: ACTIVE | Noted: 2022-11-23

## 2022-11-23 LAB
ABO + RH BLD: NORMAL
ALBUMIN SERPL BCP-MCNC: 3.3 G/DL (ref 3.5–5.2)
ALP SERPL-CCNC: 198 U/L (ref 55–135)
ALT SERPL W/O P-5'-P-CCNC: 25 U/L (ref 10–44)
ANION GAP SERPL CALC-SCNC: 10 MMOL/L (ref 8–16)
AST SERPL-CCNC: 22 U/L (ref 10–40)
BASOPHILS # BLD AUTO: 0.01 K/UL (ref 0–0.2)
BASOPHILS NFR BLD: 0.1 % (ref 0–1.9)
BILIRUB SERPL-MCNC: 0.9 MG/DL (ref 0.1–1)
BILIRUB UR QL STRIP: NEGATIVE
BLD GP AB SCN CELLS X3 SERPL QL: NORMAL
BLD PROD TYP BPU: NORMAL
BLD PROD TYP BPU: NORMAL
BLOOD UNIT EXPIRATION DATE: NORMAL
BLOOD UNIT EXPIRATION DATE: NORMAL
BLOOD UNIT TYPE CODE: 6200
BLOOD UNIT TYPE CODE: 6200
BLOOD UNIT TYPE: NORMAL
BLOOD UNIT TYPE: NORMAL
BNP SERPL-MCNC: 78 PG/ML (ref 0–99)
BUN SERPL-MCNC: 25 MG/DL (ref 6–20)
CALCIUM SERPL-MCNC: 8.9 MG/DL (ref 8.7–10.5)
CHLORIDE SERPL-SCNC: 92 MMOL/L (ref 95–110)
CLARITY UR: CLEAR
CO2 SERPL-SCNC: 29 MMOL/L (ref 23–29)
CODING SYSTEM: NORMAL
CODING SYSTEM: NORMAL
COLOR UR: YELLOW
CREAT SERPL-MCNC: 0.8 MG/DL (ref 0.5–1.4)
DIFFERENTIAL METHOD: ABNORMAL
DISPENSE STATUS: NORMAL
DISPENSE STATUS: NORMAL
EOSINOPHIL # BLD AUTO: 0.5 K/UL (ref 0–0.5)
EOSINOPHIL NFR BLD: 3.4 % (ref 0–8)
ERYTHROCYTE [DISTWIDTH] IN BLOOD BY AUTOMATED COUNT: 13.3 % (ref 11.5–14.5)
EST. GFR  (NO RACE VARIABLE): >60 ML/MIN/1.73 M^2
GLUCOSE SERPL-MCNC: 134 MG/DL (ref 70–110)
GLUCOSE SERPL-MCNC: 216 MG/DL (ref 70–110)
GLUCOSE UR QL STRIP: ABNORMAL
HCT VFR BLD AUTO: 24 % (ref 37–48.5)
HCT VFR BLD AUTO: 25.3 % (ref 37–48.5)
HGB BLD-MCNC: 7.5 G/DL (ref 12–16)
HGB BLD-MCNC: 8.1 G/DL (ref 12–16)
HGB UR QL STRIP: ABNORMAL
IMM GRANULOCYTES # BLD AUTO: 0.17 K/UL (ref 0–0.04)
IMM GRANULOCYTES NFR BLD AUTO: 1.2 % (ref 0–0.5)
INFLUENZA A, MOLECULAR: NEGATIVE
INFLUENZA B, MOLECULAR: NEGATIVE
INR PPP: 1.4
KETONES UR QL STRIP: NEGATIVE
LACTATE SERPL-SCNC: 1.9 MMOL/L (ref 0.5–1.9)
LEUKOCYTE ESTERASE UR QL STRIP: NEGATIVE
LIPASE SERPL-CCNC: 27 U/L (ref 4–60)
LYMPHOCYTES # BLD AUTO: 0.8 K/UL (ref 1–4.8)
LYMPHOCYTES NFR BLD: 5.6 % (ref 18–48)
MCH RBC QN AUTO: 26.3 PG (ref 27–31)
MCHC RBC AUTO-ENTMCNC: 32 G/DL (ref 32–36)
MCV RBC AUTO: 82 FL (ref 82–98)
MONOCYTES # BLD AUTO: 0.8 K/UL (ref 0.3–1)
MONOCYTES NFR BLD: 5.3 % (ref 4–15)
NEUTROPHILS # BLD AUTO: 12 K/UL (ref 1.8–7.7)
NEUTROPHILS NFR BLD: 84.4 % (ref 38–73)
NITRITE UR QL STRIP: NEGATIVE
NRBC BLD-RTO: 0 /100 WBC
NUM UNITS TRANS PACKED RBC: NORMAL
NUM UNITS TRANS PACKED RBC: NORMAL
OB PNL STL: POSITIVE
PH UR STRIP: 8 [PH] (ref 5–8)
PLATELET # BLD AUTO: 235 K/UL (ref 150–450)
PMV BLD AUTO: 10.4 FL (ref 9.2–12.9)
POTASSIUM SERPL-SCNC: 3.1 MMOL/L (ref 3.5–5.1)
PROT SERPL-MCNC: 6.3 G/DL (ref 6–8.4)
PROT UR QL STRIP: NEGATIVE
PROTHROMBIN TIME: 16.3 SEC (ref 11.4–13.7)
RBC # BLD AUTO: 3.08 M/UL (ref 4–5.4)
SARS-COV-2 RDRP RESP QL NAA+PROBE: NEGATIVE
SODIUM SERPL-SCNC: 131 MMOL/L (ref 136–145)
SP GR UR STRIP: 1.01 (ref 1–1.03)
SPECIMEN SOURCE: NORMAL
TROPONIN I SERPL HS-MCNC: 12.9 PG/ML (ref 0–14.9)
TROPONIN I SERPL HS-MCNC: 9 PG/ML (ref 0–14.9)
TSH SERPL DL<=0.005 MIU/L-ACNC: 1.43 UIU/ML (ref 0.34–5.6)
URN SPEC COLLECT METH UR: ABNORMAL
UROBILINOGEN UR STRIP-ACNC: NEGATIVE EU/DL
WBC # BLD AUTO: 14.22 K/UL (ref 3.9–12.7)

## 2022-11-23 PROCEDURE — 96361 HYDRATE IV INFUSION ADD-ON: CPT

## 2022-11-23 PROCEDURE — 82272 OCCULT BLD FECES 1-3 TESTS: CPT | Performed by: EMERGENCY MEDICINE

## 2022-11-23 PROCEDURE — 63600175 PHARM REV CODE 636 W HCPCS: Performed by: EMERGENCY MEDICINE

## 2022-11-23 PROCEDURE — 63600175 PHARM REV CODE 636 W HCPCS: Performed by: STUDENT IN AN ORGANIZED HEALTH CARE EDUCATION/TRAINING PROGRAM

## 2022-11-23 PROCEDURE — 87040 BLOOD CULTURE FOR BACTERIA: CPT | Mod: 59 | Performed by: EMERGENCY MEDICINE

## 2022-11-23 PROCEDURE — 12000002 HC ACUTE/MED SURGE SEMI-PRIVATE ROOM

## 2022-11-23 PROCEDURE — C9113 INJ PANTOPRAZOLE SODIUM, VIA: HCPCS | Performed by: EMERGENCY MEDICINE

## 2022-11-23 PROCEDURE — 36415 COLL VENOUS BLD VENIPUNCTURE: CPT | Performed by: STUDENT IN AN ORGANIZED HEALTH CARE EDUCATION/TRAINING PROGRAM

## 2022-11-23 PROCEDURE — 96374 THER/PROPH/DIAG INJ IV PUSH: CPT

## 2022-11-23 PROCEDURE — 85610 PROTHROMBIN TIME: CPT | Performed by: INTERNAL MEDICINE

## 2022-11-23 PROCEDURE — 81003 URINALYSIS AUTO W/O SCOPE: CPT | Performed by: EMERGENCY MEDICINE

## 2022-11-23 PROCEDURE — 80053 COMPREHEN METABOLIC PANEL: CPT | Performed by: EMERGENCY MEDICINE

## 2022-11-23 PROCEDURE — 27000671 HC TUBING MICROBORE EXT: Performed by: ANESTHESIOLOGY

## 2022-11-23 PROCEDURE — 63600175 PHARM REV CODE 636 W HCPCS: Performed by: NURSE ANESTHETIST, CERTIFIED REGISTERED

## 2022-11-23 PROCEDURE — 85018 HEMOGLOBIN: CPT | Performed by: STUDENT IN AN ORGANIZED HEALTH CARE EDUCATION/TRAINING PROGRAM

## 2022-11-23 PROCEDURE — 25000003 PHARM REV CODE 250: Performed by: STUDENT IN AN ORGANIZED HEALTH CARE EDUCATION/TRAINING PROGRAM

## 2022-11-23 PROCEDURE — 86850 RBC ANTIBODY SCREEN: CPT | Performed by: EMERGENCY MEDICINE

## 2022-11-23 PROCEDURE — 85025 COMPLETE CBC W/AUTO DIFF WBC: CPT | Performed by: EMERGENCY MEDICINE

## 2022-11-23 PROCEDURE — 84443 ASSAY THYROID STIM HORMONE: CPT | Performed by: EMERGENCY MEDICINE

## 2022-11-23 PROCEDURE — 27000673 HC TUBING BLOOD Y: Performed by: ANESTHESIOLOGY

## 2022-11-23 PROCEDURE — 96375 TX/PRO/DX INJ NEW DRUG ADDON: CPT

## 2022-11-23 PROCEDURE — 93005 ELECTROCARDIOGRAM TRACING: CPT | Performed by: SPECIALIST

## 2022-11-23 PROCEDURE — 37000008 HC ANESTHESIA 1ST 15 MINUTES: Performed by: INTERNAL MEDICINE

## 2022-11-23 PROCEDURE — 85014 HEMATOCRIT: CPT | Performed by: STUDENT IN AN ORGANIZED HEALTH CARE EDUCATION/TRAINING PROGRAM

## 2022-11-23 PROCEDURE — D9220A PRA ANESTHESIA: ICD-10-PCS | Mod: CRNA,,, | Performed by: NURSE ANESTHETIST, CERTIFIED REGISTERED

## 2022-11-23 PROCEDURE — 93010 EKG 12-LEAD: ICD-10-PCS | Mod: ,,, | Performed by: SPECIALIST

## 2022-11-23 PROCEDURE — 83605 ASSAY OF LACTIC ACID: CPT | Performed by: EMERGENCY MEDICINE

## 2022-11-23 PROCEDURE — 25000003 PHARM REV CODE 250: Performed by: EMERGENCY MEDICINE

## 2022-11-23 PROCEDURE — 83036 HEMOGLOBIN GLYCOSYLATED A1C: CPT | Performed by: STUDENT IN AN ORGANIZED HEALTH CARE EDUCATION/TRAINING PROGRAM

## 2022-11-23 PROCEDURE — U0002 COVID-19 LAB TEST NON-CDC: HCPCS | Performed by: EMERGENCY MEDICINE

## 2022-11-23 PROCEDURE — 27200997: Performed by: INTERNAL MEDICINE

## 2022-11-23 PROCEDURE — D9220A PRA ANESTHESIA: ICD-10-PCS | Mod: ANES,,, | Performed by: ANESTHESIOLOGY

## 2022-11-23 PROCEDURE — 83690 ASSAY OF LIPASE: CPT | Performed by: EMERGENCY MEDICINE

## 2022-11-23 PROCEDURE — D9220A PRA ANESTHESIA: Mod: CRNA,,, | Performed by: NURSE ANESTHETIST, CERTIFIED REGISTERED

## 2022-11-23 PROCEDURE — 99285 EMERGENCY DEPT VISIT HI MDM: CPT | Mod: 25

## 2022-11-23 PROCEDURE — D9220A PRA ANESTHESIA: Mod: ANES,,, | Performed by: ANESTHESIOLOGY

## 2022-11-23 PROCEDURE — 25500020 PHARM REV CODE 255: Performed by: EMERGENCY MEDICINE

## 2022-11-23 PROCEDURE — 84484 ASSAY OF TROPONIN QUANT: CPT | Mod: 91 | Performed by: EMERGENCY MEDICINE

## 2022-11-23 PROCEDURE — 83880 ASSAY OF NATRIURETIC PEPTIDE: CPT | Performed by: EMERGENCY MEDICINE

## 2022-11-23 PROCEDURE — 37000009 HC ANESTHESIA EA ADD 15 MINS: Performed by: INTERNAL MEDICINE

## 2022-11-23 PROCEDURE — 93010 ELECTROCARDIOGRAM REPORT: CPT | Mod: ,,, | Performed by: SPECIALIST

## 2022-11-23 PROCEDURE — 43255 EGD CONTROL BLEEDING ANY: CPT | Performed by: INTERNAL MEDICINE

## 2022-11-23 PROCEDURE — 86920 COMPATIBILITY TEST SPIN: CPT | Performed by: INTERNAL MEDICINE

## 2022-11-23 PROCEDURE — 87502 INFLUENZA DNA AMP PROBE: CPT | Performed by: EMERGENCY MEDICINE

## 2022-11-23 RX ORDER — ONDANSETRON 2 MG/ML
4 INJECTION INTRAMUSCULAR; INTRAVENOUS EVERY 8 HOURS PRN
Status: DISCONTINUED | OUTPATIENT
Start: 2022-11-23 | End: 2022-11-24 | Stop reason: HOSPADM

## 2022-11-23 RX ORDER — HYDROMORPHONE HYDROCHLORIDE 4 MG/1
4 TABLET ORAL
Qty: 120 TABLET | Refills: 0 | OUTPATIENT
Start: 2022-11-23 | End: 2022-11-24

## 2022-11-23 RX ORDER — HYDROCODONE BITARTRATE AND ACETAMINOPHEN 500; 5 MG/1; MG/1
TABLET ORAL
Status: DISCONTINUED | OUTPATIENT
Start: 2022-11-23 | End: 2022-11-24 | Stop reason: HOSPADM

## 2022-11-23 RX ORDER — INSULIN ASPART 100 [IU]/ML
1-10 INJECTION, SOLUTION INTRAVENOUS; SUBCUTANEOUS
Status: DISCONTINUED | OUTPATIENT
Start: 2022-11-23 | End: 2022-11-24 | Stop reason: HOSPADM

## 2022-11-23 RX ORDER — LEVOTHYROXINE SODIUM 25 UG/1
50 TABLET ORAL NIGHTLY
Status: DISCONTINUED | OUTPATIENT
Start: 2022-11-23 | End: 2022-11-24 | Stop reason: HOSPADM

## 2022-11-23 RX ORDER — POTASSIUM CHLORIDE 20 MEQ/1
40 TABLET, EXTENDED RELEASE ORAL ONCE
Status: COMPLETED | OUTPATIENT
Start: 2022-11-23 | End: 2022-11-23

## 2022-11-23 RX ORDER — PROMETHAZINE HYDROCHLORIDE AND DEXTROMETHORPHAN HYDROBROMIDE 6.25; 15 MG/5ML; MG/5ML
5 SYRUP ORAL EVERY 6 HOURS PRN
Status: ON HOLD | COMMUNITY
Start: 2022-11-10 | End: 2022-11-24 | Stop reason: HOSPADM

## 2022-11-23 RX ORDER — IBUPROFEN 200 MG
16 TABLET ORAL
Status: DISCONTINUED | OUTPATIENT
Start: 2022-11-23 | End: 2022-11-24 | Stop reason: HOSPADM

## 2022-11-23 RX ORDER — SODIUM CHLORIDE 0.9 % (FLUSH) 0.9 %
10 SYRINGE (ML) INJECTION
Status: DISCONTINUED | OUTPATIENT
Start: 2022-11-23 | End: 2022-11-24 | Stop reason: HOSPADM

## 2022-11-23 RX ORDER — HYDROMORPHONE HYDROCHLORIDE 2 MG/1
4 TABLET ORAL
Status: DISCONTINUED | OUTPATIENT
Start: 2022-11-23 | End: 2022-11-24 | Stop reason: HOSPADM

## 2022-11-23 RX ORDER — ONDANSETRON 2 MG/ML
4 INJECTION INTRAMUSCULAR; INTRAVENOUS
Status: COMPLETED | OUTPATIENT
Start: 2022-11-23 | End: 2022-11-23

## 2022-11-23 RX ORDER — LANOLIN ALCOHOL/MO/W.PET/CERES
400 CREAM (GRAM) TOPICAL NIGHTLY
Status: DISCONTINUED | OUTPATIENT
Start: 2022-11-23 | End: 2022-11-23

## 2022-11-23 RX ORDER — PANTOPRAZOLE SODIUM 40 MG/1
40 TABLET, DELAYED RELEASE ORAL DAILY
Status: DISCONTINUED | OUTPATIENT
Start: 2022-11-23 | End: 2022-11-23 | Stop reason: SDUPTHER

## 2022-11-23 RX ORDER — BENZONATATE 100 MG/1
100 CAPSULE ORAL 3 TIMES DAILY
Status: ON HOLD | COMMUNITY
Start: 2022-11-10 | End: 2022-11-24 | Stop reason: HOSPADM

## 2022-11-23 RX ORDER — LOSARTAN POTASSIUM 50 MG/1
100 TABLET ORAL DAILY
Status: DISCONTINUED | OUTPATIENT
Start: 2022-11-23 | End: 2022-11-24 | Stop reason: HOSPADM

## 2022-11-23 RX ORDER — SODIUM CHLORIDE 9 MG/ML
1000 INJECTION, SOLUTION INTRAVENOUS
Status: COMPLETED | OUTPATIENT
Start: 2022-11-23 | End: 2022-11-23

## 2022-11-23 RX ORDER — HYDROCODONE BITARTRATE AND ACETAMINOPHEN 5; 325 MG/1; MG/1
1 TABLET ORAL EVERY 6 HOURS PRN
Status: DISCONTINUED | OUTPATIENT
Start: 2022-11-23 | End: 2022-11-23

## 2022-11-23 RX ORDER — PANTOPRAZOLE SODIUM 40 MG/10ML
80 INJECTION, POWDER, LYOPHILIZED, FOR SOLUTION INTRAVENOUS DAILY
Status: DISCONTINUED | OUTPATIENT
Start: 2022-11-23 | End: 2022-11-24 | Stop reason: HOSPADM

## 2022-11-23 RX ORDER — ALBUTEROL SULFATE 90 UG/1
1-2 AEROSOL, METERED RESPIRATORY (INHALATION) EVERY 4 HOURS PRN
Status: ON HOLD | COMMUNITY
Start: 2022-11-10 | End: 2022-12-03 | Stop reason: HOSPADM

## 2022-11-23 RX ORDER — HYDRALAZINE HYDROCHLORIDE 20 MG/ML
10 INJECTION INTRAMUSCULAR; INTRAVENOUS EVERY 6 HOURS PRN
Status: DISCONTINUED | OUTPATIENT
Start: 2022-11-23 | End: 2022-11-24 | Stop reason: HOSPADM

## 2022-11-23 RX ORDER — LEVOTHYROXINE SODIUM 50 UG/1
50 TABLET ORAL NIGHTLY
COMMUNITY

## 2022-11-23 RX ORDER — ONDANSETRON HYDROCHLORIDE 8 MG/1
8 TABLET, FILM COATED ORAL EVERY 8 HOURS PRN
Status: DISCONTINUED | OUTPATIENT
Start: 2022-11-23 | End: 2022-11-24 | Stop reason: HOSPADM

## 2022-11-23 RX ORDER — MORPHINE SULFATE 2 MG/ML
2 INJECTION, SOLUTION INTRAMUSCULAR; INTRAVENOUS EVERY 4 HOURS PRN
Status: DISCONTINUED | OUTPATIENT
Start: 2022-11-23 | End: 2022-11-24 | Stop reason: HOSPADM

## 2022-11-23 RX ORDER — GLUCAGON 1 MG
1 KIT INJECTION
Status: DISCONTINUED | OUTPATIENT
Start: 2022-11-23 | End: 2022-11-24 | Stop reason: HOSPADM

## 2022-11-23 RX ORDER — PROPOFOL 10 MG/ML
VIAL (ML) INTRAVENOUS
Status: DISCONTINUED | OUTPATIENT
Start: 2022-11-23 | End: 2022-11-23

## 2022-11-23 RX ORDER — IBUPROFEN 200 MG
24 TABLET ORAL
Status: DISCONTINUED | OUTPATIENT
Start: 2022-11-23 | End: 2022-11-24 | Stop reason: HOSPADM

## 2022-11-23 RX ORDER — FUROSEMIDE 10 MG/ML
20 INJECTION INTRAMUSCULAR; INTRAVENOUS ONCE
Status: COMPLETED | OUTPATIENT
Start: 2022-11-23 | End: 2022-11-24

## 2022-11-23 RX ORDER — DOXYCYCLINE HYCLATE 100 MG
100 TABLET ORAL 2 TIMES DAILY
Status: ON HOLD | COMMUNITY
Start: 2022-11-10 | End: 2022-11-24 | Stop reason: HOSPADM

## 2022-11-23 RX ORDER — ONDANSETRON HYDROCHLORIDE 8 MG/1
8 TABLET, FILM COATED ORAL EVERY 8 HOURS PRN
Qty: 60 TABLET | Refills: 2 | OUTPATIENT
Start: 2022-11-23 | End: 2022-11-24

## 2022-11-23 RX ORDER — MORPHINE SULFATE 4 MG/ML
4 INJECTION, SOLUTION INTRAMUSCULAR; INTRAVENOUS
Status: COMPLETED | OUTPATIENT
Start: 2022-11-23 | End: 2022-11-23

## 2022-11-23 RX ADMIN — SODIUM CHLORIDE 1000 ML: 0.9 INJECTION, SOLUTION INTRAVENOUS at 09:11

## 2022-11-23 RX ADMIN — PROPOFOL 20 MG: 10 INJECTION, EMULSION INTRAVENOUS at 01:11

## 2022-11-23 RX ADMIN — ONDANSETRON HYDROCHLORIDE 4 MG: 2 SOLUTION INTRAMUSCULAR; INTRAVENOUS at 10:11

## 2022-11-23 RX ADMIN — PROPOFOL 30 MG: 10 INJECTION, EMULSION INTRAVENOUS at 01:11

## 2022-11-23 RX ADMIN — HYDROCODONE BITARTRATE AND ACETAMINOPHEN 1 TABLET: 5; 325 TABLET ORAL at 06:11

## 2022-11-23 RX ADMIN — Medication 400 MG: at 08:11

## 2022-11-23 RX ADMIN — MORPHINE SULFATE 4 MG: 4 INJECTION, SOLUTION INTRAMUSCULAR; INTRAVENOUS at 10:11

## 2022-11-23 RX ADMIN — IOHEXOL 100 ML: 350 INJECTION, SOLUTION INTRAVENOUS at 10:11

## 2022-11-23 RX ADMIN — PROPOFOL 90 MG: 10 INJECTION, EMULSION INTRAVENOUS at 01:11

## 2022-11-23 RX ADMIN — POTASSIUM CHLORIDE 40 MEQ: 20 TABLET, EXTENDED RELEASE ORAL at 03:11

## 2022-11-23 RX ADMIN — MORPHINE SULFATE 2 MG: 2 INJECTION, SOLUTION INTRAMUSCULAR; INTRAVENOUS at 02:11

## 2022-11-23 RX ADMIN — SODIUM CHLORIDE, SODIUM LACTATE, POTASSIUM CHLORIDE, AND CALCIUM CHLORIDE: .6; .31; .03; .02 INJECTION, SOLUTION INTRAVENOUS at 01:11

## 2022-11-23 RX ADMIN — LEVOTHYROXINE SODIUM 50 MCG: 0.03 TABLET ORAL at 08:11

## 2022-11-23 RX ADMIN — PANTOPRAZOLE SODIUM 80 MG: 40 INJECTION, POWDER, FOR SOLUTION INTRAVENOUS at 10:11

## 2022-11-23 RX ADMIN — SODIUM CHLORIDE, SODIUM LACTATE, POTASSIUM CHLORIDE, AND CALCIUM CHLORIDE 1000 ML: .6; .31; .03; .02 INJECTION, SOLUTION INTRAVENOUS at 03:11

## 2022-11-23 RX ADMIN — MORPHINE SULFATE 2 MG: 2 INJECTION, SOLUTION INTRAMUSCULAR; INTRAVENOUS at 08:11

## 2022-11-23 NOTE — ASSESSMENT & PLAN NOTE
Replace on electrolyte scale  Hold home medication of chlorthalidone as this may be contributing to this electrolyte abnormality  consider changing blood pressure medication to another type

## 2022-11-23 NOTE — H&P
[facility] - Emergency Dept  Hospital Medicine  History & Physical    Patient Name: Brunilda Bob  MRN: [id number]  Patient Class: IP- Inpatient  Admission Date: 11/23/2022  Attending Physician: Mert Garcia MD  Primary Care Provider: Sadiq Titus MD         Patient information was obtained from patient and ER records.     Subjective:     Principal Problem:GI bleed    Chief Complaint:   Chief Complaint   Patient presents with    Loss of Consciousness     Patient reports LOC, sat herself to ground, had endoscopy yesterday in which they found new spot on liver         HPI: 56-year-old  female with history of hypertension presents to the ED on account of persistent abdominal pain.  Patient states that earlier this morning while she was having breakfast she started to feel dizzy, clammy and felt like she was about to pass out. She states that she has been having dark tarry stools for weeks (2- 4)now and thought it was a side effect of antibiotics which she was taking for bronchitis.  Patient states that for months she is been feeling bloated with associated abdominal pain which she describes as gas, she states that the pain radiates to the back with no aggravating or relieving factor.  She states that she belches a lot and has been on gas and reflux medication and none have helped.  Due to all the symptoms she had an endoscopy done yesterday at [facility] in which she was told that she had inflammation and had her esophagus stretched.  Despite having this procedure done yesterday, she still felt unwell hence presented to the hospital today.  She denies any loss of consciousness, shortness of breath but states that she is been having lots of nausea, abdominal discomfort, but denies any vomiting or changes in bowel or urinary habits.  Patient stated that stated that in July she had cardiac workup done in which she had a cardiac MRI and they saw some nodules on liver and  she was referred to hepatologist.  She was supposed to have her appointment with hepatologist today however due to her symptoms presented here today.    On presentation to the ED, WBC elevated at 14.22, hemoglobin 8.1, sodium 131, potassium 3.1 and glucose 216.  Occult is positive.  CTA chest showed subsegmental PE in right lobe and CT abdomen showed heterogeneous mass arising from the proximal body of the pancreas resulting in dilatation of the pancreatic duct compatible with pancreatic malignancy and numerous hepatic metastasis with minimal ascites around the liver.  Of      Past Medical History:   Diagnosis Date    Broken heart syndrome     Hypertension        Past Surgical History:   Procedure Laterality Date    CLAVICLE SURGERY  2012    TONSILLECTOMY         Review of patient's allergies indicates:   Allergen Reactions    Crestor [rosuvastatin] Other (See Comments)     Paralysis    Codeine Nausea And Vomiting    Strawberries [strawberry] Hives    Adhesive tape-silicones Rash       No current facility-administered medications on file prior to encounter.     Current Outpatient Medications on File Prior to Encounter   Medication Sig    albuterol (PROVENTIL/VENTOLIN HFA) 90 mcg/actuation inhaler Inhale 1-2 puffs into the lungs every 4 (four) hours as needed.    amoxicillin-clavulanate 875-125mg (AUGMENTIN) 875-125 mg per tablet Take 1 tablet by mouth every 12 (twelve) hours. for 10 days    atorvastatin (LIPITOR) 10 MG tablet Take 1 tablet (10 mg total) by mouth every evening.    benzonatate (TESSALON) 100 MG capsule Take 100 mg by mouth 3 (three) times daily.    chlorthalidone (HYGROTEN) 25 MG Tab Take 1 tablet (25 mg total) by mouth once daily.    esomeprazole (NEXIUM) 40 MG capsule Take 1 capsule (40 mg total) by mouth before breakfast.    guaiFENesin-codeine 100-10 mg/5 ml (TUSSI-ORGANIDIN NR)  mg/5 mL syrup Take 5 mLs by mouth 3 (three) times daily as needed for Cough or Congestion.     levothyroxine (UNITHROID) 50 MCG tablet Take 50 mcg by mouth every evening. UNITHROID    losartan (COZAAR) 100 MG tablet Take 1 tablet (100 mg total) by mouth once daily.    magnesium oxide (MAG-OX) 400 mg (241.3 mg magnesium) tablet Take 400 mg by mouth every evening.    multivitamin (THERAGRAN) per tablet Take 1 tablet by mouth every evening.    predniSONE (DELTASONE) 20 MG tablet Take 1 tablet (20 mg total) by mouth 2 (two) times daily. for 5 days    sucralfate (CARAFATE) 1 gram tablet Take 1 tablet (1 g total) by mouth 2 (two) times daily. (Patient taking differently: Take 1 g by mouth every 8 (eight) hours.)    acetaminophen (TYLENOL) 500 MG tablet Take 2 tablets (1,000 mg total) by mouth 3 (three) times daily.    doxycycline (VIBRA-TABS) 100 MG tablet Take 100 mg by mouth 2 (two) times daily.    ergocalciferol (VITAMIN D2) 50,000 unit Cap Take 1 capsule (50,000 Units total) by mouth nightly.    iron carb,gl/FA/B12/C/docusate (FERRALET 90 DUAL-IRON DELIVERY ORAL) Take 90 mg by mouth 4 (four) times a week.     Lactobacillus rhamnosus GG (CULTURELLE) 10 billion cell capsule Take 1 capsule by mouth every evening.    levothyroxine (SYNTHROID) 50 MCG tablet Take 50 mcg by mouth every evening.    loratadine (CLARITIN) 10 mg tablet Take 10 mg by mouth every evening.    promethazine-dextromethorphan (PROMETHAZINE-DM) 6.25-15 mg/5 mL Syrp Take 5 mLs by mouth every 6 (six) hours as needed.    [DISCONTINUED] atenoloL (TENORMIN) 25 MG tablet TAKE ONE TABLET BY MOUTH ONCE DAILY (Patient taking differently: Take 25 mg by mouth every evening.)     Family History       Problem Relation (Age of Onset)    Colon cancer Mother    Hypertension Father    Testicular cancer Son    Uterine cancer Mother          Tobacco Use    Smoking status: Never    Smokeless tobacco: Never   Substance and Sexual Activity    Alcohol use: Yes    Drug use: Never    Sexual activity: Yes     Partners: Male     Review of Systems    Constitutional:  Positive for appetite change and fatigue. Negative for fever.   Respiratory:  Negative for shortness of breath.    Cardiovascular:  Negative for chest pain and palpitations.   Gastrointestinal:  Positive for abdominal distention, abdominal pain, blood in stool and nausea. Negative for diarrhea.   Genitourinary:  Negative for difficulty urinating and hematuria.   Neurological:  Positive for weakness.   All other systems reviewed and are negative.  Objective:     Vital Signs (Most Recent):  Temp: 98.1 °F (36.7 °C) (11/23/22 0831)  Pulse: 75 (11/23/22 1221)  Resp: 17 (11/23/22 1221)  BP: (!) 144/67 (11/23/22 0911)  SpO2: 100 % (11/23/22 0831)   Vital Signs (24h Range):  Temp:  [98.1 °F (36.7 °C)] 98.1 °F (36.7 °C)  Pulse:  [67-83] 75  Resp:  [16-19] 17  SpO2:  [100 %] 100 %  BP: ()/(67-68) 144/67     Weight: 76.2 kg (168 lb)  Body mass index is 32.81 kg/m².    Physical Exam  Vitals and nursing note reviewed.   HENT:      Head: Normocephalic.      Mouth/Throat:      Mouth: Mucous membranes are moist.   Eyes:      Pupils: Pupils are equal, round, and reactive to light.   Cardiovascular:      Rate and Rhythm: Normal rate.      Pulses: Normal pulses.   Pulmonary:      Effort: Pulmonary effort is normal.   Abdominal:      General: Bowel sounds are normal.      Palpations: Abdomen is soft.      Tenderness: There is abdominal tenderness.   Musculoskeletal:         General: Normal range of motion.      Cervical back: Normal range of motion.      Right lower leg: No edema.      Left lower leg: No edema.   Skin:     General: Skin is warm.   Neurological:      General: No focal deficit present.      Mental Status: She is alert and oriented to person, place, and time. Mental status is at baseline.   Psychiatric:         Mood and Affect: Mood normal.         CRANIAL NERVES     CN III, IV, VI   Pupils are equal, round, and reactive to light.     Significant Labs: All pertinent labs within the past 24 hours  have been reviewed.  CBC:   Recent Labs   Lab 11/23/22  0900   WBC 14.22*   HGB 8.1*   HCT 25.3*        CMP:   Recent Labs   Lab 11/23/22  0900   *   K 3.1*   CL 92*   CO2 29   *   BUN 25*   CREATININE 0.8   CALCIUM 8.9   PROT 6.3   ALBUMIN 3.3*   BILITOT 0.9   ALKPHOS 198*   AST 22   ALT 25   ANIONGAP 10     Coagulation: No results for input(s): PT, INR, APTT in the last 48 hours.  Magnesium: No results for input(s): MG in the last 48 hours.  TSH:   Recent Labs   Lab 11/23/22  0900   TSH 1.430     Urine Studies:   Recent Labs   Lab 11/23/22  1049   COLORU Yellow   APPEARANCEUA Clear   PHUR 8.0   SPECGRAV 1.015   PROTEINUA Negative   GLUCUA Trace*   KETONESU Negative   BILIRUBINUA Negative   OCCULTUA Trace*   NITRITE Negative   UROBILINOGEN Negative   LEUKOCYTESUR Negative       Significant Imaging: I have reviewed all pertinent imaging results/findings within the past 24 hours.  Imaging Results              CT Abdomen Pelvis With Contrast (Final result)  Result time 11/23/22 11:05:54      Final result by Inna Pratt MD (11/23/22 11:05:54)                   Narrative:    All CT scans at this facility used dose modulation, iterative reconstruction and/or weight-based dosing when appropriate to reduce radiation doses  as low as reasonably achievable.    HISTORY: Liver mass, abdominal pain    FINDINGS: Axial postcontrast imaging was performed with 100 mL Omnipaque 350 IV contrast . Multiphase imaging through the liver.    COMPARISON: 10/30/2019    CT ABDOMEN: The lung bases are clear.    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 The largest is within segment five measuring 3.6 x 3.5 cm. The largest lesion within the lateral segment the left lobe measures 2 cm. There are multiple cysts within both lobes of the liver. There is no biliary duct dilatation.    There  is a 4.5 x 4.1 x 3.7 cm (AP, craniocaudal, transverse mass arising from the proximal body of the pancreas extending superiorly. The mass abuts the proximal splenic artery and common hepatic artery. The vessels are patent. Findings are compatible with pancreatic malignancy. There is dilatation of the distal pancreatic duct. The superior mesenteric artery and vein are patent    The spleen, gallbladder, and adrenal glands are normal.    The kidneys enhance symmetrically without hydronephrosis or calculi. There is contrast material within the collecting system and ureters.    There are no thick-walled or dilated bowel loops. There is no mesenteric or retroperitoneal adenopathy. Aorta is normal in caliber. The musculature is normal.    CT PELVIS: The bladder is normal. There are multiple uterine fibroids the largest measuring 2.8 cm on the left. There is no pelvic adenopathy. There are no acute osseous abnormalities.    IMPRESSION: 4.8 x 4.1 x 3.7 cm heterogeneous mass arising from the proximal body of the pancreas resulting in dilatation of the pancreatic duct compatible with pancreatic malignancy.    Numerous hepatic metastasis with minimal ascites around the liver    Electronically signed by:  Inna Pratt MD  11/23/2022 11:05 AM CST Workstation: [id number]                                     CTA Chest Non-Coronary (PE Studies) (Edited Result - FINAL)  Result time 11/23/22 11:28:28      Edited Result - FINAL by Inna Pratt MD (11/23/22 11:28:28)                   Narrative:         ADDENDUM #1       This is a correction to the prior report    There are emboli within subsegmental arteries in the right lower lobe.    These findings were called to Dr. Vazquez in the emergency department at 11:27 AM    Electronically signed by:  Inna Pratt MD  11/23/2022 11:28 AM CST Workstation: [id number]     ORIGINAL REPORT       All CT scans at this facility used dose modulation, iterative reconstruction and/or  weight-based dosing when appropriate to reduce radiation doses  as low as reasonably achievable.    HISTORY: Chest pain, loss of consciousness.    FINDINGS: Thin axial imaging through the chest was performed with 100 mL Omnipaque 350 IV contrast, with sagittal and coronal reformatted images and 3-D reconstructions performed on an independent workstation, with images stored in the patient's permanent electronic medical record.    This is a high-grade quality study for the evaluation of pulmonary embolism. The pulmonary arteries are normal in appearance without pulmonary emboli noted up to the subsegmental level, noting limitations of CT technique for identifying small isolated subsegmental emboli.    The heart is normal in size. 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 There is no pericardial effusion. The aorta is normal in caliber.    There is no hilar, mediastinal or axillary adenopathy.    There are no pulmonary nodules, infiltrates or pleural effusions. The trachea and bronchi are normal. There are no acute osseous abnormalities.    Evaluation of the upper abdomen demonstrates multiple hypodense masses throughout both lobes of the liver suspicious for metastatic disease.. There is a 4.5 x 3.2 cm mass arising from the body of the pancreas suspicious for malignancy. Please see the CT abdomen report. The adrenal glands are normal.    IMPRESSION: No CT evidence pulmonary emboli    No acute pulmonary process    4.5 x 3.2 cm mass arising from the proximal body of the pancreas abutting the proximal splenic artery and hepatic artery suspicious for pancreatic malignancy    Numerous hypodense masses throughout the liver compatible with metastatic disease    Electronically signed by:  Inna Pratt MD  11/23/2022 10:47 AM [facility] Workstation: [id number]                      Final result by Inna Pratt MD (11/23/22 10:47:13)                   Narrative:    All CT scans at this  facility used dose modulation, iterative reconstruction and/or weight-based dosing when appropriate to reduce radiation doses  as low as reasonably achievable.    HISTORY: Chest pain, loss of consciousness.    FINDINGS: Thin axial imaging through the chest was performed with 100 mL Omnipaque 350 IV contrast, with sagittal and coronal reformatted images and 3-D reconstructions performed on an independent workstation, with images stored in the patient's permanent electronic medical record.    This is a high-grade quality study for the evaluation of pulmonary embolism. The pulmonary arteries are normal in appearance without pulmonary emboli noted up to the subsegmental level, noting limitations of CT technique for identifying small isolated subsegmental emboli.    The heart is normal in size. 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 There is no pericardial effusion. The aorta is normal in caliber.    There is no hilar, mediastinal or axillary adenopathy.    There are no pulmonary nodules, infiltrates or pleural effusions. The trachea and bronchi are normal. There are no acute osseous abnormalities.    Evaluation of the upper abdomen demonstrates multiple hypodense masses throughout both lobes of the liver suspicious for metastatic disease.. There is a 4.5 x 3.2 cm mass arising from the body of the pancreas suspicious for malignancy. Please see the CT abdomen report. The adrenal glands are normal.    IMPRESSION: No CT evidence pulmonary emboli    No acute pulmonary process    4.5 x 3.2 cm mass arising from the proximal body of the pancreas abutting the proximal splenic artery and hepatic artery suspicious for pancreatic malignancy    Numerous hypodense masses throughout the liver compatible with metastatic disease    Electronically signed by:  Inna Pratt MD  11/23/2022 10:47 AM [facility] Workstation: [id number]                                     CT Head Without Contrast (Final result)   Result time 11/23/22 10:35:09      Final result by Inna Pratt MD (11/23/22 10:35:09)                   Narrative:    All CT scans at this facility used dose modulation, iterative reconstruction and/or weight-based dosing when appropriate to reduce radiation doses  as low as reasonably achievable.    CLINICAL INFORMATION:  Dizziness, persistent/recurrent, cardiac or vascular cause suspected      COMPARISON: 10/30/2019  FINDINGS:   The ventricles and sulci are normal in size and configuration for age.  There is no intraparenchymal hemorrhage, mass or midline shift.  There are no extra-axial fluid collections. There is minimal mucosal thickening in the maxillary sinuses. The remainder the paranasal sinuses are clear.    IMPRESSION: No acute intracranial process    These findings were given to Dr. beach at 10:25 AM    Electronically signed by:  Inna Pratt MD  11/23/2022 10:35 AM CST Workstation: [id number]                                     X-Ray Chest AP Portable (Final result)  Result time 11/23/22 09:04:08      Final result by Inna Pratt MD (11/23/22 09:04:08)                   Narrative:    XR CHEST 1 VIEW    CLINICAL HISTORY:  56 years Female Chest Pain    COMPARISON: 11/18/2022    FINDINGS: Cardiomediastinal silhouette is within normal limits. Lungs are normally expanded with no airspace consolidation. No pleural effusion or pneumothorax. No acute osseous abnormality.    IMPRESSION: No acute pulmonary process.    Electronically signed by:  Inna Pratt MD  11/23/2022 9:04 AM CST Workstation: [id number]                                      Assessment/Plan:     * GI bleed  Will hold on anticoagulation  Consult to Gastroenterology  IV Protonix  Trend hemoglobin      Hypokalemia  Replace on electrolyte scale  Hold home medication of chlorthalidone as this may be contributing to this electrolyte abnormality  consider changing blood pressure medication to another  type      Hyperglycemia  Secondary to pancreas abnormality??  HbA1c  Insulin sliding scale and regular Accu-Chek      Pancreatic mass  Consult Oncology      Metastasis to liver  Hold all hepatotoxic medication  Consult to Oncology      Thyroid dysfunction  Continue home medication of levothyroxine      Hypertension  Continue home medication of Cozaar  P.r.n. hydralazine added        VTE Risk Mitigation (From admission, onward)    None             Mert Garcia MD  Department of Hospital Medicine   [facility] - Emergency Dept

## 2022-11-23 NOTE — ANESTHESIA POSTPROCEDURE EVALUATION
Anesthesia Post Evaluation    Patient: Brunilda Bob    Procedure(s) Performed: Procedure(s) (LRB):  EGD (ESOPHAGOGASTRODUODENOSCOPY) (N/A)    Final Anesthesia Type: MAC      Patient location during evaluation: PACU  Patient participation: Yes- Able to Participate  Level of consciousness: awake and alert  Post-procedure vital signs: reviewed and stable  Pain management: adequate  Airway patency: patent    PONV status at discharge: No PONV  Anesthetic complications: no      Cardiovascular status: blood pressure returned to baseline and stable  Respiratory status: unassisted and room air  Hydration status: euvolemic  Follow-up not needed.          Vitals Value Taken Time   BP 95/55 11/23/22 1515   Temp 36.2 °C (97.2 °F) 11/23/22 1410   Pulse 69 11/23/22 1515   Resp 16 11/23/22 1515   SpO2 96 % 11/23/22 1515         Event Time   Out of Recovery 14:25:00         Pain/Nat Score: Pain Rating Prior to Med Admin: 6 (11/23/2022  2:54 PM)  Pain Rating Post Med Admin: 0 (11/23/2022 11:13 AM)

## 2022-11-23 NOTE — ED PROVIDER NOTES
Encounter Date: 11/23/2022       History     Chief Complaint   Patient presents with    Loss of Consciousness     Patient reports LOC, sat herself to ground, had endoscopy yesterday in which they found new spot on liver      Patient states over the last few weeks she is had persisting abdominal pain and discomfort.  Patient states she had endoscopy performed yesterday which just showed some inflammation.  This morning patient states she felt excessively weak and had a near syncopal event at home.  Patient arrives feeling very weak and fatigued.  Patient complains of diffuse abdominal pain discomfort.  Patient notes she has an aneurysm in her chest.  At the worst symptoms are moderate.  Nothing really makes it better or worse.    Review of patient's allergies indicates:   Allergen Reactions    Crestor [rosuvastatin] Other (See Comments)     Paralysis    Codeine Nausea And Vomiting    Strawberries [strawberry] Hives    Adhesive tape-silicones Rash     Past Medical History:   Diagnosis Date    Broken heart syndrome     Hypertension      Past Surgical History:   Procedure Laterality Date    CLAVICLE SURGERY  2012    TONSILLECTOMY       Family History   Problem Relation Age of Onset    Colon cancer Mother     Uterine cancer Mother     Hypertension Father     Testicular cancer Son      Social History     Tobacco Use    Smoking status: Never    Smokeless tobacco: Never   Substance Use Topics    Alcohol use: Yes    Drug use: Never     Review of Systems   All other systems reviewed and are negative.    Physical Exam     Initial Vitals [11/23/22 0831]   BP Pulse Resp Temp SpO2   (!) 84/68 83 18 98.1 °F (36.7 °C) 100 %      MAP       --         Physical Exam    Nursing note and vitals reviewed.  Constitutional:   Pleasant, polite, weak, frail   HENT:   Head: Normocephalic and atraumatic.   Mouth/Throat: Oropharynx is clear and moist.   Eyes: EOM are normal.   Neck: Neck supple.   Normal range of motion.  Cardiovascular:   Normal rate, regular rhythm, normal heart sounds and intact distal pulses.           Pulmonary/Chest: Breath sounds normal. No respiratory distress.   Abdominal:   Diffuse mild tenderness, nonacute abdomen   Genitourinary:    Genitourinary Comments: Black stool on rectal exam, chaperoned by Tawanda.     Musculoskeletal:      Cervical back: Normal range of motion and neck supple.     Neurological: She is alert and oriented to person, place, and time.   Skin: Skin is warm and dry. Capillary refill takes less than 2 seconds.   Psychiatric: She has a normal mood and affect. Her behavior is normal. Judgment and thought content normal.       ED Course   Procedures  Labs Reviewed   CBC W/ AUTO DIFFERENTIAL - Abnormal; Notable for the following components:       Result Value    WBC 14.22 (*)     RBC 3.08 (*)     Hemoglobin 8.1 (*)     Hematocrit 25.3 (*)     MCH 26.3 (*)     Immature Granulocytes 1.2 (*)     Gran # (ANC) 12.0 (*)     Immature Grans (Abs) 0.17 (*)     Lymph # 0.8 (*)     Gran % 84.4 (*)     Lymph % 5.6 (*)     All other components within normal limits   COMPREHENSIVE METABOLIC PANEL - Abnormal; Notable for the following components:    Sodium 131 (*)     Potassium 3.1 (*)     Chloride 92 (*)     Glucose 216 (*)     BUN 25 (*)     Albumin 3.3 (*)     Alkaline Phosphatase 198 (*)     All other components within normal limits   OCCULT BLOOD X 1, STOOL - Abnormal; Notable for the following components:    Occult Blood Positive (*)     All other components within normal limits   CULTURE, BLOOD   CULTURE, BLOOD   TROPONIN I HIGH SENSITIVITY   B-TYPE NATRIURETIC PEPTIDE   LIPASE   INFLUENZA A AND B ANTIGEN    Narrative:     Specimen Source->Nasopharyngeal Swab   SARS-COV-2 RNA AMPLIFICATION, QUAL   TSH   LACTIC ACID, PLASMA   URINALYSIS, REFLEX TO URINE CULTURE   TROPONIN I HIGH SENSITIVITY   TYPE & SCREEN        ECG Results              EKG 12-lead (In process)  Result time 11/23/22 09:05:55      In process by  Interface, Lab In [facility] (11/23/22 09:05:55)                   Narrative:    Test Reason : R07.9,    Vent. Rate : 066 BPM     Atrial Rate : 066 BPM     P-R Int : 176 ms          QRS Dur : 096 ms      QT Int : 386 ms       P-R-T Axes : 064 029 049 degrees     QTc Int : 404 ms    Normal sinus rhythm  Nonspecific T wave abnormality  Abnormal ECG  When compared with ECG of 30-JUN-2022 17:30,  No significant change was found    Referred By:             Confirmed By:                                   Imaging Results              CT Abdomen Pelvis With Contrast (In process)                      CTA Chest Non-Coronary (PE Studies) (Final result)  Result time 11/23/22 10:47:13      Final result by Inna Pratt MD (11/23/22 10:47:13)                   Narrative:    All CT scans at this facility used dose modulation, iterative reconstruction and/or weight-based dosing when appropriate to reduce radiation doses  as low as reasonably achievable.    HISTORY: Chest pain, loss of consciousness.    FINDINGS: Thin axial imaging through the chest was performed with 100 mL Omnipaque 350 IV contrast, with sagittal and coronal reformatted images and 3-D reconstructions performed on an independent workstation, with images stored in the patient's permanent electronic medical record.    This is a high-grade quality study for the evaluation of pulmonary embolism. The pulmonary arteries are normal in appearance without pulmonary emboli noted up to the subsegmental level, noting limitations of CT technique for identifying small isolated subsegmental emboli.    The heart is normal in size. 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 There is no pericardial effusion. The aorta is normal in caliber.    There is no hilar, mediastinal or axillary adenopathy.    There are no pulmonary nodules, infiltrates or pleural effusions. The trachea and bronchi are normal. There are no acute osseous  abnormalities.    Evaluation of the upper abdomen demonstrates multiple hypodense masses throughout both lobes of the liver suspicious for metastatic disease.. There is a 4.5 x 3.2 cm mass arising from the body of the pancreas suspicious for malignancy. Please see the CT abdomen report. The adrenal glands are normal.    IMPRESSION: No CT evidence pulmonary emboli    No acute pulmonary process    4.5 x 3.2 cm mass arising from the proximal body of the pancreas abutting the proximal splenic artery and hepatic artery suspicious for pancreatic malignancy    Numerous hypodense masses throughout the liver compatible with metastatic disease    Electronically signed by:  Inna Pratt MD  11/23/2022 10:47 AM CST Workstation: [id number]                                     CT Head Without Contrast (Final result)  Result time 11/23/22 10:35:09      Final result by Inna Pratt MD (11/23/22 10:35:09)                   Narrative:    All CT scans at this facility used dose modulation, iterative reconstruction and/or weight-based dosing when appropriate to reduce radiation doses  as low as reasonably achievable.    CLINICAL INFORMATION:  Dizziness, persistent/recurrent, cardiac or vascular cause suspected      COMPARISON: 10/30/2019  FINDINGS:   The ventricles and sulci are normal in size and configuration for age.  There is no intraparenchymal hemorrhage, mass or midline shift.  There are no extra-axial fluid collections. There is minimal mucosal thickening in the maxillary sinuses. The remainder the paranasal sinuses are clear.    IMPRESSION: No acute intracranial process    These findings were given to Dr. beach at 10:25 AM    Electronically signed by:  Inna Pratt MD  11/23/2022 10:35 AM CST Workstation: [id number]                                     X-Ray Chest AP Portable (Final result)  Result time 11/23/22 09:04:08      Final result by Inna Pratt MD (11/23/22 09:04:08)                   Narrative:     XR CHEST 1 VIEW    CLINICAL HISTORY:  56 years Female Chest Pain    COMPARISON: 11/18/2022    FINDINGS: Cardiomediastinal silhouette is within normal limits. Lungs are normally expanded with no airspace consolidation. No pleural effusion or pneumothorax. No acute osseous abnormality.    IMPRESSION: No acute pulmonary process.    Electronically signed by:  Inna Pratt MD  11/23/2022 9:04 AM CST Workstation: [id number]                                     Medications   pantoprazole injection 80 mg (80 mg Intravenous Given 11/23/22 1037)   0.9%  NaCl infusion (0 mLs Intravenous Stopped 11/23/22 1029)   iohexoL (OMNIPAQUE 350) injection 100 mL (100 mLs Intravenous Given 11/23/22 1020)   ondansetron injection 4 mg (4 mg Intravenous Given 11/23/22 1046)   morphine injection 4 mg (4 mg Intravenous Given 11/23/22 1048)     Medical Decision Making:   Initial Assessment:   Patient appears fatigued and weak  Differential Diagnosis:   Considerations include but are not limited to GI bleed, electrolyte abnormalities, dehydration, pneumonia, worsening aneurysm, pancreatic mass, colitis  Independently Interpreted Test(s):   I have ordered and independently interpreted EKG Reading(s) - see summary below       <> Summary of EKG Reading(s): EKG is regular rate and rhythm without ST elevation  Clinical Tests:   Lab Tests: Reviewed and Ordered  Radiological Study: Ordered and Reviewed  Medical Tests: Reviewed and Ordered  ED Management:  Patient's hemoglobin represents an acute drop from 1 month ago.  We are trying to obtain medical records from [place] regarding the endoscopy.  Patient's imaging reveals pancreatic mass with hepatic lesions also present.  Patient consulted to  secondary to acute drop in hemoglobin and black stool.  Patient be consulted to Hospital Medicine.  Blood pressures have improved after IV fluids.  Patient is currently stable.    Attending Critical Care:   Critical Care Times:   Direct  Patient Care (initial evaluation, reassessments, and time considering the case)................................................................10 minutes.   Additional History from reviewing old medical records or taking additional history from the family, EMS, PCP, etc.......................10 minutes.   Ordering, Reviewing, and Interpreting Diagnostic Studies...............................................................................................................10 minutes.   Documentation..................................................................................................................................................................................5 minutes.   ==============================================================  · Total Critical Care Time - exclusive of procedural time: 35 minutes.  ==============================================================  Critical care was necessary to treat or prevent imminent or life-threatening deterioration of the following conditions:  Hypotension, GI bleed.   Critical care was time spent personally by me on the following activities: obtaining history from patient or relative, examination of patient, review of x-rays / CT sent with the patient, ordering lab, x-rays, and/or EKG, development of treatment plan with patient or relative, ordering and performing treatments and interventions, interpretation of cardiac measurements and re-evaluation of patient's condition.   Critical Care Condition: potentially life-threatening                           Clinical Impression:   Final diagnoses:  [R07.9] Chest pain  [K92.2] Gastrointestinal hemorrhage, unspecified gastrointestinal hemorrhage type  [K86.89] Pancreatic mass        ED Disposition Condition    Admit Stable                Jonah Kern MD  11/23/22 1100

## 2022-11-23 NOTE — SUBJECTIVE & OBJECTIVE
Past Medical History:   Diagnosis Date    Broken heart syndrome     Hypertension        Past Surgical History:   Procedure Laterality Date    CLAVICLE SURGERY  2012    TONSILLECTOMY         Review of patient's allergies indicates:   Allergen Reactions    Crestor [rosuvastatin] Other (See Comments)     Paralysis    Codeine Nausea And Vomiting    Strawberries [strawberry] Hives    Adhesive tape-silicones Rash       No current facility-administered medications on file prior to encounter.     Current Outpatient Medications on File Prior to Encounter   Medication Sig    albuterol (PROVENTIL/VENTOLIN HFA) 90 mcg/actuation inhaler Inhale 1-2 puffs into the lungs every 4 (four) hours as needed.    amoxicillin-clavulanate 875-125mg (AUGMENTIN) 875-125 mg per tablet Take 1 tablet by mouth every 12 (twelve) hours. for 10 days    atorvastatin (LIPITOR) 10 MG tablet Take 1 tablet (10 mg total) by mouth every evening.    benzonatate (TESSALON) 100 MG capsule Take 100 mg by mouth 3 (three) times daily.    chlorthalidone (HYGROTEN) 25 MG Tab Take 1 tablet (25 mg total) by mouth once daily.    esomeprazole (NEXIUM) 40 MG capsule Take 1 capsule (40 mg total) by mouth before breakfast.    guaiFENesin-codeine 100-10 mg/5 ml (TUSSI-ORGANIDIN NR)  mg/5 mL syrup Take 5 mLs by mouth 3 (three) times daily as needed for Cough or Congestion.    levothyroxine (UNITHROID) 50 MCG tablet Take 50 mcg by mouth every evening. UNITHROID    losartan (COZAAR) 100 MG tablet Take 1 tablet (100 mg total) by mouth once daily.    magnesium oxide (MAG-OX) 400 mg (241.3 mg magnesium) tablet Take 400 mg by mouth every evening.    multivitamin (THERAGRAN) per tablet Take 1 tablet by mouth every evening.    predniSONE (DELTASONE) 20 MG tablet Take 1 tablet (20 mg total) by mouth 2 (two) times daily. for 5 days    sucralfate (CARAFATE) 1 gram tablet Take 1 tablet (1 g total) by mouth 2 (two) times daily. (Patient taking differently: Take 1 g by mouth every  8 (eight) hours.)    acetaminophen (TYLENOL) 500 MG tablet Take 2 tablets (1,000 mg total) by mouth 3 (three) times daily.    doxycycline (VIBRA-TABS) 100 MG tablet Take 100 mg by mouth 2 (two) times daily.    ergocalciferol (VITAMIN D2) 50,000 unit Cap Take 1 capsule (50,000 Units total) by mouth nightly.    iron carb,gl/FA/B12/C/docusate (FERRALET 90 DUAL-IRON DELIVERY ORAL) Take 90 mg by mouth 4 (four) times a week.     Lactobacillus rhamnosus GG (CULTURELLE) 10 billion cell capsule Take 1 capsule by mouth every evening.    levothyroxine (SYNTHROID) 50 MCG tablet Take 50 mcg by mouth every evening.    loratadine (CLARITIN) 10 mg tablet Take 10 mg by mouth every evening.    promethazine-dextromethorphan (PROMETHAZINE-DM) 6.25-15 mg/5 mL Syrp Take 5 mLs by mouth every 6 (six) hours as needed.    [DISCONTINUED] atenoloL (TENORMIN) 25 MG tablet TAKE ONE TABLET BY MOUTH ONCE DAILY (Patient taking differently: Take 25 mg by mouth every evening.)     Family History       Problem Relation (Age of Onset)    Colon cancer Mother    Hypertension Father    Testicular cancer Son    Uterine cancer Mother          Tobacco Use    Smoking status: Never    Smokeless tobacco: Never   Substance and Sexual Activity    Alcohol use: Yes    Drug use: Never    Sexual activity: Yes     Partners: Male     Review of Systems   Constitutional:  Positive for appetite change and fatigue. Negative for fever.   Respiratory:  Negative for shortness of breath.    Cardiovascular:  Negative for chest pain and palpitations.   Gastrointestinal:  Positive for abdominal distention, abdominal pain, blood in stool and nausea. Negative for diarrhea.   Genitourinary:  Negative for difficulty urinating and hematuria.   Neurological:  Positive for weakness.   All other systems reviewed and are negative.  Objective:     Vital Signs (Most Recent):  Temp: 98.1 °F (36.7 °C) (11/23/22 0831)  Pulse: 75 (11/23/22 1221)  Resp: 17 (11/23/22 1221)  BP: (!) 144/67  (11/23/22 0911)  SpO2: 100 % (11/23/22 0831)   Vital Signs (24h Range):  Temp:  [98.1 °F (36.7 °C)] 98.1 °F (36.7 °C)  Pulse:  [67-83] 75  Resp:  [16-19] 17  SpO2:  [100 %] 100 %  BP: ()/(67-68) 144/67     Weight: 76.2 kg (168 lb)  Body mass index is 32.81 kg/m².    Physical Exam  Vitals and nursing note reviewed.   HENT:      Head: Normocephalic.      Mouth/Throat:      Mouth: Mucous membranes are moist.   Eyes:      Pupils: Pupils are equal, round, and reactive to light.   Cardiovascular:      Rate and Rhythm: Normal rate.      Pulses: Normal pulses.   Pulmonary:      Effort: Pulmonary effort is normal.   Abdominal:      General: Bowel sounds are normal.      Palpations: Abdomen is soft.      Tenderness: There is abdominal tenderness.   Musculoskeletal:         General: Normal range of motion.      Cervical back: Normal range of motion.      Right lower leg: No edema.      Left lower leg: No edema.   Skin:     General: Skin is warm.   Neurological:      General: No focal deficit present.      Mental Status: She is alert and oriented to person, place, and time. Mental status is at baseline.   Psychiatric:         Mood and Affect: Mood normal.         CRANIAL NERVES     CN III, IV, VI   Pupils are equal, round, and reactive to light.     Significant Labs: All pertinent labs within the past 24 hours have been reviewed.  CBC:   Recent Labs   Lab 11/23/22  0900   WBC 14.22*   HGB 8.1*   HCT 25.3*        CMP:   Recent Labs   Lab 11/23/22  0900   *   K 3.1*   CL 92*   CO2 29   *   BUN 25*   CREATININE 0.8   CALCIUM 8.9   PROT 6.3   ALBUMIN 3.3*   BILITOT 0.9   ALKPHOS 198*   AST 22   ALT 25   ANIONGAP 10     Coagulation: No results for input(s): PT, INR, APTT in the last 48 hours.  Magnesium: No results for input(s): MG in the last 48 hours.  TSH:   Recent Labs   Lab 11/23/22  0900   TSH 1.430     Urine Studies:   Recent Labs   Lab 11/23/22  1049   COLORU Yellow   APPEARANCEUA Clear   PHUR 8.0    SPECGRAV 1.015   PROTEINUA Negative   GLUCUA Trace*   KETONESU Negative   BILIRUBINUA Negative   OCCULTUA Trace*   NITRITE Negative   UROBILINOGEN Negative   LEUKOCYTESUR Negative       Significant Imaging: I have reviewed all pertinent imaging results/findings within the past 24 hours.  Imaging Results              CT Abdomen Pelvis With Contrast (Final result)  Result time 11/23/22 11:05:54      Final result by Inna Pratt MD (11/23/22 11:05:54)                   Narrative:    All CT scans at this facility used dose modulation, iterative reconstruction and/or weight-based dosing when appropriate to reduce radiation doses  as low as reasonably achievable.    HISTORY: Liver mass, abdominal pain    FINDINGS: Axial postcontrast imaging was performed with 100 mL Omnipaque 350 IV contrast . Multiphase imaging through the liver.    COMPARISON: 10/30/2019    CT ABDOMEN: The lung bases are clear.    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 The largest is within segment five measuring 3.6 x 3.5 cm. The largest lesion within the lateral segment the left lobe measures 2 cm. There are multiple cysts within both lobes of the liver. There is no biliary duct dilatation.    There is a 4.5 x 4.1 x 3.7 cm (AP, craniocaudal, transverse mass arising from the proximal body of the pancreas extending superiorly. The mass abuts the proximal splenic artery and common hepatic artery. The vessels are patent. Findings are compatible with pancreatic malignancy. There is dilatation of the distal pancreatic duct. The superior mesenteric artery and vein are patent    The spleen, gallbladder, and adrenal glands are normal.    The kidneys enhance symmetrically without hydronephrosis or calculi. There is contrast material within the collecting system and ureters.    There are no thick-walled or dilated bowel  loops. There is no mesenteric or retroperitoneal adenopathy. Aorta is normal in caliber. The musculature is normal.    CT PELVIS: The bladder is normal. There are multiple uterine fibroids the largest measuring 2.8 cm on the left. There is no pelvic adenopathy. There are no acute osseous abnormalities.    IMPRESSION: 4.8 x 4.1 x 3.7 cm heterogeneous mass arising from the proximal body of the pancreas resulting in dilatation of the pancreatic duct compatible with pancreatic malignancy.    Numerous hepatic metastasis with minimal ascites around the liver    Electronically signed by:  nIna Pratt MD  11/23/2022 11:05 AM CST Workstation: [id number]                                     CTA Chest Non-Coronary (PE Studies) (Edited Result - FINAL)  Result time 11/23/22 11:28:28      Edited Result - FINAL by Inna Pratt MD (11/23/22 11:28:28)                   Narrative:         ADDENDUM #1       This is a correction to the prior report    There are emboli within subsegmental arteries in the right lower lobe.    These findings were called to Dr. Vazquez in the emergency department at 11:27 AM    Electronically signed by:  Inna Pratt MD  11/23/2022 11:28 AM CST Workstation: [id number]     ORIGINAL REPORT       All CT scans at this facility used dose modulation, iterative reconstruction and/or weight-based dosing when appropriate to reduce radiation doses  as low as reasonably achievable.    HISTORY: Chest pain, loss of consciousness.    FINDINGS: Thin axial imaging through the chest was performed with 100 mL Omnipaque 350 IV contrast, with sagittal and coronal reformatted images and 3-D reconstructions performed on an independent workstation, with images stored in the patient's permanent electronic medical record.    This is a high-grade quality study for the evaluation of pulmonary embolism. The pulmonary arteries are normal in appearance without pulmonary emboli noted up to the subsegmental level, noting  limitations of CT technique for identifying small isolated subsegmental emboli.    The heart is normal in size. 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 There is no pericardial effusion. The aorta is normal in caliber.    There is no hilar, mediastinal or axillary adenopathy.    There are no pulmonary nodules, infiltrates or pleural effusions. The trachea and bronchi are normal. There are no acute osseous abnormalities.    Evaluation of the upper abdomen demonstrates multiple hypodense masses throughout both lobes of the liver suspicious for metastatic disease.. There is a 4.5 x 3.2 cm mass arising from the body of the pancreas suspicious for malignancy. Please see the CT abdomen report. The adrenal glands are normal.    IMPRESSION: No CT evidence pulmonary emboli    No acute pulmonary process    4.5 x 3.2 cm mass arising from the proximal body of the pancreas abutting the proximal splenic artery and hepatic artery suspicious for pancreatic malignancy    Numerous hypodense masses throughout the liver compatible with metastatic disease    Electronically signed by:  Inna Pratt MD  11/23/2022 10:47 AM CST Workstation: [id number]                      Final result by Inna Pratt MD (11/23/22 10:47:13)                   Narrative:    All CT scans at this facility used dose modulation, iterative reconstruction and/or weight-based dosing when appropriate to reduce radiation doses  as low as reasonably achievable.    HISTORY: Chest pain, loss of consciousness.    FINDINGS: Thin axial imaging through the chest was performed with 100 mL Omnipaque 350 IV contrast, with sagittal and coronal reformatted images and 3-D reconstructions performed on an independent workstation, with images stored in the patient's permanent electronic medical record.    This is a high-grade quality study for the evaluation of pulmonary embolism. The pulmonary arteries are normal in appearance without  pulmonary emboli noted up to the subsegmental level, noting limitations of CT technique for identifying small isolated subsegmental emboli.    The heart is normal in size. 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 There is no pericardial effusion. The aorta is normal in caliber.    There is no hilar, mediastinal or axillary adenopathy.    There are no pulmonary nodules, infiltrates or pleural effusions. The trachea and bronchi are normal. There are no acute osseous abnormalities.    Evaluation of the upper abdomen demonstrates multiple hypodense masses throughout both lobes of the liver suspicious for metastatic disease.. There is a 4.5 x 3.2 cm mass arising from the body of the pancreas suspicious for malignancy. Please see the CT abdomen report. The adrenal glands are normal.    IMPRESSION: No CT evidence pulmonary emboli    No acute pulmonary process    4.5 x 3.2 cm mass arising from the proximal body of the pancreas abutting the proximal splenic artery and hepatic artery suspicious for pancreatic malignancy    Numerous hypodense masses throughout the liver compatible with metastatic disease    Electronically signed by:  Inna Pratt MD  11/23/2022 10:47 AM CST Workstation: [id number]                                     CT Head Without Contrast (Final result)  Result time 11/23/22 10:35:09      Final result by Inna Pratt MD (11/23/22 10:35:09)                   Narrative:    All CT scans at this facility used dose modulation, iterative reconstruction and/or weight-based dosing when appropriate to reduce radiation doses  as low as reasonably achievable.    CLINICAL INFORMATION:  Dizziness, persistent/recurrent, cardiac or vascular cause suspected      COMPARISON: 10/30/2019  FINDINGS:   The ventricles and sulci are normal in size and configuration for age.  There is no intraparenchymal hemorrhage, mass or midline shift.  There are no extra-axial fluid collections.  There is minimal mucosal thickening in the maxillary sinuses. The remainder the paranasal sinuses are clear.    IMPRESSION: No acute intracranial process    These findings were given to Dr. beach at 10:25 AM    Electronically signed by:  Inna Pratt MD  11/23/2022 10:35 AM CST Workstation: [id number]                                     X-Ray Chest AP Portable (Final result)  Result time 11/23/22 09:04:08      Final result by Inna Pratt MD (11/23/22 09:04:08)                   Narrative:    XR CHEST 1 VIEW    CLINICAL HISTORY:  56 years Female Chest Pain    COMPARISON: 11/18/2022    FINDINGS: Cardiomediastinal silhouette is within normal limits. Lungs are normally expanded with no airspace consolidation. No pleural effusion or pneumothorax. No acute osseous abnormality.    IMPRESSION: No acute pulmonary process.    Electronically signed by:  Inna Pratt MD  11/23/2022 9:04 AM CST Workstation: [id number]

## 2022-11-23 NOTE — PROVATION PATIENT INSTRUCTIONS
Discharge Summary/Instructions after an Endoscopic Procedure  Patient Name: Brunilda Bob  Patient MRN: [id number]  Patient   YOB: 1965 Wednesday, November 23, 2022  Aries Saucedo III, MD  RESTRICTIONS:  During your procedure today, you received medications for sedation.  These   medications may affect your judgment, balance and coordination.  Therefore,   for 24 hours, you have the following restrictions:   - DO NOT drive a car, operate machinery, make legal/financial decisions,   sign important papers or drink alcohol.    ACTIVITY:  Today: no heavy lifting, straining or running due to procedural   sedation/anesthesia.  The following day: return to full activity including work.  DIET:  Eat and drink normally unless instructed otherwise.     TREATMENT FOR COMMON SIDE EFFECTS:  - Mild abdominal pain, nausea, belching, bloating or excessive gas:  rest,   eat lightly and use a heating pad.  - Sore Throat: treat with throat lozenges and/or gargle with warm salt   water.  - Because air was used during the procedure, expelling large amounts of air   from your rectum or belching is normal.  - If a bowel prep was taken, you may not have a bowel movement for 1-3 days.    This is normal.  SYMPTOMS TO WATCH FOR AND REPORT TO YOUR PHYSICIAN:  1. Abdominal pain or bloating, other than gas cramps.  2. Chest pain.  3. Back pain.  4. Signs of infection such as: chills or fever occurring within 24 hours   after the procedure.  5. Rectal bleeding, which would show as bright red, maroon, or black stools.   (A tablespoon of blood from the rectum is not serious, especially if   hemorrhoids are present.)  6. Vomiting.  7. Weakness or dizziness.  GO DIRECTLY TO THE NEAREST EMERGENCY ROOM IF YOU HAVE ANY OF THE FOLLOWING:      Difficulty breathing              Chills and/or fever over 101 F   Persistent vomiting and/or vomiting blood   Severe abdominal pain   Severe chest pain   Black, tarry stools   Bleeding-  more than one tablespoon   Any other symptom or condition that you feel may need urgent attention  Your doctor recommends these additional instructions:  If any biopsies were taken, your doctors clinic will contact you in 1 to 2   weeks with any results.  - Return patient to hospital [facility] for ongoing care.   - PPI BID x 1 months; EUS next week.  For questions, problems or results please call your physician - Aries Saucedo III, MD at Work:  [phone].  [facility], EMERGENCY ROOM PHONE NUMBER: [phone]  IF A COMPLICATION OR EMERGENCY SITUATION ARISES AND YOU ARE UNABLE TO REACH   YOUR PHYSICIAN - GO DIRECTLY TO THE EMERGENCY ROOM.  Aries Saucedo III, MD  11/23/2022 2:04:24 PM  This report has been verified and signed electronically.  Dear patient,  As a result of recent federal legislation (The Federal Cures Act), you may   receive lab or pathology results from your procedure in your [facility]   account before your physician is able to contact you. Your physician or   their representative will relay the results to you with their   recommendations at their soonest availability.  Thank you,  PROVATION

## 2022-11-23 NOTE — CONSULTS
GASTROENTEROLOGY INPATIENT CONSULT NOTE  Patient Name: Brunilda Bob  Patient MRN: [id number]  Patient : 1965    Admit Date: 2022  Service date: 2022    Reason for Consult: anemia / melena    PCP: Sadiq Titus MD    Chief Complaint   Patient presents with    Loss of Consciousness     Patient reports LOC, sat herself to ground, had endoscopy yesterday in which they found new spot on liver        HPI: Patient is a 56 y.o. female with PMHx past cardiomyopathy, hypothyroid, HLD presents for evaluation of fatigue / melena. Acute onset, intermittent, progressive on admission. Had EGD earlier this week for abd pain / dysphagia w/ Dr. Casas. Unfortunately CT on admission w/ eli new dx metastatic pancreatic cancer    CHART REVIEW:   Hg 8.1 (11.7 in 10/'22)  CT ABd  - 4 cm BOP mass w/ hepatic mets w/ ascites; Nml GB, bowels  Colon '18 - small polyps    Past Medical History:  Past Medical History:   Diagnosis Date    Broken heart syndrome     Hypertension         Past Surgical History:  Past Surgical History:   Procedure Laterality Date    CLAVICLE SURGERY  2012    TONSILLECTOMY          Home Medications:  (Not in a hospital admission)      Inpatient Medications:   pantoprazole  80 mg Intravenous Daily    potassium chloride  40 mEq Oral Once         Review of patient's allergies indicates:   Allergen Reactions    Crestor [rosuvastatin] Other (See Comments)     Paralysis    Codeine Nausea And Vomiting    Strawberries [strawberry] Hives    Adhesive tape-silicones Rash       Social History:   Social History     Occupational History    Not on file   Tobacco Use    Smoking status: Never    Smokeless tobacco: Never   Substance and Sexual Activity    Alcohol use: Yes    Drug use: Never    Sexual activity: Yes     Partners: Male       Family History:   Family History   Problem Relation Age of Onset    Colon cancer Mother     Uterine cancer Mother     Hypertension Father     Testicular  cancer Son        Review of Systems:  A 10 point review of systems was performed and was normal, except as mentioned in the HPI, including constitutional, HEENT, heme, lymph, cardiovascular, respiratory, gastrointestinal, genitourinary, neurologic, endocrine, psychiatric and musculoskeletal.      OBJECTIVE:    Physical Exam:  24 Hour Vital Sign Ranges: Temp:  [98.1 °F (36.7 °C)] 98.1 °F (36.7 °C)  Pulse:  [67-83] 75  Resp:  [16-19] 17  SpO2:  [100 %] 100 %  BP: ()/(67-68) 144/67  Most recent vitals: BP (!) 144/67   Pulse 75   Temp 98.1 °F (36.7 °C) (Oral)   Resp 17   Ht 5' (1.524 m)   Wt 76.2 kg (168 lb)   LMP  (LMP Unknown)   SpO2 100%   BMI 32.81 kg/m²    GEN: well-developed, well-nourished, awake and alert, non-toxic appearing adult  HEENT: PERRL, sclera anicteric, oral mucosa pink and moist without lesion  NECK: trachea midline; Good ROM  CV: regular rate and rhythm, no murmurs or gallops  RESP: clear to auscultation bilaterally, no wheezes, rhonci or rales  ABD: soft, non-tender, non-distended, normal bowel sounds  EXT: no swelling or edema, 2+ pulses distally  SKIN: no rashes or jaundice  PSYCH: normal affect    Labs:   Recent Labs     11/23/22  0900   WBC 14.22*   MCV 82        Recent Labs     11/23/22  0900   *   K 3.1*   CL 92*   CO2 29   BUN 25*   *     No results for input(s): ALB in the last 72 hours.    Invalid input(s): ALKP, SGOT, SGPT, TBIL, DBIL, TPRO  No results for input(s): PT, INR, PTT in the last 72 hours.      Radiology Review:  CT Abdomen Pelvis With Contrast   Final Result      CTA Chest Non-Coronary (PE Studies)   Final Result      CT Head Without Contrast   Final Result      X-Ray Chest AP Portable   Final Result            IMPRESSION / RECOMMENDATIONS:  56 y.o. female with PMHx past cardiomyopathy, hypothyroid, HLD presents for evaluation of fatigue / melena w/ likely new dx metastatic panc Ca. RIsks, benefits, alternatives discussed in detail regarding  upcoming procedures and sedation and possible complications. Some of the more common endoscopic complications include but not limited to immediate or delayed perforation, bleeding, infections, pain, inadvertent injury to surrounding tissue / organs and possible need for surgical evaluation. All questions answered and will proceed with procedure as planned.     -EGD today r/o bleeding destiny w/ recent interventions  -Set up EUS / FNA next week for suspected metastatic pancreatic cancer  -Oncology consult    Thank you for this consult.    Aries REYEZ Saygus III  11/23/2022  12:48 PM

## 2022-11-23 NOTE — PROGRESS NOTES
Consult called to Dr. Toribio answering service(hematology/oncology) r/t new pancreatic mass with liver mets; Spoke with answering service (violeta) who will notify Dr. Toribio to see pt; safety intact with assessment ongoing

## 2022-11-23 NOTE — HPI
56-year-old  female with history of hypertension presents to the ED on account of persistent abdominal pain.  Patient states that earlier this morning while she was having breakfast she started to feel dizzy, clammy and felt like she was about to pass out. She states that she has been having dark tarry stools for weeks (2- 4)now and thought it was a side effect of antibiotics which she was taking for bronchitis.  Patient states that for months she is been feeling bloated with associated abdominal pain which she describes as gas, she states that the pain radiates to the back with no aggravating or relieving factor.  She states that she belches a lot and has been on gas and reflux medication and none have helped.  Due to all the symptoms she had an endoscopy done yesterday at [facility] in which she was told that she had inflammation and had her esophagus stretched.  Despite having this procedure done yesterday, she still felt unwell hence presented to the hospital today.  She denies any loss of consciousness, shortness of breath but states that she is been having lots of nausea, abdominal discomfort, but denies any vomiting or changes in bowel or urinary habits.  Patient stated that stated that in July she had cardiac workup done in which she had a cardiac MRI and they saw some nodules on liver and she was referred to hepatologist.  She was supposed to have her appointment with hepatologist today however due to her symptoms presented here today.    On presentation to the ED, WBC elevated at 14.22, hemoglobin 8.1, sodium 131, potassium 3.1 and glucose 216.  Occult is positive.  CTA chest showed subsegmental PE in right lobe and CT abdomen showed heterogeneous mass arising from the proximal body of the pancreas resulting in dilatation of the pancreatic duct compatible with pancreatic malignancy and numerous hepatic metastasis with minimal ascites around the liver.  Of

## 2022-11-23 NOTE — TRANSFER OF CARE
Anesthesia Transfer of Care Note    Patient: Brunilda Bob    Procedure(s) Performed: Procedure(s) (LRB):  EGD (ESOPHAGOGASTRODUODENOSCOPY) (N/A)    Patient location: GI    Anesthesia Type: general and MAC    Transport from OR: Transported from OR on 2-3 L/min O2 by NC with adequate spontaneous ventilation    Post pain: adequate analgesia    Post assessment: no apparent anesthetic complications    Post vital signs: stable    Level of consciousness: awake and alert    Nausea/Vomiting: no nausea/vomiting    Complications: none    Transfer of care protocol was followed      Last vitals:   Visit Vitals  BP (!) 144/67   Pulse 75   Temp 37.1 °C (98.8 °F)   Resp 17   Ht 5' (1.524 m)   Wt 76.2 kg (168 lb)   LMP  (LMP Unknown)   SpO2 100%   BMI 32.81 kg/m²
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
## 2022-11-24 ENCOUNTER — DOCUMENTATION ONLY (OUTPATIENT)
Dept: HEMATOLOGY/ONCOLOGY | Facility: CLINIC | Age: 57
End: 2022-11-24

## 2022-11-24 VITALS
OXYGEN SATURATION: 97 % | SYSTOLIC BLOOD PRESSURE: 112 MMHG | DIASTOLIC BLOOD PRESSURE: 64 MMHG | WEIGHT: 168 LBS | HEART RATE: 85 BPM | TEMPERATURE: 98 F | HEIGHT: 60 IN | RESPIRATION RATE: 16 BRPM | BODY MASS INDEX: 32.98 KG/M2

## 2022-11-24 PROBLEM — I26.99 ACUTE PULMONARY EMBOLISM: Status: ACTIVE | Noted: 2022-11-24

## 2022-11-24 LAB
ABO + RH BLD: NORMAL
ANION GAP SERPL CALC-SCNC: 10 MMOL/L (ref 8–16)
BASOPHILS # BLD AUTO: 0.01 K/UL (ref 0–0.2)
BASOPHILS NFR BLD: 0.1 % (ref 0–1.9)
BLD GP AB SCN CELLS X3 SERPL QL: NORMAL
BLD PROD TYP BPU: NORMAL
BLD PROD TYP BPU: NORMAL
BLOOD UNIT EXPIRATION DATE: NORMAL
BLOOD UNIT EXPIRATION DATE: NORMAL
BLOOD UNIT TYPE CODE: 6200
BLOOD UNIT TYPE CODE: 6200
BLOOD UNIT TYPE: NORMAL
BLOOD UNIT TYPE: NORMAL
BUN SERPL-MCNC: 20 MG/DL (ref 6–20)
CALCIUM SERPL-MCNC: 8.7 MG/DL (ref 8.7–10.5)
CHLORIDE SERPL-SCNC: 90 MMOL/L (ref 95–110)
CO2 SERPL-SCNC: 31 MMOL/L (ref 23–29)
CODING SYSTEM: NORMAL
CODING SYSTEM: NORMAL
CREAT SERPL-MCNC: 0.9 MG/DL (ref 0.5–1.4)
DIFFERENTIAL METHOD: ABNORMAL
DISPENSE STATUS: NORMAL
DISPENSE STATUS: NORMAL
EOSINOPHIL # BLD AUTO: 0.5 K/UL (ref 0–0.5)
EOSINOPHIL NFR BLD: 3.4 % (ref 0–8)
ERYTHROCYTE [DISTWIDTH] IN BLOOD BY AUTOMATED COUNT: 13.4 % (ref 11.5–14.5)
EST. GFR  (NO RACE VARIABLE): >60 ML/MIN/1.73 M^2
ESTIMATED AVG GLUCOSE: 140 MG/DL (ref 68–131)
FERRITIN SERPL-MCNC: 128 NG/ML (ref 20–300)
FOLATE SERPL-MCNC: 11.1 NG/ML (ref 4–24)
GLUCOSE SERPL-MCNC: 135 MG/DL (ref 70–110)
GLUCOSE SERPL-MCNC: 137 MG/DL (ref 70–110)
GLUCOSE SERPL-MCNC: 171 MG/DL (ref 70–110)
HBA1C MFR BLD: 6.5 % (ref 4.5–6.2)
HCT VFR BLD AUTO: 33.2 % (ref 37–48.5)
HGB BLD-MCNC: 10.8 G/DL (ref 12–16)
IMM GRANULOCYTES # BLD AUTO: 0.11 K/UL (ref 0–0.04)
IMM GRANULOCYTES NFR BLD AUTO: 0.7 % (ref 0–0.5)
LYMPHOCYTES # BLD AUTO: 1.1 K/UL (ref 1–4.8)
LYMPHOCYTES NFR BLD: 7.4 % (ref 18–48)
MAGNESIUM SERPL-MCNC: 1.7 MG/DL (ref 1.6–2.6)
MCH RBC QN AUTO: 27.1 PG (ref 27–31)
MCHC RBC AUTO-ENTMCNC: 32.5 G/DL (ref 32–36)
MCV RBC AUTO: 83 FL (ref 82–98)
MONOCYTES # BLD AUTO: 0.7 K/UL (ref 0.3–1)
MONOCYTES NFR BLD: 4.5 % (ref 4–15)
NEUTROPHILS # BLD AUTO: 12.9 K/UL (ref 1.8–7.7)
NEUTROPHILS NFR BLD: 83.9 % (ref 38–73)
NRBC BLD-RTO: 0 /100 WBC
NUM UNITS TRANS PACKED RBC: NORMAL
NUM UNITS TRANS PACKED RBC: NORMAL
PLATELET # BLD AUTO: 222 K/UL (ref 150–450)
PMV BLD AUTO: 10.6 FL (ref 9.2–12.9)
POTASSIUM SERPL-SCNC: 3.6 MMOL/L (ref 3.5–5.1)
RBC # BLD AUTO: 3.99 M/UL (ref 4–5.4)
SODIUM SERPL-SCNC: 131 MMOL/L (ref 136–145)
VIT B12 SERPL-MCNC: 1095 PG/ML (ref 210–950)
WBC # BLD AUTO: 15.41 K/UL (ref 3.9–12.7)

## 2022-11-24 PROCEDURE — 25000003 PHARM REV CODE 250: Performed by: INTERNAL MEDICINE

## 2022-11-24 PROCEDURE — 82728 ASSAY OF FERRITIN: CPT | Performed by: INTERNAL MEDICINE

## 2022-11-24 PROCEDURE — P9016 RBC LEUKOCYTES REDUCED: HCPCS | Performed by: STUDENT IN AN ORGANIZED HEALTH CARE EDUCATION/TRAINING PROGRAM

## 2022-11-24 PROCEDURE — 80048 BASIC METABOLIC PNL TOTAL CA: CPT | Performed by: STUDENT IN AN ORGANIZED HEALTH CARE EDUCATION/TRAINING PROGRAM

## 2022-11-24 PROCEDURE — 83735 ASSAY OF MAGNESIUM: CPT | Performed by: STUDENT IN AN ORGANIZED HEALTH CARE EDUCATION/TRAINING PROGRAM

## 2022-11-24 PROCEDURE — 82105 ALPHA-FETOPROTEIN SERUM: CPT | Performed by: INTERNAL MEDICINE

## 2022-11-24 PROCEDURE — 36430 TRANSFUSION BLD/BLD COMPNT: CPT

## 2022-11-24 PROCEDURE — 85025 COMPLETE CBC W/AUTO DIFF WBC: CPT | Performed by: INTERNAL MEDICINE

## 2022-11-24 PROCEDURE — 63600175 PHARM REV CODE 636 W HCPCS: Performed by: STUDENT IN AN ORGANIZED HEALTH CARE EDUCATION/TRAINING PROGRAM

## 2022-11-24 PROCEDURE — 86901 BLOOD TYPING SEROLOGIC RH(D): CPT | Performed by: STUDENT IN AN ORGANIZED HEALTH CARE EDUCATION/TRAINING PROGRAM

## 2022-11-24 PROCEDURE — 82607 VITAMIN B-12: CPT | Performed by: INTERNAL MEDICINE

## 2022-11-24 PROCEDURE — 82746 ASSAY OF FOLIC ACID SERUM: CPT | Performed by: INTERNAL MEDICINE

## 2022-11-24 PROCEDURE — 63600175 PHARM REV CODE 636 W HCPCS: Performed by: INTERNAL MEDICINE

## 2022-11-24 PROCEDURE — 86301 IMMUNOASSAY TUMOR CA 19-9: CPT | Performed by: INTERNAL MEDICINE

## 2022-11-24 PROCEDURE — 86920 COMPATIBILITY TEST SPIN: CPT | Performed by: STUDENT IN AN ORGANIZED HEALTH CARE EDUCATION/TRAINING PROGRAM

## 2022-11-24 PROCEDURE — 36415 COLL VENOUS BLD VENIPUNCTURE: CPT | Performed by: STUDENT IN AN ORGANIZED HEALTH CARE EDUCATION/TRAINING PROGRAM

## 2022-11-24 RX ORDER — HYDROMORPHONE HYDROCHLORIDE 4 MG/1
4 TABLET ORAL EVERY 4 HOURS PRN
Qty: 42 TABLET | Refills: 0 | Status: SHIPPED | OUTPATIENT
Start: 2022-11-24 | End: 2022-11-24 | Stop reason: HOSPADM

## 2022-11-24 RX ORDER — HYDROMORPHONE HYDROCHLORIDE 4 MG/1
4 TABLET ORAL EVERY 4 HOURS PRN
Qty: 42 TABLET | Refills: 0
Start: 2022-11-24 | End: 2022-11-24 | Stop reason: SDUPTHER

## 2022-11-24 RX ORDER — HYDROMORPHONE HYDROCHLORIDE 4 MG/1
4 TABLET ORAL EVERY 4 HOURS PRN
Qty: 42 TABLET | Refills: 0 | Status: SHIPPED | OUTPATIENT
Start: 2022-11-24 | End: 2022-11-24 | Stop reason: SDUPTHER

## 2022-11-24 RX ORDER — ESOMEPRAZOLE MAGNESIUM 40 MG/1
40 CAPSULE, DELAYED RELEASE ORAL 2 TIMES DAILY
Qty: 60 CAPSULE | Refills: 0 | Status: ON HOLD | OUTPATIENT
Start: 2022-11-24 | End: 2022-12-03 | Stop reason: SDUPTHER

## 2022-11-24 RX ADMIN — PANCRELIPASE 2 CAPSULE: 60000; 12000; 38000 CAPSULE, DELAYED RELEASE PELLETS ORAL at 11:11

## 2022-11-24 RX ADMIN — PANCRELIPASE 2 CAPSULE: 60000; 12000; 38000 CAPSULE, DELAYED RELEASE PELLETS ORAL at 08:11

## 2022-11-24 RX ADMIN — MORPHINE SULFATE 2 MG: 2 INJECTION, SOLUTION INTRAMUSCULAR; INTRAVENOUS at 05:11

## 2022-11-24 RX ADMIN — MORPHINE SULFATE 2 MG: 2 INJECTION, SOLUTION INTRAMUSCULAR; INTRAVENOUS at 01:11

## 2022-11-24 RX ADMIN — FUROSEMIDE 20 MG: 10 INJECTION, SOLUTION INTRAMUSCULAR; INTRAVENOUS at 05:11

## 2022-11-24 RX ADMIN — HYDROMORPHONE HYDROCHLORIDE 4 MG: 2 TABLET ORAL at 11:11

## 2022-11-24 NOTE — PLAN OF CARE
[facility]  Discharge Final Note    Primary Care Provider: Sadiq Titus MD    Expected Discharge Date: 11/24/2022     met with Pt at bedside to confirm discharge plans. Pt verbalized plan to discharge home via family transport. Pt's PCP office closed at time;  unable to schedule Pt follow-up appointment. Pt has no other needs to be addressed by case management. Pt cleared to discharge by case management.    Final Discharge Note (most recent)       Final Note - 11/24/22 1315          Final Note    Assessment Type Final Discharge Note     Anticipated Discharge Disposition Home or Self Care     What phone number can be called within the next 1-3 days to see how you are doing after discharge? [phone]        Post-Acute Status    Coverage Payor:  [employer]     Discharge Delays None known at this time                     Important Message from Medicare             Contact Info       Sadiq Titus MD   Specialty: Family Medicine, Home Health Services, Hospice Services   Relationship: PCP - General    [address]  [address]  [facility] [address]   Phone: [phone]       Next Steps: Follow up in 1 week(s)

## 2022-11-24 NOTE — PROGRESS NOTES
Discharge education and instructions provided, all questions answered and understanding voiced; PIV(s) removed; telemetry removed; Paper Rx in pts possession; all belongings with pt;     Pt transported off unit with safety intact

## 2022-11-24 NOTE — PLAN OF CARE
[facility]  Initial Discharge Assessment       Primary Care Provider: Sadiq Titus MD    Admission Diagnosis: GI bleed [K92.2]    Admission Date: 11/23/2022  Expected Discharge Date: 11/24/2022    Discharge Barriers Identified: None     with Pt at bedside to complete discharge assessment. Pt AAOx4s. Demographics, PCP, and insurance verified. No home health. No dialysis. Pt reports ability to complete ADLs without assistance. Pt verbalized plan to discharge home via family transport. Pt has no other needs to be addressed at this time.    Payor: [employer] / Plan: [employer] ([employer]) / Product Type: Commercial /     Extended Emergency Contact Information  Primary Emergency Contact: Juan Carlos Vazquez  Address: [address]           [place] [address] [facility]  Home Phone: [phone]  Work Phone: [phone]  Mobile Phone: [phone]  Relation: Spouse   needed? No  Secondary Emergency Contact: Tom Santiago  Mobile Phone: [phone]  Relation: Son  Preferred language: English   needed? No    Discharge Plan A: Home with family  Discharge Plan B: Home with family      [facility] - [place] - [address]  [address]  [facility] [address]  Phone: [phone] Fax: [phone]    [id number] [place] - [address]  [address]  [facility] [address]  Phone: [phone] Fax: [phone]    [id number] - [place] - [address]  [address]  [facility] [address]  Phone: [phone] Fax: [phone]      Initial Assessment (most recent)       Adult Discharge Assessment - 11/24/22 1311          Discharge Assessment    Assessment Type Discharge Planning Assessment     Confirmed/corrected address, phone number and insurance Yes     Confirmed Demographics Correct on Facesheet      Source of Information patient     Does patient/caregiver understand observation status Yes     Communicated LOI with patient/caregiver Yes     Reason For Admission GI bleed     Lives With spouse     Facility Arrived From: Home     Do you expect to return to your current living situation? Yes     Do you have help at home or someone to help you manage your care at home? Yes     Who are your caregiver(s) and their phone number(s)? Juan Carlos Vazquez (Spouse)   [phone] (Mobile)     Prior to hospitilization cognitive status: Alert/Oriented     Current cognitive status: Alert/Oriented     Walking or Climbing Stairs Difficulty none     Dressing/Bathing Difficulty none     Home Accessibility wheelchair accessible     Home Layout Able to live on 1st floor     Equipment Currently Used at Home none     Readmission within 30 days? No     Patient currently being followed by outpatient case management? No     Do you currently have service(s) that help you manage your care at home? No     Do you take prescription medications? Yes     Do you have prescription coverage? Yes     Coverage Payor:  [employer] - [employer])     Do you have any problems affording any of your prescribed medications? No     Is the patient taking medications as prescribed? yes     Who is going to help you get home at discharge? Juan Carlos Vazquez (Spouse)   [phone] (Mobile)     How do you get to doctors appointments? car, drives self     Are you on dialysis? No     Do you take coumadin? No     Discharge Plan A Home with family     Discharge Plan B Home with family     DME Needed Upon Discharge  none     Discharge Plan discussed with: Patient     Discharge Barriers Identified None        Physical Activity    On average, how many days per week do you engage in moderate to strenuous exercise (like a brisk walk)? 0 days     On average, how many minutes do you engage in exercise at this level? 0 min        Financial Resource  Strain    How hard is it for you to pay for the very basics like food, housing, medical care, and heating? Not hard at all        Housing Stability    In the last 12 months, was there a time when you were not able to pay the mortgage or rent on time? No     In the last 12 months, how many places have you lived? 1     In the last 12 months, was there a time when you did not have a steady place to sleep or slept in a shelter (including now)? No        Transportation Needs    In the past 12 months, has lack of transportation kept you from medical appointments or from getting medications? No     In the past 12 months, has lack of transportation kept you from meetings, work, or from getting things needed for daily living? No        Food Insecurity    Within the past 12 months, you worried that your food would run out before you got the money to buy more. Never true     Within the past 12 months, the food you bought just didn't last and you didn't have money to get more. Never true        Stress    Do you feel stress - tense, restless, nervous, or anxious, or unable to sleep at night because your mind is troubled all the time - these days? Not at all        Social Connections    In a typical week, how many times do you talk on the phone with family, friends, or neighbors? Twice a week     How often do you get together with friends or relatives? Once a week     How often do you attend Cheondoism or Druze services? 1 to 4 times per year     Do you belong to any clubs or organizations such as Cheondoism groups, unions, fraternal or athletic groups, or school groups? Yes     How often do you attend meetings of the clubs or organizations you belong to? 1 to 4 times per year     Are you , , , , never , or living with a partner?         Alcohol Use    Q1: How often do you have a drink containing alcohol? Monthly or less     Q2: How many drinks containing alcohol do you have on a typical  day when you are drinking? 1 or 2     Q3: How often do you have six or more drinks on one occasion? Never        Relationship/Environment    Name(s) of Who Lives With Patient Juan Carlos Vazquez (Spouse)   [phone] (Mobile)

## 2022-11-24 NOTE — CONSULTS
Hematology/Oncology  Inpatient Consult Note  Patient Name: Brunilda Bob  MRN: [id number]  Admission Date: 11/23/2022  Hospital Length of Stay: 0 days  Code Status: Full Code   Attending Provider: Mert Garcia MD  Referring Provider: Dr. Saucedo  Consulting Provider: YANICK Toribio MD  Primary Care Physician: Sadiq Titus MD  Principal Problem:GI bleed    Inpatient consult to Hematology/Oncology  Consult performed by: YANICK Toribio MD  Consult ordered by: MD Parth Heart MD Consult 11/23/22  Subjective:     Chief Complaint: Loss of Consciousness (Patient reports LOC, sat herself to ground, had endoscopy yesterday in which they found new spot on liver )        History Present Illness:  56 y.o. female upper abdominal pain and bloating x's 1-2 months.  Admits to mid back pain as well.  Melena for several weeks.  Had EGD yesterday, bx of gastric area done.  Near syncope today.  Hgb 11 to 7.5 over 1 month.  EGD today 2 areas of bx, clamped per Dr. Saucedo.  CAT 4.2 cm mass head of pancreas, liver masses.  Due for ERCP, U/S guided Bx of pancreatic mass, liver masses per Dr. Saucedo next week.  Hgb 7.5, 2 units of pRBC being given.  CAT of chest addendum no PE seen.  Likely home 11/24/22.  RTC see me 12/2/22.      Past Medical/Surgical History:  Past Medical History:   Diagnosis Date    Broken heart syndrome     Hypertension      Past Surgical History:   Procedure Laterality Date    CLAVICLE SURGERY  2012    TONSILLECTOMY         Allergies:  Review of patient's allergies indicates:   Allergen Reactions    Crestor [rosuvastatin] Other (See Comments)     Paralysis    Codeine Nausea And Vomiting    Strawberries [strawberry] Hives    Adhesive tape-silicones Rash       Social/Family History:  Social History     Socioeconomic History    Marital status:    Tobacco Use    Smoking status: Never    Smokeless tobacco: Never   Substance and Sexual Activity    Alcohol use: Yes     Drug use: Never    Sexual activity: Yes     Partners: Male     Family History   Problem Relation Age of Onset    Colon cancer Mother     Uterine cancer Mother     Hypertension Father     Testicular cancer Son        ROS:    GEN: normal without any fever, night sweats or weight loss  HEENT:See HPI  CV: normal with no CP, SOB, PND, LEONARD or orthopnea  PULM: normal with no SOB, cough, hemoptysis, sputum or pleuritic pain  GI: See HPI  : normal with no hematuria, dysuria  BREAST: normal with no mass, discharge, pain  SKIN: normal with no rash, erythema, bruising, or swelling        Medications:  Continuous Infusions:  Scheduled Meds:   furosemide (LASIX) injection  20 mg Intravenous Once    levothyroxine  50 mcg Oral QHS    [START ON 11/24/2022] lipase-protease-amylase 12,000-38,000-60,000 units  2 capsule Oral TID WM    losartan  100 mg Oral Daily    pantoprazole  80 mg Intravenous Daily     PRN Meds:sodium chloride, dextrose 10%, dextrose 10%, glucagon (human recombinant), glucose, glucose, hydrALAZINE, HYDROmorphone, insulin aspart U-100, morphine, ondansetron, ondansetron, sodium chloride 0.9%     Objective:     Vitals:  Blood pressure 113/62, pulse 83, temperature 97.6 °F (36.4 °C), temperature source Oral, resp. rate 17, height 5' (1.524 m), weight 76.2 kg (168 lb), SpO2 98 %.    Physical Exam:  GEN: no apparent distress, comfortable  HEAD: atraumatic and normocephalic  EYES: + pallor, no icterus  ENT: no pharyngeal erythema, external ears WNL; no nasal discharge  NECK: no masses, thyroid normal, trachea midline, no LAD/LN's, supple  CV: RRR with no murmur; normal pulse; normal S1 and S2; no pedal edema  CHEST: Normal respiratory effort; CTAB; normal breath sounds; no wheeze or crackles  ABDOM: nontender and nondistended; soft; no rebound/guarding, L/S NP  MUSC/Skeletal: ROM normal; no crepitus; joints normal  EXTREM: no clubbing, cyanosis, inflammation or swelling  SKIN: no rashes, lesions, ulcers, petechiae  "  : no cvat  NEURO: grossly intact; motor/sensory WNL;  no tremors  PSYCH: normal mood, affect and behavior  LYMPH: normal cervical, supraclavicular, axillary  LN's    H/H 7.5/24, PT 16.3,  creat 0.8,   alk phos 198    CT of chest addendum, no PE seen    CT of abdomin 2.4 cm pseudoaneurism at the ventricular apex, multiple liver masses up to 3.6 cm, and 4.5 cm mass at body of pancreas, abutting splenic artery, common hepatic artery.  Dilation of distal pancreatic duct seen.  Superior mesenteric artery and vein are patent.     Alpha feto protein, Ca 19-9 pending.    (1) 56 y.o. female with diagnosis of blood loss anemia 2nd acute and chronic GI bleed.  Give 2 units of pRBC, Hgb 7.5. She is symptomatic with increase fatigue with exertion.    For the chronic anemia, melena hx over 1-2 months, does she need colonoscopy, Pill endoscopy out pt to complete GI evaluation, Dr. Saucedo.?    (2)Pancreatic mass, liver masses, suspected pancreatic malignancy, metastatic dx.  Suspected stage 4 dx.    (3)Plan for ERCP, U/S guided Bx of pancreatic mass +/or liver masses for tissue Dx and documentation of metastatic dx.  Per Dr. Saucedo next week.    (4) Chronic pain 2nd liver metastatic dx, and likely nerve encroachment per pancreatic mass.  For possible nerve block procedure  Per Dr. Saucedo out pt.    (5)I have placed her on, and have forwarded Rx for Dilaudid 4 mg, Zofran 8 mg, Creon  To [facility].    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  She is at increased risk of thromboembolic complications, CVA, PE, DVT.    Once she is stabilized from GI bleed event,   I expect to place her on Elliquis or Xarelto  Later for prophylaxis.    (7)Treatment options for Stage 4 dx,  Will include Gemzar/Abraxane for palliation.  Another option to include Folfirinox out pt.    (8)Next week, will get Next Generational\Sequencing Studies on " peripheral blood at [facility] or [facility],  To help clarify immune oncology options of opdivo, keytruda, yervoy.    Anticipate D/C home 11/24.  RTC see me 12/2/22.      Active Diagnoses:    Diagnosis Date Noted POA    PRINCIPAL PROBLEM:  GI bleed [K92.2] 11/23/2022 Yes    Pancreatic mass [K86.89] 11/23/2022 Yes    Metastasis to liver [C78.7] 11/23/2022 Yes    Hypokalemia [E87.6] 11/23/2022 Yes    Hyperglycemia [R73.9] 11/23/2022 Yes    Thyroid dysfunction [E07.9] 01/12/2021 Yes    Hypertension [I10] 10/30/2019 Yes      Problems Resolved During this Admission:       YANICK Toribio MD  Hematology/Oncology  [facility]   11/23/22

## 2022-11-24 NOTE — DISCHARGE SUMMARY
Critical access hospital  Discharge Summary  Patient Name: Brunilda Bob MRN: [id number]   Patient Class: IP- Inpatient  Length of Stay: 1   Admission Date: 11/23/2022  8:32 AM Attending Physician: Rangel Hernandez MD   Primary Care Provider: Sadiq Titus MD Face-to-Face encounter date: 11/24/2022   Chief Complaint: Loss of Consciousness (Patient reports LOC, sat herself to ground, had endoscopy yesterday in which they found new spot on liver )    Date of Discharge: 11/24/2022  Discharge Disposition:Home or Self Care    Condition: Stable       Reason for Hospitalization     Active Hospital Problems    Diagnosis    *GI bleed    Acute pulmonary embolism    Pancreatic mass    Metastasis to liver    Hypokalemia    Hyperglycemia    Thyroid dysfunction    Hypertension         Brief History of Present Illness    Burnilda Bob is a 56 y.o.  female who  has a past medical history of Broken heart syndrome and Hypertension.. The patient presented to Critical access hospital on 11/23/2022 with a primary complaint of Loss of Consciousness (Patient reports LOC, sat herself to ground, had endoscopy yesterday in which they found new spot on liver )  .     For the full HPI please refer to the History & Physical from this admission.    Hospital Course By Problem with Pertinent Findings     Admitted for GI bleeding that required endoscopy. Two oozing crater duodenal ulcers were founds and hemostasis was achieving by placing hemostatic clips. CT Scan of the chest and abdomen showed subsegmental PE and Pancreatic mass with metastasis to the liver. GI is planning to do EUS biopsy on Tuesday. Oncology is going to follow her outpatient. She was started on Elliquis that she is going to stop on Sunday for the EUS on Tuesday. This was discussed with Dr. Saucedo. All prescriptions including PO Dilaudid, Protonix and elliquis were sent to her default pharmacy.     Patient was seen and examined on the date of  discharge and determined to be suitable for discharge.    Physical Exam  /64   Pulse 85   Temp 98 °F (36.7 °C) (Oral)   Resp 16   Ht 5' (1.524 m)   Wt 76.2 kg (168 lb)   LMP  (LMP Unknown)   SpO2 97%   BMI 32.81 kg/m²   Vitals reviewed.    Constitutional: No distress.   HENT: Atraumatic.   Cardiovascular: Normal rate, regular rhythm and normal heart sounds.   Pulmonary/Chest: Effort normal. Clear to auscultation bilaterally. No wheezes.   Abdominal: Soft. Bowel sounds are normal. Exhibits no distension and no mass. No tenderness  Neurological: Alert.   Skin: Skin is warm and dry.     Following labs were Reviewed   Recent Labs   Lab 11/24/22  1018   WBC 15.41*   HGB 10.8*   HCT 33.2*      CALCIUM 8.7   *   K 3.6   CO2 31*   CL 90*   BUN 20   CREATININE 0.9     No results found for: POCTGLUCOSE     All labs within the past 24 hours have been reviewed    Microbiology Results (last 7 days)       Procedure Component Value Units Date/Time    Blood culture x two cultures. Draw prior to antibiotics. [[id number]] Collected: 11/23/22 0905    Order Status: Completed Specimen: Blood from Peripheral, Antecubital, Left Updated: 11/24/22 1032     Blood Culture, Routine No Growth to date      No Growth to date    Narrative:      Aerobic and anaerobic    Blood culture x two cultures. Draw prior to antibiotics. [[id number]] Collected: 11/23/22 0930    Order Status: Completed Specimen: Blood from Peripheral, Antecubital, Left Updated: 11/24/22 1032     Blood Culture, Routine No Growth to date      No Growth to date    Narrative:      Aerobic and anaerobic          CT Abdomen Pelvis With Contrast   Final Result      CTA Chest Non-Coronary (PE Studies)   Final Result      CT Head Without Contrast   Final Result      X-Ray Chest AP Portable   Final Result          No results found for this or any previous visit.      Consultants and Procedures   Consultants:  Consults (From admission, onward)          Status  Ordering Provider     Inpatient consult to Gastroenterology  Once        Provider:  Aries Saucedo III, MD    Completed MERT GARCIA     Inpatient consult to Hematology/Oncology  Once        Provider:  YANICK Toribio MD    Completed MERT GARCIA     Inpatient consult to Hospitalist  Once        Provider:  Mert Garcia MD    Acknowledged MERT GARCIA            Procedures:   Procedure(s) (LRB):  EGD (ESOPHAGOGASTRODUODENOSCOPY) (N/A)     Discharge Information:   Diet:  Resume regular diet    Physical Activity:  Activity as tolerated    Instructions:  1. Take all medications as prescribed  2. Keep all follow-up appointments  3. Return to the hospital or call your primary care physicians if any worsening symptoms such as bleeding, chest pain, shortness of breath occur.    Follow-Up Appointments:  Please call your primary care physician to schedule an appointment in 1 week time.     Follow-up Information       Sadiq Titus MD Follow up in 1 week(s).    Specialties: Family Medicine, Home Health Services, Hospice Services  Contact information:  [address]  [facility]  [place] [address]  [phone]               YANICK Toribio MD Follow up.    Specialties: Hematology, Oncology, Hematology and Oncology  Why: as directed  Contact information:  [address] [facility]  [address]  [place] [address]  [phone]               Aries Saucedo III, MD Follow up.    Specialty: Gastroenterology  Contact information:  [address] [place] [address] [address] [place] [address]  [phone]                               Pending laboratory work/Tests to be performed/followed by the Primary care Physician: Biopsy     The patient was discharged in the care of her parents//wife/family/caregiver, with discharge instructions were reviewed in written and verbal form. All pertinent questions were discussed and prescriptions were provided. The importance of making follow up appointments and  compliance of medications has been stressed repeatedly. The patient will follow up in 1 week or sooner as needed with the PCP, and the patient is on board with the plan. Upon discharge, patient needs to be on following medications.    Discharge Medications:     Medication List        START taking these medications      apixaban 2.5 mg Tab  Commonly known as: ELIQUIS  Take 4 tablets (10 mg total) by mouth 2 (two) times daily for 10 days, THEN 2 tablets (5 mg total) 2 (two) times daily.  Start taking on: November 24, 2022     HYDROmorphone 4 MG tablet  Commonly known as: DILAUDID  Take 1 tablet (4 mg total) by mouth every 4 (four) hours as needed for Pain.     lipase-protease-amylase 12,000-38,000-60,000 units Cpdr  Commonly known as: CREON  Take 2 po with meals.  Take 1 po with snacks.            CHANGE how you take these medications      esomeprazole 40 MG capsule  Commonly known as: NEXIUM  Take 1 capsule (40 mg total) by mouth 2 (two) times daily.  What changed: when to take this     sucralfate 1 gram tablet  Commonly known as: CARAFATE  Take 1 tablet (1 g total) by mouth 2 (two) times daily.  What changed: when to take this     UNITHROID 50 MCG tablet  Generic drug: levothyroxine  What changed: Another medication with the same name was removed. Continue taking this medication, and follow the directions you see here.            CONTINUE taking these medications      albuterol 90 mcg/actuation inhaler  Commonly known as: PROVENTIL/VENTOLIN HFA     atorvastatin 10 MG tablet  Commonly known as: LIPITOR  Take 1 tablet (10 mg total) by mouth every evening.            STOP taking these medications      acetaminophen 500 MG tablet  Commonly known as: TYLENOL     amoxicillin-clavulanate 875-125mg 875-125 mg per tablet  Commonly known as: AUGMENTIN     benzonatate 100 MG capsule  Commonly known as: TESSALON     chlorthalidone 25 MG Tab  Commonly known as: HYGROTEN     doxycycline 100 MG tablet  Commonly known as:  VIBRA-TABS     ergocalciferol 50,000 unit Cap  Commonly known as: VITAMIN D2     FERRALET 90 DUAL-IRON DELIVERY ORAL     guaiFENesin-codeine 100-10 mg/5 ml  mg/5 mL syrup  Commonly known as: TUSSI-ORGANIDIN NR     Lactobacillus rhamnosus GG 10 billion cell capsule  Commonly known as: CULTURELLE     loratadine 10 mg tablet  Commonly known as: CLARITIN     losartan 100 MG tablet  Commonly known as: COZAAR     magnesium oxide 400 mg (241.3 mg magnesium) tablet  Commonly known as: MAG-OX     multivitamin per tablet  Commonly known as: THERAGRAN     predniSONE 20 MG tablet  Commonly known as: DELTASONE     promethazine-dextromethorphan 6.25-15 mg/5 mL Syrp  Commonly known as: PROMETHAZINE-DM               Where to Get Your Medications        These medications were sent to [id number] - [address] AT [facility] & [address] [address]      Hours: 24-hours Phone: [phone]   apixaban 2.5 mg Tab  esomeprazole 40 MG capsule  HYDROmorphone 4 MG tablet  lipase-protease-amylase 12,000-38,000-60,000 units Cpdr           I spent 40 minutes preparing the discharge including reviewing records from previous encounters, preparation of discharge summary, assessing and final examination of the patient, discharge medicine reconciliation, discussing plan of care, follow up and education and prescriptions.       Rangel Hernandez  [facility] Hospitalist  11/24/2022

## 2022-11-24 NOTE — PLAN OF CARE
Problem: Adult Inpatient Plan of Care  Goal: Absence of Hospital-Acquired Illness or Injury  Outcome: Ongoing, Progressing     Problem: Fall Injury Risk  Goal: Absence of Fall and Fall-Related Injury  Outcome: Ongoing, Progressing     Problem: Adjustment to Illness (Gastrointestinal Bleeding)  Goal: Optimal Coping with Acute Illness  Outcome: Ongoing, Progressing     Problem: Bleeding (Gastrointestinal Bleeding)  Goal: Hemostasis  Outcome: Ongoing, Progressing

## 2022-11-24 NOTE — DISCHARGE INSTRUCTIONS
Keep all follow up appointments. Take all medications as directed. Follow all discharge instructions.       Diet:  Resume regular diet    Physical Activity:  Activity as tolerated    Instructions:  1. Take all medications as prescribed  2. Keep all follow-up appointments  3. Return to the hospital or call your primary care physicians if any worsening symptoms such as bleeding, chest pain, shortness of breath occur.    Follow-Up Appointments:  Please call your primary care physician to schedule an appointment in 1 week time.

## 2022-11-25 ENCOUNTER — TELEPHONE (OUTPATIENT)
Dept: HEMATOLOGY/ONCOLOGY | Facility: CLINIC | Age: 57
End: 2022-11-25

## 2022-11-25 DIAGNOSIS — D68.59 HYPERCOAGULATION SYNDROME: Primary | ICD-10-CM

## 2022-11-25 NOTE — PROGRESS NOTES
Hematology/Oncology  Inpatient Progress Note  Patient Name: Brunilda Bob  MRN: [id number]  Admission Date: (Not on file)  Hospital Length of Stay: 0 days  Code Status: Full Code   Attending Provider: No att. providers found  Referring Provider: Dr. Saucedo  Consulting Provider: YANICK Toribio MD  Primary Care Physician: Sadiq Titus MD  Principal Problem:GI blee    Progress Note Parth Toribio MD 11/24/22  Subjective:     Chief Complaint: abd pain        History Present Illness:  56 y.o. female upper abdominal pain and bloating x's 1-2 months.  Admits to mid back pain as well.  Melena for several weeks.  Had EGD yesterday, bx of gastric area done.  Near syncope today.  Hgb 11 to 7.5 over 1 month.  EGD today 2 areas of bx, clamped per Dr. Saucedo.  CAT 4.2 cm mass head of pancreas, liver masses.  Due for ERCP, U/S guided Bx of pancreatic mass, liver masses per Dr. Saucedo next week.  Hgb 7.5, 2 units of pRBC being given.  CAT of chest addendum no PE seen.  Then another report came out confirming the PE present.  She is going home on Eliquis 2.5 mg 4 po BID x's 7-10 days, per hospitalist and then decrease to 2 po BID.    She is to hold the Eliquis after Sunday am dosage, for anticipated GI procedure on Tuesday.  If liver Bx is done, then Eliquis will be held for 7/10 days, to decrease bleeding risk.    After pRBC, Hgb 10.8.  RTC see me 12/2/22.      Past Medical/Surgical History:  Past Medical History:   Diagnosis Date    Broken heart syndrome     Hypertension      Past Surgical History:   Procedure Laterality Date    CLAVICLE SURGERY  2012    ESOPHAGOGASTRODUODENOSCOPY N/A 11/23/2022    Procedure: EGD (ESOPHAGOGASTRODUODENOSCOPY);  Surgeon: Aries Saucedo III, MD;  Location: [facility];  Service: Endoscopy;  Laterality: N/A;    TONSILLECTOMY         Allergies:  Review of patient's allergies indicates:   Allergen Reactions    Crestor [rosuvastatin] Other (See Comments)     Paralysis     Codeine Nausea And Vomiting    Strawberries [strawberry] Hives    Adhesive tape-silicones Rash       Social/Family History:  Social History     Socioeconomic History    Marital status:    Tobacco Use    Smoking status: Never    Smokeless tobacco: Never   Substance and Sexual Activity    Alcohol use: Yes    Drug use: Never    Sexual activity: Yes     Partners: Male     Social Determinants of Health     Financial Resource Strain: Low Risk     Difficulty of Paying Living Expenses: Not hard at all   Food Insecurity: No Food Insecurity    Worried About Running Out of Food in the Last Year: Never true    Ran Out of Food in the Last Year: Never true   Transportation Needs: No Transportation Needs    Lack of Transportation (Medical): No    Lack of Transportation (Non-Medical): No   Physical Activity: Inactive    Days of Exercise per Week: 0 days    Minutes of Exercise per Session: 0 min   Stress: No Stress Concern Present    Feeling of Stress : Not at all   Social Connections: Socially Integrated    Frequency of Communication with Friends and Family: Twice a week    Frequency of Social Gatherings with Friends and Family: Once a week    Attends Confucianism Services: 1 to 4 times per year    Active Member of Clubs or Organizations: Yes    Attends Club or Organization Meetings: 1 to 4 times per year    Marital Status:    Housing Stability: Low Risk     Unable to Pay for Housing in the Last Year: No    Number of Places Lived in the Last Year: 1    Unstable Housing in the Last Year: No     Family History   Problem Relation Age of Onset    Colon cancer Mother     Uterine cancer Mother     Hypertension Father     Testicular cancer Son        ROS:    GEN: normal without any fever, night sweats or weight loss  HEENT:See HPI  CV: normal with no CP, SOB, PND, LEONARD or orthopnea  PULM: normal with no SOB, cough, hemoptysis, sputum or pleuritic pain  GI: See HPI  : normal with no hematuria, dysuria  BREAST: normal with no  mass, discharge, pain  SKIN: normal with no rash, erythema, bruising, or swelling        Medications:  Continuous Infusions:REM  Scheduled Meds:  REM    PRN Meds:REM     Objective:     Vitals:  There were no vitals taken for this visit.    Physical Exam:  GEN: no apparent distress, comfortable  HEAD: atraumatic and normocephalic  EYES: + pallor, no icterus  ENT: no pharyngeal erythema, external ears WNL; no nasal discharge  NECK: no masses, thyroid normal, trachea midline, no LAD/LN's, supple  CV: RRR with no murmur; normal pulse; normal S1 and S2; no pedal edema  CHEST: Normal respiratory effort; CTAB; normal breath sounds; no wheeze or crackles  ABDOM: nontender and nondistended; soft; no rebound/guarding, L/S NP  MUSC/Skeletal: ROM normal; no crepitus; joints normal  EXTREM: no clubbing, cyanosis, inflammation or swelling  SKIN: no rashes, lesions, ulcers, petechiae   : no cvat  NEURO: grossly intact; motor/sensory WNL;  no tremors  PSYCH: normal mood, affect and behavior  LYMPH: normal cervical, supraclavicular, axillary  LN's    H/H 7.5/24, PT 16.3,  creat 0.8,   Now Hgb 10.8  B 12, folate, Ferritin NL.  alk phos 198    CT of chest addendum, no PE seen    CT of abdomin 2.4 cm pseudoaneurism at the ventricular apex, multiple liver masses up to 3.6 cm, and 4.5 cm mass at body of pancreas, abutting splenic artery, common hepatic artery.  Dilation of distal pancreatic duct seen.  Superior mesenteric artery and vein are patent.     Alpha feto protein, Ca 19-9 pending.    (1) 56 y.o. female with diagnosis of blood loss anemia 2nd acute and chronic GI bleed.  Give 2 units of pRBC, Hgb 7.5. She is symptomatic with increase fatigue with exertion.    For the chronic anemia, melena hx over 1-2 months, does she need colonoscopy, Pill endoscopy out pt to complete GI evaluation, Dr. Saucedo.?    (2)Pancreatic mass, liver masses, suspected pancreatic malignancy, metastatic dx.  Suspected stage 4 dx.    (3)Plan for ERCP,  "U/S guided Bx of pancreatic mass +/or liver masses for tissue Dx and documentation of metastatic dx.  Per Dr. Saucedo next week.    (4) Chronic pain 2nd liver metastatic dx, and likely nerve encroachment per pancreatic mass.  For possible nerve block procedure  Per Dr. Saucedo out pt.    (5)I have placed her on, and have forwarded Rx for Dilaudid 4 mg, Zofran 8 mg, Creon  To [facility].    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  She is at increased risk of thromboembolic complications, CVA, PE, DVT.    PE, to be on Eliquis depending on timing of GI procedure/ Bx.    (7)Treatment options for Stage 4 dx,  Will include Gemzar/Abraxane for palliation.  Another option to include Folfirinox out pt.    (8)Next week, will get Next Generational\Sequencing Studies on peripheral blood at [facility] or [facility],  To help clarify immune oncology options of opdivo, keytruda, yervoy.    Anticipate D/C home 11/24.  RTC see me 12/2/22.      YANICK Toribio MD  Hematology/Oncology  [facility]   11/24/22    "

## 2022-11-26 LAB
AFP-TM SERPL-MCNC: 2.1 NG/ML (ref 0–9.2)
CANCER AG19-9 SERPL-ACNC: 5 U/ML (ref 0–35)

## 2022-11-26 NOTE — TELEPHONE ENCOUNTER
I need to talk with Dr. Saucedo 11/29/22 on her.    I need to schedule bilat duane dopplar study for 11/29/22  At [facility].    I need Next Generational Sequencing Study  On peripheral blood on 12/2/22    Make appt see me 12/2/22.  30 minutes.

## 2022-11-27 ENCOUNTER — TELEPHONE (OUTPATIENT)
Dept: HEMATOLOGY/ONCOLOGY | Facility: CLINIC | Age: 57
End: 2022-11-27

## 2022-11-28 ENCOUNTER — TELEPHONE (OUTPATIENT)
Dept: FAMILY MEDICINE | Facility: CLINIC | Age: 57
End: 2022-11-28

## 2022-11-28 ENCOUNTER — PATIENT OUTREACH (OUTPATIENT)
Dept: FAMILY MEDICINE | Facility: CLINIC | Age: 57
End: 2022-11-28

## 2022-11-28 LAB
BACTERIA BLD CULT: NORMAL
BACTERIA BLD CULT: NORMAL

## 2022-11-28 NOTE — PROGRESS NOTES
Discharge Information     Discharge Date:  11/26     Primary Discharge Diagnosis:  GI bleed    Discharge Summary:  Reviewed      Medication & Order Review     Were medication changes made or new medications added?   No    If so, has the patient filled the prescriptions?  No     Was Home Health ordered? No    If so, has Home Health contacted patient and/or initiated services?  No    Name of Home Health Agency? N/A    Durable Medical Equipment ordered?  No     If so, has the DME provider contacted patient and delivered equipment?  N/A    Follow Up               Any problems since discharge? No    How is the patient feeling since returning home?      Have you set up recommended follow up appointments?  (cardiology, surgery, etc.)    Schedule Hospital Follow-up appointment within 7-14 days (preferably 7).      Notes:  Spoke with patient and got her scheduled. She is scheduled for a biopsy tomorrow. She is already scheduled for all of her follow up appointments, we got the visit with Dr. Titus moved to next week since she has so much going on.             Valeria Ratliff

## 2022-11-29 ENCOUNTER — ANESTHESIA EVENT (OUTPATIENT)
Dept: SURGERY | Facility: HOSPITAL | Age: 57
DRG: 435 | End: 2022-11-29
Payer: COMMERCIAL

## 2022-11-29 ENCOUNTER — HOSPITAL ENCOUNTER (INPATIENT)
Facility: HOSPITAL | Age: 57
LOS: 3 days | Discharge: HOME | DRG: 435 | End: 2022-12-03
Attending: INTERNAL MEDICINE | Admitting: HOSPITALIST
Payer: COMMERCIAL

## 2022-11-29 ENCOUNTER — ANESTHESIA (OUTPATIENT)
Dept: SURGERY | Facility: HOSPITAL | Age: 57
DRG: 435 | End: 2022-11-29
Payer: COMMERCIAL

## 2022-11-29 DIAGNOSIS — K92.2 UPPER GI BLEED: ICD-10-CM

## 2022-11-29 DIAGNOSIS — I82.413 ACUTE DEEP VEIN THROMBOSIS (DVT) OF FEMORAL VEIN OF BOTH LOWER EXTREMITIES: ICD-10-CM

## 2022-11-29 DIAGNOSIS — R10.10 PAIN OF UPPER ABDOMEN: Chronic | ICD-10-CM

## 2022-11-29 DIAGNOSIS — K76.9 LIVER LESION: Primary | Chronic | ICD-10-CM

## 2022-11-29 DIAGNOSIS — D62 ACUTE BLOOD LOSS ANEMIA: ICD-10-CM

## 2022-11-29 DIAGNOSIS — C25.9 MALIGNANT NEOPLASM OF PANCREAS: ICD-10-CM

## 2022-11-29 DIAGNOSIS — K92.2 GI BLEED: ICD-10-CM

## 2022-11-29 DIAGNOSIS — I82.419 ACUTE DEEP VEIN THROMBOSIS (DVT) OF FEMORAL VEIN, UNSPECIFIED LATERALITY: ICD-10-CM

## 2022-11-29 DIAGNOSIS — C25.9 PANCREATIC ADENOCARCINOMA: Chronic | ICD-10-CM

## 2022-11-29 DIAGNOSIS — K21.00 GASTROESOPHAGEAL REFLUX DISEASE WITH ESOPHAGITIS WITHOUT HEMORRHAGE: ICD-10-CM

## 2022-11-29 DIAGNOSIS — R07.9 CHEST PAIN: ICD-10-CM

## 2022-11-29 PROBLEM — I26.99 ACUTE PULMONARY EMBOLISM: Chronic | Status: ACTIVE | Noted: 2022-11-24

## 2022-11-29 PROBLEM — Z79.01 CURRENT USE OF LONG TERM ANTICOAGULATION: Chronic | Status: ACTIVE | Noted: 2022-11-29

## 2022-11-29 PROBLEM — E03.9 HYPOTHYROIDISM: Chronic | Status: ACTIVE | Noted: 2022-11-29

## 2022-11-29 PROBLEM — R10.9 ABDOMINAL PAIN: Chronic | Status: ACTIVE | Noted: 2022-11-29

## 2022-11-29 LAB
ABO + RH BLD: NORMAL
BLD GP AB SCN CELLS X3 SERPL QL: NORMAL
BLD PROD TYP BPU: NORMAL
BLOOD UNIT EXPIRATION DATE: NORMAL
BLOOD UNIT TYPE CODE: 6200
BLOOD UNIT TYPE: NORMAL
CODING SYSTEM: NORMAL
DISPENSE STATUS: NORMAL
ERYTHROCYTE [DISTWIDTH] IN BLOOD BY AUTOMATED COUNT: 14.3 % (ref 11.5–14.5)
HCT VFR BLD AUTO: 22.4 % (ref 37–48.5)
HCT VFR BLD AUTO: 26.7 % (ref 37–48.5)
HGB BLD-MCNC: 7.2 G/DL (ref 12–16)
HGB BLD-MCNC: 8.3 G/DL (ref 12–16)
MCH RBC QN AUTO: 27.3 PG (ref 27–31)
MCHC RBC AUTO-ENTMCNC: 32.1 G/DL (ref 32–36)
MCV RBC AUTO: 85 FL (ref 82–98)
NUM UNITS TRANS PACKED RBC: NORMAL
PLATELET # BLD AUTO: 264 K/UL (ref 150–450)
PMV BLD AUTO: 10.7 FL (ref 9.2–12.9)
RBC # BLD AUTO: 2.64 M/UL (ref 4–5.4)
WBC # BLD AUTO: 13.22 K/UL (ref 3.9–12.7)

## 2022-11-29 PROCEDURE — 25000003 PHARM REV CODE 250: Performed by: NURSE ANESTHETIST, CERTIFIED REGISTERED

## 2022-11-29 PROCEDURE — 85027 COMPLETE CBC AUTOMATED: CPT | Performed by: INTERNAL MEDICINE

## 2022-11-29 PROCEDURE — 63600175 PHARM REV CODE 636 W HCPCS: Performed by: INTERNAL MEDICINE

## 2022-11-29 PROCEDURE — 37000008 HC ANESTHESIA 1ST 15 MINUTES: Performed by: INTERNAL MEDICINE

## 2022-11-29 PROCEDURE — G0378 HOSPITAL OBSERVATION PER HR: HCPCS

## 2022-11-29 PROCEDURE — 85018 HEMOGLOBIN: CPT | Performed by: INTERNAL MEDICINE

## 2022-11-29 PROCEDURE — 86850 RBC ANTIBODY SCREEN: CPT | Performed by: INTERNAL MEDICINE

## 2022-11-29 PROCEDURE — 86920 COMPATIBILITY TEST SPIN: CPT | Performed by: INTERNAL MEDICINE

## 2022-11-29 PROCEDURE — P9016 RBC LEUKOCYTES REDUCED: HCPCS | Performed by: INTERNAL MEDICINE

## 2022-11-29 PROCEDURE — 96372 THER/PROPH/DIAG INJ SC/IM: CPT | Performed by: INTERNAL MEDICINE

## 2022-11-29 PROCEDURE — 43238 EGD US FINE NEEDLE BX/ASPIR: CPT | Performed by: INTERNAL MEDICINE

## 2022-11-29 PROCEDURE — 85014 HEMATOCRIT: CPT | Performed by: INTERNAL MEDICINE

## 2022-11-29 PROCEDURE — 63600175 PHARM REV CODE 636 W HCPCS: Performed by: NURSE ANESTHETIST, CERTIFIED REGISTERED

## 2022-11-29 PROCEDURE — 36415 COLL VENOUS BLD VENIPUNCTURE: CPT | Performed by: INTERNAL MEDICINE

## 2022-11-29 PROCEDURE — 37000009 HC ANESTHESIA EA ADD 15 MINS: Performed by: INTERNAL MEDICINE

## 2022-11-29 PROCEDURE — 36430 TRANSFUSION BLD/BLD COMPNT: CPT

## 2022-11-29 PROCEDURE — 25000003 PHARM REV CODE 250: Performed by: INTERNAL MEDICINE

## 2022-11-29 PROCEDURE — 27202053 HC NEEDLE BIOPSY EUS: Performed by: INTERNAL MEDICINE

## 2022-11-29 RX ORDER — LEVOTHYROXINE SODIUM 25 UG/1
50 TABLET ORAL
Status: DISCONTINUED | OUTPATIENT
Start: 2022-11-30 | End: 2022-12-03 | Stop reason: HOSPADM

## 2022-11-29 RX ORDER — SODIUM,POTASSIUM PHOSPHATES 280-250MG
2 POWDER IN PACKET (EA) ORAL
Status: DISCONTINUED | OUTPATIENT
Start: 2022-11-29 | End: 2022-12-03 | Stop reason: HOSPADM

## 2022-11-29 RX ORDER — NALOXONE HCL 0.4 MG/ML
0.02 VIAL (ML) INJECTION
Status: DISCONTINUED | OUTPATIENT
Start: 2022-11-29 | End: 2022-12-03 | Stop reason: HOSPADM

## 2022-11-29 RX ORDER — ALBUTEROL SULFATE 90 UG/1
2 AEROSOL, METERED RESPIRATORY (INHALATION) EVERY 6 HOURS PRN
Status: DISCONTINUED | OUTPATIENT
Start: 2022-11-29 | End: 2022-11-29

## 2022-11-29 RX ORDER — HYDROMORPHONE HYDROCHLORIDE 1 MG/ML
2 INJECTION, SOLUTION INTRAMUSCULAR; INTRAVENOUS; SUBCUTANEOUS EVERY 6 HOURS PRN
Status: DISCONTINUED | OUTPATIENT
Start: 2022-11-29 | End: 2022-11-30

## 2022-11-29 RX ORDER — HYDROCODONE BITARTRATE AND ACETAMINOPHEN 500; 5 MG/1; MG/1
TABLET ORAL
Status: DISCONTINUED | OUTPATIENT
Start: 2022-11-29 | End: 2022-12-03 | Stop reason: HOSPADM

## 2022-11-29 RX ORDER — PROPOFOL 10 MG/ML
INJECTION, EMULSION INTRAVENOUS
Status: DISCONTINUED | OUTPATIENT
Start: 2022-11-29 | End: 2022-11-29

## 2022-11-29 RX ORDER — IBUPROFEN 200 MG
24 TABLET ORAL
Status: DISCONTINUED | OUTPATIENT
Start: 2022-11-29 | End: 2022-12-03 | Stop reason: HOSPADM

## 2022-11-29 RX ORDER — LANOLIN ALCOHOL/MO/W.PET/CERES
800 CREAM (GRAM) TOPICAL
Status: DISCONTINUED | OUTPATIENT
Start: 2022-11-29 | End: 2022-12-03 | Stop reason: HOSPADM

## 2022-11-29 RX ORDER — PHYTONADIONE 10 MG/ML
10 INJECTION, EMULSION INTRAMUSCULAR; INTRAVENOUS; SUBCUTANEOUS ONCE
Status: COMPLETED | OUTPATIENT
Start: 2022-11-29 | End: 2022-11-29

## 2022-11-29 RX ORDER — ALBUTEROL SULFATE 0.83 MG/ML
2.5 SOLUTION RESPIRATORY (INHALATION) EVERY 6 HOURS PRN
Status: DISCONTINUED | OUTPATIENT
Start: 2022-11-29 | End: 2022-11-30

## 2022-11-29 RX ORDER — POLYETHYLENE GLYCOL 3350 17 G/17G
17 POWDER, FOR SOLUTION ORAL 2 TIMES DAILY
Status: DISCONTINUED | OUTPATIENT
Start: 2022-11-29 | End: 2022-12-03 | Stop reason: HOSPADM

## 2022-11-29 RX ORDER — HYDROMORPHONE HYDROCHLORIDE 4 MG/1
4 TABLET ORAL EVERY 4 HOURS PRN
COMMUNITY
End: 2022-12-12

## 2022-11-29 RX ORDER — PANTOPRAZOLE SODIUM 40 MG/1
40 TABLET, DELAYED RELEASE ORAL 2 TIMES DAILY
Status: DISCONTINUED | OUTPATIENT
Start: 2022-11-29 | End: 2022-12-03 | Stop reason: HOSPADM

## 2022-11-29 RX ORDER — ONDANSETRON 2 MG/ML
INJECTION INTRAMUSCULAR; INTRAVENOUS
Status: DISCONTINUED | OUTPATIENT
Start: 2022-11-29 | End: 2022-11-29

## 2022-11-29 RX ORDER — GLUCAGON 1 MG
1 KIT INJECTION
Status: DISCONTINUED | OUTPATIENT
Start: 2022-11-29 | End: 2022-12-03 | Stop reason: HOSPADM

## 2022-11-29 RX ORDER — FENTANYL CITRATE 50 UG/ML
INJECTION, SOLUTION INTRAMUSCULAR; INTRAVENOUS
Status: DISCONTINUED | OUTPATIENT
Start: 2022-11-29 | End: 2022-11-29 | Stop reason: HOSPADM

## 2022-11-29 RX ORDER — SODIUM CHLORIDE 0.9 % (FLUSH) 0.9 %
10 SYRINGE (ML) INJECTION EVERY 6 HOURS PRN
Status: DISCONTINUED | OUTPATIENT
Start: 2022-11-29 | End: 2022-12-03 | Stop reason: HOSPADM

## 2022-11-29 RX ORDER — AMOXICILLIN 250 MG
2 CAPSULE ORAL 2 TIMES DAILY PRN
Status: DISCONTINUED | OUTPATIENT
Start: 2022-11-29 | End: 2022-12-03 | Stop reason: HOSPADM

## 2022-11-29 RX ORDER — ATORVASTATIN CALCIUM 10 MG/1
10 TABLET, FILM COATED ORAL NIGHTLY
Status: DISCONTINUED | OUTPATIENT
Start: 2022-11-29 | End: 2022-12-03 | Stop reason: HOSPADM

## 2022-11-29 RX ORDER — HYDROMORPHONE HYDROCHLORIDE 2 MG/1
4 TABLET ORAL EVERY 4 HOURS PRN
Status: DISCONTINUED | OUTPATIENT
Start: 2022-11-29 | End: 2022-12-01

## 2022-11-29 RX ORDER — IBUPROFEN 200 MG
16 TABLET ORAL
Status: DISCONTINUED | OUTPATIENT
Start: 2022-11-29 | End: 2022-12-03 | Stop reason: HOSPADM

## 2022-11-29 RX ORDER — ONDANSETRON HYDROCHLORIDE 8 MG/1
8 TABLET, FILM COATED ORAL 4 TIMES DAILY PRN
Status: ON HOLD | COMMUNITY
End: 2022-12-03 | Stop reason: HOSPADM

## 2022-11-29 RX ORDER — ONDANSETRON 2 MG/ML
4 INJECTION INTRAMUSCULAR; INTRAVENOUS EVERY 8 HOURS PRN
Status: DISCONTINUED | OUTPATIENT
Start: 2022-11-29 | End: 2022-12-03 | Stop reason: HOSPADM

## 2022-11-29 RX ORDER — HYDROMORPHONE HYDROCHLORIDE 1 MG/ML
2 INJECTION, SOLUTION INTRAMUSCULAR; INTRAVENOUS; SUBCUTANEOUS ONCE
Status: COMPLETED | OUTPATIENT
Start: 2022-11-29 | End: 2022-11-29

## 2022-11-29 RX ADMIN — ONDANSETRON 4 MG: 2 INJECTION INTRAMUSCULAR; INTRAVENOUS at 01:11

## 2022-11-29 RX ADMIN — ATORVASTATIN CALCIUM 10 MG: 10 TABLET, FILM COATED ORAL at 09:11

## 2022-11-29 RX ADMIN — PROPOFOL 100 MG: 10 INJECTION, EMULSION INTRAVENOUS at 01:11

## 2022-11-29 RX ADMIN — PROPOFOL 50 MG: 10 INJECTION, EMULSION INTRAVENOUS at 01:11

## 2022-11-29 RX ADMIN — SODIUM CHLORIDE: 0.9 INJECTION, SOLUTION INTRAVENOUS at 01:11

## 2022-11-29 RX ADMIN — PANTOPRAZOLE SODIUM 40 MG: 40 TABLET, DELAYED RELEASE ORAL at 08:11

## 2022-11-29 RX ADMIN — HYDROMORPHONE HYDROCHLORIDE 4 MG: 2 TABLET ORAL at 09:11

## 2022-11-29 RX ADMIN — HYDROMORPHONE HYDROCHLORIDE 2 MG: 1 INJECTION, SOLUTION INTRAMUSCULAR; INTRAVENOUS; SUBCUTANEOUS at 06:11

## 2022-11-29 RX ADMIN — POLYETHYLENE GLYCOL 3350 17 G: 17 POWDER, FOR SOLUTION ORAL at 08:11

## 2022-11-29 RX ADMIN — PROPOFOL 150 MG: 10 INJECTION, EMULSION INTRAVENOUS at 01:11

## 2022-11-29 NOTE — [employer] PATIENT INSTRUCTIONS
Discharge Summary/Instructions after an Endoscopic Procedure  Patient Name: Brunilda Bob  Patient MRN: [id number]  Patient   YOB: 1965 Tuesday, November 29, 2022  Aries Saucedo III, MD  RESTRICTIONS:  During your procedure today, you received medications for sedation.  These   medications may affect your judgment, balance and coordination.  Therefore,   for 24 hours, you have the following restrictions:   - DO NOT drive a car, operate machinery, make legal/financial decisions,   sign important papers or drink alcohol.    ACTIVITY:  Today: no heavy lifting, straining or running due to procedural   sedation/anesthesia.  The following day: return to full activity including work.  DIET:  Eat and drink normally unless instructed otherwise.     TREATMENT FOR COMMON SIDE EFFECTS:  - Mild abdominal pain, nausea, belching, bloating or excessive gas:  rest,   eat lightly and use a heating pad.  - Sore Throat: treat with throat lozenges and/or gargle with warm salt   water.  - Because air was used during the procedure, expelling large amounts of air   from your rectum or belching is normal.  - If a bowel prep was taken, you may not have a bowel movement for 1-3 days.    This is normal.  SYMPTOMS TO WATCH FOR AND REPORT TO YOUR PHYSICIAN:  1. Abdominal pain or bloating, other than gas cramps.  2. Chest pain.  3. Back pain.  4. Signs of infection such as: chills or fever occurring within 24 hours   after the procedure.  5. Rectal bleeding, which would show as bright red, maroon, or black stools.   (A tablespoon of blood from the rectum is not serious, especially if   hemorrhoids are present.)  6. Vomiting.  7. Weakness or dizziness.  GO DIRECTLY TO THE NEAREST EMERGENCY ROOM IF YOU HAVE ANY OF THE FOLLOWING:      Difficulty breathing              Chills and/or fever over 101 F   Persistent vomiting and/or vomiting blood   Severe abdominal pain   Severe chest pain   Black, tarry stools   Bleeding-  more than one tablespoon   Any other symptom or condition that you feel may need urgent attention  Your doctor recommends these additional instructions:  If any biopsies were taken, your doctors clinic will contact you in 1 to 2   weeks with any results.  - Discharge patient to home.   - Patient has a contact number available for emergencies.  The signs and   symptoms of potential delayed complications were discussed with the   patient.  Return to normal activities tomorrow.  Written discharge   instructions were provided to the patient.   - Resume regular diet.   - Continue present medications.   - Recheck CBC today, given Vit K for minimally elevated INR and get CT   triple phase CT to see if bleeding can be localized from pancreas vs liver   vs biliary  For questions, problems or results please call your physician - Aries Saucedo III, MD at Work:  [phone].  [facility], EMERGENCY ROOM PHONE NUMBER: [phone]  IF A COMPLICATION OR EMERGENCY SITUATION ARISES AND YOU ARE UNABLE TO REACH   YOUR PHYSICIAN - GO DIRECTLY TO THE EMERGENCY ROOM.  Aries Saucedo III, MD  11/29/2022 1:59:57 PM  This report has been verified and signed electronically.  Dear patient,  As a result of recent federal legislation (The Federal Cures Act), you may   receive lab or pathology results from your procedure in your [facility]   account before your physician is able to contact you. Your physician or   their representative will relay the results to you with their   recommendations at their soonest availability.  Thank you,  [employer]
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
## 2022-11-29 NOTE — TRANSFER OF CARE
Anesthesia Transfer of Care Note    Patient: Brunilda Bob    Procedure(s) Performed: Procedure(s) (LRB):  ULTRASOUND, UPPER GI TRACT, ENDOSCOPIC (N/A)    Patient location: GI    Anesthesia Type: MAC    Transport from OR: Transported from OR on room air with adequate spontaneous ventilation    Post pain: adequate analgesia    Post assessment: no apparent anesthetic complications    Post vital signs: stable    Level of consciousness: responds to stimulation    Nausea/Vomiting: no nausea/vomiting    Complications: none    Transfer of care protocol was followed      Last vitals:   Visit Vitals  LMP  (LMP Unknown)   Breastfeeding No

## 2022-11-29 NOTE — ANESTHESIA POSTPROCEDURE EVALUATION
Anesthesia Post Evaluation    Patient: Brunilda Bob    Procedure(s) Performed: Procedure(s) (LRB):  ULTRASOUND, UPPER GI TRACT, ENDOSCOPIC (N/A)    Final Anesthesia Type: MAC      Patient location during evaluation: GI PACU  Patient participation: Yes- Able to Participate  Level of consciousness: awake and alert  Post-procedure vital signs: reviewed and stable  Pain management: adequate  Airway patency: patent    PONV status at discharge: No PONV  Anesthetic complications: no      Cardiovascular status: stable  Respiratory status: unassisted  Hydration status: euvolemic  Follow-up not needed.          Vitals Value Taken Time   /64 11/29/22 1500   Temp  11/29/22 1504   Pulse 80 11/29/22 1502   Resp 16 11/29/22 1502   SpO2 98 % 11/29/22 1502   Vitals shown include unvalidated device data.      No case tracking events are documented in the log.      Pain/Nat Score: No data recorded

## 2022-11-29 NOTE — H&P
GASTROENTEROLOGY PRE-PROCEDURE H&P NOTE  Patient Name: Brunilda Bob  Patient MRN: [id number]  Patient : 1965    Service date: 2022    PCP: Sadiq Titus MD    No chief complaint on file.      HPI: Patient is a 57 y.o. female with PMHx as below here for evaluation of f/u duodenal ulcer and new panc mas w/ suspected liver mets.     Past Medical History:  Past Medical History:   Diagnosis Date    Broken heart syndrome     Hypertension         Past Surgical History:  Past Surgical History:   Procedure Laterality Date    CLAVICLE SURGERY      ESOPHAGOGASTRODUODENOSCOPY N/A 2022    Procedure: EGD (ESOPHAGOGASTRODUODENOSCOPY);  Surgeon: Aries Saucedo III, MD;  Location: [facility];  Service: Endoscopy;  Laterality: N/A;    TONSILLECTOMY          Home Medications:  Medications Prior to Admission   Medication Sig Dispense Refill Last Dose    albuterol (PROVENTIL/VENTOLIN HFA) 90 mcg/actuation inhaler Inhale 1-2 puffs into the lungs every 4 (four) hours as needed.       apixaban (ELIQUIS) 2.5 mg Tab Take 4 tablets (10 mg total) by mouth 2 (two) times daily for 10 days, THEN 2 tablets (5 mg total) 2 (two) times daily. 200 tablet 0     atorvastatin (LIPITOR) 10 MG tablet Take 1 tablet (10 mg total) by mouth every evening. 90 tablet 3     esomeprazole (NEXIUM) 40 MG capsule Take 1 capsule (40 mg total) by mouth 2 (two) times daily. 60 capsule 0     levothyroxine (UNITHROID) 50 MCG tablet Take 50 mcg by mouth every evening. UNITHROID       lipase-protease-amylase 12,000-38,000-60,000 units (CREON) CpDR Take 2 po with meals.  Take 1 po with snacks. 180 capsule 3     sucralfate (CARAFATE) 1 gram tablet Take 1 tablet (1 g total) by mouth 2 (two) times daily. 180 tablet 3        Inpatient Medications:        Review of patient's allergies indicates:   Allergen Reactions    Crestor [rosuvastatin] Other (See Comments)     Paralysis    Codeine Nausea And Vomiting    Strawberries [strawberry]  Hives    Adhesive tape-silicones Rash       Social History:   Social History     Occupational History    Not on file   Tobacco Use    Smoking status: Never    Smokeless tobacco: Never   Substance and Sexual Activity    Alcohol use: Yes    Drug use: Never    Sexual activity: Yes     Partners: Male       Family History:   Family History   Problem Relation Age of Onset    Colon cancer Mother     Uterine cancer Mother     Hypertension Father     Testicular cancer Son        Review of Systems:  A 10 point review of systems was performed and was normal, except as mentioned in the HPI, including constitutional, HEENT, heme, lymph, cardiovascular, respiratory, gastrointestinal, genitourinary, neurologic, endocrine, psychiatric and musculoskeletal.      OBJECTIVE:    Physical Exam:  24 Hour Vital Sign Ranges:    Most recent vitals: LMP  (LMP Unknown)   Breastfeeding No    GEN: well-developed, well-nourished, awake and alert, non-toxic appearing adult  HEENT: PERRL, sclera anicteric, oral mucosa pink and moist without lesion  NECK: trachea midline; Good ROM  CV: regular rate and rhythm, no murmurs or gallops  RESP: clear to auscultation bilaterally, no wheezes, rhonci or rales  ABD: soft, non-tender, non-distended, normal bowel sounds  EXT: no swelling or edema, 2+ pulses distally  SKIN: no rashes or jaundice  PSYCH: normal affect    Labs:   No results for input(s): WBC, MCV, PLT in the last 72 hours.    Invalid input(s): HGBAU  No results for input(s): NA, K, CL, CO2, BUN, GLU in the last 72 hours.    Invalid input(s): CREA  No results for input(s): ALB in the last 72 hours.    Invalid input(s): ALKP, SGOT, SGPT, TBIL, DBIL, TPRO  No results for input(s): PT, INR, PTT in the last 72 hours.      IMPRESSION / RECOMMENDATIONS:  EGD / EUS  with interventions as warranted.   RIsks, benefits, alternatives discussed in detail regarding upcoming procedures and sedation. Some of the more common endoscopic complications include but  not limited to immediate or delayed perforation, bleeding, infections, pain, inadvertent injury to surrounding tissue / organs and possible need for surgical evaluation. Patient expressed understanding, all questions answered and will proceed with procedure as planned.     Aries Saucedo III  11/29/2022  1:01 PM

## 2022-11-29 NOTE — Clinical Note
A venogram was performed of the inferior vena cava. The venogram syringe was removed and the venogram is complete.

## 2022-11-30 PROBLEM — I82.403 DVT, BILATERAL LOWER LIMBS: Status: ACTIVE | Noted: 2022-11-30

## 2022-11-30 PROBLEM — I82.419 ACUTE DEEP VEIN THROMBOSIS (DVT) OF FEMORAL VEIN: Status: ACTIVE | Noted: 2022-11-30

## 2022-11-30 LAB
ALBUMIN SERPL BCP-MCNC: 2.7 G/DL (ref 3.5–5.2)
ALP SERPL-CCNC: 188 U/L (ref 55–135)
ALT SERPL W/O P-5'-P-CCNC: 21 U/L (ref 10–44)
ANION GAP SERPL CALC-SCNC: 6 MMOL/L (ref 8–16)
AST SERPL-CCNC: 23 U/L (ref 10–40)
BASOPHILS # BLD AUTO: 0.02 K/UL (ref 0–0.2)
BASOPHILS NFR BLD: 0.2 % (ref 0–1.9)
BILIRUB SERPL-MCNC: 1.1 MG/DL (ref 0.1–1)
BLD PROD TYP BPU: NORMAL
BLOOD UNIT EXPIRATION DATE: NORMAL
BLOOD UNIT TYPE CODE: 6200
BLOOD UNIT TYPE: NORMAL
BUN SERPL-MCNC: 15 MG/DL (ref 6–20)
CALCIUM SERPL-MCNC: 7.9 MG/DL (ref 8.7–10.5)
CHLORIDE SERPL-SCNC: 96 MMOL/L (ref 95–110)
CO2 SERPL-SCNC: 30 MMOL/L (ref 23–29)
CODING SYSTEM: NORMAL
CREAT SERPL-MCNC: 0.7 MG/DL (ref 0.5–1.4)
DIFFERENTIAL METHOD: ABNORMAL
DISPENSE STATUS: NORMAL
EOSINOPHIL # BLD AUTO: 0.4 K/UL (ref 0–0.5)
EOSINOPHIL NFR BLD: 3.2 % (ref 0–8)
ERYTHROCYTE [DISTWIDTH] IN BLOOD BY AUTOMATED COUNT: 14.8 % (ref 11.5–14.5)
EST. GFR  (NO RACE VARIABLE): >60 ML/MIN/1.73 M^2
GLUCOSE SERPL-MCNC: 102 MG/DL (ref 70–110)
HCT VFR BLD AUTO: 24.1 % (ref 37–48.5)
HCT VFR BLD AUTO: 24.3 % (ref 37–48.5)
HCT VFR BLD AUTO: 25 % (ref 37–48.5)
HCT VFR BLD AUTO: 26.3 % (ref 37–48.5)
HCT VFR BLD AUTO: 26.6 % (ref 37–48.5)
HGB BLD-MCNC: 7.5 G/DL (ref 12–16)
HGB BLD-MCNC: 7.7 G/DL (ref 12–16)
HGB BLD-MCNC: 7.9 G/DL (ref 12–16)
HGB BLD-MCNC: 8.2 G/DL (ref 12–16)
HGB BLD-MCNC: 8.4 G/DL (ref 12–16)
IMM GRANULOCYTES # BLD AUTO: 0.05 K/UL (ref 0–0.04)
IMM GRANULOCYTES NFR BLD AUTO: 0.5 % (ref 0–0.5)
INR PPP: 1.6
LYMPHOCYTES # BLD AUTO: 0.9 K/UL (ref 1–4.8)
LYMPHOCYTES NFR BLD: 8.2 % (ref 18–48)
MAGNESIUM SERPL-MCNC: 1.9 MG/DL (ref 1.6–2.6)
MCH RBC QN AUTO: 27 PG (ref 27–31)
MCHC RBC AUTO-ENTMCNC: 31.6 G/DL (ref 32–36)
MCV RBC AUTO: 86 FL (ref 82–98)
MONOCYTES # BLD AUTO: 0.7 K/UL (ref 0.3–1)
MONOCYTES NFR BLD: 6.4 % (ref 4–15)
NEUTROPHILS # BLD AUTO: 9 K/UL (ref 1.8–7.7)
NEUTROPHILS NFR BLD: 81.5 % (ref 38–73)
NRBC BLD-RTO: 0 /100 WBC
NUM UNITS TRANS PACKED RBC: NORMAL
PLATELET # BLD AUTO: 261 K/UL (ref 150–450)
PMV BLD AUTO: 10.9 FL (ref 9.2–12.9)
POTASSIUM SERPL-SCNC: 3.7 MMOL/L (ref 3.5–5.1)
PROT SERPL-MCNC: 5.7 G/DL (ref 6–8.4)
PROTHROMBIN TIME: 17.7 SEC (ref 11.4–13.7)
RBC # BLD AUTO: 3.11 M/UL (ref 4–5.4)
SODIUM SERPL-SCNC: 132 MMOL/L (ref 136–145)
WBC # BLD AUTO: 11.06 K/UL (ref 3.9–12.7)

## 2022-11-30 PROCEDURE — 25000003 PHARM REV CODE 250: Performed by: SURGERY

## 2022-11-30 PROCEDURE — P9016 RBC LEUKOCYTES REDUCED: HCPCS | Performed by: INTERNAL MEDICINE

## 2022-11-30 PROCEDURE — 25500020 PHARM REV CODE 255: Performed by: INTERNAL MEDICINE

## 2022-11-30 PROCEDURE — 36415 COLL VENOUS BLD VENIPUNCTURE: CPT | Performed by: INTERNAL MEDICINE

## 2022-11-30 PROCEDURE — 25000003 PHARM REV CODE 250: Performed by: INTERNAL MEDICINE

## 2022-11-30 PROCEDURE — 85014 HEMATOCRIT: CPT | Mod: 91 | Performed by: INTERNAL MEDICINE

## 2022-11-30 PROCEDURE — 99221 PR INITIAL HOSPITAL CARE,LEVL I: ICD-10-PCS | Mod: ,,, | Performed by: INTERNAL MEDICINE

## 2022-11-30 PROCEDURE — 63600175 PHARM REV CODE 636 W HCPCS: Performed by: INTERNAL MEDICINE

## 2022-11-30 PROCEDURE — 25500020 PHARM REV CODE 255: Performed by: SURGERY

## 2022-11-30 PROCEDURE — 85025 COMPLETE CBC W/AUTO DIFF WBC: CPT | Performed by: INTERNAL MEDICINE

## 2022-11-30 PROCEDURE — C1769 GUIDE WIRE: HCPCS | Performed by: SURGERY

## 2022-11-30 PROCEDURE — 85018 HEMOGLOBIN: CPT | Mod: 91 | Performed by: INTERNAL MEDICINE

## 2022-11-30 PROCEDURE — 85014 HEMATOCRIT: CPT | Mod: 91 | Performed by: SURGERY

## 2022-11-30 PROCEDURE — 80053 COMPREHEN METABOLIC PANEL: CPT | Performed by: INTERNAL MEDICINE

## 2022-11-30 PROCEDURE — 85018 HEMOGLOBIN: CPT | Performed by: INTERNAL MEDICINE

## 2022-11-30 PROCEDURE — 85610 PROTHROMBIN TIME: CPT | Performed by: INTERNAL MEDICINE

## 2022-11-30 PROCEDURE — 12000002 HC ACUTE/MED SURGE SEMI-PRIVATE ROOM

## 2022-11-30 PROCEDURE — 85014 HEMATOCRIT: CPT | Performed by: INTERNAL MEDICINE

## 2022-11-30 PROCEDURE — 85018 HEMOGLOBIN: CPT | Mod: 91 | Performed by: SURGERY

## 2022-11-30 PROCEDURE — 36430 TRANSFUSION BLD/BLD COMPNT: CPT

## 2022-11-30 PROCEDURE — 36415 COLL VENOUS BLD VENIPUNCTURE: CPT | Performed by: SURGERY

## 2022-11-30 PROCEDURE — 83735 ASSAY OF MAGNESIUM: CPT | Performed by: INTERNAL MEDICINE

## 2022-11-30 PROCEDURE — 99221 1ST HOSP IP/OBS SF/LOW 40: CPT | Mod: ,,, | Performed by: INTERNAL MEDICINE

## 2022-11-30 PROCEDURE — 37191 INS ENDOVAS VENA CAVA FILTR: CPT | Performed by: SURGERY

## 2022-11-30 PROCEDURE — C1880 VENA CAVA FILTER: HCPCS | Performed by: SURGERY

## 2022-11-30 DEVICE — GUNTHER TULIP VENA CAVA FILTER SET FOR FEMORAL VEIN APPROACH
Type: IMPLANTABLE DEVICE | Site: GROIN | Status: FUNCTIONAL
Brand: GUNTHER TULIP

## 2022-11-30 RX ORDER — HYDROMORPHONE HYDROCHLORIDE 1 MG/ML
2 INJECTION, SOLUTION INTRAMUSCULAR; INTRAVENOUS; SUBCUTANEOUS EVERY 4 HOURS PRN
Status: DISCONTINUED | OUTPATIENT
Start: 2022-11-30 | End: 2022-12-01

## 2022-11-30 RX ORDER — AMOXICILLIN 250 MG
2 CAPSULE ORAL 2 TIMES DAILY
Status: DISCONTINUED | OUTPATIENT
Start: 2022-11-30 | End: 2022-11-30

## 2022-11-30 RX ORDER — LIDOCAINE HYDROCHLORIDE 10 MG/ML
INJECTION, SOLUTION EPIDURAL; INFILTRATION; INTRACAUDAL; PERINEURAL
Status: DISCONTINUED | OUTPATIENT
Start: 2022-11-30 | End: 2022-11-30 | Stop reason: HOSPADM

## 2022-11-30 RX ORDER — HYDROCODONE BITARTRATE AND ACETAMINOPHEN 500; 5 MG/1; MG/1
TABLET ORAL
Status: DISCONTINUED | OUTPATIENT
Start: 2022-11-30 | End: 2022-12-03 | Stop reason: HOSPADM

## 2022-11-30 RX ADMIN — IOHEXOL 100 ML: 350 INJECTION, SOLUTION INTRAVENOUS at 07:11

## 2022-11-30 RX ADMIN — HYDROMORPHONE HYDROCHLORIDE 2 MG: 1 INJECTION, SOLUTION INTRAMUSCULAR; INTRAVENOUS; SUBCUTANEOUS at 08:11

## 2022-11-30 RX ADMIN — LEVOTHYROXINE SODIUM 50 MCG: 0.03 TABLET ORAL at 06:11

## 2022-11-30 RX ADMIN — HYDROMORPHONE HYDROCHLORIDE 4 MG: 2 TABLET ORAL at 05:11

## 2022-11-30 RX ADMIN — HYDROMORPHONE HYDROCHLORIDE 2 MG: 1 INJECTION, SOLUTION INTRAMUSCULAR; INTRAVENOUS; SUBCUTANEOUS at 03:11

## 2022-11-30 RX ADMIN — POLYETHYLENE GLYCOL 3350 17 G: 17 POWDER, FOR SOLUTION ORAL at 09:11

## 2022-11-30 RX ADMIN — POLYETHYLENE GLYCOL 3350 17 G: 17 POWDER, FOR SOLUTION ORAL at 08:11

## 2022-11-30 RX ADMIN — HYDROMORPHONE HYDROCHLORIDE 2 MG: 1 INJECTION, SOLUTION INTRAMUSCULAR; INTRAVENOUS; SUBCUTANEOUS at 09:11

## 2022-11-30 RX ADMIN — METHYLNALTREXONE BROMIDE 8 MG: 12 INJECTION, SOLUTION SUBCUTANEOUS at 05:11

## 2022-11-30 RX ADMIN — PANTOPRAZOLE SODIUM 40 MG: 40 TABLET, DELAYED RELEASE ORAL at 05:11

## 2022-11-30 RX ADMIN — ATORVASTATIN CALCIUM 10 MG: 10 TABLET, FILM COATED ORAL at 08:11

## 2022-11-30 RX ADMIN — PANTOPRAZOLE SODIUM 40 MG: 40 TABLET, DELAYED RELEASE ORAL at 06:11

## 2022-11-30 RX ADMIN — HYDROMORPHONE HYDROCHLORIDE 4 MG: 2 TABLET ORAL at 06:11

## 2022-11-30 RX ADMIN — HYDROMORPHONE HYDROCHLORIDE 2 MG: 1 INJECTION, SOLUTION INTRAMUSCULAR; INTRAVENOUS; SUBCUTANEOUS at 01:11

## 2022-11-30 NOTE — HPI
Mrs. Spann is a 57-year-old female who is being directly admitted after EUS performed earlier today.  Patient with recent [facility] admission, CTA at that time with right lower lung subsegmental PE, as well as 4.8 x 4.1 x 3.7 cm body of pancreas mass with numerous hepatic lesions concerning for metastatic disease, CA 19.9 and alpha-fetoprotein within normal, acute on chronic anemia with concern for upper GI bleed, underwent EGD with concern for to duodenal ulcer, recommends b.i.d. PPI therapy by 1 month with outpatient EUS.  Patient was started on Eliquis for PE during that admission, seen by Dr. Toribio from Hematology/Oncology.  She had EUS today round mass pancreatic body 3.6 x 3.7 mm, FNA, noted to have bleeding postprocedure, believed to be coming from pancreatic duct.  Last dose of Eliquis was on 11/27 morning.  She was given sub cut vitamin K 10 mg.  Hemoglobin 7.2, ordered 1 unit PRBC transfusion.  Patient does report generalized abdominal pain, states she takes Dilaudid q.4 hours at home.  States she has not had a bowel movement since previous admission, states she is passing flatus, was using Colace at home, feels related to pain medication.  Mother had history of colorectal cancer and son history of testicular cancer.  Nonsmoker only occasional alcohol use.  Does report intermittent swelling left ankle with calf cramping sensation.  Afebrile.  Previous imaging reviewed.  Discussed with referring provider .  Discussed with nursing.  Discussed with patient and family members at bedside.

## 2022-11-30 NOTE — CARE UPDATE
US leg IMPRESSION:  1. Deep venous thrombosis involving the left distal superficial femoral vein as well as the left popliteal vein.  2. Venous thrombosis involving the calf veins bilaterally    Plan   Appear not able to anti coagulate   Consult vascular for evonne

## 2022-11-30 NOTE — CONSULTS
Inpatient consult to Radiation Oncology  Consult performed by: Aries Yepez Jr., MD  Consult ordered by: Nida Linder MD    Brunilda Bob  [id number]  1965 11/30/2022  No referring provider defined for this encounter.    REASON FOR CONSULTATION: Presumed metastatic pancreatic adenocarcinoma (PATH pending)    TREATMENT GOAL: palliative    HISTORY OF PRESENT ILLNESS:   Brunilda Bob is a 57 y.o. female who presented with abdominal pain, bloating and midback pain x2 months with melena.  CT abdomen pelvis noted a 4.8 x 4.1 x 3.7 cm pancreatic body mass resulting in dilatation of the pancreatic duct with numerous hepatic metastases and ascites. She was taken for EGD where 2 oozing cratered duodenal ulcers were noted and successfully clipped. On EUS numerous hypoechoic liver lesions and 3.6 x 3.7 cm pancreatic body mass with upstream pancreatic duct dilation was noted. FNA pending.  Her hemoglobin has decreased from 11-7.5 requiring transfusion of packed red blood cells.  Consensus is that bleed is coming from pancreatic duct and Eliquis has since been held.  She has also been diagnosed with LLE DVT and subsegmental PE's, requiring Eliquis which is further complicating upper GI bleed.  This led to placement of IVC filter by Dr. Uribe.      Per chart, she has been accepted for transfer to [facility] for consideration of IR embolization.  I am consulted for the role of palliative radiotherapy.      At my visit with the patient and her  she is stable.  She is laying in bed.  She endorses midback pain.  She denies pruritus.  She denies nausea or vomiting.    Review of systems otherwise negative unless indicated in HPI.    Past Medical History:   Diagnosis Date    Broken heart syndrome     Hypertension      Past Surgical History:   Procedure Laterality Date    CLAVICLE SURGERY  2012    ESOPHAGOGASTRODUODENOSCOPY N/A 11/23/2022    Procedure: EGD (ESOPHAGOGASTRODUODENOSCOPY);  Surgeon:  Aries Saucedo III, MD;  Location: [facility];  Service: Endoscopy;  Laterality: N/A;    TONSILLECTOMY       Social History     Socioeconomic History    Marital status:    Tobacco Use    Smoking status: Never    Smokeless tobacco: Never   Substance and Sexual Activity    Alcohol use: Yes     Comment: occ    Drug use: Never    Sexual activity: Yes     Partners: Male     Social Determinants of Health     Financial Resource Strain: Low Risk     Difficulty of Paying Living Expenses: Not hard at all   Food Insecurity: No Food Insecurity    Worried About Running Out of Food in the Last Year: Never true    Ran Out of Food in the Last Year: Never true   Transportation Needs: No Transportation Needs    Lack of Transportation (Medical): No    Lack of Transportation (Non-Medical): No   Physical Activity: Inactive    Days of Exercise per Week: 0 days    Minutes of Exercise per Session: 0 min   Stress: No Stress Concern Present    Feeling of Stress : Not at all   Social Connections: Socially Integrated    Frequency of Communication with Friends and Family: Twice a week    Frequency of Social Gatherings with Friends and Family: Once a week    Attends Bahai Services: 1 to 4 times per year    Active Member of Clubs or Organizations: Yes    Attends Club or Organization Meetings: 1 to 4 times per year    Marital Status:    Housing Stability: Low Risk     Unable to Pay for Housing in the Last Year: No    Number of Places Lived in the Last Year: 1    Unstable Housing in the Last Year: No     Family History   Problem Relation Age of Onset    Colon cancer Mother     Uterine cancer Mother     Hypertension Father     Testicular cancer Son        PRIOR HISTORY OF CHEMOTHERAPY OR RADIOTHERAPY: Please see HPI for patients prior oncologic history.    Current Discharge Medication List        CONTINUE these medications which have NOT CHANGED    Details   atorvastatin (LIPITOR) 10 MG tablet Take 1 tablet (10 mg total) by  mouth every evening.  Qty: 90 tablet, Refills: 3    Associated Diagnoses: Dyslipidemia      esomeprazole (NEXIUM) 40 MG capsule Take 1 capsule (40 mg total) by mouth 2 (two) times daily.  Qty: 60 capsule, Refills: 0    Associated Diagnoses: Gastroesophageal reflux disease with esophagitis without hemorrhage      HYDROmorphone (DILAUDID) 4 MG tablet Take 4 mg by mouth every 4 (four) hours as needed for Pain.      levothyroxine (SYNTHROID) 50 MCG tablet Take 50 mcg by mouth every evening. UNITHROID      lipase-protease-amylase 12,000-38,000-60,000 units (CREON) CpDR Take 2 po with meals.  Take 1 po with snacks.  Qty: 180 capsule, Refills: 3    Associated Diagnoses: Pancreatic mass      sucralfate (CARAFATE) 1 gram tablet Take 1 tablet (1 g total) by mouth 2 (two) times daily.  Qty: 180 tablet, Refills: 3      albuterol (PROVENTIL/VENTOLIN HFA) 90 mcg/actuation inhaler Inhale 1-2 puffs into the lungs every 4 (four) hours as needed.      apixaban (ELIQUIS) 2.5 mg Tab Take 4 tablets (10 mg total) by mouth 2 (two) times daily for 10 days, THEN 2 tablets (5 mg total) 2 (two) times daily.  Qty: 200 tablet, Refills: 0      ondansetron (ZOFRAN) 8 MG tablet Take 8 mg by mouth 4 (four) times daily as needed for Nausea.           Review of patient's allergies indicates:   Allergen Reactions    Crestor [rosuvastatin] Other (See Comments)     Paralysis    Codeine Nausea And Vomiting    Strawberries [strawberry] Hives    Adhesive tape-silicones Rash       QUALITY OF LIFE: 40%- Disabled: Requires Special Care and Assistance    Vitals:    11/30/22 1502 11/30/22 1519 11/30/22 1602 11/30/22 1707   BP: 118/70  108/71    Pulse: 81  77    Resp: 19 18 18 18   Temp:   98.5 °F (36.9 °C)    TempSrc:   Oral    SpO2: 99%  99%    Weight:       Height:         Body mass index is 32.81 kg/m².    PHYSICAL EXAM:   GENERAL: alert; in no apparent distress.   HEAD: normocephalic, atraumatic.  EYES: pupils are equal, round, reactive to light and  accommodation. Sclera anicteric. Conjunctiva not injected.   NOSE/THROAT: no nasal erythema or rhinorrhea. Oropharynx pink, without erythema, ulcerations or thrush.   NECK: no cervical motion rigidity; supple with no masses.    CHEST: Patient is speaking comfortably on room air with normal work of breathing without using accessory muscles of respiration.  CARDIOVASCULAR: regular rate and rhythm  ABDOMEN: soft, nontender, nondistended.   MUSCULOSKELETAL: no tenderness to palpation along the spine or scapulae. Normal range of motion.  NEUROLOGIC: cranial nerves II-XII intact bilaterally. Strength 5/5 in bilateral upper and lower extremities. No sensory deficits appreciated.   EXTREMITIES: no clubbing, cyanosis.  SKIN: no erythema, rashes, ulcerations noted.     REVIEW OF IMAGING/PATHOLOGY/LABS: Please see HPI. All images reviewed personally by dictating physician.     ASSESSMENT: Brunilda Bob is a 57 y.o. female with Presumed metastatic pancreatic adenocarcinoma (PATH pending).  PLAN:  Brunilda Bob presents as above.    1.  Inpatient management per hospitalist team.  2.  Melena with resultant drop in hemoglobin thought due to pancreatic tumor eroding into arterial supply.  CT and EUS imaging consistent with metastatic pancreas cancer with path pending.  Patient with concurrent diagnosis of DVT/PE status post IVC filter placement; intolerance of Eliquis due to bleed.  3.  Pancreatic malignancy is relatively radioresistant and my expectation is a low likelihood that radiotherapy would stop the bleed.  If efficacious, expected time line would be approximately 2 weeks after initiation of radiotherapy.    4.  Agree with transfer to [facility] for consideration of interventional procedure.  If able to localize bleed, expectation would be for more rapid and efficacious therapy.  If IR embolization not possible or ultimately fails to control bleed, can resort to second option of RT.  5. Once bleed and  H/H stabilized, anticipate celiac neurolysis for pain control and initiation of systemic therapy with Dr. Troibio. [place] RT for local palliation/control.  6. cc Feliciano Saucedo and Catarino.  7. Will sign off and follow-up as outpatient.     The patient has our contact information and understands that they are free to contact us at any time with questions or concerns regarding radiation therapy.     DISPOSITION: RTC AFTER FURTHER WORKUP     I have personally seen and evaluated this patient with a high complexity diagnosis.      Greater than 60 minutes were dedicated to reviewing/interpreting pertinent laboratory/imaging/pathology as well as prior consultations; reviewing and performing history and physical; counseling patient on oncologic recommendations; documentation in the electronic medical record including ordering of additional tests and/or radiation treatment protocol; and coordination of care with physicians with referrals placed as appropriate.     COVID-19 precautions and Cancer Center policy discussed.      PHYSICIAN: Aries Yepez Jr, MD    Thank you for the opportunity to meet and consult with Brunilda Bob.   Please feel free to contact me to discuss the above recommendation further.

## 2022-11-30 NOTE — NURSING
Patient back on unit from cath lab. Right groin site dressing clean, dry and intact. Denies pain to site. Pedal pulses present. No complaints at this time. Call light within reach. Family present at bedside.

## 2022-11-30 NOTE — OP NOTE
[facility]  Surgery Department  Operative Note    SUMMARY     Date of Procedure: 11/30/2022     Procedure: Procedure(s) (LRB):  Placement, Inferior Vena Cava Filter (N/A)     Surgeon(s) and Role:     * Roni Uribe MD - Primary    Assisting Surgeon: None    Pre-Operative Diagnosis: Acute deep vein thrombosis (DVT) of femoral vein, unspecified laterality [I82.419]    Post-Operative Diagnosis: Post-Op Diagnosis Codes:     * Acute deep vein thrombosis (DVT) of femoral vein, unspecified laterality [I82.419]    Anesthesia: RN IV Sedation    Operative Findings (including complications, if any):  Left leg DVT pulmonary emboli    Description of Technical Procedures:  Inferior vena cavogram inferior vena cava filter placement via right femoral vein    Local anesthesia injected over the right femoral vein ultrasound used to access the right femoral vein on the 1st pass no entrance into the artery was made guidewire was passed 7 [place] sheath was placed and vena cavogram was performed identifying the renal veins at the L1 spinous body level filter was then deployed across the L3 spinous body level repeat venogram showed good apposition and good positioning below the renal veins above the bifurcation.  Sheath was pulled and pressure was held.    Significant Surgical Tasks Conducted by the Assistant(s), if Applicable:     Estimated Blood Loss (EBL): * No values recorded between 11/30/2022 11:30 AM and 11/30/2022 11:36 AM *           Implants:   Implant Name Type Inv. Item Serial No.  Lot No. LRB No. Used Action   FILTER FEMORAL FREDRICK [employer]ULIP [id number] - [id number] IVC Filter FILTER FEMORAL FREDRICK [place]IP [id number]    N/A 1 Implanted       Specimens:   Specimen (24h ago, onward)       Start     Ordered    11/29/22 1348  Specimen to Pathology - Surgery  Once        Comments: Pre-op Diagnosis: Malignant neoplasm of pancreas, unspecified location of malignancy [C25.9]Post op diagnosis: Ampulla bleed  Procedure(s):ULTRASOUND, UPPER GI TRACT, ENDOSCOPIC Number of specimens: 1 Name of specimens: 1) Pancreatic body bx     Question:  Release to patient  Answer:  Immediate    11/29/22 1012                            Condition: Good    Disposition: PACU - hemodynamically stable.    Attestation: I was present and scrubbed for the entire procedure.

## 2022-11-30 NOTE — PROGRESS NOTES
Automatic Inhaler to Nebulizer Interchange    albuterol (Ventolin, ProAir, or Proventil)  mcg given multiple times per day changed to albuterol 2.5 mg nebulized q6h prn  per [facility] Automatic Therapeutic Substitutions Protocol.    Please contact pharmacy at extension [phone] with any questions.     Thank you,   Isaura Li

## 2022-11-30 NOTE — NURSING
I faxed the consult order to Dr Caldera and just tried to call the consult in. I received a message the call in unavailable at this time. The office opens at 8am. Tamara FLEMING states she will let Ms Fonseca know to try to call it in again when the office opens.

## 2022-11-30 NOTE — HOSPITAL COURSE
Patient admitted after outpatient EUS with concern for ongoing bleeding from ampulla of Vater in the setting of new diagnosis of pancreatic mass.  Had recent H admission with concern for upper GI bleed secondary to duodenal ulcer but on discussion with Gastroenterology feels bleeding was at that time also from ampulla.  She was transfuse 1 unit PRBC with trending of H&H. No bowel movement at this time.  Patient with recent diagnosis of right lower lung PE, Eliquis was held, received vitamin K, lower extremity ultrasound was performed which confirmed bilateral DVT, significant clot burden, vascular was consulted given unable to resume anticoagulation due to bleeding, on 11/30 had IVC filter placed by Dr. Uribe.  CT pancreatic protocol with pancreatic mass with severe narrowing SMV/portal vein, masses contiguous and encasing splenic artery, discussed with oncologist, recommends transfer to [facility] [place]ventional Radiology for consideration of [facility] IR embolization, discussed with IR over transfer center and recommends CTA to evaluate if active bleed, if active bleed then will consider embolization, if no active bleed, no indication for transfer.  She received 2nd unit PRBC.  On 12/01 had bowel movement, dark in color and hard, hemoglobin today 9.2, CTA with no active bleeding and therefore transfer cancelled, pathology back with moderately differentiated pancreatic adenocarcinoma.  Her pain medication was adjusted with addition of fentanyl patch with continuation of p.o. Dilaudid.  She did receive Relistor and had multiple bowel movements.  Hemoglobin was trended and remained stable at 9.0.  On 12/03, no new complaints, cleared by consultants for discharge and appears medically stable for discharge with outpatient follow-up.  She has appointment with radiation oncology on 12/05 as well as her regular oncologist who will continue to monitor outpatient CBC and transfuse as needed.  Prescription for fentanyl patch as  well as Zofran, she already has p.o. Dilaudid at home, medications reviewed.  Discharge plan including medication, follow-up as well as strict return precautions were reviewed with patient and  present at bedside, expressed understanding, no questions or concerns, grateful for care provided.  Communicated with nursing.      Discharge examination   Lying in bed, alert, oriented, on room air, bowel sounds heard

## 2022-11-30 NOTE — CONSULTS
GASTROENTEROLOGY INPATIENT CONSULT NOTE  Patient Name: Brunilda Bob  Patient MRN: [id number]  Patient : 1965    Admit Date: 2022  Service date: 2022    Reason for Consult: ampullary bleeding ; metastatic panc Ca    PCP: Sadiq Titus MD    Chief Complaint   Patient presents with    Bleeding/Bruising     Had EUS and bleeding, admit for monitoring       HPI: Patient is a 57 y.o. female with PMHx  past cardiomyopathy, hypothyroid, HLD, DVT/PE, susptected metastatic panc ca  (path pending) presents for evaluation of fatigue / melena. Acute onset, intermittent, progressive on admission. Had EGD 2 weeks for abd pain / dysphagia w/ Dr. Casas. Unfortunately CT on admission at that time w/ likely new dx metastatic pancreatic cancer. Underwent EGD / EUS yesteday w/ panc mass / liver mets but more importantly, blood coming from ampulla despite being off anticoagulation w/ additional drop in Hg. Given Vit k, onc / vascular consulted and IVC filter placed as anticoagulation in setting of ongoing bleeding that is likely not ammenable to any endoscopic / surgical interventions.      CHART REVIEW:   IVC filter placed today due to bilateral DVTs  CT panc protocol - panc mass invading splenic artery; liver mets; no active extravasation; collateral flow in portal region w/ mild narrowing of PV  EGD /EUS  - small ulcers from past duodenal bx; Bleeding from ampulla; Panc mass w/ liver mets s/p FNA; increased collateral blood vessels  -Path pneding  CT ABd  - 4 cm BOP mass w/ hepatic mets w/ ascites; Nml GB, bowels  Colon '18 - small polyps.     Past Medical History:  Past Medical History:   Diagnosis Date    Broken heart syndrome     Hypertension         Past Surgical History:  Past Surgical History:   Procedure Laterality Date    CLAVICLE SURGERY  2012    ESOPHAGOGASTRODUODENOSCOPY N/A 2022    Procedure: EGD (ESOPHAGOGASTRODUODENOSCOPY);  Surgeon: Aries Saucedo III, MD;   Location: [facility];  Service: Endoscopy;  Laterality: N/A;    TONSILLECTOMY          Home Medications:  Medications Prior to Admission   Medication Sig Dispense Refill Last Dose    atorvastatin (LIPITOR) 10 MG tablet Take 1 tablet (10 mg total) by mouth every evening. 90 tablet 3 11/28/2022    esomeprazole (NEXIUM) 40 MG capsule Take 1 capsule (40 mg total) by mouth 2 (two) times daily. 60 capsule 0 11/28/2022    HYDROmorphone (DILAUDID) 4 MG tablet Take 4 mg by mouth every 4 (four) hours as needed for Pain.   11/29/2022 at 0630    levothyroxine (SYNTHROID) 50 MCG tablet Take 50 mcg by mouth every evening. UNITHROID   11/28/2022    lipase-protease-amylase 12,000-38,000-60,000 units (CREON) CpDR Take 2 po with meals.  Take 1 po with snacks. 180 capsule 3 11/28/2022    sucralfate (CARAFATE) 1 gram tablet Take 1 tablet (1 g total) by mouth 2 (two) times daily. 180 tablet 3 11/28/2022    albuterol (PROVENTIL/VENTOLIN HFA) 90 mcg/actuation inhaler Inhale 1-2 puffs into the lungs every 4 (four) hours as needed.       apixaban (ELIQUIS) 2.5 mg Tab Take 4 tablets (10 mg total) by mouth 2 (two) times daily for 10 days, THEN 2 tablets (5 mg total) 2 (two) times daily. 200 tablet 0 11/27/2022    ondansetron (ZOFRAN) 8 MG tablet Take 8 mg by mouth 4 (four) times daily as needed for Nausea.          Inpatient Medications:   atorvastatin  10 mg Oral QHS    levothyroxine  50 mcg Oral Before breakfast    pantoprazole  40 mg Oral BID    polyethylene glycol  17 g Oral BID     sodium chloride, albuterol sulfate, dextrose 10%, dextrose 10%, glucagon (human recombinant), glucose, glucose, HYDROmorphone, HYDROmorphone, magnesium oxide, magnesium oxide, naloxone, ondansetron, potassium bicarbonate, potassium bicarbonate, potassium bicarbonate, potassium, sodium phosphates, potassium, sodium phosphates, potassium, sodium phosphates, senna-docusate 8.6-50 mg, sodium chloride 0.9%    Review of patient's allergies indicates:   Allergen  Reactions    Crestor [rosuvastatin] Other (See Comments)     Paralysis    Codeine Nausea And Vomiting    Strawberries [strawberry] Hives    Adhesive tape-silicones Rash       Social History:   Social History     Occupational History    Not on file   Tobacco Use    Smoking status: Never    Smokeless tobacco: Never   Substance and Sexual Activity    Alcohol use: Yes     Comment: occ    Drug use: Never    Sexual activity: Yes     Partners: Male       Family History:   Family History   Problem Relation Age of Onset    Colon cancer Mother     Uterine cancer Mother     Hypertension Father     Testicular cancer Son        Review of Systems:  A 10 point review of systems was performed and was normal, except as mentioned in the HPI, including constitutional, HEENT, heme, lymph, cardiovascular, respiratory, gastrointestinal, genitourinary, neurologic, endocrine, psychiatric and musculoskeletal.      OBJECTIVE:    Physical Exam:  24 Hour Vital Sign Ranges: Temp:  [98.1 °F (36.7 °C)-99.9 °F (37.7 °C)] 99.4 °F (37.4 °C)  Pulse:  [] 80  Resp:  [16-18] 16  SpO2:  [89 %-100 %] 99 %  BP: ()/(61-76) 102/71  Most recent vitals: /71   Pulse 80   Temp 99.4 °F (37.4 °C) (Oral)   Resp 16   Ht 5' (1.524 m)   Wt 76.2 kg (167 lb 15.9 oz)   LMP  (LMP Unknown)   SpO2 99%   Breastfeeding No   BMI 32.81 kg/m²    GEN: well-developed, well-nourished, awake and alert, non-toxic appearing adult  HEENT: PERRL, sclera anicteric, oral mucosa pink and moist without lesion  NECK: trachea midline; Good ROM  CV: regular rate and rhythm, no murmurs or gallops  RESP: clear to auscultation bilaterally, no wheezes, rhonci or rales  ABD: soft, non-tender, non-distended, normal bowel sounds  EXT: no swelling or edema, 2+ pulses distally  SKIN: no rashes or jaundice  PSYCH: normal affect    Labs:   Recent Labs     11/29/22  1414 11/30/22  0603   WBC 13.22* 11.06   MCV 85 86    261     Recent Labs     11/30/22  0603   *   K  3.7   CL 96   CO2 30*   BUN 15        No results for input(s): ALB in the last 72 hours.    Invalid input(s): ALKP, SGOT, SGPT, TBIL, DBIL, TPRO  Recent Labs     11/30/22  0603   INR 1.6         Radiology Review:  CT Abdomen Pelvis W WO Contrast   Final Result      US Lower Extremity Veins Bilateral   Final Result            IMPRESSION / RECOMMENDATIONS:  57 y.o. female with PMHx  past cardiomyopathy, hypothyroid, HLD, DVT/PE, susptected metastatic panc ca  (path pending) presents for evaluation of suspected metastatic panc ca w/ new onset DVT /PE> Underwent EGD / EUS yesteday w/ panc mass / liver mets but more importantly, blood coming from ampulla despite being off anticoagulation w/ additional drop in Hg. Given Vit k, onc / vascular consulted and IVC filter placed as anticoagulation in setting of ongoing bleeding that is likely not ammenable to any endoscopic / surgical interventions. Triple phase CT done w/o active extravasation but favor pancreatic duct in origin given non-dilated CBD w/o slduge in duct and nml LFTs. At this point, likely poor surgical / IR candidate and radiation may be best option.     -F/u path  -Avoid anticoagulation as likely to exacerbate symptomatic bleeding   -Case discussed w/ onc / vascular / rads in detail and unclear if good option as not surgical candidate, no extravasation on imaging and still unclear if this arterial vs venous vs portal circulation vs tumor as source.   -Consider palliative radiation to primary tumor  -Agree w/ pRBCs    Thank you for this consult.    Aries REYEZ Darogerive III  11/30/2022  2:53 PM

## 2022-11-30 NOTE — PROGRESS NOTES
[facility] Medicine  Progress Note    Patient Name: Brunilda Bob  MRN: [id number]  Patient Class: OP- Observation   Admission Date: 11/29/2022  Length of Stay: 0 days  Attending Physician: Nida Linder MD  Primary Care Provider: Sadiq Titus MD        Subjective:     Principal Problem:Upper GI bleed        HPI:  Mrs. Spann is a 57-year-old female who is being directly admitted after EUS performed earlier today.  Patient with recent [facility] admission, CTA at that time with right lower lung subsegmental PE, as well as 4.8 x 4.1 x 3.7 cm body of pancreas mass with numerous hepatic lesions concerning for metastatic disease, CA 19.9 and alpha-fetoprotein within normal, acute on chronic anemia with concern for upper GI bleed, underwent EGD with concern for to duodenal ulcer, recommends b.i.d. PPI therapy by 1 month with outpatient EUS.  Patient was started on Eliquis for PE during that admission, seen by Dr. Toribio from Hematology/Oncology.  She had EUS today round mass pancreatic body 3.6 x 3.7 mm, FNA, noted to have bleeding postprocedure, believed to be coming from pancreatic duct.  Last dose of Eliquis was on 11/27 morning.  She was given sub cut vitamin K 10 mg.  Hemoglobin 7.2, ordered 1 unit PRBC transfusion.  Patient does report generalized abdominal pain, states she takes Dilaudid q.4 hours at home.  States she has not had a bowel movement since previous admission, states she is passing flatus, was using Colace at home, feels related to pain medication.  Mother had history of colorectal cancer and son history of testicular cancer.  Nonsmoker only occasional alcohol use.  Does report intermittent swelling left ankle with calf cramping sensation.  Afebrile.  Previous imaging reviewed.  Discussed with referring provider .  Discussed with nursing.  Discussed with patient and family members at bedside.      Overview/Hospital Course:  Patient admitted after  outpatient EUS with concern for ongoing bleeding from ampulla of Vater in the setting of new diagnosis of pancreatic mass.  Had recent [facility] admission with concern for upper GI bleed secondary to duodenal ulcer but on discussion with Gastroenterology feels bleeding was at that time also from ampulla.  She was transfuse 1 unit PRBC with trending of H&H No bowel movement at this time.  Patient with recent diagnosis of right lower lung PE, Eliquis was held, received vitamin K, lower extremity ultrasound was performed which confirmed bilateral DVT, significant clot burden, vascular was consulted given unable to resume anticoagulation due to active bleed, on 11/30 had IVC filter placed by Dr. Uribe.  CT pancreatic protocol with pancreatic mass with severe narrowing SMV/portal vein, masses contiguous and encasing splenic artery, discussed with oncologist, recommends transfer to [facility] for Interventional Radiology consideration of [facility] IR embolization.  Patient accepted and awaiting bed availability.      Interval History:  See hospital course.  Patient seen on returning from IVC filter placement.  States previously had 7/10 abdominal pain but just received pain medication and improved.  No bowel movement since admission but passing flatus.  Vitals stable, current hemoglobin 7.5, BUN/creatinine 15/0.7.  CT pancreas reviewed.  Ultrasound lower extremity with bilateral DVT.  Discussed with oncologist Dr. Toribio recommends transfer to [facility] for IR embolization.  Discussed with transfer center.    Review of Systems   Constitutional:  Negative for fever.   Respiratory:  Negative for shortness of breath.    Gastrointestinal:  Positive for abdominal pain and constipation.   Psychiatric/Behavioral:  Negative for confusion.    Objective:     Vital Signs (Most Recent):  Temp: 99.4 °F (37.4 °C) (11/30/22 1202)  Pulse: 80 (11/30/22 1247)  Resp: 18 (11/30/22 1519)  BP: 102/71 (11/30/22 1247)  SpO2: 99 % (11/30/22 1247) Vital Signs (24h  Range):  Temp:  [98.1 °F (36.7 °C)-99.9 °F (37.7 °C)] 99.4 °F (37.4 °C)  Pulse:  [] 80  Resp:  [16-18] 18  SpO2:  [89 %-100 %] 99 %  BP: ()/(61-76) 102/71     Weight: 76.2 kg (167 lb 15.9 oz)  Body mass index is 32.81 kg/m².    Intake/Output Summary (Last 24 hours) at 11/30/2022 1525  Last data filed at 11/30/2022 0914  Gross per 24 hour   Intake 275 ml   Output 1 ml   Net 274 ml      Physical Exam  Vitals and nursing note reviewed.   Constitutional:       Comments: Lying in bed, cooperative   HENT:      Head: Normocephalic and atraumatic.      Mouth/Throat:      Mouth: Mucous membranes are moist.   Cardiovascular:      Rate and Rhythm: Normal rate and regular rhythm.      Comments: Trace left ankle edema  Pulmonary:      Comments: Nasal cannula  Abdominal:      General: Bowel sounds are normal.   Genitourinary:     Comments: No Campo catheter  Skin:     General: Skin is warm.      Comments: Right groin vascular access site with clean dry intact dressing   Neurological:      General: No focal deficit present.      Mental Status: She is alert and oriented to person, place, and time. Mental status is at baseline.   Psychiatric:         Mood and Affect: Mood normal.       Significant Labs: BMP:   Recent Labs   Lab 11/30/22  0603      *   K 3.7   CL 96   CO2 30*   BUN 15   CREATININE 0.7   CALCIUM 7.9*   MG 1.9     CBC:   Recent Labs   Lab 11/29/22  1414 11/29/22  2144 11/30/22  0121 11/30/22  0603 11/30/22  1309   WBC 13.22*  --   --  11.06  --    HGB 7.2*   < > 7.9* 8.2*  8.4* 7.5*   HCT 22.4*   < > 24.3* 26.3*  26.6* 24.1*     --   --  261  --     < > = values in this interval not displayed.     CMP:   Recent Labs   Lab 11/30/22  0603   *   K 3.7   CL 96   CO2 30*      BUN 15   CREATININE 0.7   CALCIUM 7.9*   PROT 5.7*   ALBUMIN 2.7*   BILITOT 1.1*   ALKPHOS 188*   AST 23   ALT 21   ANIONGAP 6*     Lactic Acid: No results for input(s): LACTATE in the last 48  hours.    Significant Imaging: I have reviewed all pertinent imaging results/findings within the past 24 hours.    CT Abdomen Pelvis W WO Contrast    Result Date: 11/30/2022   ADDENDUM #1 Pancreatic mass causes severe narrowing of the SMV/portal vein confluence, with contrast opacification of portal venous branches. Large collaterals in the tye hepatis are evident. No active contrast extravasation is seen. Patient is experiencing bleeding from the ampulla per Dr. Saucedo. The pancreatic mass is contiguous with and partially encases the splenic artery. These findings were discussed with Dr. Saucedo at 9:45 am. Electronically signed by:  Zack De Anda MD  11/30/2022 9:50 AM CST Workstation: [id number]  ORIGINAL REPORT CMS MANDATED QUALITY DATA - CT RADIATION  436 All CT scans at this facility utilize dose modulation, iterative reconstruction, and/or weight based dosing when appropriate to reduce radiation dose to as low as reasonably achievable. CT ABDOMEN PELVIS WITHOUT THEN WITH IV CONTRAST CLINICAL HISTORY: 57 years Female triple phase CT pancreatic protocol COMPARISON: CT abdomen and pelvis November 23, 2022 FINDINGS: Images through the lower thorax demonstrate trace pleural fluid layering dependently bilaterally. Subsegmental atelectasis or scarring involving the lung bases. Bone window images show no acute or aggressive osseous abnormality. Innumerable solid hypoenhancing hepatic lesions consistent with widespread hepatic metastatic disease. Right hepatic lobe lesion measures 3.8 x 3.4 cm on image 40. Gallbladder and biliary tree are unremarkable. Spleen appears normal. A irregularly-shaped hypoenhancing mass arising from the pancreatic body measures 4.5 x 3.2 x 4.5 enlarged, leiomyomatous uterus. Cm. This is highly suspicious for pancreatic adenocarcinoma. The mass results in-and dilation of the pancreatic duct. The mass is contiguous with the celiac axis and branch vessels. It is also contiguous with  the lesser curvature of the stomach. No adrenal lesion. Kidneys are unremarkable. Ureters are normal in caliber. Urinary bladder is unremarkable. No evidence of bowel obstruction or acute pathology involving the gastrointestinal tract. Two metallic objects are present within the duodenum. A few distal colonic diverticula. No free fluid or free air within the abdomen or pelvis. Aortoiliac atherosclerotic calcification. Small volume free fluid within the pelvis. Small fat-containing inguinal hernias bilaterally, right greater than left. IMPRESSION: 4.5 cm irregular hypoenhancing mass arising from the body of the pancreas contiguous with the celiac axis and lesser curvature of the stomach, with downstream pancreatic duct dilation. Findings are highly suspicious for pancreatic adenocarcinoma. Extensive metastatic disease throughout the liver. Small volume free fluid in the pelvis. Leiomyomatous uterus. Trace pleural fluid bilaterally. Electronically signed by:  Zack De Anda MD  11/30/2022 8:53 AM CST Workstation: [id number]    X-Ray Chest PA And Lateral    Result Date: 11/18/2022  Reason: Cough and congestion FINDINGS: PA and lateral chest with comparison chest x-ray June 30, 2022 show normal cardiomediastinal silhouette. Lungs are clear. Pulmonary vasculature is normal. No acute osseous abnormality. IMPRESSION: No acute cardiopulmonary abnormality. Electronically signed by:  Bryn Dempsey DO  11/18/2022 12:45 PM CST Workstation: [id number]    CT Head Without Contrast    Result Date: 11/23/2022  All CT scans at this facility used dose modulation, iterative reconstruction and/or weight-based dosing when appropriate to reduce radiation doses  as low as reasonably achievable. CLINICAL INFORMATION:  Dizziness, persistent/recurrent, cardiac or vascular cause suspected COMPARISON: 10/30/2019 FINDINGS:   The ventricles and sulci are normal in size and configuration for age.  There is no intraparenchymal hemorrhage, mass or  midline shift.  There are no extra-axial fluid collections. There is minimal mucosal thickening in the maxillary sinuses. The remainder the paranasal sinuses are clear. IMPRESSION: No acute intracranial process These findings were given to Dr. beach at 10:25 AM Electronically signed by:  Inna Pratt MD  11/23/2022 10:35 AM CST Workstation: [id number]    CTA Chest Non-Coronary (PE Studies)    Result Date: 11/23/2022   ADDENDUM #1 This is a correction to the prior report There are emboli within subsegmental arteries in the right lower lobe. These findings were called to Dr. Vazquez in the emergency department at 11:27 AM Electronically signed by:  Inna Pratt MD  11/23/2022 11:28 AM CST Workstation: [id number]  ORIGINAL REPORT All CT scans at this facility used dose modulation, iterative reconstruction and/or weight-based dosing when appropriate to reduce radiation doses  as low as reasonably achievable. HISTORY: Chest pain, loss of consciousness. FINDINGS: Thin axial imaging through the chest was performed with 100 mL Omnipaque 350 IV contrast, with sagittal and coronal reformatted images and 3-D reconstructions performed on an independent workstation, with images stored in the patient's permanent electronic medical record. This is a high-grade quality study for the evaluation of pulmonary embolism. The pulmonary arteries are normal in appearance without pulmonary emboli noted up to the subsegmental level, noting limitations of CT technique for identifying small isolated subsegmental emboli. The heart is normal in size. 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 There is no pericardial effusion. The aorta is normal in caliber. There is no hilar, mediastinal or axillary adenopathy. There are no pulmonary nodules, infiltrates or pleural effusions. The trachea and bronchi are normal. There are no acute osseous abnormalities. Evaluation of the upper abdomen demonstrates  multiple hypodense masses throughout both lobes of the liver suspicious for metastatic disease.. There is a 4.5 x 3.2 cm mass arising from the body of the pancreas suspicious for malignancy. Please see the CT abdomen report. The adrenal glands are normal. IMPRESSION: No CT evidence pulmonary emboli No acute pulmonary process 4.5 x 3.2 cm mass arising from the proximal body of the pancreas abutting the proximal splenic artery and hepatic artery suspicious for pancreatic malignancy Numerous hypodense masses throughout the liver compatible with metastatic disease Electronically signed by:  Inna Pratt MD  11/23/2022 10:47 AM [facility] Workstation: [id number]    CT Abdomen Pelvis With Contrast    Result Date: 11/23/2022  All CT scans at this facility used dose modulation, iterative reconstruction and/or weight-based dosing when appropriate to reduce radiation doses  as low as reasonably achievable. HISTORY: Liver mass, abdominal pain FINDINGS: Axial postcontrast imaging was performed with 100 mL Omnipaque 350 IV contrast . Multiphase imaging through the liver. COMPARISON: 10/30/2019 CT ABDOMEN: The lung bases are clear. There is a 2.4 cm pseudoaneurysm at the ventricular apex Liver: There are numerous hypodense masses throughout both lobes of the liver compatible with metastatic disease. The largest is within segment five measuring 3.6 x 3.5 cm. The largest lesion within the lateral segment the left lobe measures 2 cm. There are multiple cysts within both lobes of the liver. There is no biliary duct dilatation. There is a 4.5 x 4.1 x 3.7 cm (AP, craniocaudal, transverse mass arising from the proximal body of the pancreas extending superiorly. The mass abuts the proximal splenic artery and common hepatic artery. The vessels are patent. Findings are compatible with pancreatic malignancy. There is dilatation of the distal pancreatic duct. The superior mesenteric artery and vein are patent The spleen, gallbladder, and  adrenal glands are normal. The kidneys enhance symmetrically without hydronephrosis or calculi. There is contrast material within the collecting system and ureters. There are no thick-walled or dilated bowel loops. There is no mesenteric or retroperitoneal adenopathy. Aorta is normal in caliber. The musculature is normal. CT PELVIS: The bladder is normal. There are multiple uterine fibroids the largest measuring 2.8 cm on the left. There is no pelvic adenopathy. There are no acute osseous abnormalities. IMPRESSION: 4.8 x 4.1 x 3.7 cm heterogeneous mass arising from the proximal body of the pancreas resulting in dilatation of the pancreatic duct compatible with pancreatic malignancy. Numerous hepatic metastasis with minimal ascites around the liver Electronically signed by:  Inna Pratt MD  11/23/2022 11:05 AM CST Workstation: [id number]    X-Ray Chest AP Portable    Result Date: 11/23/2022  XR CHEST 1 VIEW CLINICAL HISTORY: 56 years Female Chest Pain COMPARISON: 11/18/2022 FINDINGS: Cardiomediastinal silhouette is within normal limits. Lungs are normally expanded with no airspace consolidation. No pleural effusion or pneumothorax. No acute osseous abnormality. IMPRESSION: No acute pulmonary process. Electronically signed by:  Inna Pratt MD  11/23/2022 9:04 AM CST Workstation: [id number]    US Lower Extremity Veins Bilateral    Result Date: 11/30/2022   ADDENDUM #1 Findings discussed with Dr. Levine at 11:10 PM. Electronically signed by:  Jose Alfredo Vee MD  11/30/2022 1:43 AM CST Workstation: [id number]  ORIGINAL REPORT US LOWER EXTREMITY VEINS BILATERAL ADDITIONAL PERTINENT HISTORY:  Pain in legs with possible DVT.. COMPARISON STUDIES:   None. FINDINGS: Grayscale compression, duplex and color Doppler interrogation of the bilateral lower extremity deep veins from common femoral vein to proximal calf was performed. The greater saphenous vein in the ipsilateral proximal thigh was evaluated using similar  technique. Bilateral lower extremity: Common femoral vein:  Negative. Femoral vein:  Echogenic material with lack of flow and lack of compressibility within the distal left superficial femoral vein. Deep femoral vein:  Negative. Popliteal vein:  Echogenic material and lack of flow within the left popliteal vein with lack of compressibility. Visualized deep calf veins  echogenic material with lack of compressibility and lack of flow within the right posterior tibial vein as well as the left posterior tibial vein, left anterior tibial vein in the left peroneal vein. Greater saphenous vein in the proximal thigh:  Negative. Popliteal fossa: negative IMPRESSION: 1. Deep venous thrombosis involving the left distal superficial femoral vein as well as the left popliteal vein. 2. Venous thrombosis involving the calf veins bilaterally. 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 involving veseels    Acute blood loss anemia    Pancreatic mass    DVT, bilateral lower limbs, s/p IVC on 11/30    Hypothyroidism    Current use of long term anticoagulation    Liver lesion, multiple     Abdominal pain    PE o right lower lung subsegmental artery    Dyslipidemia    Hypertension     Plan:  Continue care on medical floor   Ensure patient has 2 large bore peripheral IV   Recent hemoglobin 7.5, continue to trend, transfuse for hemoglobin less than 7   Last dose of Eliquis morning of 11/2 7 continue to hold  Extensive bilateral DVT with right lower lung subsegmental PE, status post IVC filter 11/30 by Dr. Uribe  Concern for ongoing slow GI bleed from ambulate due to pancreatic mass involvement of blood vessel, recommends IR embolization of superior mesenteric vessel   Continue p.r.n. pain control as ordered   Continue stool softener and following  bowel movement  Electrolytes sliding scale repletion   A.m. labs ordered  Appreciate Gastroenterology input   Appreciate Oncology input, discussed personally with Dr. Toribio multiple times today   Appreciate vascular input   Further plan as per clinical course   Patient is currently awaiting bed at [facility] for Interventional Radiology evaluation      VTE Risk Mitigation (From admission, onward)         Ordered     IP VTE HIGH RISK PATIENT  Once         11/29/22 1924     Place sequential compression device  Until discontinued         11/29/22 1924                Discharge Planning   LOI: 12/1/2022     Code Status: Prior   Is the patient medically ready for discharge?:     Reason for patient still in hospital (select all that apply): Patient trending condition  Discharge Plan A: Other                  Nida Linder MD  Department of Hospital Medicine   [facility]

## 2022-11-30 NOTE — SUBJECTIVE & OBJECTIVE
Interval History:  See hospital course.  Patient seen on returning from IVC filter placement.  States previously had 7/10 abdominal pain but just received pain medication and improved.  No bowel movement since admission but passing flatus.  Vitals stable, current hemoglobin 7.5, BUN/creatinine 15/0.7.  CT pancreas reviewed.  Ultrasound lower extremity with bilateral DVT.  Discussed with oncologist Dr. Toribio recommends transfer to [facility] for IR embolization.  Discussed with transfer center.    Review of Systems   Constitutional:  Negative for fever.   Respiratory:  Negative for shortness of breath.    Gastrointestinal:  Positive for abdominal pain and constipation.   Psychiatric/Behavioral:  Negative for confusion.    Objective:     Vital Signs (Most Recent):  Temp: 99.4 °F (37.4 °C) (11/30/22 1202)  Pulse: 80 (11/30/22 1247)  Resp: 18 (11/30/22 1519)  BP: 102/71 (11/30/22 1247)  SpO2: 99 % (11/30/22 1247) Vital Signs (24h Range):  Temp:  [98.1 °F (36.7 °C)-99.9 °F (37.7 °C)] 99.4 °F (37.4 °C)  Pulse:  [] 80  Resp:  [16-18] 18  SpO2:  [89 %-100 %] 99 %  BP: ()/(61-76) 102/71     Weight: 76.2 kg (167 lb 15.9 oz)  Body mass index is 32.81 kg/m².    Intake/Output Summary (Last 24 hours) at 11/30/2022 1525  Last data filed at 11/30/2022 0914  Gross per 24 hour   Intake 275 ml   Output 1 ml   Net 274 ml      Physical Exam  Vitals and nursing note reviewed.   Constitutional:       Comments: Lying in bed, cooperative   HENT:      Head: Normocephalic and atraumatic.      Mouth/Throat:      Mouth: Mucous membranes are moist.   Cardiovascular:      Rate and Rhythm: Normal rate and regular rhythm.      Comments: Trace left ankle edema  Pulmonary:      Comments: Nasal cannula  Abdominal:      General: Bowel sounds are normal.   Genitourinary:     Comments: No [place] catheter  Skin:     General: Skin is warm.      Comments: Right groin vascular access site with clean dry intact dressing   Neurological:      General: No  focal deficit present.      Mental Status: She is alert and oriented to person, place, and time. Mental status is at baseline.   Psychiatric:         Mood and Affect: Mood normal.       Significant Labs: BMP:   Recent Labs   Lab 11/30/22  0603      *   K 3.7   CL 96   CO2 30*   BUN 15   CREATININE 0.7   CALCIUM 7.9*   MG 1.9     CBC:   Recent Labs   Lab 11/29/22  1414 11/29/22  2144 11/30/22  0121 11/30/22  0603 11/30/22  1309   WBC 13.22*  --   --  11.06  --    HGB 7.2*   < > 7.9* 8.2*  8.4* 7.5*   HCT 22.4*   < > 24.3* 26.3*  26.6* 24.1*     --   --  261  --     < > = values in this interval not displayed.     CMP:   Recent Labs   Lab 11/30/22  0603   *   K 3.7   CL 96   CO2 30*      BUN 15   CREATININE 0.7   CALCIUM 7.9*   PROT 5.7*   ALBUMIN 2.7*   BILITOT 1.1*   ALKPHOS 188*   AST 23   ALT 21   ANIONGAP 6*     Lactic Acid: No results for input(s): LACTATE in the last 48 hours.    Significant Imaging: I have reviewed all pertinent imaging results/findings within the past 24 hours.    CT Abdomen Pelvis W WO Contrast    Result Date: 11/30/2022   ADDENDUM #1 Pancreatic mass causes severe narrowing of the SMV/portal vein confluence, with contrast opacification of portal venous branches. Large collaterals in the tye hepatis are evident. No active contrast extravasation is seen. Patient is experiencing bleeding from the ampulla per Dr. Saucedo. The pancreatic mass is contiguous with and partially encases the splenic artery. These findings were discussed with Dr. Saucedo at 9:45 am. Electronically signed by:  Zack De Anda MD  11/30/2022 9:50 AM [facility] Workstation: [id number]  ORIGINAL REPORT CMS MANDATED QUALITY DATA - CT RADIATION  436 All CT scans at this facility utilize dose modulation, iterative reconstruction, and/or weight based dosing when appropriate to reduce radiation dose to as low as reasonably achievable. CT ABDOMEN PELVIS WITHOUT THEN WITH IV CONTRAST CLINICAL  HISTORY: 57 years Female triple phase CT pancreatic protocol COMPARISON: CT abdomen and pelvis November 23, 2022 FINDINGS: Images through the lower thorax demonstrate trace pleural fluid layering dependently bilaterally. Subsegmental atelectasis or scarring involving the lung bases. Bone window images show no acute or aggressive osseous abnormality. Innumerable solid hypoenhancing hepatic lesions consistent with widespread hepatic metastatic disease. Right hepatic lobe lesion measures 3.8 x 3.4 cm on image 40. Gallbladder and biliary tree are unremarkable. Spleen appears normal. A irregularly-shaped hypoenhancing mass arising from the pancreatic body measures 4.5 x 3.2 x 4.5 enlarged, leiomyomatous uterus. Cm. This is highly suspicious for pancreatic adenocarcinoma. The mass results in-and dilation of the pancreatic duct. The mass is contiguous with the celiac axis and branch vessels. It is also contiguous with the lesser curvature of the stomach. No adrenal lesion. Kidneys are unremarkable. Ureters are normal in caliber. Urinary bladder is unremarkable. No evidence of bowel obstruction or acute pathology involving the gastrointestinal tract. Two metallic objects are present within the duodenum. A few distal colonic diverticula. No free fluid or free air within the abdomen or pelvis. Aortoiliac atherosclerotic calcification. Small volume free fluid within the pelvis. Small fat-containing inguinal hernias bilaterally, right greater than left. IMPRESSION: 4.5 cm irregular hypoenhancing mass arising from the body of the pancreas contiguous with the celiac axis and lesser curvature of the stomach, with downstream pancreatic duct dilation. Findings are highly suspicious for pancreatic adenocarcinoma. Extensive metastatic disease throughout the liver. Small volume free fluid in the pelvis. Leiomyomatous uterus. Trace pleural fluid bilaterally. Electronically signed by:  Zack De Anda MD  11/30/2022 8:53 AM CST  Workstation: [id number]    X-Ray Chest PA And Lateral    Result Date: 11/18/2022  Reason: Cough and congestion FINDINGS: PA and lateral chest with comparison chest x-ray June 30, 2022 show normal cardiomediastinal silhouette. Lungs are clear. Pulmonary vasculature is normal. No acute osseous abnormality. IMPRESSION: No acute cardiopulmonary abnormality. Electronically signed by:  Bryn Ke EWING  11/18/2022 12:45 PM CST Workstation: [id number]    CT Head Without Contrast    Result Date: 11/23/2022  All CT scans at this facility used dose modulation, iterative reconstruction and/or weight-based dosing when appropriate to reduce radiation doses  as low as reasonably achievable. CLINICAL INFORMATION:  Dizziness, persistent/recurrent, cardiac or vascular cause suspected COMPARISON: 10/30/2019 FINDINGS:   The ventricles and sulci are normal in size and configuration for age.  There is no intraparenchymal hemorrhage, mass or midline shift.  There are no extra-axial fluid collections. There is minimal mucosal thickening in the maxillary sinuses. The remainder the paranasal sinuses are clear. IMPRESSION: No acute intracranial process These findings were given to Dr. beach at 10:25 AM Electronically signed by:  Inna Pratt MD  11/23/2022 10:35 AM CST Workstation: [id number]    CTA Chest Non-Coronary (PE Studies)    Result Date: 11/23/2022   ADDENDUM #1 This is a correction to the prior report There are emboli within subsegmental arteries in the right lower lobe. These findings were called to Dr. Vazquez in the emergency department at 11:27 AM Electronically signed by:  Inna Pratt MD  11/23/2022 11:28 AM CST Workstation: [id number]  ORIGINAL REPORT All CT scans at this facility used dose modulation, iterative reconstruction and/or weight-based dosing when appropriate to reduce radiation doses  as low as reasonably achievable. HISTORY: Chest pain, loss of consciousness. FINDINGS: Thin axial imaging through the  chest was performed with 100 mL Omnipaque 350 IV contrast, with sagittal and coronal reformatted images and 3-D reconstructions performed on an independent workstation, with images stored in the patient's permanent electronic medical record. This is a high-grade quality study for the evaluation of pulmonary embolism. The pulmonary arteries are normal in appearance without pulmonary emboli noted up to the subsegmental level, noting limitations of CT technique for identifying small isolated subsegmental emboli. The heart is normal in size. 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 There is no pericardial effusion. The aorta is normal in caliber. There is no hilar, mediastinal or axillary adenopathy. There are no pulmonary nodules, infiltrates or pleural effusions. The trachea and bronchi are normal. There are no acute osseous abnormalities. Evaluation of the upper abdomen demonstrates multiple hypodense masses throughout both lobes of the liver suspicious for metastatic disease.. There is a 4.5 x 3.2 cm mass arising from the body of the pancreas suspicious for malignancy. Please see the CT abdomen report. The adrenal glands are normal. IMPRESSION: No CT evidence pulmonary emboli No acute pulmonary process 4.5 x 3.2 cm mass arising from the proximal body of the pancreas abutting the proximal splenic artery and hepatic artery suspicious for pancreatic malignancy Numerous hypodense masses throughout the liver compatible with metastatic disease Electronically signed by:  Inna Pratt MD  11/23/2022 10:47 AM CST Workstation: [id number]    CT Abdomen Pelvis With Contrast    Result Date: 11/23/2022  All CT scans at this facility used dose modulation, iterative reconstruction and/or weight-based dosing when appropriate to reduce radiation doses  as low as reasonably achievable. HISTORY: Liver mass, abdominal pain FINDINGS: Axial postcontrast imaging was performed with 100 mL Omnipaque 350  IV contrast . Multiphase imaging through the liver. COMPARISON: 10/30/2019 CT ABDOMEN: The lung bases are clear. There is a 2.4 cm pseudoaneurysm at the ventricular apex Liver: There are numerous hypodense masses throughout both lobes of the liver compatible with metastatic disease. The largest is within segment five measuring 3.6 x 3.5 cm. The largest lesion within the lateral segment the left lobe measures 2 cm. There are multiple cysts within both lobes of the liver. There is no biliary duct dilatation. There is a 4.5 x 4.1 x 3.7 cm (AP, craniocaudal, transverse mass arising from the proximal body of the pancreas extending superiorly. The mass abuts the proximal splenic artery and common hepatic artery. The vessels are patent. Findings are compatible with pancreatic malignancy. There is dilatation of the distal pancreatic duct. The superior mesenteric artery and vein are patent The spleen, gallbladder, and adrenal glands are normal. The kidneys enhance symmetrically without hydronephrosis or calculi. There is contrast material within the collecting system and ureters. There are no thick-walled or dilated bowel loops. There is no mesenteric or retroperitoneal adenopathy. Aorta is normal in caliber. The musculature is normal. CT PELVIS: The bladder is normal. There are multiple uterine fibroids the largest measuring 2.8 cm on the left. There is no pelvic adenopathy. There are no acute osseous abnormalities. IMPRESSION: 4.8 x 4.1 x 3.7 cm heterogeneous mass arising from the proximal body of the pancreas resulting in dilatation of the pancreatic duct compatible with pancreatic malignancy. Numerous hepatic metastasis with minimal ascites around the liver Electronically signed by:  Inna Pratt MD  11/23/2022 11:05 AM CST Workstation: [id number]    X-Ray Chest AP Portable    Result Date: 11/23/2022  XR CHEST 1 VIEW CLINICAL HISTORY: 56 years Female Chest Pain COMPARISON: 11/18/2022 FINDINGS: Cardiomediastinal  silhouette is within normal limits. Lungs are normally expanded with no airspace consolidation. No pleural effusion or pneumothorax. No acute osseous abnormality. IMPRESSION: No acute pulmonary process. Electronically signed by:  Inna Pratt MD  11/23/2022 9:04 AM CST Workstation: [id number]    US Lower Extremity Veins Bilateral    Result Date: 11/30/2022   ADDENDUM #1 Findings discussed with Dr. Levine at 11:10 PM. Electronically signed by:  Jose Alfredo Vee MD  11/30/2022 1:43 AM CST Workstation: [id number]  ORIGINAL REPORT US LOWER EXTREMITY VEINS BILATERAL ADDITIONAL PERTINENT HISTORY:  Pain in legs with possible DVT.. COMPARISON STUDIES:   None. FINDINGS: Grayscale compression, duplex and color Doppler interrogation of the bilateral lower extremity deep veins from common femoral vein to proximal calf was performed. The greater saphenous vein in the ipsilateral proximal thigh was evaluated using similar technique. Bilateral lower extremity: Common femoral vein:  Negative. Femoral vein:  Echogenic material with lack of flow and lack of compressibility within the distal left superficial femoral vein. Deep femoral vein:  Negative. Popliteal vein:  Echogenic material and lack of flow within the left popliteal vein with lack of compressibility. Visualized deep calf veins  echogenic material with lack of compressibility and lack of flow within the right posterior tibial vein as well as the left posterior tibial vein, left anterior tibial vein in the left peroneal vein. Greater saphenous vein in the proximal thigh:  Negative. Popliteal fossa: negative IMPRESSION: 1. Deep venous thrombosis involving the left distal superficial femoral vein as well as the left popliteal vein. 2. Venous thrombosis involving the calf veins bilaterally. Electronically signed by:  Jose Alfredo Vee MD  11/29/2022 10:54 PM CST Workstation: [id number]

## 2022-11-30 NOTE — SUBJECTIVE & OBJECTIVE
Past Medical History:   Diagnosis Date    Broken heart syndrome     Hypertension        Past Surgical History:   Procedure Laterality Date    CLAVICLE SURGERY  2012    ESOPHAGOGASTRODUODENOSCOPY N/A 11/23/2022    Procedure: EGD (ESOPHAGOGASTRODUODENOSCOPY);  Surgeon: Aries Saucedo III, MD;  Location: [facility];  Service: Endoscopy;  Laterality: N/A;    TONSILLECTOMY         Review of patient's allergies indicates:   Allergen Reactions    Crestor [rosuvastatin] Other (See Comments)     Paralysis    Codeine Nausea And Vomiting    Strawberries [strawberry] Hives    Adhesive tape-silicones Rash       No current facility-administered medications on file prior to encounter.     Current Outpatient Medications on File Prior to Encounter   Medication Sig    albuterol (PROVENTIL/VENTOLIN HFA) 90 mcg/actuation inhaler Inhale 1-2 puffs into the lungs every 4 (four) hours as needed.    apixaban (ELIQUIS) 2.5 mg Tab Take 4 tablets (10 mg total) by mouth 2 (two) times daily for 10 days, THEN 2 tablets (5 mg total) 2 (two) times daily.    atorvastatin (LIPITOR) 10 MG tablet Take 1 tablet (10 mg total) by mouth every evening.    esomeprazole (NEXIUM) 40 MG capsule Take 1 capsule (40 mg total) by mouth 2 (two) times daily.    levothyroxine (UNITHROID) 50 MCG tablet Take 50 mcg by mouth every evening. UNITHROID    lipase-protease-amylase 12,000-38,000-60,000 units (CREON) CpDR Take 2 po with meals.  Take 1 po with snacks.    sucralfate (CARAFATE) 1 gram tablet Take 1 tablet (1 g total) by mouth 2 (two) times daily.    [DISCONTINUED] atenoloL (TENORMIN) 25 MG tablet TAKE ONE TABLET BY MOUTH ONCE DAILY (Patient taking differently: Take 25 mg by mouth every evening.)     Family History       Problem Relation (Age of Onset)    Colon cancer Mother    Hypertension Father    Testicular cancer Son    Uterine cancer Mother          Tobacco Use    Smoking status: Never    Smokeless tobacco: Never   Substance and Sexual Activity    Alcohol  use: Yes     Comment: occ    Drug use: Never    Sexual activity: Yes     Partners: Male     Review of Systems   Constitutional:  Negative for fever.   HENT:  Negative for congestion.    Cardiovascular:  Positive for leg swelling.   Gastrointestinal:  Positive for abdominal pain and constipation. Negative for blood in stool.   Genitourinary:  Negative for hematuria.   Musculoskeletal:  Negative for neck stiffness.   Skin:  Negative for wound.   Allergic/Immunologic: Negative for immunocompromised state.   Neurological:  Negative for seizures.   Psychiatric/Behavioral:  Negative for confusion.    Objective:     Vital Signs (Most Recent):  Temp: 98.2 °F (36.8 °C) (11/29/22 1927)  Pulse: 74 (11/29/22 1927)  Resp: 18 (11/29/22 1927)  BP: 121/70 (11/29/22 1927)  SpO2: 98 % (11/29/22 1927) Vital Signs (24h Range):  Temp:  [98.1 °F (36.7 °C)-98.6 °F (37 °C)] 98.2 °F (36.8 °C)  Pulse:  [69-82] 74  Resp:  [11-18] 18  SpO2:  [95 %-100 %] 98 %  BP: ()/(53-76) 121/70     Weight: 76.2 kg (167 lb 15.9 oz)  Body mass index is 32.81 kg/m².    Physical Exam  Vitals and nursing note reviewed.   Constitutional:       General: She is not in acute distress.     Appearance: She is not ill-appearing, toxic-appearing or diaphoretic.   HENT:      Head: Normocephalic and atraumatic.      Mouth/Throat:      Mouth: Mucous membranes are moist.   Eyes:      General:         Right eye: No discharge.         Left eye: No discharge.   Cardiovascular:      Rate and Rhythm: Normal rate and regular rhythm.      Comments: Trace left ankle edema  Pulmonary:      Effort: No respiratory distress.      Breath sounds: Normal breath sounds. No stridor. No wheezing.      Comments: On room air  Abdominal:      General: Bowel sounds are normal. There is no distension.      Tenderness: There is abdominal tenderness.   Genitourinary:     Comments: No Campo catheter  Skin:     General: Skin is warm.      Comments: Bruising left antecubital fossa    Neurological:      General: No focal deficit present.      Mental Status: She is alert and oriented to person, place, and time. Mental status is at baseline.   Psychiatric:         Mood and Affect: Mood normal.           Significant Labs: BMP: No results for input(s): GLU, NA, K, CL, CO2, BUN, CREATININE, CALCIUM, MG in the last 48 hours.  CBC:   Recent Labs   Lab 11/29/22  1414   WBC 13.22*   HGB 7.2*   HCT 22.4*        CMP: No results for input(s): NA, K, CL, CO2, GLU, BUN, CREATININE, CALCIUM, PROT, ALBUMIN, BILITOT, ALKPHOS, AST, ALT, ANIONGAP, EGFRNONAA in the last 48 hours.    Invalid input(s): ESTGFAFRICA  Cardiac Markers: No results for input(s): CKMB, MYOGLOBIN, BNP, TROPISTAT in the last 48 hours.  Lactic Acid: No results for input(s): LACTATE in the last 48 hours.  Magnesium: No results for input(s): MG in the last 48 hours.  POCT Glucose: No results for input(s): POCTGLUCOSE in the last 48 hours.  Respiratory Culture: No results for input(s): GSRESP, RESPIRATORYC in the last 48 hours.  Troponin: No results for input(s): TROPONINI, TROPONINIHS in the last 48 hours.  TSH:   Recent Labs   Lab 11/23/22  0900   TSH 1.430     Urine Culture: No results for input(s): LABURIN in the last 48 hours.  Urine Studies: No results for input(s): COLORU, APPEARANCEUA, PHUR, SPECGRAV, PROTEINUA, GLUCUA, KETONESU, BILIRUBINUA, OCCULTUA, NITRITE, UROBILINOGEN, LEUKOCYTESUR, RBCUA, WBCUA, BACTERIA, SQUAMEPITHEL, HYALINECASTS in the last 48 hours.    Invalid input(s): WRIGHTSUR    Significant Imaging: I have reviewed all pertinent imaging results/findings within the past 24 hours.    X-Ray Chest PA And Lateral    Result Date: 11/18/2022  Reason: Cough and congestion FINDINGS: PA and lateral chest with comparison chest x-ray June 30, 2022 show normal cardiomediastinal silhouette. Lungs are clear. Pulmonary vasculature is normal. No acute osseous abnormality. IMPRESSION: No acute cardiopulmonary abnormality.  Electronically signed by:  Bryn Ke EWING  11/18/2022 12:45 PM CST Workstation: [id number]    CT Head Without Contrast    Result Date: 11/23/2022  All CT scans at this facility used dose modulation, iterative reconstruction and/or weight-based dosing when appropriate to reduce radiation doses  as low as reasonably achievable. CLINICAL INFORMATION:  Dizziness, persistent/recurrent, cardiac or vascular cause suspected COMPARISON: 10/30/2019 FINDINGS:   The ventricles and sulci are normal in size and configuration for age.  There is no intraparenchymal hemorrhage, mass or midline shift.  There are no extra-axial fluid collections. There is minimal mucosal thickening in the maxillary sinuses. The remainder the paranasal sinuses are clear. IMPRESSION: No acute intracranial process These findings were given to Dr. beach at 10:25 AM Electronically signed by:  Inna Pratt MD  11/23/2022 10:35 AM CST Workstation: [id number]    CTA Chest Non-Coronary (PE Studies)    Result Date: 11/23/2022   ADDENDUM #1 This is a correction to the prior report There are emboli within subsegmental arteries in the right lower lobe. These findings were called to Dr. Vazquez in the emergency department at 11:27 AM Electronically signed by:  Inna Pratt MD  11/23/2022 11:28 AM CST Workstation: [id number]  ORIGINAL REPORT All CT scans at this facility used dose modulation, iterative reconstruction and/or weight-based dosing when appropriate to reduce radiation doses  as low as reasonably achievable. HISTORY: Chest pain, loss of consciousness. FINDINGS: Thin axial imaging through the chest was performed with 100 mL Omnipaque 350 IV contrast, with sagittal and coronal reformatted images and 3-D reconstructions performed on an independent workstation, with images stored in the patient's permanent electronic medical record. This is a high-grade quality study for the evaluation of pulmonary embolism. The pulmonary arteries are normal in  appearance without pulmonary emboli noted up to the subsegmental level, noting limitations of CT technique for identifying small isolated subsegmental emboli. The heart is normal in size. 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 There is no pericardial effusion. The aorta is normal in caliber. There is no hilar, mediastinal or axillary adenopathy. There are no pulmonary nodules, infiltrates or pleural effusions. The trachea and bronchi are normal. There are no acute osseous abnormalities. Evaluation of the upper abdomen demonstrates multiple hypodense masses throughout both lobes of the liver suspicious for metastatic disease.. There is a 4.5 x 3.2 cm mass arising from the body of the pancreas suspicious for malignancy. Please see the CT abdomen report. The adrenal glands are normal. IMPRESSION: No CT evidence pulmonary emboli No acute pulmonary process 4.5 x 3.2 cm mass arising from the proximal body of the pancreas abutting the proximal splenic artery and hepatic artery suspicious for pancreatic malignancy Numerous hypodense masses throughout the liver compatible with metastatic disease Electronically signed by:  Inna Pratt MD  11/23/2022 10:47 AM CST Workstation: [id number]    CT Abdomen Pelvis With Contrast    Result Date: 11/23/2022  All CT scans at this facility used dose modulation, iterative reconstruction and/or weight-based dosing when appropriate to reduce radiation doses  as low as reasonably achievable. HISTORY: Liver mass, abdominal pain FINDINGS: Axial postcontrast imaging was performed with 100 mL Omnipaque 350 IV contrast . Multiphase imaging through the liver. COMPARISON: 10/30/2019 CT ABDOMEN: The lung bases are clear. There is a 2.4 cm pseudoaneurysm at the ventricular apex Liver: There are numerous hypodense masses throughout both lobes of the liver compatible with metastatic disease. The largest is within segment five measuring 3.6 x 3.5 cm. The largest  lesion within the lateral segment the left lobe measures 2 cm. There are multiple cysts within both lobes of the liver. There is no biliary duct dilatation. There is a 4.5 x 4.1 x 3.7 cm (AP, craniocaudal, transverse mass arising from the proximal body of the pancreas extending superiorly. The mass abuts the proximal splenic artery and common hepatic artery. The vessels are patent. Findings are compatible with pancreatic malignancy. There is dilatation of the distal pancreatic duct. The superior mesenteric artery and vein are patent The spleen, gallbladder, and adrenal glands are normal. The kidneys enhance symmetrically without hydronephrosis or calculi. There is contrast material within the collecting system and ureters. There are no thick-walled or dilated bowel loops. There is no mesenteric or retroperitoneal adenopathy. Aorta is normal in caliber. The musculature is normal. CT PELVIS: The bladder is normal. There are multiple uterine fibroids the largest measuring 2.8 cm on the left. There is no pelvic adenopathy. There are no acute osseous abnormalities. IMPRESSION: 4.8 x 4.1 x 3.7 cm heterogeneous mass arising from the proximal body of the pancreas resulting in dilatation of the pancreatic duct compatible with pancreatic malignancy. Numerous hepatic metastasis with minimal ascites around the liver Electronically signed by:  Inna Pratt MD  11/23/2022 11:05 AM [employer] Workstation: [id number]    X-Ray Chest AP Portable    Result Date: 11/23/2022  XR CHEST 1 VIEW CLINICAL HISTORY: 56 years Female Chest Pain COMPARISON: 11/18/2022 FINDINGS: Cardiomediastinal silhouette is within normal limits. Lungs are normally expanded with no airspace consolidation. No pleural effusion or pneumothorax. No acute osseous abnormality. IMPRESSION: No acute pulmonary process. Electronically signed by:  Inna Pratt MD  11/23/2022 9:04 AM [employer] Workstation: [id number]

## 2022-11-30 NOTE — CONSULTS
57-year-old female with new diagnosis of left distal superficial femoral vein and popliteal vein DVT.  Diagnosis of pancreatic mass.  Admitted with severe anemia with hematocrit of 22.  Patient has been referred to us for consideration for IVC filter placement since she is not a good candidate for anticoagulation at this time.  She also does have some bilateral pulmonary emboli.  Discussed findings with the patient and her family and at this point I do recommend vena cava filter placement.

## 2022-11-30 NOTE — PLAN OF CARE
[facility]  Initial Discharge Assessment       Primary Care Provider: Sadiq Titus MD    Admission Diagnosis: Malignant neoplasm of pancreas, unspecified location of malignancy [C25.9]  Malignant neoplasm of pancreas [C25.9]    Admission Date: 11/29/2022  Expected Discharge Date: 11/29/2022    Social work intern met with Pt at bedside to complete discharge assessment. Pt's spouse Juan Carlos Vazquez (Spouse)   [phone] also present at time and volunteered to participate. Pt's spouse verified demographics, PCP, and insurance verified. No home health. No dialysis. Pt's spouse reports ability to complete ADLs without assistance. Pt verbalized plan to discharge to [facility] when a bed becomes available. Pt has no other needs to be addressed at this time.    Discharge Barriers Identified: None    Payor: [employer] / Plan: [facility] RESOURCES ([employer]) / Product Type: Commercial /     Extended Emergency Contact Information  Primary Emergency Contact: Juan Carlos Vazquez  Address: [address] [place] [address] [id number] [place] [address] of [facility]  Home Phone: [phone]  Work Phone: [phone]  Mobile Phone: [phone]  Relation: Spouse   needed? No  Secondary Emergency Contact: Tom Santiago  Mobile Phone: [phone]  Relation: Son  Preferred language: English   needed? No    Discharge Plan A: Other  Discharge Plan B: Other      [facility] [place] - [address]  [address]  [facility] [address]  Phone: [phone] Fax: [phone]    [id number] [place] - [address]  [address]  [facility] [address]  Phone: [phone] Fax: [phone]    [id number] - [place] - [address]  [address]  [facility] [address]  Phone: [phone] Fax: [phone]      Initial Assessment (most recent)       Adult Discharge  Assessment - 11/30/22 1247          Discharge Assessment    Assessment Type Discharge Planning Assessment     Confirmed/corrected address, phone number and insurance Yes     Confirmed Demographics Correct on Facesheet     Source of Information family     Communicated LOI with patient/caregiver Yes     Reason For Admission Acute blood loss anemia     Lives With spouse;grandchild(gabi)     Facility Arrived From: Home     Do you expect to return to your current living situation? Yes     Do you have help at home or someone to help you manage your care at home? Yes     Who are your caregiver(s) and their phone number(s)? Juan Carlos Vazquez (Spouse)   [phone]     Prior to hospitilization cognitive status: Unable to Assess     Current cognitive status: Unable to Assess     Walking or Climbing Stairs Difficulty none     Dressing/Bathing Difficulty bathing difficulty, requires equipment     Dressing/Bathing Management Pt's spouse bought chair to  use in [place] in shower     Home Accessibility wheelchair accessible     Home Layout Able to live on 1st floor     Equipment Currently Used at Home none     Readmission within 30 days? Yes     Patient currently being followed by outpatient case management? No     Do you currently have service(s) that help you manage your care at home? No     Do you take prescription medications? Yes     Do you have prescription coverage? Yes     Coverage Payor:  [employer] - [employer] ([employer])     Do you have any problems affording any of your prescribed medications? No     Is the patient taking medications as prescribed? yes     Who is going to help you get home at discharge? Juan Carlos Vazquez (Spouse)   [phone]     How do you get to doctors appointments? family or friend will provide;car, drives self     Are you on dialysis? No     Do you take coumadin? No     Discharge Plan A Other     Discharge Plan B Other     DME Needed Upon Discharge  none     Discharge Plan  discussed with: Spouse/sig other     Name(s) and Number(s) Juan Carlos Vazquez (Spouse)   [phone]     Discharge Barriers Identified None

## 2022-12-01 PROBLEM — C25.9 PANCREATIC ADENOCARCINOMA: Chronic | Status: ACTIVE | Noted: 2022-12-01

## 2022-12-01 LAB
ANION GAP SERPL CALC-SCNC: 7 MMOL/L (ref 8–16)
BUN SERPL-MCNC: 14 MG/DL (ref 6–20)
CALCIUM SERPL-MCNC: 8.2 MG/DL (ref 8.7–10.5)
CHLORIDE SERPL-SCNC: 97 MMOL/L (ref 95–110)
CO2 SERPL-SCNC: 30 MMOL/L (ref 23–29)
CREAT SERPL-MCNC: 0.6 MG/DL (ref 0.5–1.4)
ERYTHROCYTE [DISTWIDTH] IN BLOOD BY AUTOMATED COUNT: 14.5 % (ref 11.5–14.5)
EST. GFR  (NO RACE VARIABLE): >60 ML/MIN/1.73 M^2
GLUCOSE SERPL-MCNC: 104 MG/DL (ref 70–110)
HCT VFR BLD AUTO: 27.9 % (ref 37–48.5)
HCT VFR BLD AUTO: 28.9 % (ref 37–48.5)
HGB BLD-MCNC: 8.9 G/DL (ref 12–16)
HGB BLD-MCNC: 9.2 G/DL (ref 12–16)
MAGNESIUM SERPL-MCNC: 1.9 MG/DL (ref 1.6–2.6)
MCH RBC QN AUTO: 27.4 PG (ref 27–31)
MCHC RBC AUTO-ENTMCNC: 31.8 G/DL (ref 32–36)
MCV RBC AUTO: 86 FL (ref 82–98)
PHOSPHATE SERPL-MCNC: 3 MG/DL (ref 2.7–4.5)
PLATELET # BLD AUTO: 236 K/UL (ref 150–450)
PMV BLD AUTO: 10.5 FL (ref 9.2–12.9)
POTASSIUM SERPL-SCNC: 4 MMOL/L (ref 3.5–5.1)
RBC # BLD AUTO: 3.36 M/UL (ref 4–5.4)
SODIUM SERPL-SCNC: 134 MMOL/L (ref 136–145)
WBC # BLD AUTO: 9.94 K/UL (ref 3.9–12.7)

## 2022-12-01 PROCEDURE — 25000003 PHARM REV CODE 250: Performed by: SURGERY

## 2022-12-01 PROCEDURE — 99233 SBSQ HOSP IP/OBS HIGH 50: CPT | Mod: ,,, | Performed by: INTERNAL MEDICINE

## 2022-12-01 PROCEDURE — 85018 HEMOGLOBIN: CPT | Performed by: SURGERY

## 2022-12-01 PROCEDURE — 36415 COLL VENOUS BLD VENIPUNCTURE: CPT | Performed by: SURGERY

## 2022-12-01 PROCEDURE — 25000003 PHARM REV CODE 250: Performed by: INTERNAL MEDICINE

## 2022-12-01 PROCEDURE — 63600175 PHARM REV CODE 636 W HCPCS: Performed by: INTERNAL MEDICINE

## 2022-12-01 PROCEDURE — 12000002 HC ACUTE/MED SURGE SEMI-PRIVATE ROOM

## 2022-12-01 PROCEDURE — 85027 COMPLETE CBC AUTOMATED: CPT | Performed by: INTERNAL MEDICINE

## 2022-12-01 PROCEDURE — 36415 COLL VENOUS BLD VENIPUNCTURE: CPT | Performed by: INTERNAL MEDICINE

## 2022-12-01 PROCEDURE — 85014 HEMATOCRIT: CPT | Performed by: SURGERY

## 2022-12-01 PROCEDURE — 83735 ASSAY OF MAGNESIUM: CPT | Performed by: INTERNAL MEDICINE

## 2022-12-01 PROCEDURE — 80048 BASIC METABOLIC PNL TOTAL CA: CPT | Performed by: INTERNAL MEDICINE

## 2022-12-01 PROCEDURE — 99233 PR SUBSEQUENT HOSPITAL CARE,LEVL III: ICD-10-PCS | Mod: ,,, | Performed by: INTERNAL MEDICINE

## 2022-12-01 PROCEDURE — 84100 ASSAY OF PHOSPHORUS: CPT | Performed by: INTERNAL MEDICINE

## 2022-12-01 RX ORDER — HYDROMORPHONE HYDROCHLORIDE 1 MG/ML
2 INJECTION, SOLUTION INTRAMUSCULAR; INTRAVENOUS; SUBCUTANEOUS
Status: DISCONTINUED | OUTPATIENT
Start: 2022-12-01 | End: 2022-12-03 | Stop reason: HOSPADM

## 2022-12-01 RX ORDER — HYDROMORPHONE HYDROCHLORIDE 2 MG/1
4 TABLET ORAL
Status: DISCONTINUED | OUTPATIENT
Start: 2022-12-01 | End: 2022-12-03 | Stop reason: HOSPADM

## 2022-12-01 RX ADMIN — POLYETHYLENE GLYCOL 3350 17 G: 17 POWDER, FOR SOLUTION ORAL at 08:12

## 2022-12-01 RX ADMIN — LEVOTHYROXINE SODIUM 50 MCG: 0.03 TABLET ORAL at 05:12

## 2022-12-01 RX ADMIN — HYDROMORPHONE HYDROCHLORIDE 4 MG: 2 TABLET ORAL at 08:12

## 2022-12-01 RX ADMIN — ATORVASTATIN CALCIUM 10 MG: 10 TABLET, FILM COATED ORAL at 08:12

## 2022-12-01 RX ADMIN — HYDROMORPHONE HYDROCHLORIDE 2 MG: 1 INJECTION, SOLUTION INTRAMUSCULAR; INTRAVENOUS; SUBCUTANEOUS at 12:12

## 2022-12-01 RX ADMIN — HYDROMORPHONE HYDROCHLORIDE 2 MG: 1 INJECTION, SOLUTION INTRAMUSCULAR; INTRAVENOUS; SUBCUTANEOUS at 01:12

## 2022-12-01 RX ADMIN — PANTOPRAZOLE SODIUM 40 MG: 40 TABLET, DELAYED RELEASE ORAL at 05:12

## 2022-12-01 RX ADMIN — HYDROMORPHONE HYDROCHLORIDE 2 MG: 1 INJECTION, SOLUTION INTRAMUSCULAR; INTRAVENOUS; SUBCUTANEOUS at 11:12

## 2022-12-01 RX ADMIN — HYDROMORPHONE HYDROCHLORIDE 2 MG: 1 INJECTION, SOLUTION INTRAMUSCULAR; INTRAVENOUS; SUBCUTANEOUS at 07:12

## 2022-12-01 RX ADMIN — METHYLNALTREXONE BROMIDE 8 MG: 12 INJECTION, SOLUTION SUBCUTANEOUS at 02:12

## 2022-12-01 RX ADMIN — HYDROMORPHONE HYDROCHLORIDE 2 MG: 1 INJECTION, SOLUTION INTRAMUSCULAR; INTRAVENOUS; SUBCUTANEOUS at 05:12

## 2022-12-01 RX ADMIN — HYDROMORPHONE HYDROCHLORIDE 4 MG: 2 TABLET ORAL at 04:12

## 2022-12-01 RX ADMIN — POLYETHYLENE GLYCOL 3350 17 G: 17 POWDER, FOR SOLUTION ORAL at 09:12

## 2022-12-01 RX ADMIN — HYDROMORPHONE HYDROCHLORIDE 4 MG: 2 TABLET ORAL at 03:12

## 2022-12-01 NOTE — PLAN OF CARE
Problem: Adult Inpatient Plan of Care  Goal: Plan of Care Review  12/1/2022 1304 by Tamara Moss RN  Outcome: Ongoing, Progressing  12/1/2022 1304 by Tamara Moss RN  Outcome: Ongoing, Progressing  Goal: Patient-Specific Goal (Individualized)  12/1/2022 1304 by Tamara Moss RN  Outcome: Ongoing, Progressing  12/1/2022 1304 by Tamara Moss RN  Outcome: Ongoing, Progressing  Goal: Absence of Hospital-Acquired Illness or Injury  12/1/2022 1304 by Tamara Moss RN  Outcome: Ongoing, Progressing  12/1/2022 1304 by Tamara Moss RN  Outcome: Ongoing, Progressing  Goal: Optimal Comfort and Wellbeing  12/1/2022 1304 by Tamara Moss RN  Outcome: Ongoing, Progressing  12/1/2022 1304 by Tamara Moss RN  Outcome: Ongoing, Progressing  Goal: Readiness for Transition of Care  12/1/2022 1304 by Tamara Moss RN  Outcome: Ongoing, Progressing  12/1/2022 1304 by Tamara Moss RN  Outcome: Ongoing, Progressing     Problem: Fall Injury Risk  Goal: Absence of Fall and Fall-Related Injury  12/1/2022 1304 by Tamara Moss RN  Outcome: Ongoing, Progressing  12/1/2022 1304 by Tamara Moss RN  Outcome: Ongoing, Progressing

## 2022-12-01 NOTE — PROGRESS NOTES
[facility] Medicine  Progress Note    Patient Name: Brunilda Bob  MRN: [id number]  Patient Class: IP- Inpatient   Admission Date: 11/29/2022  Length of Stay: 1 days  Attending Physician: Nida Linder MD  Primary Care Provider: Sadiq Titus MD        Subjective:     Principal Problem:Upper GI bleed        HPI:  Mrs. Spann is a 57-year-old female who is being directly admitted after EUS performed earlier today.  Patient with recent [facility] admission, CTA at that time with right lower lung subsegmental PE, as well as 4.8 x 4.1 x 3.7 cm body of pancreas mass with numerous hepatic lesions concerning for metastatic disease, CA 19.9 and alpha-fetoprotein within normal, acute on chronic anemia with concern for upper GI bleed, underwent EGD with concern for to duodenal ulcer, recommends b.i.d. PPI therapy by 1 month with outpatient EUS.  Patient was started on Eliquis for PE during that admission, seen by Dr. Toribio from Hematology/Oncology.  She had EUS today round mass pancreatic body 3.6 x 3.7 mm, FNA, noted to have bleeding postprocedure, believed to be coming from pancreatic duct.  Last dose of Eliquis was on 11/27 morning.  She was given sub cut vitamin K 10 mg.  Hemoglobin 7.2, ordered 1 unit PRBC transfusion.  Patient does report generalized abdominal pain, states she takes Dilaudid q.4 hours at home.  States she has not had a bowel movement since previous admission, states she is passing flatus, was using Colace at home, feels related to pain medication.  Mother had history of colorectal cancer and son history of testicular cancer.  Nonsmoker only occasional alcohol use.  Does report intermittent swelling left ankle with calf cramping sensation.  Afebrile.  Previous imaging reviewed.  Discussed with referring provider .  Discussed with nursing.  Discussed with patient and family members at bedside.      Overview/Hospital Course:  Patient admitted after  outpatient EUS with concern for ongoing bleeding from ampulla of Vater in the setting of new diagnosis of pancreatic mass.  Had recent [facility] admission with concern for upper GI bleed secondary to duodenal ulcer but on discussion with Gastroenterology feels bleeding was at that time also from ampulla.  She was transfuse 1 unit PRBC with trending of H&H No bowel movement at this time.  Patient with recent diagnosis of right lower lung PE, Eliquis was held, received vitamin K, lower extremity ultrasound was performed which confirmed bilateral DVT, significant clot burden, vascular was consulted given unable to resume anticoagulation due to active bleed, on 11/30 had IVC filter placed by Dr. Uribe.  CT pancreatic protocol with pancreatic mass with severe narrowing SMV/portal vein, masses contiguous and encasing splenic artery, discussed with oncologist, recommends transfer to [facility] for Interventional Radiology consideration of H IR embolization, discussed with IR over transfer center and recommends CTA to evaluate if near active bleed, if active bleed then will consider embolization, if no active bleed, no indication for transfer.  She received 2nd unit PRBC.  On 12/01 had bowel movement, dark in color and hard, hemoglobin today 9.2, CTA with no active bleeding, pathology back with moderately differentiated pancreatic adenocarcinoma.      Interval History:  Patient seen and examined with  present at bedside.  Continues to report generalized abdominal pain, severity 9.  States she did have hard moderate size dark bowel movement.  Intermittent cramping right lower extremity, present but improved.  Vitals stable.  Received 2nd unit PRBC last night, hemoglobin 9.2.  CTA ordered today as per IR recommendation, no active arterial bleeding and therefore transfer canceled.  Pathology back with moderately differentiated pancreatic adenocarcinoma.  Discussed with patient and .  Discussed with nursing.  Communicated  with consultants.    Review of Systems   Constitutional:  Negative for fever.   Cardiovascular:  Negative for chest pain.   Gastrointestinal:  Positive for abdominal pain.   Psychiatric/Behavioral:  Negative for confusion.    Objective:     Vital Signs (Most Recent):  Temp: 98.5 °F (36.9 °C) (12/01/22 1203)  Pulse: 87 (12/01/22 1203)  Resp: 18 (12/01/22 1222)  BP: 116/67 (12/01/22 1203)  SpO2: 99 % (12/01/22 1203) Vital Signs (24h Range):  Temp:  [98 °F (36.7 °C)-99 °F (37.2 °C)] 98.5 °F (36.9 °C)  Pulse:  [75-87] 87  Resp:  [14-19] 18  SpO2:  [95 %-100 %] 99 %  BP: ()/(53-73) 116/67     Weight: 76.2 kg (167 lb 15.9 oz)  Body mass index is 32.81 kg/m².    Intake/Output Summary (Last 24 hours) at 12/1/2022 1313  Last data filed at 12/1/2022 0125  Gross per 24 hour   Intake 572 ml   Output --   Net 572 ml      Physical Exam  Vitals and nursing note reviewed.   Constitutional:       Comments: Lying in bed, cooperative   HENT:      Head: Normocephalic and atraumatic.      Mouth/Throat:      Mouth: Mucous membranes are moist.   Cardiovascular:      Rate and Rhythm: Normal rate and regular rhythm.   Pulmonary:      Comments: Nasal cannula  Abdominal:      General: Bowel sounds are normal.   Genitourinary:     Comments: No Campo catheter  Skin:     General: Skin is warm.   Neurological:      General: No focal deficit present.      Mental Status: She is alert and oriented to person, place, and time. Mental status is at baseline.   Psychiatric:         Mood and Affect: Mood normal.       Significant Labs: BMP:   Recent Labs   Lab 12/01/22  0726      *   K 4.0   CL 97   CO2 30*   BUN 14   CREATININE 0.6   CALCIUM 8.2*   MG 1.9     CBC:   Recent Labs   Lab 11/29/22  1414 11/29/22  2144 11/30/22  0603 11/30/22  1309 11/30/22  1803 12/01/22  0726   WBC 13.22*  --  11.06  --   --  9.94   HGB 7.2*   < > 8.2*  8.4* 7.5* 7.7* 9.2*   HCT 22.4*   < > 26.3*  26.6* 24.1* 25.0* 28.9*     --  261  --   --  236     < > = values in this interval not displayed.     CMP:   Recent Labs   Lab 11/30/22  0603 12/01/22  0726   * 134*   K 3.7 4.0   CL 96 97   CO2 30* 30*    104   BUN 15 14   CREATININE 0.7 0.6   CALCIUM 7.9* 8.2*   PROT 5.7*  --    ALBUMIN 2.7*  --    BILITOT 1.1*  --    ALKPHOS 188*  --    AST 23  --    ALT 21  --    ANIONGAP 6* 7*     Magnesium:   Recent Labs   Lab 11/30/22  0603 12/01/22  0726   MG 1.9 1.9       Significant Imaging: I have reviewed all pertinent imaging results/findings within the past 24 hours.  CTA  CMS MANDATED QUALITY DATA-CT RADIATION DOSE-436  All CT scans at this facility dose modulation, iterative reconstruction, and or weight-based dosing when appropriate to reduce radiation dose to as low as reasonably achievable.     HISTORY: bleeding from pancreatic mass vs vessel  metastatic pancreatic carcinoma     FINDINGS: Thin axial imaging through the abdomen was performed with 100 mL Omnipaque 350 IV contrast, with sagittal and coronal reformatted images and MIP reconstructions performed, and images stored in the patient's permanent electronic medical record. Comparison to prior exams including CT of the prior day.     The abdominal aorta enhances normally and tapers appropriately, with minimal calcified plaque of the distal abdominal aorta and common iliac arteries. There is no aortic dissection, aneurysm, or evidence of aortic intramural hematoma.     The celiac artery and its branches are patent, with segmental up to 50% narrowing of the proximal splenic artery, secondary to encasement by pancreatic mass. The superior mesenteric artery and its proximal branches are widely patent. The hepatic and pancreaticoduodenal arteries are patent, with no aneurysm or evidence of active arterial bleeding in the region of pancreatic mass.     The bilateral single main renal arteries are widely patent, with the inferior mesenteric artery patent. The common iliac arteries are widely patent.      The lung bases show unchanged subsegmental atelectasis, with trace low-density pleural effusions. Numerous hypoenhancing hepatic masses reflecting hepatic metastatic disease are unchanged, with hyperdense vicarious excretion of contrast material within the gallbladder, and trace perihepatic ascites. The spleen has normal heterogeneous arterial phase enhancement, with unchanged hypoenhancing pancreatic mass involving the pancreatic body, and dilated main pancreatic duct. The kidneys enhance normally, with infrarenal IVC filter present. The visualized small bowel and colon show no new abnormalities, with numerous varices area noted in the upper abdomen.     IMPRESSION:  1. Negative for active arterial bleeding, aneurysm, or other significant arterial vascular abnormality related to the pancreatic mass.  2. Segmental of the 50% narrowing of proximal splenic artery.  3. Additional observations as described.      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     Acute blood loss anemia    DVT, bilateral lower limbs, s/p IVC on 11/30    Pancreatic adenocarcinoma    Acute deep vein thrombosis (DVT) of femoral vein    Hypothyroidism    Current use of long term anticoagulation    Liver lesion, multiple     Abdominal pain    PE o right lower lung subsegmental artery    Dyslipidemia    Hypertension     Plan:  Continue care on medical floor   Status post 2 units PRBC transfusion this admission, trending H&H, transfuse for hemoglobin less than 7 (9.2 today)   Repeat dose of Relistor with stool softener for constipation, following bowel movements  Extensive bilateral DVT with right lower lung subsegmental PE, status post IVC filter 11/30 by Dr. Uribe  CTA on 12/01 with no evidence of active arterial bleed, as per prior discussion with [facility] IR, would not be candidate for embolization given same, transfer canceled  Continue p.r.n. pain  control as ordered   Electrolytes sliding scale repletion   CBC in a.m.  Appreciate Gastroenterology input   Appreciate Oncology input, discussed personally with Dr. Toribio multiple times today   Appreciate vascular input   Further plan as per clinical course           VTE Risk Mitigation (From admission, onward)         Ordered     IP VTE HIGH RISK PATIENT  Once         11/29/22 1924     Place sequential compression device  Until discontinued         11/29/22 1924                Discharge Planning   LOI: 12/3/2022     Code Status: Prior   Is the patient medically ready for discharge?:     Reason for patient still in hospital (select all that apply): Patient trending condition  Discharge Plan A: Other                  Nida Linder MD  Department of Hospital Medicine   [facility]

## 2022-12-01 NOTE — CONSULTS
Hematology/Oncology  Inpatient Consult Note  Patient Name: Brunilda Bob  MRN: [id number]  Admission Date: 11/29/2022  Hospital Length of Stay: 0 days  Code Status: Full Code   Attending Provider: Nida Linder MD  Referring Provider: Dr. Linder  Consulting Provider: YANICK Toribio MD  Primary Care Physician: Sadiq Titus MD  Principal Problem:GI bleed    Consult  Note Parth Toribio MD 11/30/22  Subjective:     Chief Complaint: GI bleeding        History Present Illness:  57 y.o. female upper abdominal pain and bloating x's 1-2 months.  Admits to mid back pain as well.  Melena for several weeks.  She had endoscopy, pancreatic mass, Bx done, path report pending.  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  Given 3 units last week, and 2 units pRBC thus far on this admit.    She has PE, L>R leg DVT.  IVC filter placed today, she is off Eliquis as of 11/27 am.    Pancreatic mass, liver masses.  Bleeding from pancreatic duct.  Unclear if this is from arterial or venous invasion or bleeding from cancer mass?    Per Dr. Yepez, palliative Rad Rx, unclear if Rad Rx would stop local bleeding, and may take 2 weeks for bleeding control.    Pt accepted for transfer to [facility] main campus for Interventional Radiology evaluation for selective angiogram and possible embolization procedure for bleeding control.    pRBC being given.    Path report may be out tomorrow.  Chemotherapy option for palliation could  Include Gemzar Abraxane opdivo or keytruda?              Past Medical/Surgical History:  Past Medical History:   Diagnosis Date    Broken heart syndrome     Hypertension      Past Surgical History:   Procedure Laterality Date    CLAVICLE SURGERY  2012    ESOPHAGOGASTRODUODENOSCOPY N/A 11/23/2022    Procedure: EGD (ESOPHAGOGASTRODUODENOSCOPY);  Surgeon: Aries Saucedo III, MD;  Location: [facility];  Service: Endoscopy;  Laterality: N/A;    TONSILLECTOMY         Allergies:  Review of patient's  allergies indicates:   Allergen Reactions    Crestor [rosuvastatin] Other (See Comments)     Paralysis    Codeine Nausea And Vomiting    Strawberries [strawberry] Hives    Adhesive tape-silicones Rash       Social/Family History:  Social History     Socioeconomic History    Marital status:    Tobacco Use    Smoking status: Never    Smokeless tobacco: Never   Substance and Sexual Activity    Alcohol use: Yes     Comment: occ    Drug use: Never    Sexual activity: Yes     Partners: Male     Social Determinants of Health     Financial Resource Strain: Low Risk     Difficulty of Paying Living Expenses: Not hard at all   Food Insecurity: No Food Insecurity    Worried About Running Out of Food in the Last Year: Never true    Ran Out of Food in the Last Year: Never true   Transportation Needs: No Transportation Needs    Lack of Transportation (Medical): No    Lack of Transportation (Non-Medical): No   Physical Activity: Inactive    Days of Exercise per Week: 0 days    Minutes of Exercise per Session: 0 min   Stress: No Stress Concern Present    Feeling of Stress : Not at all   Social Connections: Socially Integrated    Frequency of Communication with Friends and Family: Twice a week    Frequency of Social Gatherings with Friends and Family: Once a week    Attends Synagogue Services: 1 to 4 times per year    Active Member of Clubs or Organizations: Yes    Attends Club or Organization Meetings: 1 to 4 times per year    Marital Status:    Housing Stability: Low Risk     Unable to Pay for Housing in the Last Year: No    Number of Places Lived in the Last Year: 1    Unstable Housing in the Last Year: No     Family History   Problem Relation Age of Onset    Colon cancer Mother     Uterine cancer Mother     Hypertension Father     Testicular cancer Son        ROS:    GEN: normal without any fever, night sweats or weight loss  HEENT:See HPI  CV: normal with no CP, SOB, PND, LEONARD or orthopnea  PULM: normal with no SOB,  cough, hemoptysis, sputum or pleuritic pain  GI: See HPI  : normal with no hematuria, dysuria  BREAST: normal with no mass, discharge, pain  SKIN: normal with no rash, erythema, bruising, or swelling        Medications:  Continuous Infusions:  Scheduled Meds:   atorvastatin  10 mg Oral QHS    levothyroxine  50 mcg Oral Before breakfast    pantoprazole  40 mg Oral BID    polyethylene glycol  17 g Oral BID       PRN Meds:sodium chloride, sodium chloride, dextrose 10%, dextrose 10%, glucagon (human recombinant), glucose, glucose, HYDROmorphone, HYDROmorphone, magnesium oxide, magnesium oxide, naloxone, ondansetron, potassium bicarbonate, potassium bicarbonate, potassium bicarbonate, potassium, sodium phosphates, potassium, sodium phosphates, potassium, sodium phosphates, senna-docusate 8.6-50 mg, sodium chloride 0.9%     Objective:     Vitals:  Blood pressure 105/65, pulse 75, temperature 98.6 °F (37 °C), resp. rate 16, height 5' (1.524 m), weight 76.2 kg (167 lb 15.9 oz), SpO2 96 %, not currently breastfeeding.    Physical Exam:  GEN: no apparent distress, comfortable  HEAD: atraumatic and normocephalic  EYES: + pallor, no icterus  ENT: no pharyngeal erythema, external ears WNL; no nasal discharge  NECK: no masses, thyroid normal, trachea midline, no LAD/LN's, supple  CV: RRR with no murmur; normal pulse; normal S1 and S2; no pedal edema  CHEST: Normal respiratory effort; CTAB; normal breath sounds; no wheeze or crackles  ABDOM: nontender and nondistended; soft; no rebound/guarding, L/S NP  MUSC/Skeletal: ROM normal; no crepitus; joints normal  EXTREM: no clubbing, cyanosis, inflammation or swelling  SKIN: no rashes, lesions, ulcers, petechiae   : no cvat  NEURO: grossly intact; motor/sensory WNL;  no tremors  PSYCH: normal mood, affect and behavior  LYMPH: normal cervical, supraclavicular, axillary  LN's          CT of abdomin 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  Dilation of distal pancreatic duct seen.  Superior mesenteric artery and vein are patent.     Alpha feto protein, Ca 19-9 pending.    (1) 57 y.o. female with diagnosis of blood loss anemia 2nd acute and chronic GI bleed.    (2)Pancreatic mass, liver masses, suspected pancreatic malignancy, metastatic dx.  Suspected stage 4 dx.    (3) Significant bleeding from ampulla of vater, pancreatic duct.  Transfused 5 units of pRBC in last week.    (4) Chronic pain 2nd liver metastatic dx, and likely nerve encroachment per pancreatic mass.  For possible nerve block procedure  Per Dr. Saucedo out pt.    (5)Transfer to [facility] main [facility] for IR  Vascular evaluation and Embolization procedure for bleeding control if feasible.    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  She is at increased risk of thromboembolic complications, CVA, PE, DVT.    (7)Treatment options for Stage 4 dx,  Will include Gemzar/Abraxane for palliation.  Another option to include Folfirinox out pt.    (8)Next week, will get Next Generational\Sequencing Studies on peripheral blood at [facility] or [facility],  To help clarify immune oncology options of opdivo, keytruda, yervoy.    (9)PE and DVT, off Eliquis.  S/P IVC filter placement.    Discussed with Rony Kerr, Lewis, Artur, Marleni,  And IR MD at [facility].      YANICK Toribio MD  Hematology/Oncology  [facility]   11/30/22    "

## 2022-12-01 NOTE — SUBJECTIVE & OBJECTIVE
Interval History:  Patient seen and examined with  present at bedside.  Continues to report generalized abdominal pain, severity 9.  States she did have hard moderate size dark bowel movement.  Intermittent cramping right lower extremity, present but improved.  Vitals stable.  Received 2nd unit PRBC last night, hemoglobin 9.2.  CTA ordered today as per IR recommendation, no active arterial bleeding and therefore transfer canceled.  Pathology back with moderately differentiated pancreatic adenocarcinoma.  Discussed with patient and .  Discussed with nursing.  Communicated with consultants.    Review of Systems   Constitutional:  Negative for fever.   Cardiovascular:  Negative for chest pain.   Gastrointestinal:  Positive for abdominal pain.   Psychiatric/Behavioral:  Negative for confusion.    Objective:     Vital Signs (Most Recent):  Temp: 98.5 °F (36.9 °C) (12/01/22 1203)  Pulse: 87 (12/01/22 1203)  Resp: 18 (12/01/22 1222)  BP: 116/67 (12/01/22 1203)  SpO2: 99 % (12/01/22 1203) Vital Signs (24h Range):  Temp:  [98 °F (36.7 °C)-99 °F (37.2 °C)] 98.5 °F (36.9 °C)  Pulse:  [75-87] 87  Resp:  [14-19] 18  SpO2:  [95 %-100 %] 99 %  BP: ()/(53-73) 116/67     Weight: 76.2 kg (167 lb 15.9 oz)  Body mass index is 32.81 kg/m².    Intake/Output Summary (Last 24 hours) at 12/1/2022 1313  Last data filed at 12/1/2022 0125  Gross per 24 hour   Intake 572 ml   Output --   Net 572 ml      Physical Exam  Vitals and nursing note reviewed.   Constitutional:       Comments: Lying in bed, cooperative   HENT:      Head: Normocephalic and atraumatic.      Mouth/Throat:      Mouth: Mucous membranes are moist.   Cardiovascular:      Rate and Rhythm: Normal rate and regular rhythm.   Pulmonary:      Comments: Nasal cannula  Abdominal:      General: Bowel sounds are normal.   Genitourinary:     Comments: No Campo catheter  Skin:     General: Skin is warm.   Neurological:      General: No focal deficit present.       Mental Status: She is alert and oriented to person, place, and time. Mental status is at baseline.   Psychiatric:         Mood and Affect: Mood normal.       Significant Labs: BMP:   Recent Labs   Lab 12/01/22  0726      *   K 4.0   CL 97   CO2 30*   BUN 14   CREATININE 0.6   CALCIUM 8.2*   MG 1.9     CBC:   Recent Labs   Lab 11/29/22  1414 11/29/22  2144 11/30/22  0603 11/30/22  1309 11/30/22  1803 12/01/22  0726   WBC 13.22*  --  11.06  --   --  9.94   HGB 7.2*   < > 8.2*  8.4* 7.5* 7.7* 9.2*   HCT 22.4*   < > 26.3*  26.6* 24.1* 25.0* 28.9*     --  261  --   --  236    < > = values in this interval not displayed.     CMP:   Recent Labs   Lab 11/30/22  0603 12/01/22  0726   * 134*   K 3.7 4.0   CL 96 97   CO2 30* 30*    104   BUN 15 14   CREATININE 0.7 0.6   CALCIUM 7.9* 8.2*   PROT 5.7*  --    ALBUMIN 2.7*  --    BILITOT 1.1*  --    ALKPHOS 188*  --    AST 23  --    ALT 21  --    ANIONGAP 6* 7*     Magnesium:   Recent Labs   Lab 11/30/22  0603 12/01/22  0726   MG 1.9 1.9       Significant Imaging: I have reviewed all pertinent imaging results/findings within the past 24 hours.  CTA  CMS MANDATED QUALITY DATA-CT RADIATION DOSE-436  All CT scans at this facility dose modulation, iterative reconstruction, and or weight-based dosing when appropriate to reduce radiation dose to as low as reasonably achievable.     HISTORY: bleeding from pancreatic mass vs vessel  metastatic pancreatic carcinoma     FINDINGS: Thin axial imaging through the abdomen was performed with 100 mL Omnipaque 350 IV contrast, with sagittal and coronal reformatted images and MIP reconstructions performed, and images stored in the patient's permanent electronic medical record. Comparison to prior exams including CT of the prior day.     The abdominal aorta enhances normally and tapers appropriately, with minimal calcified plaque of the distal abdominal aorta and common iliac arteries. There is no aortic  dissection, aneurysm, or evidence of aortic intramural hematoma.     The celiac artery and its branches are patent, with segmental up to 50% narrowing of the proximal splenic artery, secondary to encasement by pancreatic mass. The superior mesenteric artery and its proximal branches are widely patent. The hepatic and pancreaticoduodenal arteries are patent, with no aneurysm or evidence of active arterial bleeding in the region of pancreatic mass.     The bilateral single main renal arteries are widely patent, with the inferior mesenteric artery patent. The common iliac arteries are widely patent.     The lung bases show unchanged subsegmental atelectasis, with trace low-density pleural effusions. Numerous hypoenhancing hepatic masses reflecting hepatic metastatic disease are unchanged, with hyperdense vicarious excretion of contrast material within the gallbladder, and trace perihepatic ascites. The spleen has normal heterogeneous arterial phase enhancement, with unchanged hypoenhancing pancreatic mass involving the pancreatic body, and dilated main pancreatic duct. The kidneys enhance normally, with infrarenal IVC filter present. The visualized small bowel and colon show no new abnormalities, with numerous varices area noted in the upper abdomen.     IMPRESSION:  1. Negative for active arterial bleeding, aneurysm, or other significant arterial vascular abnormality related to the pancreatic mass.  2. Segmental of the 50% narrowing of proximal splenic artery.  3. Additional observations as described.

## 2022-12-02 PROBLEM — G89.3 CANCER ASSOCIATED PAIN: Chronic | Status: ACTIVE | Noted: 2022-12-02

## 2022-12-02 LAB
FERRITIN SERPL-MCNC: 101 NG/ML (ref 20–300)
FOLATE SERPL-MCNC: 9.9 NG/ML (ref 4–24)
HCT VFR BLD AUTO: 28.6 % (ref 37–48.5)
HGB BLD-MCNC: 9.1 G/DL (ref 12–16)
IRON SERPL-MCNC: 20 UG/DL (ref 30–160)
SATURATED IRON: 9 % (ref 20–50)
TOTAL IRON BINDING CAPACITY: 227 UG/DL (ref 250–450)
TRANSFERRIN SERPL-MCNC: 162 MG/DL (ref 200–375)
VIT B12 SERPL-MCNC: 1223 PG/ML (ref 210–950)

## 2022-12-02 PROCEDURE — 12000002 HC ACUTE/MED SURGE SEMI-PRIVATE ROOM

## 2022-12-02 PROCEDURE — 25000003 PHARM REV CODE 250: Performed by: INTERNAL MEDICINE

## 2022-12-02 PROCEDURE — 25000003 PHARM REV CODE 250: Performed by: SURGERY

## 2022-12-02 PROCEDURE — 84466 ASSAY OF TRANSFERRIN: CPT | Performed by: INTERNAL MEDICINE

## 2022-12-02 PROCEDURE — 85014 HEMATOCRIT: CPT | Performed by: INTERNAL MEDICINE

## 2022-12-02 PROCEDURE — 36415 COLL VENOUS BLD VENIPUNCTURE: CPT | Performed by: INTERNAL MEDICINE

## 2022-12-02 PROCEDURE — 82728 ASSAY OF FERRITIN: CPT | Performed by: INTERNAL MEDICINE

## 2022-12-02 PROCEDURE — 99232 PR SUBSEQUENT HOSPITAL CARE,LEVL II: ICD-10-PCS | Mod: GT,,, | Performed by: INTERNAL MEDICINE

## 2022-12-02 PROCEDURE — 82746 ASSAY OF FOLIC ACID SERUM: CPT | Performed by: INTERNAL MEDICINE

## 2022-12-02 PROCEDURE — 82607 VITAMIN B-12: CPT | Performed by: INTERNAL MEDICINE

## 2022-12-02 PROCEDURE — 99232 SBSQ HOSP IP/OBS MODERATE 35: CPT | Mod: GT,,, | Performed by: INTERNAL MEDICINE

## 2022-12-02 PROCEDURE — 63600175 PHARM REV CODE 636 W HCPCS: Performed by: INTERNAL MEDICINE

## 2022-12-02 PROCEDURE — 85018 HEMOGLOBIN: CPT | Performed by: INTERNAL MEDICINE

## 2022-12-02 RX ORDER — FENTANYL 25 UG/1
1 PATCH TRANSDERMAL
Status: DISCONTINUED | OUTPATIENT
Start: 2022-12-02 | End: 2022-12-03 | Stop reason: HOSPADM

## 2022-12-02 RX ADMIN — PANTOPRAZOLE SODIUM 40 MG: 40 TABLET, DELAYED RELEASE ORAL at 06:12

## 2022-12-02 RX ADMIN — ATORVASTATIN CALCIUM 10 MG: 10 TABLET, FILM COATED ORAL at 08:12

## 2022-12-02 RX ADMIN — HYDROMORPHONE HYDROCHLORIDE 4 MG: 2 TABLET ORAL at 05:12

## 2022-12-02 RX ADMIN — POLYETHYLENE GLYCOL 3350 17 G: 17 POWDER, FOR SOLUTION ORAL at 09:12

## 2022-12-02 RX ADMIN — HYDROMORPHONE HYDROCHLORIDE 2 MG: 1 INJECTION, SOLUTION INTRAMUSCULAR; INTRAVENOUS; SUBCUTANEOUS at 09:12

## 2022-12-02 RX ADMIN — HYDROMORPHONE HYDROCHLORIDE 2 MG: 1 INJECTION, SOLUTION INTRAMUSCULAR; INTRAVENOUS; SUBCUTANEOUS at 02:12

## 2022-12-02 RX ADMIN — HYDROMORPHONE HYDROCHLORIDE 4 MG: 2 TABLET ORAL at 01:12

## 2022-12-02 RX ADMIN — PANTOPRAZOLE SODIUM 40 MG: 40 TABLET, DELAYED RELEASE ORAL at 05:12

## 2022-12-02 RX ADMIN — POLYETHYLENE GLYCOL 3350 17 G: 17 POWDER, FOR SOLUTION ORAL at 08:12

## 2022-12-02 RX ADMIN — LEVOTHYROXINE SODIUM 50 MCG: 0.03 TABLET ORAL at 05:12

## 2022-12-02 RX ADMIN — HYDROMORPHONE HYDROCHLORIDE 2 MG: 1 INJECTION, SOLUTION INTRAMUSCULAR; INTRAVENOUS; SUBCUTANEOUS at 03:12

## 2022-12-02 RX ADMIN — HYDROMORPHONE HYDROCHLORIDE 4 MG: 2 TABLET ORAL at 11:12

## 2022-12-02 RX ADMIN — HYDROMORPHONE HYDROCHLORIDE 4 MG: 2 TABLET ORAL at 08:12

## 2022-12-02 RX ADMIN — FENTANYL TRANSDERMAL 1 PATCH: 25 PATCH, EXTENDED RELEASE TRANSDERMAL at 06:12

## 2022-12-02 NOTE — PROGRESS NOTES
[facility] Medicine  Progress Note    Patient Name: Brunilda Bob  MRN: [id number]  Patient Class: IP- Inpatient   Admission Date: 11/29/2022  Length of Stay: 2 days  Attending Physician: Nida Linder MD  Primary Care Provider: Sadiq Titus MD        Subjective:     Principal Problem:Upper GI bleed        HPI:  Mrs. Spann is a 57-year-old female who is being directly admitted after EUS performed earlier today.  Patient with recent [facility] admission, CTA at that time with right lower lung subsegmental PE, as well as 4.8 x 4.1 x 3.7 cm body of pancreas mass with numerous hepatic lesions concerning for metastatic disease, CA 19.9 and alpha-fetoprotein within normal, acute on chronic anemia with concern for upper GI bleed, underwent EGD with concern for to duodenal ulcer, recommends b.i.d. PPI therapy by 1 month with outpatient EUS.  Patient was started on Eliquis for PE during that admission, seen by Dr. Toribio from Hematology/Oncology.  She had EUS today round mass pancreatic body 3.6 x 3.7 mm, FNA, noted to have bleeding postprocedure, believed to be coming from pancreatic duct.  Last dose of Eliquis was on 11/27 morning.  She was given sub cut vitamin K 10 mg.  Hemoglobin 7.2, ordered 1 unit PRBC transfusion.  Patient does report generalized abdominal pain, states she takes Dilaudid q.4 hours at home.  States she has not had a bowel movement since previous admission, states she is passing flatus, was using Colace at home, feels related to pain medication.  Mother had history of colorectal cancer and son history of testicular cancer.  Nonsmoker only occasional alcohol use.  Does report intermittent swelling left ankle with calf cramping sensation.  Afebrile.  Previous imaging reviewed.  Discussed with referring provider .  Discussed with nursing.  Discussed with patient and family members at bedside.      Overview/Hospital Course:  Patient admitted after  outpatient EUS with concern for ongoing bleeding from ampulla of Vater in the setting of new diagnosis of pancreatic mass.  Had recent [facility] admission with concern for upper GI bleed secondary to duodenal ulcer but on discussion with Gastroenterology feels bleeding was at that time also from ampulla.  She was transfuse 1 unit PRBC with trending of H&H No bowel movement at this time.  Patient with recent diagnosis of right lower lung PE, Eliquis was held, received vitamin K, lower extremity ultrasound was performed which confirmed bilateral DVT, significant clot burden, vascular was consulted given unable to resume anticoagulation due to active bleed, on 11/30 had IVC filter placed by Dr. Uribe.  CT pancreatic protocol with pancreatic mass with severe narrowing SMV/portal vein, masses contiguous and encasing splenic artery, discussed with oncologist, recommends transfer to [facility] for Interventional Radiology consideration of [facility] IR embolization, discussed with IR over transfer center and recommends CTA to evaluate if near active bleed, if active bleed then will consider embolization, if no active bleed, no indication for transfer.  She received 2nd unit PRBC.  On 12/01 had bowel movement, dark in color and hard, hemoglobin today 9.2, CTA with no active bleeding, pathology back with moderately differentiated pancreatic adenocarcinoma.      Interval History:  Patient feels overall is having a better day.  Had 2 interval bowel movement.  Abdominal pain continues.  Belching.  Vitals stable, hemoglobin 9.1.  Discussed with Dr. Toribio, cleared for discharge, outpatient follow-up early next week, Radiation Oncology scheduled appointment for next Monday.  Discussed with patient and  at bedside, wishes to stay until tomorrow for repeat hemoglobin check, discussed starting fentanyl patch given constant pain with continue p.r.n. Dilaudid, agreeable.    Review of Systems   Constitutional:  Negative for fever.   Respiratory:   Negative for shortness of breath.    Gastrointestinal:  Positive for abdominal pain. Negative for constipation, nausea and vomiting.        Belching   Psychiatric/Behavioral:  Negative for confusion.    Objective:     Vital Signs (Most Recent):  Temp: 97.2 °F (36.2 °C) (12/02/22 1133)  Pulse: 77 (12/02/22 1133)  Resp: 16 (12/02/22 1552)  BP: 108/74 (12/02/22 1133)  SpO2: (!) 93 % (12/02/22 1133)   Vital Signs (24h Range):  Temp:  [97.2 °F (36.2 °C)-99.2 °F (37.3 °C)] 97.2 °F (36.2 °C)  Pulse:  [] 77  Resp:  [16-20] 16  SpO2:  [93 %-100 %] 93 %  BP: ()/(63-77) 108/74     Weight: 76.2 kg (167 lb 15.9 oz)  Body mass index is 32.81 kg/m².  No intake or output data in the 24 hours ending 12/02/22 1557   Physical Exam  Vitals and nursing note reviewed.   Constitutional:       Comments: Sitting in bed, cooperative   HENT:      Head: Normocephalic and atraumatic.      Mouth/Throat:      Mouth: Mucous membranes are moist.   Cardiovascular:      Rate and Rhythm: Normal rate and regular rhythm.   Pulmonary:      Effort: No respiratory distress.      Breath sounds: No stridor. No wheezing.   Abdominal:      General: Bowel sounds are normal.   Skin:     General: Skin is warm.   Neurological:      General: No focal deficit present.      Mental Status: She is alert and oriented to person, place, and time. Mental status is at baseline.   Psychiatric:         Mood and Affect: Mood normal.       Significant Labs: BMP:   Recent Labs   Lab 12/01/22  0726      *   K 4.0   CL 97   CO2 30*   BUN 14   CREATININE 0.6   CALCIUM 8.2*   MG 1.9     CBC:   Recent Labs   Lab 12/01/22  0726 12/01/22  1214 12/02/22  0419   WBC 9.94  --   --    HGB 9.2* 8.9* 9.1*   HCT 28.9* 27.9* 28.6*     --   --      CMP:   Recent Labs   Lab 12/01/22  0726   *   K 4.0   CL 97   CO2 30*      BUN 14   CREATININE 0.6   CALCIUM 8.2*   ANIONGAP 7*     Lactic Acid: No results for input(s): LACTATE in the last 48  hours.  Magnesium:   Recent Labs   Lab 12/01/22  0726   MG 1.9       Significant Imaging: I have reviewed all pertinent imaging results/findings within the past 24 hours.      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     Acute blood loss anemia    DVT, bilateral lower limbs, s/p IVC on 11/30    Cancer associated pain    Pancreatic adenocarcinoma    Acute deep vein thrombosis (DVT) of femoral vein    Hypothyroidism    Current use of long term anticoagulation    Liver lesion, multiple     Abdominal pain    PE o right lower lung subsegmental artery    Dyslipidemia    Hypertension     Plan:  Continue care on medical floor   Status post 2 unit PRBC transfusion this admission, H&H now holding stable, repeat levels tomorrow, hemoglobin today 9.4   Constipation now resolved, continue stool softener plus or minus Relistor   Extensive bilateral DVT with right lower lung subsegmental PE, status post IVC filter placement 11/30 by Dr. Uribe as unable to tolerate anticoagulation due to bleeding issue   Start fentanyl 25 mcg patch, continue p.o. and IV Dilaudid breakthrough pain, monitor for over-sedation, adjust as needed  CTA on 12/01 with no evidence of active arterial bleed, as per prior discussion with [facility], would not be candidate for embolization given same, transfer canceled  Electrolytes sliding scale repletion  Planned for radiation oncology appointment next Monday as well as follow-up with oncologist Dr. Toribio   Repeat H&H in a.m.  Appreciate all consultant's input   Further plan as clinical course  Discussed with patient and  present at bedside    VTE Risk Mitigation (From admission, onward)         Ordered     IP VTE HIGH RISK PATIENT  Once         11/29/22 1924     Place sequential compression device  Until discontinued         11/29/22 1924                Discharge Planning   LOI: 12/4/2022     Code Status:  Prior   Is the patient medically ready for discharge?:     Reason for patient still in hospital (select all that apply): Patient trending condition  Discharge Plan A: Other                  Nida Linder MD  Department of Hospital Medicine   [facility]

## 2022-12-02 NOTE — SUBJECTIVE & OBJECTIVE
Interval History:  Patient feels overall is having a better day.  Had 2 interval bowel movement.  Abdominal pain continues.  Belching.  Vitals stable, hemoglobin 9.1.  Discussed with Dr. Toribio, cleared for discharge, outpatient follow-up early next week, Radiation Oncology scheduled appointment for next Monday.  Discussed with patient and  at bedside, wishes to stay until tomorrow for repeat hemoglobin check, discussed starting fentanyl patch given constant pain with continue p.r.n. Dilaudid, agreeable.    Review of Systems   Constitutional:  Negative for fever.   Respiratory:  Negative for shortness of breath.    Gastrointestinal:  Positive for abdominal pain. Negative for constipation, nausea and vomiting.        Belching   Psychiatric/Behavioral:  Negative for confusion.    Objective:     Vital Signs (Most Recent):  Temp: 97.2 °F (36.2 °C) (12/02/22 1133)  Pulse: 77 (12/02/22 1133)  Resp: 16 (12/02/22 1552)  BP: 108/74 (12/02/22 1133)  SpO2: (!) 93 % (12/02/22 1133)   Vital Signs (24h Range):  Temp:  [97.2 °F (36.2 °C)-99.2 °F (37.3 °C)] 97.2 °F (36.2 °C)  Pulse:  [] 77  Resp:  [16-20] 16  SpO2:  [93 %-100 %] 93 %  BP: ()/(63-77) 108/74     Weight: 76.2 kg (167 lb 15.9 oz)  Body mass index is 32.81 kg/m².  No intake or output data in the 24 hours ending 12/02/22 1557   Physical Exam  Vitals and nursing note reviewed.   Constitutional:       Comments: Sitting in bed, cooperative   HENT:      Head: Normocephalic and atraumatic.      Mouth/Throat:      Mouth: Mucous membranes are moist.   Cardiovascular:      Rate and Rhythm: Normal rate and regular rhythm.   Pulmonary:      Effort: No respiratory distress.      Breath sounds: No stridor. No wheezing.   Abdominal:      General: Bowel sounds are normal.   Skin:     General: Skin is warm.   Neurological:      General: No focal deficit present.      Mental Status: She is alert and oriented to person, place, and time. Mental status is at baseline.    Psychiatric:         Mood and Affect: Mood normal.       Significant Labs: BMP:   Recent Labs   Lab 12/01/22  0726      *   K 4.0   CL 97   CO2 30*   BUN 14   CREATININE 0.6   CALCIUM 8.2*   MG 1.9     CBC:   Recent Labs   Lab 12/01/22  0726 12/01/22  1214 12/02/22  0419   WBC 9.94  --   --    HGB 9.2* 8.9* 9.1*   HCT 28.9* 27.9* 28.6*     --   --      CMP:   Recent Labs   Lab 12/01/22  0726   *   K 4.0   CL 97   CO2 30*      BUN 14   CREATININE 0.6   CALCIUM 8.2*   ANIONGAP 7*     Lactic Acid: No results for input(s): LACTATE in the last 48 hours.  Magnesium:   Recent Labs   Lab 12/01/22  0726   MG 1.9       Significant Imaging: I have reviewed all pertinent imaging results/findings within the past 24 hours.

## 2022-12-02 NOTE — PROGRESS NOTES
Hematology/Oncology  Inpatient Consult Note  Patient Name: Brunilda Bob  MRN: [id number]  Admission Date: 11/29/2022  Hospital Length of Stay: 1 days  Code Status: Full Code   Attending Provider: Nida Linder MD  Referring Provider: Dr. Linder  Consulting Provider: YANICK Toribio MD  Primary Care Physician: Sadiq Titus MD  Principal Problem:GI bleed    Consult  Note Parth Toribio MD 12/1/22  Subjective:     Chief Complaint: GI bleeding        History Present Illness:  57 y.o. female upper abdominal pain and bloating x's 1-2 months.  Admits to mid back pain as well.  Melena for several weeks.  She had endoscopy, pancreatic mass, Bx done, path report pending.  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  Given 3 units last week, and 2 units pRBC thus far on this admit.    She has PE, L>R leg DVT.  IVC filter placed,  she is off Eliquis.    Pancreatic mass, liver masses.  Bleeding from pancreatic duct.  Unclear if this is from arterial or venous invasion or bleeding from cancer mass?    CTA procedure did not show arterial bleeding.  Multiple conversations with Marleni Pierson Bolton, IR MD.    Hold off on transfer to [facility] main campus.    After pRBC, Hgb 8.9.  We plan to check CBC BIW and support with transfusions as needed.    Pt to begin local Rad Rx for control of bleeding and control of pancreatic cancer related pain.    After Rad Rx completed, plan to begin   Gemzar, Abraxane in 2 weeks.    Path report + for pancreatic adenoCa.      Past Medical/Surgical History:  Past Medical History:   Diagnosis Date    Broken heart syndrome     Hypertension      Past Surgical History:   Procedure Laterality Date    CLAVICLE SURGERY  2012    ENDOSCOPIC ULTRASOUND OF UPPER GASTROINTESTINAL TRACT N/A 11/29/2022    Procedure: ULTRASOUND, UPPER GI TRACT, ENDOSCOPIC;  Surgeon: Aries Saucedo III, MD;  Location: [facility];  Service: Endoscopy;  Laterality: N/A;    ESOPHAGOGASTRODUODENOSCOPY N/A  11/23/2022    Procedure: EGD (ESOPHAGOGASTRODUODENOSCOPY);  Surgeon: Aries Saucedo III, MD;  Location: [facility];  Service: Endoscopy;  Laterality: N/A;    TONSILLECTOMY         Allergies:  Review of patient's allergies indicates:   Allergen Reactions    Crestor [rosuvastatin] Other (See Comments)     Paralysis    Codeine Nausea And Vomiting    Strawberries [strawberry] Hives    Adhesive tape-silicones Rash       Social/Family History:  Social History     Socioeconomic History    Marital status:    Tobacco Use    Smoking status: Never    Smokeless tobacco: Never   Substance and Sexual Activity    Alcohol use: Yes     Comment: occ    Drug use: Never    Sexual activity: Yes     Partners: Male     Social Determinants of Health     Financial Resource Strain: Low Risk     Difficulty of Paying Living Expenses: Not hard at all   Food Insecurity: No Food Insecurity    Worried About Running Out of Food in the Last Year: Never true    Ran Out of Food in the Last Year: Never true   Transportation Needs: No Transportation Needs    Lack of Transportation (Medical): No    Lack of Transportation (Non-Medical): No   Physical Activity: Inactive    Days of Exercise per Week: 0 days    Minutes of Exercise per Session: 0 min   Stress: No Stress Concern Present    Feeling of Stress : Not at all   Social Connections: Socially Integrated    Frequency of Communication with Friends and Family: Twice a week    Frequency of Social Gatherings with Friends and Family: Once a week    Attends Sikh Services: 1 to 4 times per year    Active Member of Clubs or Organizations: Yes    Attends Club or Organization Meetings: 1 to 4 times per year    Marital Status:    Housing Stability: Low Risk     Unable to Pay for Housing in the Last Year: No    Number of Places Lived in the Last Year: 1    Unstable Housing in the Last Year: No     Family History   Problem Relation Age of Onset    Colon cancer Mother     Uterine cancer Mother      Hypertension Father     Testicular cancer Son        ROS:    GEN: normal without any fever, night sweats or weight loss  HEENT:See HPI  CV: normal with no CP, SOB, PND, LEONARD or orthopnea  PULM: normal with no SOB, cough, hemoptysis, sputum or pleuritic pain  GI: See HPI  : normal with no hematuria, dysuria  BREAST: normal with no mass, discharge, pain  SKIN: normal with no rash, erythema, bruising, or swelling        Medications:  Continuous Infusions:  Scheduled Meds:   atorvastatin  10 mg Oral QHS    levothyroxine  50 mcg Oral Before breakfast    pantoprazole  40 mg Oral BID    polyethylene glycol  17 g Oral BID       PRN Meds:sodium chloride, sodium chloride, dextrose 10%, dextrose 10%, glucagon (human recombinant), glucose, glucose, HYDROmorphone, HYDROmorphone, iohexoL, magnesium oxide, magnesium oxide, naloxone, ondansetron, potassium bicarbonate, potassium bicarbonate, potassium bicarbonate, potassium, sodium phosphates, potassium, sodium phosphates, potassium, sodium phosphates, senna-docusate 8.6-50 mg, sodium chloride 0.9%     Objective:     Vitals:  Blood pressure 112/66, pulse 75, temperature 98.2 °F (36.8 °C), resp. rate 16, height 5' (1.524 m), weight 76.2 kg (167 lb 15.9 oz), SpO2 98 %, not currently breastfeeding.    Physical Exam:  GEN: no apparent distress, comfortable  HEAD: atraumatic and normocephalic  EYES: + pallor, no icterus  ENT: no pharyngeal erythema, external ears WNL; no nasal discharge  NECK: no masses, thyroid normal, trachea midline, no LAD/LN's, supple  CV: RRR with no murmur; normal pulse; normal S1 and S2; no pedal edema  CHEST: Normal respiratory effort; CTAB; normal breath sounds; no wheeze or crackles  ABDOM: nontender and nondistended; soft; no rebound/guarding, L/S NP  MUSC/Skeletal: ROM normal; no crepitus; joints normal  EXTREM: no clubbing, cyanosis, inflammation or swelling  SKIN: no rashes, lesions, ulcers, petechiae   : no cvat  NEURO: grossly intact;  "motor/sensory WNL;  no tremors  PSYCH: normal mood, affect and behavior  LYMPH: normal cervical, supraclavicular, axillary  LN's          CT of abdomin 2.4 cm pseudoaneurism at the ventricular apex, multiple liver masses up to 3.6 cm, and 4.5 cm mass at body of pancreas, abutting splenic artery, common hepatic artery.  Dilation of distal pancreatic duct seen.  Superior mesenteric artery and vein are patent.     Alpha feto protein, Ca 19-9 pending.    (1) 57 y.o. female with diagnosis of blood loss anemia 2nd acute and chronic GI bleed.    (2)Pancreatic malignancy, metastatic ,  stage 4 dx.    (3) Significant bleeding from ampulla of vater, pancreatic duct.  Transfused 5 units of pRBC in past week.    (4) Chronic pain 2nd liver metastatic dx, and likely nerve encroachment per pancreatic mass.  For possible nerve block procedure  Per Dr. Saucedo out pt.    (5)Initial Rad Rx to pancreatic mass for help in control of bleeding and help with control of pain.    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    (7)Treatment options for Stage 4 dx,  Will include Gemzar/Abraxane for palliation.  Another option to include Folfirinox out pt.    (8)Next week, will get Next Generational\Sequencing Studies on peripheral blood at [facility] or [facility],  To help clarify immune oncology options of opdivo, keytruda, yervoy.    (9)PE and DVT, off Eliquis.  S/P IVC filter placement.  Hypercoagulation Syndrome 2nd metastatic pancreatic cancer.    YANICK Toribio MD  Hematology/Oncology  [facility]   12/1/22    " unknown

## 2022-12-03 VITALS
SYSTOLIC BLOOD PRESSURE: 127 MMHG | RESPIRATION RATE: 19 BRPM | TEMPERATURE: 98 F | BODY MASS INDEX: 32.98 KG/M2 | HEIGHT: 60 IN | WEIGHT: 168 LBS | HEART RATE: 79 BPM | DIASTOLIC BLOOD PRESSURE: 77 MMHG | OXYGEN SATURATION: 100 %

## 2022-12-03 PROBLEM — K92.2 UPPER GI BLEED: Status: RESOLVED | Noted: 2022-11-29 | Resolved: 2022-12-03

## 2022-12-03 LAB
HCT VFR BLD AUTO: 29.1 % (ref 37–48.5)
HGB BLD-MCNC: 9 G/DL (ref 12–16)

## 2022-12-03 PROCEDURE — 63600175 PHARM REV CODE 636 W HCPCS: Performed by: INTERNAL MEDICINE

## 2022-12-03 PROCEDURE — 25000003 PHARM REV CODE 250: Performed by: INTERNAL MEDICINE

## 2022-12-03 PROCEDURE — 85014 HEMATOCRIT: CPT | Performed by: INTERNAL MEDICINE

## 2022-12-03 PROCEDURE — 36415 COLL VENOUS BLD VENIPUNCTURE: CPT | Performed by: INTERNAL MEDICINE

## 2022-12-03 PROCEDURE — 85018 HEMOGLOBIN: CPT | Performed by: INTERNAL MEDICINE

## 2022-12-03 PROCEDURE — 25000003 PHARM REV CODE 250: Performed by: SURGERY

## 2022-12-03 RX ORDER — POLYETHYLENE GLYCOL 3350 17 G/17G
17 POWDER, FOR SOLUTION ORAL DAILY
Qty: 30 EACH | Refills: 0 | Status: SHIPPED | OUTPATIENT
Start: 2022-12-03 | End: 2023-01-02

## 2022-12-03 RX ORDER — FENTANYL 25 UG/1
1 PATCH TRANSDERMAL
Qty: 3 PATCH | Refills: 0 | Status: SHIPPED | OUTPATIENT
Start: 2022-12-04 | End: 2022-12-09

## 2022-12-03 RX ORDER — SUCRALFATE 1 G/1
1 TABLET ORAL 2 TIMES DAILY PRN
Qty: 180 TABLET | Refills: 3 | Status: SHIPPED | OUTPATIENT
Start: 2022-12-03 | End: 2023-01-02

## 2022-12-03 RX ORDER — ESOMEPRAZOLE MAGNESIUM 40 MG/1
40 CAPSULE, DELAYED RELEASE ORAL
Qty: 60 CAPSULE | Refills: 0 | Status: SHIPPED | OUTPATIENT
Start: 2022-12-03 | End: 2023-01-02

## 2022-12-03 RX ORDER — ONDANSETRON 4 MG/1
8 TABLET, ORALLY DISINTEGRATING ORAL EVERY 8 HOURS PRN
Qty: 30 TABLET | Refills: 0 | Status: SHIPPED | OUTPATIENT
Start: 2022-12-03 | End: 2022-12-19 | Stop reason: SDUPTHER

## 2022-12-03 RX ADMIN — POLYETHYLENE GLYCOL 3350 17 G: 17 POWDER, FOR SOLUTION ORAL at 09:12

## 2022-12-03 RX ADMIN — HYDROMORPHONE HYDROCHLORIDE 4 MG: 2 TABLET ORAL at 04:12

## 2022-12-03 RX ADMIN — ONDANSETRON 4 MG: 2 INJECTION INTRAMUSCULAR; INTRAVENOUS at 09:12

## 2022-12-03 RX ADMIN — HYDROMORPHONE HYDROCHLORIDE 4 MG: 2 TABLET ORAL at 07:12

## 2022-12-03 RX ADMIN — PANTOPRAZOLE SODIUM 40 MG: 40 TABLET, DELAYED RELEASE ORAL at 06:12

## 2022-12-03 RX ADMIN — LEVOTHYROXINE SODIUM 50 MCG: 0.03 TABLET ORAL at 06:12

## 2022-12-03 NOTE — PROGRESS NOTES
Hematology/Oncology  Inpatient Subsequent Encounter Note  Patient Name: Brunilda Bob  MRN: [id number]  Admission Date: 11/29/2022  Hospital Length of Stay: 2 days  Code Status: Full Code   Attending Provider: Nida Linder MD  Referring Provider: Dr. Linder  Consulting Provider: YANICK Toribio MD  Primary Care Physician: Sadiq Titus MD  Principal Problem:GI bleed    Consult  Note Parth Toribio MD 12/2/22  Subjective:     Chief Complaint: GI bleeding        History Present Illness:  57 y.o. female upper abdominal pain and bloating x's 1-2 months.  Admits to mid back pain as well.  Melena for several weeks.  She had endoscopy, pancreatic mass, Bx done, path report pending.  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  Given 3 units last week, and 2 units pRBC thus far on this admit.    She has PE, L>R leg DVT.  IVC filter placed,  she is off Eliquis.    Pancreatic mass, liver masses.  Bleeding from pancreatic duct.  Unclear if this is from arterial or venous invasion or bleeding from cancer mass?    CTA procedure did not show arterial bleeding.  Multiple conversations with Marleni Pierson Bolton, IR MD.    Hold off on transfer to [facility] main campus.    After pRBC, Hgb 9.1.  Stable.  We plan to check CBC BIW and support with transfusions as needed.    Pt to begin local Rad Rx for control of bleeding and control of pancreatic cancer related pain.  Discused with Dr. Linder and Dr. Yepez.    After Rad Rx completed, plan to begin   Gemzar, Abraxane in 2 weeks.  D1C1 12/21/22    Path report + for pancreatic adenoCa.      Past Medical/Surgical History:  Past Medical History:   Diagnosis Date    Broken heart syndrome     Hypertension      Past Surgical History:   Procedure Laterality Date    CLAVICLE SURGERY  2012    ENDOSCOPIC ULTRASOUND OF UPPER GASTROINTESTINAL TRACT N/A 11/29/2022    Procedure: ULTRASOUND, UPPER GI TRACT, ENDOSCOPIC;  Surgeon: Aries Saucedo III, MD;  Location:  [facility] END[facility];  Service: Endoscopy;  Laterality: N/A;    ESOPHAGOGASTRODUODENOSCOPY N/A 11/23/2022    Procedure: EGD (ESOPHAGOGASTRODUODENOSCOPY);  Surgeon: Aries Saucedo III, MD;  Location: [facility];  Service: Endoscopy;  Laterality: N/A;    TONSILLECTOMY         Allergies:  Review of patient's allergies indicates:   Allergen Reactions    Crestor [rosuvastatin] Other (See Comments)     Paralysis    Codeine Nausea And Vomiting    Strawberries [strawberry] Hives    Adhesive tape-silicones Rash       Social/Family History:  Social History     Socioeconomic History    Marital status:    Tobacco Use    Smoking status: Never    Smokeless tobacco: Never   Substance and Sexual Activity    Alcohol use: Yes     Comment: occ    Drug use: Never    Sexual activity: Yes     Partners: Male     Social Determinants of Health     Financial Resource Strain: Low Risk     Difficulty of Paying Living Expenses: Not hard at all   Food Insecurity: No Food Insecurity    Worried About Running Out of Food in the Last Year: Never true    Ran Out of Food in the Last Year: Never true   Transportation Needs: No Transportation Needs    Lack of Transportation (Medical): No    Lack of Transportation (Non-Medical): No   Physical Activity: Inactive    Days of Exercise per Week: 0 days    Minutes of Exercise per Session: 0 min   Stress: No Stress Concern Present    Feeling of Stress : Not at all   Social Connections: Socially Integrated    Frequency of Communication with Friends and Family: Twice a week    Frequency of Social Gatherings with Friends and Family: Once a week    Attends [employer] Services: 1 to 4 times per year    Active Member of Clubs or Organizations: Yes    Attends Club or Organization Meetings: 1 to 4 times per year    Marital Status:    Housing Stability: Low Risk     Unable to Pay for Housing in the Last Year: No    Number of Places Lived in the Last Year: 1    Unstable Housing in the Last Year: No     Family  History   Problem Relation Age of Onset    Colon cancer Mother     Uterine cancer Mother     Hypertension Father     Testicular cancer Son        ROS:    GEN: normal without any fever, night sweats or weight loss  HEENT:See HPI  CV: normal with no CP, SOB, PND, LEONARD or orthopnea  PULM: normal with no SOB, cough, hemoptysis, sputum or pleuritic pain  GI: See HPI  : normal with no hematuria, dysuria  BREAST: normal with no mass, discharge, pain  SKIN: normal with no rash, erythema, bruising, or swelling        Medications:  Continuous Infusions:  Scheduled Meds:   atorvastatin  10 mg Oral QHS    fentaNYL  1 patch Transdermal Q72H    levothyroxine  50 mcg Oral Before breakfast    pantoprazole  40 mg Oral BID    polyethylene glycol  17 g Oral BID       PRN Meds:sodium chloride, sodium chloride, dextrose 10%, dextrose 10%, glucagon (human recombinant), glucose, glucose, HYDROmorphone, HYDROmorphone, iohexoL, magnesium oxide, magnesium oxide, naloxone, ondansetron, potassium bicarbonate, potassium bicarbonate, potassium bicarbonate, potassium, sodium phosphates, potassium, sodium phosphates, potassium, sodium phosphates, senna-docusate 8.6-50 mg, sodium chloride 0.9%     Objective:     Vitals:  Blood pressure 110/74, pulse 77, temperature 97 °F (36.1 °C), temperature source Oral, resp. rate 16, height 5' (1.524 m), weight 76.2 kg (167 lb 15.9 oz), SpO2 (!) 94 %, not currently breastfeeding.    Physical Exam:  GEN: no apparent distress, comfortable  HEAD: atraumatic and normocephalic  EYES: + pallor, no icterus  ENT: no pharyngeal erythema, external ears WNL; no nasal discharge  NECK: no masses, thyroid normal, trachea midline, no LAD/LN's, supple  CV: RRR with no murmur; normal pulse; normal S1 and S2; no pedal edema  CHEST: Normal respiratory effort; CTAB; normal breath sounds; no wheeze or crackles  ABDOM: nontender and nondistended; soft; no rebound/guarding, L/S NP  MUSC/Skeletal: ROM normal; no crepitus; joints  "normal  EXTREM: no clubbing, cyanosis, inflammation or swelling  SKIN: no rashes, lesions, ulcers, petechiae   : no cvat  NEURO: grossly intact; motor/sensory WNL;  no tremors  PSYCH: normal mood, affect and behavior  LYMPH: normal cervical, supraclavicular, axillary  LN's          CT of abdomin 2.4 cm pseudoaneurism at the ventricular apex, multiple liver masses up to 3.6 cm, and 4.5 cm mass at body of pancreas, abutting splenic artery, common hepatic artery.  Dilation of distal pancreatic duct seen.  Superior mesenteric artery and vein are patent.     Alpha feto protein, Ca 19-9 pending.    (1) 57 y.o. female with diagnosis of blood loss anemia 2nd acute and chronic GI bleed.    (2)Pancreatic malignancy, metastatic ,  stage 4 dx.    (3) Significant bleeding from ampulla of vater, pancreatic duct.  Transfused 5 units of pRBC in past week.    (4) Chronic pain 2nd liver metastatic dx, and likely nerve encroachment per pancreatic mass.  For possible nerve block procedure  Per Dr. Saucedo out pt.    (5)Initial Rad Rx to pancreatic mass for help in control of bleeding and help with control of pain.    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    (7)Treatment options for Stage 4 dx,  Will include Gemzar/Abraxane for palliation.  Another option to include Folfirinox out pt.    (8)Next week, will get Next Generational\Sequencing Studies on peripheral blood at [facility] or [facility],  To help clarify immune oncology options of opdivo, keytruda, yervoy.    (9)PE and DVT, off Eliquis.  S/P IVC filter placement.  Hypercoagulation Syndrome 2nd metastatic pancreatic cancer.    YANICK Toribio MD  Hematology/Oncology  [facility]   12/2/22    "

## 2022-12-03 NOTE — DISCHARGE SUMMARY
[facility] Medicine  Discharge Summary      Patient Name: Brunilda Bob  MRN: [id number]  JOLLY: [id number]  Patient Class: IP- Inpatient  Admission Date: 11/29/2022  Hospital Length of Stay: 3 days  Discharge Date and Time: 12/3/2022 10:45 AM  Attending Physician: No att. providers found   Discharging Provider: Nida Linder MD  Primary Care Provider: Sadiq Titus MD    Primary Care Team: Networked reference to record PCT     HPI:   Mrs. Spann is a 57-year-old female who is being directly admitted after EUS performed earlier today.  Patient with recent [facility] admission, CTA at that time with right lower lung subsegmental PE, as well as 4.8 x 4.1 x 3.7 cm body of pancreas mass with numerous hepatic lesions concerning for metastatic disease, CA 19.9 and alpha-fetoprotein within normal, acute on chronic anemia with concern for upper GI bleed, underwent EGD with concern for to duodenal ulcer, recommends b.i.d. PPI therapy by 1 month with outpatient EUS.  Patient was started on Eliquis for PE during that admission, seen by Dr. Toribio from Hematology/Oncology.  She had EUS today round mass pancreatic body 3.6 x 3.7 mm, FNA, noted to have bleeding postprocedure, believed to be coming from pancreatic duct.  Last dose of Eliquis was on 11/27 morning.  She was given sub cut vitamin K 10 mg.  Hemoglobin 7.2, ordered 1 unit PRBC transfusion.  Patient does report generalized abdominal pain, states she takes Dilaudid q.4 hours at home.  States she has not had a bowel movement since previous admission, states she is passing flatus, was using Colace at home, feels related to pain medication.  Mother had history of colorectal cancer and son history of testicular cancer.  Nonsmoker only occasional alcohol use.  Does report intermittent swelling left ankle with calf cramping sensation.  Afebrile.  Previous imaging reviewed.  Discussed with referring provider .  Discussed with  nursing.  Discussed with patient and family members at bedside.      Procedure(s) (LRB):  Placement, Inferior Vena Cava Filter (N/A)      Hospital Course:   Patient admitted after outpatient EUS with concern for ongoing bleeding from ampulla of Vater in the setting of new diagnosis of pancreatic mass.  Had recent H admission with concern for upper GI bleed secondary to duodenal ulcer but on discussion with Gastroenterology feels bleeding was at that time also from ampulla.  She was transfuse 1 unit PRBC with trending of H&H. No bowel movement at this time.  Patient with recent diagnosis of right lower lung PE, Eliquis was held, received vitamin K, lower extremity ultrasound was performed which confirmed bilateral DVT, significant clot burden, vascular was consulted given unable to resume anticoagulation due to bleeding, on 11/30 had IVC filter placed by Dr. Uribe.  CT pancreatic protocol with pancreatic mass with severe narrowing SMV/portal vein, masses contiguous and encasing splenic artery, discussed with oncologist, recommends transfer to [facility] for Interventional Radiology for consideration of [facility] IR embolization, discussed with IR over transfer center and recommends CTA to evaluate if active bleed, if active bleed then will consider embolization, if no active bleed, no indication for transfer.  She received 2nd unit PRBC.  On 12/01 had bowel movement, dark in color and hard, hemoglobin today 9.2, CTA with no active bleeding and therefore transfer cancelled, pathology back with moderately differentiated pancreatic adenocarcinoma.  Her pain medication was adjusted with addition of fentanyl patch with continuation of p.o. Dilaudid.  She did receive Relistor and had multiple bowel movements.  Hemoglobin was trended and remained stable at 9.0.  On 12/03, no new complaints, cleared by consultants for discharge and appears medically stable for discharge with outpatient follow-up.  She has appointment with radiation  oncology on 12/05 as well as her regular oncologist who will continue to monitor outpatient CBC and transfuse as needed.  Prescription for fentanyl patch as well as Zofran, she already has p.o. Dilaudid at home, medications reviewed.  Discharge plan including medication, follow-up as well as strict return precautions were reviewed with patient and  present at bedside, expressed understanding, no questions or concerns, grateful for care provided.  Communicated with nursing.      Discharge examination   Lying in bed, alert, oriented, on room air, bowel sounds heard       Goals of Care Treatment Preferences:  Code Status: Full Code      Consults:   Consults (From admission, onward)        Status Ordering Provider     Inpatient consult to Radiation Oncology  Once        Provider:  Aries Yepez Jr., MD    Completed YANICK ALVAREZ     Inpatient consult to Hematology Oncology  Once        Provider:  YANICK Alvarez MD    Completed ASYA GRANT     Inpatient consult to Vascular Surgery  Once        Provider:  Ali Khoobehi, MD    Completed MARY JANE CLIFFORD          No new Assessment & Plan notes have been filed under this hospital service since the last note was generated.  Service: Hospital Medicine    Final Active Diagnoses:    Diagnosis Date Noted POA    PRINCIPAL PROBLEM:  Pancreatic adenocarcinoma [C25.9] 12/01/2022 Yes     Chronic    Acute blood loss anemia [D62] 11/29/2022 Yes    DVT, bilateral lower limbs, s/p IVC on 11/30 [I82.403] 11/30/2022 Yes    Cancer associated pain [G89.3] 12/02/2022 Yes     Chronic    Acute deep vein thrombosis (DVT) of femoral vein [I82.419] 11/30/2022 Yes    Hypothyroidism [E03.9] 11/29/2022 Yes     Chronic    Current use of long term anticoagulation [Z79.01] 11/29/2022 Not Applicable     Chronic    Liver lesion, multiple  [K76.9] 11/29/2022 Yes     Chronic    Abdominal pain [R10.9] 11/29/2022 Yes     Chronic    PE, right lower lung subsegmental artery [I26.99]  11/24/2022 Yes     Chronic    Dyslipidemia [E78.5] 08/10/2021 Yes    Hypertension [I10] 10/30/2019 Yes      Problems Resolved During this Admission:    Diagnosis Date Noted Date Resolved POA    Upper GI bleed- 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 Schedule an appointment as soon as possible for a visit in 1 week(s).    Specialties: Hematology, Oncology, Hematology and Oncology  Why: follow up  Contact information:  [address]  [address]  [place] [address]  [phone]             Aries Yepez Jr, MD. Go on 12/5/2022.    Specialty: Radiation Oncology  Why: follow up  Contact information:  [address]  [address]  [facility]  [place] [address]  [phone]                       Patient Instructions:      Diet Adult Regular     Notify your health care provider if you experience any of the following:  temperature >100.4     Notify your health care provider if you experience any of the following:  severe uncontrolled pain     Notify your health care provider if you experience any of the following:  difficulty breathing or increased cough     Activity as tolerated       Significant Diagnostic Studies: Labs:   BMP: No results for input(s): GLU, NA, K, CL, CO2, BUN, CREATININE, CALCIUM, MG in the last 48 hours., CMP No results for input(s): NA, K, CL, CO2, GLU, BUN, CREATININE, CALCIUM, PROT, ALBUMIN, BILITOT, ALKPHOS, AST, ALT, ANIONGAP, ESTGFRAFRICA, EGFRNONAA in the last 48 hours., CBC   Recent Labs   Lab 12/01/22  1214 12/02/22  0419 12/03/22  0539   HGB 8.9* 9.1* 9.0*   HCT 27.9* 28.6* 29.1*   , INR   Lab Results   Component Value Date    INR 1.6 11/30/2022    INR 1.4 11/23/2022    INR 1.1 10/30/2019   , Lipid Panel   Lab Results   Component Value Date    CHOL 144 02/23/2022    HDL 46 (L) 02/23/2022    LDLCALC 80 02/23/2022     TRIG 99 02/23/2022    CHOLHDL 3.1 02/23/2022   , Troponin No results for input(s): TROPONINI in the last 168 hours. and A1C:   Recent Labs   Lab 07/01/22  0255 11/23/22  1458   HGBA1C 6.2 6.5*       Pending Diagnostic Studies:     None       CT Abdomen Pelvis W WO Contrast    Result Date: 11/30/2022   ADDENDUM #1 Pancreatic mass causes severe narrowing of the SMV/portal vein confluence, with contrast opacification of portal venous branches. Large collaterals in the tye hepatis are evident. No active contrast extravasation is seen. Patient is experiencing bleeding from the ampulla per Dr. Saucedo. The pancreatic mass is contiguous with and partially encases the splenic artery. These findings were discussed with Dr. Saucedo at 9:45 am. Electronically signed by:  Zack De Anda MD  11/30/2022 9:50 AM CST Workstation: [id number]  ORIGINAL REPORT CMS MANDATED QUALITY DATA - CT RADIATION  436 All CT scans at this facility utilize dose modulation, iterative reconstruction, and/or weight based dosing when appropriate to reduce radiation dose to as low as reasonably achievable. CT ABDOMEN PELVIS WITHOUT THEN WITH IV CONTRAST CLINICAL HISTORY: 57 years Female triple phase CT pancreatic protocol COMPARISON: CT abdomen and pelvis November 23, 2022 FINDINGS: Images through the lower thorax demonstrate trace pleural fluid layering dependently bilaterally. Subsegmental atelectasis or scarring involving the lung bases. Bone window images show no acute or aggressive osseous abnormality. Innumerable solid hypoenhancing hepatic lesions consistent with widespread hepatic metastatic disease. Right hepatic lobe lesion measures 3.8 x 3.4 cm on image 40. Gallbladder and biliary tree are unremarkable. Spleen appears normal. A irregularly-shaped hypoenhancing mass arising from the pancreatic body measures 4.5 x 3.2 x 4.5 enlarged, leiomyomatous uterus. Cm. This is highly suspicious for pancreatic adenocarcinoma. The mass results  in-and dilation of the pancreatic duct. The mass is contiguous with the celiac axis and branch vessels. It is also contiguous with the lesser curvature of the stomach. No adrenal lesion. Kidneys are unremarkable. Ureters are normal in caliber. Urinary bladder is unremarkable. No evidence of bowel obstruction or acute pathology involving the gastrointestinal tract. Two metallic objects are present within the duodenum. A few distal colonic diverticula. No free fluid or free air within the abdomen or pelvis. Aortoiliac atherosclerotic calcification. Small volume free fluid within the pelvis. Small fat-containing inguinal hernias bilaterally, right greater than left. IMPRESSION: 4.5 cm irregular hypoenhancing mass arising from the body of the pancreas contiguous with the celiac axis and lesser curvature of the stomach, with downstream pancreatic duct dilation. Findings are highly suspicious for pancreatic adenocarcinoma. Extensive metastatic disease throughout the liver. Small volume free fluid in the pelvis. Leiomyomatous uterus. Trace pleural fluid bilaterally. Electronically signed by:  Zack De Anda MD  11/30/2022 8:53 AM CST Workstation: [id number]    X-Ray Chest PA And Lateral    Result Date: 11/18/2022  Reason: Cough and congestion FINDINGS: PA and lateral chest with comparison chest x-ray June 30, 2022 show normal cardiomediastinal silhouette. Lungs are clear. Pulmonary vasculature is normal. No acute osseous abnormality. IMPRESSION: No acute cardiopulmonary abnormality. Electronically signed by:  Bryn Dempsey DO  11/18/2022 12:45 PM CST Workstation: [id number]    CT Head Without Contrast    Result Date: 11/23/2022  All CT scans at this facility used dose modulation, iterative reconstruction and/or weight-based dosing when appropriate to reduce radiation doses  as low as reasonably achievable. CLINICAL INFORMATION:  Dizziness, persistent/recurrent, cardiac or vascular cause suspected COMPARISON: 10/30/2019  FINDINGS:   The ventricles and sulci are normal in size and configuration for age.  There is no intraparenchymal hemorrhage, mass or midline shift.  There are no extra-axial fluid collections. There is minimal mucosal thickening in the maxillary sinuses. The remainder the paranasal sinuses are clear. IMPRESSION: No acute intracranial process These findings were given to Dr. beach at 10:25 AM Electronically signed by:  Inna Pratt MD  11/23/2022 10:35 AM CST Workstation: [id number]    CTA Chest Non-Coronary (PE Studies)    Result Date: 11/23/2022   ADDENDUM #1 This is a correction to the prior report There are emboli within subsegmental arteries in the right lower lobe. These findings were called to Dr. Vazquez in the emergency department at 11:27 AM Electronically signed by:  Inna Pratt MD  11/23/2022 11:28 AM CST Workstation: [id number]  ORIGINAL REPORT All CT scans at this facility used dose modulation, iterative reconstruction and/or weight-based dosing when appropriate to reduce radiation doses  as low as reasonably achievable. HISTORY: Chest pain, loss of consciousness. FINDINGS: Thin axial imaging through the chest was performed with 100 mL Omnipaque 350 IV contrast, with sagittal and coronal reformatted images and 3-D reconstructions performed on an independent workstation, with images stored in the patient's permanent electronic medical record. This is a high-grade quality study for the evaluation of pulmonary embolism. The pulmonary arteries are normal in appearance without pulmonary emboli noted up to the subsegmental level, noting limitations of CT technique for identifying small isolated subsegmental emboli. The heart is normal in size. 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 There is no pericardial effusion. The aorta is normal in caliber. There is no hilar, mediastinal or axillary adenopathy. There are no pulmonary nodules, infiltrates or pleural  effusions. The trachea and bronchi are normal. There are no acute osseous abnormalities. Evaluation of the upper abdomen demonstrates multiple hypodense masses throughout both lobes of the liver suspicious for metastatic disease.. There is a 4.5 x 3.2 cm mass arising from the body of the pancreas suspicious for malignancy. Please see the CT abdomen report. The adrenal glands are normal. IMPRESSION: No CT evidence pulmonary emboli No acute pulmonary process 4.5 x 3.2 cm mass arising from the proximal body of the pancreas abutting the proximal splenic artery and hepatic artery suspicious for pancreatic malignancy Numerous hypodense masses throughout the liver compatible with metastatic disease Electronically signed by:  Inna Pratt MD  11/23/2022 10:47 AM CST Workstation: [id number]    CTA Abdomen    Result Date: 12/1/2022  CMS MANDATED QUALITY DATA-CT RADIATION DOSE-436 All CT scans at this facility dose modulation, iterative reconstruction, and or weight-based dosing when appropriate to reduce radiation dose to as low as reasonably achievable. HISTORY: bleeding from pancreatic mass vs vessel  metastatic pancreatic carcinoma FINDINGS: Thin axial imaging through the abdomen was performed with 100 mL Omnipaque 350 IV contrast, with sagittal and coronal reformatted images and MIP reconstructions performed, and images stored in the patient's permanent electronic medical record. Comparison to prior exams including CT of the prior day. The abdominal aorta enhances normally and tapers appropriately, with minimal calcified plaque of the distal abdominal aorta and common iliac arteries. There is no aortic dissection, aneurysm, or evidence of aortic intramural hematoma. The celiac artery and its branches are patent, with segmental up to 50% narrowing of the proximal splenic artery, secondary to encasement by pancreatic mass. The superior mesenteric artery and its proximal branches are widely patent. The hepatic and  pancreaticoduodenal arteries are patent, with no aneurysm or evidence of active arterial bleeding in the region of pancreatic mass. The bilateral single main renal arteries are widely patent, with the inferior mesenteric artery patent. The common iliac arteries are widely patent. The lung bases show unchanged subsegmental atelectasis, with trace low-density pleural effusions. Numerous hypoenhancing hepatic masses reflecting hepatic metastatic disease are unchanged, with hyperdense vicarious excretion of contrast material within the gallbladder, and trace perihepatic ascites. The spleen has normal heterogeneous arterial phase enhancement, with unchanged hypoenhancing pancreatic mass involving the pancreatic body, and dilated main pancreatic duct. The kidneys enhance normally, with infrarenal IVC filter present. The visualized small bowel and colon show no new abnormalities, with numerous varices area noted in the upper abdomen. IMPRESSION: 1. Negative for active arterial bleeding, aneurysm, or other significant arterial vascular abnormality related to the pancreatic mass. 2. Segmental of the 50% narrowing of proximal splenic artery. 3. Additional observations as described. Electronically signed by:  Brennen Goff MD  12/1/2022 12:20 PM CST Workstation: [id number]    CT Abdomen Pelvis With Contrast    Result Date: 11/23/2022  All CT scans at this facility used dose modulation, iterative reconstruction and/or weight-based dosing when appropriate to reduce radiation doses  as low as reasonably achievable. HISTORY: Liver mass, abdominal pain FINDINGS: Axial postcontrast imaging was performed with 100 mL Omnipaque 350 IV contrast . Multiphase imaging through the liver. COMPARISON: 10/30/2019 CT ABDOMEN: The lung bases are clear. There is a 2.4 cm pseudoaneurysm at the ventricular apex Liver: There are numerous hypodense masses throughout both lobes of the liver compatible with metastatic disease. The largest is within  segment five measuring 3.6 x 3.5 cm. The largest lesion within the lateral segment the left lobe measures 2 cm. There are multiple cysts within both lobes of the liver. There is no biliary duct dilatation. There is a 4.5 x 4.1 x 3.7 cm (AP, craniocaudal, transverse mass arising from the proximal body of the pancreas extending superiorly. The mass abuts the proximal splenic artery and common hepatic artery. The vessels are patent. Findings are compatible with pancreatic malignancy. There is dilatation of the distal pancreatic duct. The superior mesenteric artery and vein are patent The spleen, gallbladder, and adrenal glands are normal. The kidneys enhance symmetrically without hydronephrosis or calculi. There is contrast material within the collecting system and ureters. There are no thick-walled or dilated bowel loops. There is no mesenteric or retroperitoneal adenopathy. Aorta is normal in caliber. The musculature is normal. CT PELVIS: The bladder is normal. There are multiple uterine fibroids the largest measuring 2.8 cm on the left. There is no pelvic adenopathy. There are no acute osseous abnormalities. IMPRESSION: 4.8 x 4.1 x 3.7 cm heterogeneous mass arising from the proximal body of the pancreas resulting in dilatation of the pancreatic duct compatible with pancreatic malignancy. Numerous hepatic metastasis with minimal ascites around the liver Electronically signed by:  Inna Pratt MD  11/23/2022 11:05 AM CST Workstation: [id number]    X-Ray Chest AP Portable    Result Date: 11/23/2022  XR CHEST 1 VIEW CLINICAL HISTORY: 56 years Female Chest Pain COMPARISON: 11/18/2022 FINDINGS: Cardiomediastinal silhouette is within normal limits. Lungs are normally expanded with no airspace consolidation. No pleural effusion or pneumothorax. No acute osseous abnormality. IMPRESSION: No acute pulmonary process. Electronically signed by:  Inna Pratt MD  11/23/2022 9:04 AM CST Workstation: [id number]    [facility]  Extremity Veins Bilateral    Result Date: 11/30/2022   ADDENDUM #1 Findings discussed with Dr. Levine at 11:10 PM. Electronically signed by:  Jose Alfredo Vee MD  11/30/2022 1:43 AM CST Workstation: [id number]  ORIGINAL REPORT US LOWER EXTREMITY VEINS BILATERAL ADDITIONAL PERTINENT HISTORY:  Pain in legs with possible DVT.. COMPARISON STUDIES:   None. FINDINGS: Grayscale compression, duplex and color Doppler interrogation of the bilateral lower extremity deep veins from common femoral vein to proximal calf was performed. The greater saphenous vein in the ipsilateral proximal thigh was evaluated using similar technique. Bilateral lower extremity: Common femoral vein:  Negative. Femoral vein:  Echogenic material with lack of flow and lack of compressibility within the distal left superficial femoral vein. Deep femoral vein:  Negative. Popliteal vein:  Echogenic material and lack of flow within the left popliteal vein with lack of compressibility. Visualized deep calf veins  echogenic material with lack of compressibility and lack of flow within the right posterior tibial vein as well as the left posterior tibial vein, left anterior tibial vein in the left peroneal vein. Greater saphenous vein in the proximal thigh:  Negative. Popliteal fossa: negative IMPRESSION: 1. Deep venous thrombosis involving the left distal superficial femoral vein as well as the left popliteal vein. 2. Venous thrombosis involving the calf veins bilaterally. Electronically signed by:  Jose Alfredo Vee MD  11/29/2022 10:54 PM CST Workstation: [id number]    Cardiac catheterization    Result Date: 12/2/2022  Procedure performed in the Invasive Lab  - See Procedure Log link below for nursing documentation  - See OpNote on Surgeries Tab for physician findings  - See Imaging Tab for radiologist dictation    Medications:  Reconciled Home Medications:      Medication List      START taking these medications    fentaNYL 25 mcg/hr  Commonly known as:  DURAGESIC  Place 1 patch onto the skin every 72 hours. for 7 days  Start taking on: December 4, 2022     ondansetron 4 MG Tbdl  Commonly known as: ZOFRAN-ODT  Take 2 tablets (8 mg total) by mouth every 8 (eight) hours as needed (nausea).     polyethylene glycol 17 gram Pwpk  Commonly known as: GLYCOLAX  Take 17 g by mouth once daily.        CHANGE how you take these medications    esomeprazole 40 MG capsule  Commonly known as: NEXIUM  Take 1 capsule (40 mg total) by mouth before breakfast. Take 30 minutes before breakfast on empty stomach  What changed:   · when to take this  · additional instructions     sucralfate 1 gram tablet  Commonly known as: CARAFATE  Take 1 tablet (1 g total) by mouth 2 (two) times daily as needed (reflux, dyspepsia).  What changed:   · when to take this  · reasons to take this        CONTINUE taking these medications    atorvastatin 10 MG tablet  Commonly known as: LIPITOR  Take 1 tablet (10 mg total) by mouth every evening.     HYDROmorphone 4 MG tablet  Commonly known as: DILAUDID  Take 4 mg by mouth every 4 (four) hours as needed for Pain.     levothyroxine 50 MCG tablet  Commonly known as: SYNTHROID  Take 50 mcg by mouth every evening. UNITHROID     lipase-protease-amylase 12,000-38,000-60,000 units Cpdr  Commonly known as: CREON  Take 2 po with meals.  Take 1 po with snacks.        STOP taking these medications    albuterol 90 mcg/actuation inhaler  Commonly known as: PROVENTIL/VENTOLIN HFA     apixaban 2.5 mg Tab  Commonly known as: ELIQUIS     ondansetron 8 MG tablet  Commonly known as: ZOFRAN            Indwelling Lines/Drains at time of discharge:   Lines/Drains/Airways     None                 Time spent on the discharge of patient: 32 minutes         Nida Linder MD  Department of Hospital Medicine  [facility]

## 2022-12-03 NOTE — PLAN OF CARE
Patient cleared for discharge from case management standpoint.         12/03/22 1050   Final Note   Assessment Type Final Discharge Note   Anticipated Discharge Disposition Home   What phone number can be called within the next 1-3 days to see how you are doing after discharge? [phone]   Hospital Resources/Appts/Education Provided Appointments scheduled and added to AVS;Provided patient/caregiver with written discharge plan information;Provided education on problems/symptoms using teachback

## 2022-12-03 NOTE — PROGRESS NOTES
[facility]   Hematology/Oncology  Inpatient Progress Note          Patient Name: Brunilda Bob  MRN: [id number]  Admission Date: 11/29/2022  Hospital Length of Stay: 3 days  Code Status: Prior   Attending Provider: Nida Linder MD  Consulting Provider: Justice Caldera MD  Primary Care Physician: Sadiq Titus MD  Principal Problem:Upper GI bleed        Coverage for Dr YANICK Toribio        Subjective:       Patient ID: Brunilda Bob is a 57 y.o. female.    Chief Complaint: Bleeding/Bruising (Had EUS and bleeding, admit for monitoring)        History Present Illness:    Patient sitting up in bed; she is awake and alert; she has some residual RUQ discomfort and occasional nausea especially when pain gets bad; low appetite;  is at bedside; discussed with floor staff      Review of Systems:  GEN: normal without any fever, night sweats or weight loss  HEENT: normal with no HA's, sore throat, stiff neck, changes in vision  CV: normal with no CP, SOB, PND, LEONARD or orthopnea  PULM: normal with no SOB, cough, hemoptysis, sputum or pleuritic pain  GI: RUQ abdominal pain, nausea   : normal with no hematuria, dysuria  BREAST: normal with no mass, discharge, pain  SKIN: normal with no rash, erythema, bruising, or swelling      Objective:     Vitals:  Blood pressure 127/77, pulse 79, temperature 98.2 °F (36.8 °C), temperature source Oral, resp. rate 18, height 5' (1.524 m), weight 76.2 kg (167 lb 15.9 oz), SpO2 100 %, not currently breastfeeding.    Physical Exam:  GEN: no apparent distress, comfortable; AAOx3  HEAD: atraumatic and normocephalic  EYES: no pallor, no icterus, PERRLA  ENT: OMM, no pharyngeal erythema, external ears WNL; no nasal discharge; no thrush  NECK: no masses, thyroid normal, trachea midline, no LAD/LN's, supple  CV: RRR with no murmur; normal pulse; normal S1 and S2; no pedal edema  CHEST: Normal respiratory effort; CTAB; normal breath sounds; no  wheeze or crackles; O2 per NC  ABDOM: some mild, RUQ  tenderness to palpation and nondistended; soft; normal bowel sounds; no rebound/guarding  MUSC/Skeletal: ROM normal; no crepitus; joints normal; no deformities or arthropathy  EXTREM: no clubbing, cyanosis, inflammation or swelling; IV's  SKIN: no rashes, lesions, ulcers, petechiae or subcutaneous nodules  : no french  NEURO: grossly intact; motor/sensory WNL; AAOx3; no tremors  PSYCH: normal mood, affect and behavior  LYMPH: normal cervical, supraclavicular, axillary and groin LN's            Lab Review:        Lab Results   Component Value Date    WBC 9.94 12/01/2022    HGB 9.0 (L) 12/03/2022    HCT 29.1 (L) 12/03/2022    MCV 86 12/01/2022     12/01/2022       CMP  Sodium   Date Value Ref Range Status   12/01/2022 134 (L) 136 - 145 mmol/L Final     Potassium   Date Value Ref Range Status   12/01/2022 4.0 3.5 - 5.1 mmol/L Final     Chloride   Date Value Ref Range Status   12/01/2022 97 95 - 110 mmol/L Final     CO2   Date Value Ref Range Status   12/01/2022 30 (H) 23 - 29 mmol/L Final     Glucose   Date Value Ref Range Status   12/01/2022 104 70 - 110 mg/dL Final     BUN   Date Value Ref Range Status   12/01/2022 14 6 - 20 mg/dL Final     Creatinine   Date Value Ref Range Status   12/01/2022 0.6 0.5 - 1.4 mg/dL Final     Calcium   Date Value Ref Range Status   12/01/2022 8.2 (L) 8.7 - 10.5 mg/dL Final     Total Protein   Date Value Ref Range Status   11/30/2022 5.7 (L) 6.0 - 8.4 g/dL Final     Albumin   Date Value Ref Range Status   11/30/2022 2.7 (L) 3.5 - 5.2 g/dL Final     Total Bilirubin   Date Value Ref Range Status   11/30/2022 1.1 (H) 0.1 - 1.0 mg/dL Final     Comment:     For infants and newborns, interpretation of results should be based  on gestational age, weight and in agreement with clinical  observations.    Premature Infant recommended reference ranges:  Up to 24 hours.............<8.0 mg/dL  Up to 48 hours............<12.0 mg/dL  3-5  days..................<15.0 mg/dL  6-29 days.................<15.0 mg/dL       Alkaline Phosphatase   Date Value Ref Range Status   11/30/2022 188 (H) 55 - 135 U/L Final     AST   Date Value Ref Range Status   11/30/2022 23 10 - 40 U/L Final     ALT   Date Value Ref Range Status   11/30/2022 21 10 - 44 U/L Final     Anion Gap   Date Value Ref Range Status   12/01/2022 7 (L) 8 - 16 mmol/L Final     eGFR   Date Value Ref Range Status   12/01/2022 >60.0 >60 mL/min/1.73 m^2 Final           Radiology Diagnostic Studies:           Assessment:     IMPRESSION:    (1) 57 y.o. 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 Liver lesion, multiple     2. Malignant neoplasm of pancreas    3. Chest pain    4. Acute deep vein thrombosis (DVT) of femoral vein, unspecified laterality    5. GI bleed    6. 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 Acute deep vein thrombosis (DVT) of femoral vein of both lower extremities [I82.413 (ICD-10-CM)]    8. Acute blood loss anemia [D62 (ICD-10-CM)]    9. Pain of upper abdomen           Plan:     PLAN:          Monitor labs and transfuse as needed  Planned outpatient XRT per direction of Dr Yepez  GI as per Dr Saucedo  Will follow with you - expected d/c in near future (today) - f/u with Dr Toribio and Dr Yepez as outpatient next week as planned               Justice Caldera MD  Hematology/Oncology  [facility]

## 2022-12-03 NOTE — PLAN OF CARE
Problem: Adult Inpatient Plan of Care  Goal: Plan of Care Review  Outcome: Met  Goal: Patient-Specific Goal (Individualized)  Outcome: Met  Goal: Absence of Hospital-Acquired Illness or Injury  Outcome: Met  Goal: Optimal Comfort and Wellbeing  Outcome: Met  Goal: Readiness for Transition of Care  Outcome: Met     Problem: Fall Injury Risk  Goal: Absence of Fall and Fall-Related Injury  Outcome: Met     Problem: Adjustment to Illness (Gastrointestinal Bleeding)  Goal: Optimal Coping with Acute Illness  Outcome: Met     Problem: Bleeding (Gastrointestinal Bleeding)  Goal: Hemostasis  Outcome: Met     Problem: Pain Acute  Goal: Acceptable Pain Control and Functional Ability  Outcome: Met

## 2022-12-05 ENCOUNTER — PATIENT OUTREACH (OUTPATIENT)
Dept: FAMILY MEDICINE | Facility: CLINIC | Age: 57
End: 2022-12-05

## 2022-12-05 ENCOUNTER — TELEPHONE (OUTPATIENT)
Dept: FAMILY MEDICINE | Facility: CLINIC | Age: 57
End: 2022-12-05

## 2022-12-05 ENCOUNTER — LAB VISIT (OUTPATIENT)
Dept: LAB | Facility: HOSPITAL | Age: 57
End: 2022-12-05
Attending: INTERNAL MEDICINE
Payer: COMMERCIAL

## 2022-12-05 ENCOUNTER — TELEPHONE (OUTPATIENT)
Dept: HEMATOLOGY/ONCOLOGY | Facility: CLINIC | Age: 57
End: 2022-12-05

## 2022-12-05 ENCOUNTER — TREATMENT (OUTPATIENT)
Dept: RADIATION ONCOLOGY | Facility: CLINIC | Age: 57
End: 2022-12-05
Payer: COMMERCIAL

## 2022-12-05 ENCOUNTER — DOCUMENTATION ONLY (OUTPATIENT)
Dept: RADIATION ONCOLOGY | Facility: CLINIC | Age: 57
End: 2022-12-05

## 2022-12-05 VITALS — HEART RATE: 113 BPM | DIASTOLIC BLOOD PRESSURE: 73 MMHG | SYSTOLIC BLOOD PRESSURE: 108 MMHG

## 2022-12-05 DIAGNOSIS — C25.9 PANCREATIC CANCER METASTASIZED TO LIVER: ICD-10-CM

## 2022-12-05 DIAGNOSIS — C78.7 PANCREATIC CANCER METASTASIZED TO LIVER: Primary | ICD-10-CM

## 2022-12-05 DIAGNOSIS — C25.9 PANCREATIC CANCER METASTASIZED TO LIVER: Primary | ICD-10-CM

## 2022-12-05 DIAGNOSIS — C78.7 PANCREATIC CANCER METASTASIZED TO LIVER: ICD-10-CM

## 2022-12-05 LAB
ALBUMIN SERPL BCP-MCNC: 3.3 G/DL (ref 3.5–5.2)
ALP SERPL-CCNC: 257 U/L (ref 55–135)
ALT SERPL W/O P-5'-P-CCNC: 30 U/L (ref 10–44)
ANION GAP SERPL CALC-SCNC: 12 MMOL/L (ref 8–16)
AST SERPL-CCNC: 45 U/L (ref 10–40)
BASOPHILS # BLD AUTO: 0.06 K/UL (ref 0–0.2)
BASOPHILS NFR BLD: 0.4 % (ref 0–1.9)
BILIRUB SERPL-MCNC: 1.3 MG/DL (ref 0.1–1)
BUN SERPL-MCNC: 14 MG/DL (ref 6–20)
CALCIUM SERPL-MCNC: 8.9 MG/DL (ref 8.7–10.5)
CHLORIDE SERPL-SCNC: 93 MMOL/L (ref 95–110)
CO2 SERPL-SCNC: 29 MMOL/L (ref 23–29)
CREAT SERPL-MCNC: 0.9 MG/DL (ref 0.5–1.4)
DIFFERENTIAL METHOD: ABNORMAL
EOSINOPHIL # BLD AUTO: 0 K/UL (ref 0–0.5)
EOSINOPHIL NFR BLD: 0.3 % (ref 0–8)
ERYTHROCYTE [DISTWIDTH] IN BLOOD BY AUTOMATED COUNT: 14.9 % (ref 11.5–14.5)
EST. GFR  (NO RACE VARIABLE): >60 ML/MIN/1.73 M^2
GLUCOSE SERPL-MCNC: 134 MG/DL (ref 70–110)
HCT VFR BLD AUTO: 30.1 % (ref 37–48.5)
HGB BLD-MCNC: 9.3 G/DL (ref 12–16)
IMM GRANULOCYTES # BLD AUTO: 0.1 K/UL (ref 0–0.04)
IMM GRANULOCYTES NFR BLD AUTO: 0.7 % (ref 0–0.5)
LYMPHOCYTES # BLD AUTO: 0.8 K/UL (ref 1–4.8)
LYMPHOCYTES NFR BLD: 6.1 % (ref 18–48)
MCH RBC QN AUTO: 26.4 PG (ref 27–31)
MCHC RBC AUTO-ENTMCNC: 30.9 G/DL (ref 32–36)
MCV RBC AUTO: 86 FL (ref 82–98)
MONOCYTES # BLD AUTO: 0.7 K/UL (ref 0.3–1)
MONOCYTES NFR BLD: 5.4 % (ref 4–15)
NEUTROPHILS # BLD AUTO: 11.7 K/UL (ref 1.8–7.7)
NEUTROPHILS NFR BLD: 87.1 % (ref 38–73)
NRBC BLD-RTO: 0 /100 WBC
PLATELET # BLD AUTO: 283 K/UL (ref 150–450)
PMV BLD AUTO: 9.8 FL (ref 9.2–12.9)
POTASSIUM SERPL-SCNC: 3.9 MMOL/L (ref 3.5–5.1)
PROT SERPL-MCNC: 6.6 G/DL (ref 6–8.4)
RBC # BLD AUTO: 3.52 M/UL (ref 4–5.4)
SODIUM SERPL-SCNC: 134 MMOL/L (ref 136–145)
WBC # BLD AUTO: 13.44 K/UL (ref 3.9–12.7)

## 2022-12-05 PROCEDURE — 36415 COLL VENOUS BLD VENIPUNCTURE: CPT | Performed by: INTERNAL MEDICINE

## 2022-12-05 PROCEDURE — 77334 RADIATION TREATMENT AID(S): CPT | Mod: S$GLB,,, | Performed by: RADIOLOGY

## 2022-12-05 PROCEDURE — 85025 COMPLETE CBC W/AUTO DIFF WBC: CPT | Performed by: INTERNAL MEDICINE

## 2022-12-05 PROCEDURE — 77334 PR  RADN TREATMENT AID(S) COMPLX: ICD-10-PCS | Mod: S$GLB,,, | Performed by: RADIOLOGY

## 2022-12-05 PROCEDURE — 77399 UNLISTED PX MED RADJ PHYSICS: CPT | Mod: S$GLB,,, | Performed by: RADIOLOGY

## 2022-12-05 PROCEDURE — 77399 PR IMAGE FUSION, RADIATION THERAPY: ICD-10-PCS | Mod: S$GLB,,, | Performed by: RADIOLOGY

## 2022-12-05 PROCEDURE — 77300 RADIATION THERAPY DOSE PLAN: CPT | Mod: S$GLB,,, | Performed by: RADIOLOGY

## 2022-12-05 PROCEDURE — 77263 THER RADIOLOGY TX PLNG CPLX: CPT | Mod: S$GLB,,, | Performed by: RADIOLOGY

## 2022-12-05 PROCEDURE — 77263 PR  RADIATION THERAPY PLAN COMPLEX: ICD-10-PCS | Mod: S$GLB,,, | Performed by: RADIOLOGY

## 2022-12-05 PROCEDURE — 77300 PR RADIATION THERAPY,DOSIMETRY PLAN: ICD-10-PCS | Mod: S$GLB,,, | Performed by: RADIOLOGY

## 2022-12-05 PROCEDURE — 77290 PR  SET RADN THERAPY FIELD COMPLEX: ICD-10-PCS | Mod: S$GLB,,, | Performed by: RADIOLOGY

## 2022-12-05 PROCEDURE — 77295 PR 3D RADIOTHERAPY PLAN: ICD-10-PCS | Mod: S$GLB,,, | Performed by: RADIOLOGY

## 2022-12-05 PROCEDURE — 77290 THER RAD SIMULAJ FIELD CPLX: CPT | Mod: S$GLB,,, | Performed by: RADIOLOGY

## 2022-12-05 PROCEDURE — 77295 3-D RADIOTHERAPY PLAN: CPT | Mod: S$GLB,,, | Performed by: RADIOLOGY

## 2022-12-05 PROCEDURE — 80053 COMPREHEN METABOLIC PANEL: CPT | Performed by: INTERNAL MEDICINE

## 2022-12-05 NOTE — TELEPHONE ENCOUNTER
Pt states she has radiation scheduled tomorrow. Appt with Dr. Titus confirmed. Call charted. Pt states she is doing well

## 2022-12-05 NOTE — PROGRESS NOTES
CTA abdomen (-). No IR embolization planned.    Palliative RT SIM 12/5. 30Gy/10frx RT followed by chemotherapy.    Discussed with Cyndi Moore [facility].

## 2022-12-05 NOTE — TELEPHONE ENCOUNTER
Pt states she was prescribed Fentanyl but it needs a PA she wants to know if Dr. Titus will do a PA. Printed for Dr. Titus.

## 2022-12-05 NOTE — PROGRESS NOTES
Discharge Information     Discharge Date:   12/3/2022    Primary Discharge Diagnosis:    Pancreatic adenocarcinoma     Discharge Summary:  Reviewed      Medication & Order Review     Were medication changes made or new medications added?   Yes    If so, has the patient filled the prescriptions?  Yes     Was Home Health ordered? No    If so, has Home Health contacted patient and/or initiated services?  No    Name of Home Health Agency? N/A    Durable Medical Equipment ordered?  No     If so, has the DME provider contacted patient and delivered equipment?  N/A    Follow Up               Any problems since discharge? No    How is the patient feeling since returning home?     Have you set up recommended follow up appointments?  Yes    Schedule Hospital Follow-up appointment within 7-14 days (preferably 7).      Notes:  Pt states she has radiation scheduled tomorrow. Appt with Dr. Titus confirmed. Call charted. Pt states she is doing well.          [place]

## 2022-12-08 ENCOUNTER — TELEPHONE (OUTPATIENT)
Dept: FAMILY MEDICINE | Facility: CLINIC | Age: 57
End: 2022-12-08

## 2022-12-08 ENCOUNTER — OFFICE VISIT (OUTPATIENT)
Dept: FAMILY MEDICINE | Facility: CLINIC | Age: 57
End: 2022-12-08
Payer: COMMERCIAL

## 2022-12-08 DIAGNOSIS — E78.5 DYSLIPIDEMIA: ICD-10-CM

## 2022-12-08 DIAGNOSIS — I82.411 ACUTE DEEP VEIN THROMBOSIS (DVT) OF FEMORAL VEIN OF RIGHT LOWER EXTREMITY: ICD-10-CM

## 2022-12-08 DIAGNOSIS — I82.403 ACUTE DEEP VEIN THROMBOSIS (DVT) OF BOTH LOWER EXTREMITIES, UNSPECIFIED VEIN: ICD-10-CM

## 2022-12-08 DIAGNOSIS — E03.9 ACQUIRED HYPOTHYROIDISM: Chronic | ICD-10-CM

## 2022-12-08 DIAGNOSIS — I10 PRIMARY HYPERTENSION: ICD-10-CM

## 2022-12-08 DIAGNOSIS — C78.7 METASTASIS TO LIVER: ICD-10-CM

## 2022-12-08 DIAGNOSIS — I25.3 LEFT VENTRICULAR ANEURYSM: ICD-10-CM

## 2022-12-08 DIAGNOSIS — Z09 HOSPITAL DISCHARGE FOLLOW-UP: ICD-10-CM

## 2022-12-08 DIAGNOSIS — C25.9 PANCREATIC ADENOCARCINOMA: Primary | Chronic | ICD-10-CM

## 2022-12-08 PROCEDURE — 1159F MED LIST DOCD IN RCRD: CPT | Mod: CPTII,S$GLB,, | Performed by: FAMILY MEDICINE

## 2022-12-08 PROCEDURE — 99495 TCM SERVICES (MODERATE COMPLEXITY): ICD-10-PCS | Mod: S$GLB,,, | Performed by: FAMILY MEDICINE

## 2022-12-08 PROCEDURE — 4010F ACE/ARB THERAPY RXD/TAKEN: CPT | Mod: CPTII,S$GLB,, | Performed by: FAMILY MEDICINE

## 2022-12-08 PROCEDURE — 3044F PR MOST RECENT HEMOGLOBIN A1C LEVEL <7.0%: ICD-10-PCS | Mod: CPTII,S$GLB,, | Performed by: FAMILY MEDICINE

## 2022-12-08 PROCEDURE — 1159F PR MEDICATION LIST DOCUMENTED IN MEDICAL RECORD: ICD-10-PCS | Mod: CPTII,S$GLB,, | Performed by: FAMILY MEDICINE

## 2022-12-08 PROCEDURE — 3044F HG A1C LEVEL LT 7.0%: CPT | Mod: CPTII,S$GLB,, | Performed by: FAMILY MEDICINE

## 2022-12-08 PROCEDURE — 99495 TRANSJ CARE MGMT MOD F2F 14D: CPT | Mod: S$GLB,,, | Performed by: FAMILY MEDICINE

## 2022-12-08 PROCEDURE — 4010F PR ACE/ARB THEARPY RXD/TAKEN: ICD-10-PCS | Mod: CPTII,S$GLB,, | Performed by: FAMILY MEDICINE

## 2022-12-08 NOTE — TELEPHONE ENCOUNTER
----- Message from Alycia Meng sent at 12/8/2022  8:58 AM CST -----  PA for fentanyl patches has been approved for 12/7/22 - 12/6/23. Pharmacy states they can not break a box so a new RX needs to be sent to the pharmacy with qty 5. Thank you

## 2022-12-09 RX ORDER — FENTANYL 25 UG/1
1 PATCH TRANSDERMAL
Qty: 5 PATCH | Refills: 0 | Status: SHIPPED | OUTPATIENT
Start: 2022-12-09 | End: 2022-12-16

## 2022-12-10 NOTE — PROGRESS NOTES
SUBJECTIVE:    Patient ID: Brunilda Bob is a 57 y.o. female.    Chief Complaint: Hospital Follow Up (Pancreatic adenocarcinoma, metastasis to liver, LLQ, RLQ,RUQ,LUQ the 4 quadrants pain,pt loss conscience and pass out 2 weeks ago, pt started radio therapy, abc )    This 57-year-old female was found to have pancreatic cancer.  It started when she passed out at home.  After hospital admissions CT scans were done and found a pancreatic tumor and metastases to the liver.  Dr. Saucedo performed EUS and found 4.8 x 3.7 cm mass in the head of her pancreas.  She had to be transfused with 4 units of blood due to severe anemia.  There were also DVT in her lower legs and small pulmonary emboli in the lungs.  She was unable to take blood thinners due to her anemia, so IVC filter placement was done in her  vena cava.    Currently she is undergoing 10 radiation treatments to shrink the tumor with Dr. Yepez.  Chemotherapy is being planned by Dr. Parth Toribio.  Weekly labs are being drawn.    She is uncomfortable all the time.  She has abdominal pain radiating through to her back she has belching and decreased appetite.  She can only eat small meals and drink smoothies.  She has constipation and uses MiraLax.    She is been taken off her blood pressure medications due to an aneurysm found in the left ventricle of her heart.  Pain is managed with hydromorphone 2 mg and Zofran for nausea.  Nexium and Carafate for GERD.  Creon 2 tablets with meals for digestion.    She may need a intra-abdominal nerve block with Dr. Saucedo to relieve the pain in the bed of the pancreas.      Admit Date: 11/29/22   Discharge Date: 12/3/22  Discharge Facility: Hospital    Medication Reconciliation:  Medications changed/added/deleted.  Dilaudid 2 mg for pain Zofran for nausea Nexium and Carafate for GERD Creon for digestion  New Prescriptions filled after discharge: yes  Discharge summary reviewed:  yes  Pending test results at  discharge reviewed:   yes  Follow up appointments scheduled:  yes              with Gastroenterology   Follow up labs/tests ordered:   yes  Home Health ordered on discharge:   no  Home Health company name:   DME ordered at discharge:   not applicable  How patient is feeling since discharge from the hospital?  Patient still having significant abdominal pain.     Patient follow up phone call documented on separate encounter.      Lab Visit on 12/05/2022   Component Date Value Ref Range Status    WBC 12/05/2022 13.44 (H)  3.90 - 12.70 K/uL Final    RBC 12/05/2022 3.52 (L)  4.00 - 5.40 M/uL Final    Hemoglobin 12/05/2022 9.3 (L)  12.0 - 16.0 g/dL Final    Hematocrit 12/05/2022 30.1 (L)  37.0 - 48.5 % Final    MCV 12/05/2022 86  82 - 98 fL Final    MCH 12/05/2022 26.4 (L)  27.0 - 31.0 pg Final    MCHC 12/05/2022 30.9 (L)  32.0 - 36.0 g/dL Final    RDW 12/05/2022 14.9 (H)  11.5 - 14.5 % Final    Platelets 12/05/2022 283  150 - 450 K/uL Final    MPV 12/05/2022 9.8  9.2 - 12.9 fL Final    Immature Granulocytes 12/05/2022 0.7 (H)  0.0 - 0.5 % Final    Gran # (ANC) 12/05/2022 11.7 (H)  1.8 - 7.7 K/uL Final    Immature Grans (Abs) 12/05/2022 0.10 (H)  0.00 - 0.04 K/uL Final    Lymph # 12/05/2022 0.8 (L)  1.0 - 4.8 K/uL Final    Mono # 12/05/2022 0.7  0.3 - 1.0 K/uL Final    Eos # 12/05/2022 0.0  0.0 - 0.5 K/uL Final    Baso # 12/05/2022 0.06  0.00 - 0.20 K/uL Final    nRBC 12/05/2022 0  0 /100 WBC Final    Gran % 12/05/2022 87.1 (H)  38.0 - 73.0 % Final    Lymph % 12/05/2022 6.1 (L)  18.0 - 48.0 % Final    Mono % 12/05/2022 5.4  4.0 - 15.0 % Final    Eosinophil % 12/05/2022 0.3  0.0 - 8.0 % Final    Basophil % 12/05/2022 0.4  0.0 - 1.9 % Final    Differential Method 12/05/2022 Automated   Final    Sodium 12/05/2022 134 (L)  136 - 145 mmol/L Final    Potassium 12/05/2022 3.9  3.5 - 5.1 mmol/L Final    Chloride 12/05/2022 93 (L)  95 - 110 mmol/L Final    CO2 12/05/2022 29  23 - 29 mmol/L Final    Glucose 12/05/2022 134 (H)  70 -  110 mg/dL Final    BUN 12/05/2022 14  6 - 20 mg/dL Final    Creatinine 12/05/2022 0.9  0.5 - 1.4 mg/dL Final    Calcium 12/05/2022 8.9  8.7 - 10.5 mg/dL Final    Total Protein 12/05/2022 6.6  6.0 - 8.4 g/dL Final    Albumin 12/05/2022 3.3 (L)  3.5 - 5.2 g/dL Final    Total Bilirubin 12/05/2022 1.3 (H)  0.1 - 1.0 mg/dL Final    Alkaline Phosphatase 12/05/2022 257 (H)  55 - 135 U/L Final    AST 12/05/2022 45 (H)  10 - 40 U/L Final    ALT 12/05/2022 30  10 - 44 U/L Final    Anion Gap 12/05/2022 12  8 - 16 mmol/L Final    eGFR 12/05/2022 >60.0  >60 mL/min/1.73 m^2 Final   Admission on 11/29/2022, Discharged on 12/03/2022   Component Date Value Ref Range Status    WBC 11/29/2022 13.22 (H)  3.90 - 12.70 K/uL Final    RBC 11/29/2022 2.64 (L)  4.00 - 5.40 M/uL Final    Hemoglobin 11/29/2022 7.2 (L)  12.0 - 16.0 g/dL Final    Hematocrit 11/29/2022 22.4 (L)  37.0 - 48.5 % Final    MCV 11/29/2022 85  82 - 98 fL Final    MCH 11/29/2022 27.3  27.0 - 31.0 pg Final    MCHC 11/29/2022 32.1  32.0 - 36.0 g/dL Final    RDW 11/29/2022 14.3  11.5 - 14.5 % Final    Platelets 11/29/2022 264  150 - 450 K/uL Final    MPV 11/29/2022 10.7  9.2 - 12.9 fL Final    Group & Rh 11/29/2022 A POS   Final    Indirect Surya 11/29/2022 NEG   Final    UNIT NUMBER 11/29/2022 [id number]   Final    Product Code 11/29/2022 [id number]   Final    DISPENSE STATUS 11/29/2022 TRANSFUSED   Final    CODING SYSTEM 11/29/2022 [id number]   Final    Unit Blood Type Code 11/29/2022 6200   Final    Unit Blood Type 11/29/2022 A POS   Final    Unit Expiration 11/29/2022 [id number]   Final    Hemoglobin 11/29/2022 8.3 (L)  12.0 - 16.0 g/dL Final    Hematocrit 11/29/2022 26.7 (L)  37.0 - 48.5 % Final    Hemoglobin 11/30/2022 7.9 (L)  12.0 - 16.0 g/dL Final    Hematocrit 11/30/2022 24.3 (L)  37.0 - 48.5 % Final    Hemoglobin 11/30/2022 8.2 (L)  12.0 - 16.0 g/dL Final    Hematocrit 11/30/2022 26.3 (L)  37.0 - 48.5 % Final    WBC 11/30/2022 11.06  3.90 - 12.70 K/uL Final     RBC 11/30/2022 3.11 (L)  4.00 - 5.40 M/uL Final    Hemoglobin 11/30/2022 8.4 (L)  12.0 - 16.0 g/dL Final    Hematocrit 11/30/2022 26.6 (L)  37.0 - 48.5 % Final    MCV 11/30/2022 86  82 - 98 fL Final    MCH 11/30/2022 27.0  27.0 - 31.0 pg Final    MCHC 11/30/2022 31.6 (L)  32.0 - 36.0 g/dL Final    RDW 11/30/2022 14.8 (H)  11.5 - 14.5 % Final    Platelets 11/30/2022 261  150 - 450 K/uL Final    MPV 11/30/2022 10.9  9.2 - 12.9 fL Final    Immature Granulocytes 11/30/2022 0.5  0.0 - 0.5 % Final    Gran # (ANC) 11/30/2022 9.0 (H)  1.8 - 7.7 K/uL Final    Immature Grans (Abs) 11/30/2022 0.05 (H)  0.00 - 0.04 K/uL Final    Lymph # 11/30/2022 0.9 (L)  1.0 - 4.8 K/uL Final    Mono # 11/30/2022 0.7  0.3 - 1.0 K/uL Final    Eos # 11/30/2022 0.4  0.0 - 0.5 K/uL Final    Baso # 11/30/2022 0.02  0.00 - 0.20 K/uL Final    nRBC 11/30/2022 0  0 /100 WBC Final    Gran % 11/30/2022 81.5 (H)  38.0 - 73.0 % Final    Lymph % 11/30/2022 8.2 (L)  18.0 - 48.0 % Final    Mono % 11/30/2022 6.4  4.0 - 15.0 % Final    Eosinophil % 11/30/2022 3.2  0.0 - 8.0 % Final    Basophil % 11/30/2022 0.2  0.0 - 1.9 % Final    Differential Method 11/30/2022 Automated   Final    Sodium 11/30/2022 132 (L)  136 - 145 mmol/L Final    Potassium 11/30/2022 3.7  3.5 - 5.1 mmol/L Final    Chloride 11/30/2022 96  95 - 110 mmol/L Final    CO2 11/30/2022 30 (H)  23 - 29 mmol/L Final    Glucose 11/30/2022 102  70 - 110 mg/dL Final    BUN 11/30/2022 15  6 - 20 mg/dL Final    Creatinine 11/30/2022 0.7  0.5 - 1.4 mg/dL Final    Calcium 11/30/2022 7.9 (L)  8.7 - 10.5 mg/dL Final    Total Protein 11/30/2022 5.7 (L)  6.0 - 8.4 g/dL Final    Albumin 11/30/2022 2.7 (L)  3.5 - 5.2 g/dL Final    Total Bilirubin 11/30/2022 1.1 (H)  0.1 - 1.0 mg/dL Final    Alkaline Phosphatase 11/30/2022 188 (H)  55 - 135 U/L Final    AST 11/30/2022 23  10 - 40 U/L Final    ALT 11/30/2022 21  10 - 44 U/L Final    Anion Gap 11/30/2022 6 (L)  8 - 16 mmol/L Final    eGFR 11/30/2022 >60.0  >60  mL/min/1.73 m^2 Final    Magnesium 11/30/2022 1.9  1.6 - 2.6 mg/dL Final    PT 11/30/2022 17.7 (H)  11.4 - 13.7 sec Final    INR 11/30/2022 1.6   Final    Hemoglobin 11/30/2022 7.5 (L)  12.0 - 16.0 g/dL Final    Hematocrit 11/30/2022 24.1 (L)  37.0 - 48.5 % Final    Hemoglobin 11/30/2022 7.7 (L)  12.0 - 16.0 g/dL Final    Hematocrit 11/30/2022 25.0 (L)  37.0 - 48.5 % Final    UNIT NUMBER 11/29/2022 [id number]   Final    Product Code 11/29/2022 [id number]   Final    DISPENSE STATUS 11/29/2022 TRANSFUSED   Final    CODING SYSTEM 11/29/2022 [id number]   Final    Unit Blood Type Code 11/29/2022 6200   Final    Unit Blood Type 11/29/2022 A POS   Final    Unit Expiration 11/29/2022 202212132359   Final    WBC 12/01/2022 9.94  3.90 - 12.70 K/uL Final    RBC 12/01/2022 3.36 (L)  4.00 - 5.40 M/uL Final    Hemoglobin 12/01/2022 9.2 (L)  12.0 - 16.0 g/dL Final    Hematocrit 12/01/2022 28.9 (L)  37.0 - 48.5 % Final    MCV 12/01/2022 86  82 - 98 fL Final    MCH 12/01/2022 27.4  27.0 - 31.0 pg Final    MCHC 12/01/2022 31.8 (L)  32.0 - 36.0 g/dL Final    RDW 12/01/2022 14.5  11.5 - 14.5 % Final    Platelets 12/01/2022 236  150 - 450 K/uL Final    MPV 12/01/2022 10.5  9.2 - 12.9 fL Final    Sodium 12/01/2022 134 (L)  136 - 145 mmol/L Final    Potassium 12/01/2022 4.0  3.5 - 5.1 mmol/L Final    Chloride 12/01/2022 97  95 - 110 mmol/L Final    CO2 12/01/2022 30 (H)  23 - 29 mmol/L Final    Glucose 12/01/2022 104  70 - 110 mg/dL Final    BUN 12/01/2022 14  6 - 20 mg/dL Final    Creatinine 12/01/2022 0.6  0.5 - 1.4 mg/dL Final    Calcium 12/01/2022 8.2 (L)  8.7 - 10.5 mg/dL Final    Anion Gap 12/01/2022 7 (L)  8 - 16 mmol/L Final    eGFR 12/01/2022 >60.0  >60 mL/min/1.73 m^2 Final    Magnesium 12/01/2022 1.9  1.6 - 2.6 mg/dL Final    Phosphorus 12/01/2022 3.0  2.7 - 4.5 mg/dL Final    Hemoglobin 12/01/2022 8.9 (L)  12.0 - 16.0 g/dL Final    Hematocrit 12/01/2022 27.9 (L)  37.0 - 48.5 % Final    Vitamin B-12 12/02/2022 1223 (H)  210 - 950  pg/mL Final    Folate 12/02/2022 9.9  4.0 - 24.0 ng/mL Final    Ferritin 12/02/2022 101  20.0 - 300.0 ng/mL Final    Iron 12/02/2022 20 (L)  30 - 160 ug/dL Final    Transferrin 12/02/2022 162 (L)  200 - 375 mg/dL Final    TIBC 12/02/2022 227 (L)  250 - 450 ug/dL Final    Saturated Iron 12/02/2022 9 (L)  20 - 50 % Final    Hemoglobin 12/02/2022 9.1 (L)  12.0 - 16.0 g/dL Final    Hematocrit 12/02/2022 28.6 (L)  37.0 - 48.5 % Final    Hemoglobin 12/03/2022 9.0 (L)  12.0 - 16.0 g/dL Final    Hematocrit 12/03/2022 29.1 (L)  37.0 - 48.5 % Final   Admission on 11/23/2022, Discharged on 11/24/2022   Component Date Value Ref Range Status    WBC 11/23/2022 14.22 (H)  3.90 - 12.70 K/uL Final    RBC 11/23/2022 3.08 (L)  4.00 - 5.40 M/uL Final    Hemoglobin 11/23/2022 8.1 (L)  12.0 - 16.0 g/dL Final    Hematocrit 11/23/2022 25.3 (L)  37.0 - 48.5 % Final    MCV 11/23/2022 82  82 - 98 fL Final    MCH 11/23/2022 26.3 (L)  27.0 - 31.0 pg Final    MCHC 11/23/2022 32.0  32.0 - 36.0 g/dL Final    RDW 11/23/2022 13.3  11.5 - 14.5 % Final    Platelets 11/23/2022 235  150 - 450 K/uL Final    MPV 11/23/2022 10.4  9.2 - 12.9 fL Final    Immature Granulocytes 11/23/2022 1.2 (H)  0.0 - 0.5 % Final    Gran # (ANC) 11/23/2022 12.0 (H)  1.8 - 7.7 K/uL Final    Immature Grans (Abs) 11/23/2022 0.17 (H)  0.00 - 0.04 K/uL Final    Lymph # 11/23/2022 0.8 (L)  1.0 - 4.8 K/uL Final    Mono # 11/23/2022 0.8  0.3 - 1.0 K/uL Final    Eos # 11/23/2022 0.5  0.0 - 0.5 K/uL Final    Baso # 11/23/2022 0.01  0.00 - 0.20 K/uL Final    nRBC 11/23/2022 0  0 /100 WBC Final    Gran % 11/23/2022 84.4 (H)  38.0 - 73.0 % Final    Lymph % 11/23/2022 5.6 (L)  18.0 - 48.0 % Final    Mono % 11/23/2022 5.3  4.0 - 15.0 % Final    Eosinophil % 11/23/2022 3.4  0.0 - 8.0 % Final    Basophil % 11/23/2022 0.1  0.0 - 1.9 % Final    Differential Method 11/23/2022 Automated   Final    Sodium 11/23/2022 131 (L)  136 - 145 mmol/L Final    Potassium 11/23/2022 3.1 (L)  3.5 - 5.1 mmol/L  Final    Chloride 11/23/2022 92 (L)  95 - 110 mmol/L Final    CO2 11/23/2022 29  23 - 29 mmol/L Final    Glucose 11/23/2022 216 (H)  70 - 110 mg/dL Final    BUN 11/23/2022 25 (H)  6 - 20 mg/dL Final    Creatinine 11/23/2022 0.8  0.5 - 1.4 mg/dL Final    Calcium 11/23/2022 8.9  8.7 - 10.5 mg/dL Final    Total Protein 11/23/2022 6.3  6.0 - 8.4 g/dL Final    Albumin 11/23/2022 3.3 (L)  3.5 - 5.2 g/dL Final    Total Bilirubin 11/23/2022 0.9  0.1 - 1.0 mg/dL Final    Alkaline Phosphatase 11/23/2022 198 (H)  55 - 135 U/L Final    AST 11/23/2022 22  10 - 40 U/L Final    ALT 11/23/2022 25  10 - 44 U/L Final    Anion Gap 11/23/2022 10  8 - 16 mmol/L Final    eGFR 11/23/2022 >60.0  >60 mL/min/1.73 m^2 Final    Troponin I High Sensitivity 11/23/2022 9.0  0.0 - 14.9 pg/mL Final    BNP 11/23/2022 78  0 - 99 pg/mL Final    Group & Rh 11/23/2022 A POS   Final    Indirect Surya 11/23/2022 NEG   Final    Lipase 11/23/2022 27  4 - 60 U/L Final    Occult Blood 11/23/2022 Positive (A)  Negative Final    Influenza A, Molecular 11/23/2022 Negative  Negative Final    Influenza B, Molecular 11/23/2022 Negative  Negative Final    Flu A & B Source 11/23/2022 Nasal swab   Final    SARS-CoV-2 RNA, Amplification, Qual 11/23/2022 Negative  Negative Final    TSH 11/23/2022 1.430  0.340 - 5.600 uIU/mL Final    Blood Culture, Routine 11/23/2022 No growth after 5 days.   Final    Blood Culture, Routine 11/23/2022 No growth after 5 days.   Final    Lactate (Lactic Acid) 11/23/2022 1.9  0.5 - 1.9 mmol/L Final    Specimen UA 11/23/2022 Urine, Clean Catch   Final    Color, UA 11/23/2022 Yellow  Yellow, Straw, [place] Final    Appearance, UA 11/23/2022 Clear  Clear Final    pH, UA 11/23/2022 8.0  5.0 - 8.0 Final    Specific Gravity, UA 11/23/2022 1.015  1.005 - 1.030 Final    Protein, UA 11/23/2022 Negative  Negative Final    Glucose, UA 11/23/2022 Trace (A)  Negative Final    Ketones, UA 11/23/2022 Negative  Negative Final    Bilirubin (UA) 11/23/2022  Negative  Negative Final    Occult Blood UA 11/23/2022 Trace (A)  Negative Final    Nitrite, UA 11/23/2022 Negative  Negative Final    Urobilinogen, UA 11/23/2022 Negative  Negative EU/dL Final    Leukocytes, UA 11/23/2022 Negative  Negative Final    Troponin I High Sensitivity 11/23/2022 12.9  0.0 - 14.9 pg/mL Final    Hemoglobin A1C 11/23/2022 6.5 (H)  4.5 - 6.2 % Final    Estimated Avg Glucose 11/23/2022 140 (H)  68 - 131 mg/dL Final    PT 11/23/2022 16.3 (H)  11.4 - 13.7 sec Final    INR 11/23/2022 1.4   Final    Hemoglobin 11/23/2022 7.5 (L)  12.0 - 16.0 g/dL Final    Hematocrit 11/23/2022 24.0 (L)  37.0 - 48.5 % Final    POC Glucose 11/23/2022 134 (H)  70 - 110 Final    UNIT NUMBER 11/23/2022 [id number]   Final    Product Code 11/23/2022 [id number]   Final    DISPENSE STATUS 11/23/2022 RETURNED   Final    CODING SYSTEM 11/23/2022 [id number]   Final    Unit Blood Type Code 11/23/2022 6200   Final    Unit Blood Type 11/23/2022 A POS   Final    Unit Expiration 11/23/2022 202212132359   Final    UNIT NUMBER 11/23/2022 [id number]   Final    Product Code 11/23/2022 [id number]   Final    DISPENSE STATUS 11/23/2022 RETURNED   Final    CODING SYSTEM 11/23/2022 [id number]   Final    Unit Blood Type Code 11/23/2022 6200   Final    Unit Blood Type 11/23/2022 A POS   Final    Unit Expiration 11/23/2022 202212132359   Final    Group & Rh 11/24/2022 A POS   Final    Indirect Surya 11/24/2022 NEG   Final    Sodium 11/24/2022 131 (L)  136 - 145 mmol/L Final    Potassium 11/24/2022 3.6  3.5 - 5.1 mmol/L Final    Chloride 11/24/2022 90 (L)  95 - 110 mmol/L Final    CO2 11/24/2022 31 (H)  23 - 29 mmol/L Final    Glucose 11/24/2022 171 (H)  70 - 110 mg/dL Final    BUN 11/24/2022 20  6 - 20 mg/dL Final    Creatinine 11/24/2022 0.9  0.5 - 1.4 mg/dL Final    Calcium 11/24/2022 8.7  8.7 - 10.5 mg/dL Final    Anion Gap 11/24/2022 10  8 - 16 mmol/L Final    eGFR 11/24/2022 >60.0  >60 mL/min/1.73 m^2 Final    Magnesium 11/24/2022 1.7  1.6 -  2.6 mg/dL Final    CA 19-9 11/24/2022 5  0 - 35 U/mL Final    AFP 11/24/2022 2.1  0.0 - 9.2 ng/mL Final    Ferritin 11/24/2022 128  20.0 - 300.0 ng/mL Final    Vitamin B-12 11/24/2022 1095 (H)  210 - 950 pg/mL Final    Folate 11/24/2022 11.1  4.0 - 24.0 ng/mL Final    UNIT NUMBER 11/24/2022 [id number]   Final    Product Code 11/24/2022 [id number]   Final    DISPENSE STATUS 11/24/2022 TRANSFUSED   Final    CODING SYSTEM 11/24/2022 [id number]   Final    Unit Blood Type Code 11/24/2022 6200   Final    Unit Blood Type 11/24/2022 A POS   Final    Unit Expiration 11/24/2022 202212132359   Final    UNIT NUMBER 11/24/2022 [id number]   Final    Product Code 11/24/2022 [id number]   Final    DISPENSE STATUS 11/24/2022 TRANSFUSED   Final    CODING SYSTEM 11/24/2022 [id number]   Final    Unit Blood Type Code 11/24/2022 6200   Final    Unit Blood Type 11/24/2022 A POS   Final    Unit Expiration 11/24/2022 202212132359   Final    POC Glucose 11/24/2022 135 (H)  70 - 110 Final    WBC 11/24/2022 15.41 (H)  3.90 - 12.70 K/uL Final    RBC 11/24/2022 3.99 (L)  4.00 - 5.40 M/uL Final    Hemoglobin 11/24/2022 10.8 (L)  12.0 - 16.0 g/dL Final    Hematocrit 11/24/2022 33.2 (L)  37.0 - 48.5 % Final    MCV 11/24/2022 83  82 - 98 fL Final    MCH 11/24/2022 27.1  27.0 - 31.0 pg Final    MCHC 11/24/2022 32.5  32.0 - 36.0 g/dL Final    RDW 11/24/2022 13.4  11.5 - 14.5 % Final    Platelets 11/24/2022 222  150 - 450 K/uL Final    MPV 11/24/2022 10.6  9.2 - 12.9 fL Final    Immature Granulocytes 11/24/2022 0.7 (H)  0.0 - 0.5 % Final    Gran # (ANC) 11/24/2022 12.9 (H)  1.8 - 7.7 K/uL Final    Immature Grans (Abs) 11/24/2022 0.11 (H)  0.00 - 0.04 K/uL Final    Lymph # 11/24/2022 1.1  1.0 - 4.8 K/uL Final    Mono # 11/24/2022 0.7  0.3 - 1.0 K/uL Final    Eos # 11/24/2022 0.5  0.0 - 0.5 K/uL Final    Baso # 11/24/2022 0.01  0.00 - 0.20 K/uL Final    nRBC 11/24/2022 0  0 /100 WBC Final    Gran % 11/24/2022 83.9 (H)  38.0 - 73.0 % Final    Lymph %  11/24/2022 7.4 (L)  18.0 - 48.0 % Final    Mono % 11/24/2022 4.5  4.0 - 15.0 % Final    Eosinophil % 11/24/2022 3.4  0.0 - 8.0 % Final    Basophil % 11/24/2022 0.1  0.0 - 1.9 % Final    Differential Method 11/24/2022 Automated   Final    POC Glucose 11/24/2022 137 (H)  70 - 110 Final   Lab Visit on 10/13/2022   Component Date Value Ref Range Status    NIL 10/13/2022 0.14138  IU/mL Final    TB1 - Nil 10/13/2022 0.043  IU/mL Final    TB2 - Nil 10/13/2022 0.035  IU/mL Final    Mitogen - Nil 10/13/2022 4.344  IU/mL Final    TB Gold Plus 10/13/2022 Negative  Negative Final   Lab Visit on 10/13/2022   Component Date Value Ref Range Status    CRP 10/13/2022 16.1 (H)  0.0 - 8.2 mg/L Final    Anti-SSB Antibody 10/13/2022 0.05  0.00 - 0.99 Ratio Final    Anti-SSB Interpretation 10/13/2022 Negative  Negative Final    WBC 10/13/2022 7.03  3.90 - 12.70 K/uL Final    RBC 10/13/2022 4.33  4.00 - 5.40 M/uL Final    Hemoglobin 10/13/2022 11.7 (L)  12.0 - 16.0 g/dL Final    Hematocrit 10/13/2022 36.9 (L)  37.0 - 48.5 % Final    MCV 10/13/2022 85  82 - 98 fL Final    MCH 10/13/2022 27.0  27.0 - 31.0 pg Final    MCHC 10/13/2022 31.7 (L)  32.0 - 36.0 g/dL Final    RDW 10/13/2022 14.0  11.5 - 14.5 % Final    Platelets 10/13/2022 271  150 - 450 K/uL Final    MPV 10/13/2022 10.8  9.2 - 12.9 fL Final    Immature Granulocytes 10/13/2022 0.4  0.0 - 0.5 % Final    Gran # (ANC) 10/13/2022 5.1  1.8 - 7.7 K/uL Final    Immature Grans (Abs) 10/13/2022 0.03  0.00 - 0.04 K/uL Final    Lymph # 10/13/2022 1.3  1.0 - 4.8 K/uL Final    Mono # 10/13/2022 0.4  0.3 - 1.0 K/uL Final    Eos # 10/13/2022 0.2  0.0 - 0.5 K/uL Final    Baso # 10/13/2022 0.04  0.00 - 0.20 K/uL Final    nRBC 10/13/2022 0  0 /100 WBC Final    Gran % 10/13/2022 71.9  38.0 - 73.0 % Final    Lymph % 10/13/2022 18.3  18.0 - 48.0 % Final    Mono % 10/13/2022 5.5  4.0 - 15.0 % Final    Eosinophil % 10/13/2022 3.3  0.0 - 8.0 % Final    Basophil % 10/13/2022 0.6  0.0 - 1.9 % Final     Differential Method 10/13/2022 Automated   Final    Sodium 10/13/2022 139  136 - 145 mmol/L Final    Potassium 10/13/2022 3.6  3.5 - 5.1 mmol/L Final    Chloride 10/13/2022 100  95 - 110 mmol/L Final    CO2 10/13/2022 29  23 - 29 mmol/L Final    Glucose 10/13/2022 153 (H)  70 - 110 mg/dL Final    BUN 10/13/2022 23 (H)  6 - 20 mg/dL Final    Creatinine 10/13/2022 0.9  0.5 - 1.4 mg/dL Final    Calcium 10/13/2022 10.1  8.7 - 10.5 mg/dL Final    Total Protein 10/13/2022 7.5  6.0 - 8.4 g/dL Final    Albumin 10/13/2022 3.8  3.5 - 5.2 g/dL Final    Total Bilirubin 10/13/2022 0.6  0.1 - 1.0 mg/dL Final    Alkaline Phosphatase 10/13/2022 144 (H)  55 - 135 U/L Final    AST 10/13/2022 25  10 - 40 U/L Final    ALT 10/13/2022 23  10 - 44 U/L Final    Anion Gap 10/13/2022 10  8 - 16 mmol/L Final    eGFR 10/13/2022 >60  >60 mL/min/1.73 m^2 Final    Rheumatoid Factor 10/13/2022 <13.0  0.0 - 15.0 IU/mL Final    Anti-SSA Antibody 10/13/2022 0.06  0.00 - 0.99 Ratio Final    Anti-SSA Interpretation 10/13/2022 Negative  Negative Final    Complement (C-4) 10/13/2022 36  11 - 44 mg/dL Final    Complement (C-3) 10/13/2022 135  50 - 180 mg/dL Final    Anti-Histone Antibody 10/13/2022 0.5  0.0 - 0.9 Units Final    Sed Rate 10/13/2022 28 (H)  0 - 20 mm/Hr Final    Specimen UA 10/13/2022 Urine, Clean Catch   Final    Color, UA 10/13/2022 Yellow  Yellow, Straw, [place] Final    Appearance, UA 10/13/2022 Clear  Clear Final    pH, UA 10/13/2022 7.0  5.0 - 8.0 Final    Specific Gravity, UA 10/13/2022 1.010  1.005 - 1.030 Final    Protein, UA 10/13/2022 Negative  Negative Final    Glucose, UA 10/13/2022 Negative  Negative Final    Ketones, UA 10/13/2022 Negative  Negative Final    Bilirubin (UA) 10/13/2022 Negative  Negative Final    Occult Blood UA 10/13/2022 Negative  Negative Final    Nitrite, UA 10/13/2022 Negative  Negative Final    Urobilinogen, UA 10/13/2022 Negative  <2.0 EU/dL Final    Leukocytes, UA 10/13/2022 2+ (A)  Negative Final    Hep  B S Ab 10/13/2022 <3.00  mIU/mL Final    Hep B S Ab 10/13/2022 Non-reactive   Final    Vit D, 25-Hydroxy 10/13/2022 46  30 - 96 ng/mL Final    Uric Acid 10/13/2022 5.9 (H)  2.4 - 5.7 mg/dL Final    TSH 10/13/2022 1.297  0.400 - 4.000 uIU/mL Final    Hepatitis C Ab 10/13/2022 Non-reactive  Non-reactive Final    Hepatitis B Surface Ag 10/13/2022 Non-reactive  Non-reactive Final    CPK 10/13/2022 55  20 - 180 U/L Final    GGT 10/13/2022 58 (H)  8 - 55 U/L Final    HIV 1/2 Ag/Ab 10/13/2022 Non-reactive  Non-reactive Final    Angio Convert Enzyme 10/13/2022 31  16 - 85 U/L Final    Interleukin 2 (IL-2) 10/13/2022 660.9  175.3 - 858.2 pg/mL Final    IgG 10/13/2022 1112  650 - 1600 mg/dL Final    IgA 10/13/2022 233  40 - 350 mg/dL Final    IgM 10/13/2022 65  50 - 300 mg/dL Final    IgG 1 10/13/2022 546  382 - 929 mg/dL Final    IgG 2 10/13/2022 407  242 - 700 mg/dL Final    IgG 3 10/13/2022 69  22 - 176 mg/dL Final    IgG 4 10/13/2022 31  4 - 86 mg/dL Final    Immunofix Interp. 10/13/2022 SEE COMMENT   Final    Protein, Serum 10/13/2022 6.6  6.0 - 8.4 g/dL Final    Albumin 10/13/2022 3.77  3.35 - 5.55 g/dL Final    Alpha-1 10/13/2022 0.34  0.17 - 0.41 g/dL Final    Alpha-2 10/13/2022 0.75  0.43 - 0.99 g/dL Final    Beta 10/13/2022 0.75  0.50 - 1.10 g/dL Final    Gamma 10/13/2022 0.99  0.67 - 1.58 g/dL Final    PT Lupus Anticoagulant 10/13/2022 13.1  12.0 - 15.5 sec Final    PTT Lupus Anticoagulant 10/13/2022 37  32 - 48 sec Final    Thrombin Time 10/13/2022 Not Applicable  14.7 - 19.5 sec Final    Reptilase Time 10/13/2022 Not Applicable  <=21.9 sec Final    PTT Heparin Neutralized 10/13/2022 Not Applicable  32 - 48 sec Final    PTT-LA Mix 10/13/2022 Not Applicable  32 - 48 sec Final    PLATELET NEUTRALIZATION APTT 10/13/2022 Not Applicable  Negative Final    DRVVT Screen 10/13/2022 27 (L)  33 - 44 sec Final    dRVVT Incubated 1:1 Mix 10/13/2022 Not Applicable  33 - 44 sec Final    dRVVT Confirm 10/13/2022 Not Applicable   Negative ratio Final    Hex Phosph Neut Test 10/13/2022 Not Applicable  Negative Final    Lupus Anticoagulant Interpretation 10/13/2022 See Note   Final    APA Isotype IgG 10/13/2022 <9.40  0.00 - 14.99 GPL Final    APA Isotype IgM 10/13/2022 <9.40  0.00 - 12.49 MPL Final    Beta-2 Glyco 1 IgG 10/13/2022 <9  <=20 [facility] Final    Beta-2 Glyco 1 IgM 10/13/2022 <9  <=20 [facility] Final    Beta-2 Glyco 1 IgA 10/13/2022 <9  <=20 [facility] Final    Thyroperoxidase Antibodies 10/13/2022 <6.0  <6.0 IU/mL Final    RBC, UA 10/13/2022 4  0 - 4 /hpf Final    WBC, UA 10/13/2022 1  0 - 5 /hpf Final    Bacteria 10/13/2022 Few (A)  None-Occ /hpf Final    Squam Epithel, UA 10/13/2022 5  /hpf Final    Microscopic Comment 10/13/2022 SEE COMMENT   Final    Pathologist Interpretation CYNDI 10/13/2022 REVIEWED   Final    Pathologist Interpretation SPE 10/13/2022 REVIEWED   Final   Admission on 07/02/2022, Discharged on 07/04/2022   Component Date Value Ref Range Status    Ascending aorta 07/03/2022 3.19  cm Final    STJ 07/03/2022 2.56  cm Final    AV mean gradient 07/03/2022 6  mmHg Final    Ao peak harley 07/03/2022 1.58  m/s Final    Ao VTI 07/03/2022 33.33  cm Final    IVRT 07/03/2022 119.89  msec Final    IVS 07/03/2022 0.92  0.6 - 1.1 cm Final    LA size 07/03/2022 3.57  cm Final    Left Atrium Major Axis 07/03/2022 5.78  cm Final    Left Atrium Minor Axis 07/03/2022 5.26  cm Final    LVIDd 07/03/2022 4.46  3.5 - 6.0 cm Final    LVIDs 07/03/2022 2.83  2.1 - 4.0 cm Final    LVOT diameter 07/03/2022 1.94  cm Final    LVOT peak VTI 07/03/2022 20.58  cm Final    Posterior Wall 07/03/2022 0.91  0.6 - 1.1 cm Final    MV Peak A Harley 07/03/2022 0.96  m/s Final    E wave deceleration time 07/03/2022 255.43  msec Final    MV Peak E Harley 07/03/2022 0.80  m/s Final    RA Major Axis 07/03/2022 4.18  cm Final    RA Width 07/03/2022 3.03  cm Final    RVDD 07/03/2022 3.10  cm Final    Sinus 07/03/2022 3.04  cm Final    TAPSE 07/03/2022 2.04  cm Final    TDI LATERAL  07/03/2022 0.10  m/s Final    TDI SEPTAL 07/03/2022 0.07  m/s Final    LA WIDTH 07/03/2022 4.05  cm Final    MV stenosis pressure 1/2 time 07/03/2022 74.07  ms Final    LV Diastolic Volume 07/03/2022 90.35  mL Final    LV Systolic Volume 07/03/2022 30.35  mL Final    RV S' 07/03/2022 9.15  cm/s Final    LVOT peak dick 07/03/2022 1.09  m/s Final    LA volume (mod) 07/03/2022 68.02  cm3 Final    LV LATERAL E/E' RATIO 07/03/2022 8.00  m/s Final    LV SEPTAL E/E' RATIO 07/03/2022 11.43  m/s Final    FS 07/03/2022 37  % Final    LA volume 07/03/2022 67.69  cm3 Final    LV mass 07/03/2022 133.84  g Final    Left Ventricle Relative Wall Thick* 07/03/2022 0.41  cm Final    AV valve area 07/03/2022 1.82  cm2 Final    AV Velocity Ratio 07/03/2022 0.69   Final    AV index (prosthetic) 07/03/2022 0.62   Final    MV valve area p 1/2 method 07/03/2022 2.97  cm2 Final    E/A ratio 07/03/2022 0.83   Final    Mean e' 07/03/2022 0.09  m/s Final    LVOT area 07/03/2022 3.0  cm2 Final    LVOT stroke volume 07/03/2022 60.80  cm3 Final    AV peak gradient 07/03/2022 10  mmHg Final    E/E' ratio 07/03/2022 9.41  m/s Final    LV Systolic Volume Index 07/03/2022 17.0  mL/m2 Final    LV Diastolic Volume Index 07/03/2022 50.47  mL/m2 Final    LA Volume Index 07/03/2022 37.8  mL/m2 Final    LV Mass Index 07/03/2022 75  g/m2 Final    LA Volume Index (Mod) 07/03/2022 38.0  mL/m2 Final    BSA 07/03/2022 1.86  m2 Final    Right Atrial Pressure (from IVC) 07/03/2022 3  mmHg Final    EF 07/03/2022 65  % Final    Sodium 07/03/2022 139  136 - 145 mmol/L Final    Potassium 07/03/2022 4.7  3.5 - 5.1 mmol/L Final    Chloride 07/03/2022 104  95 - 110 mmol/L Final    CO2 07/03/2022 26  23 - 29 mmol/L Final    Glucose 07/03/2022 99  70 - 110 mg/dL Final    BUN 07/03/2022 21 (H)  6 - 20 mg/dL Final    Creatinine 07/03/2022 0.8  0.5 - 1.4 mg/dL Final    Calcium 07/03/2022 9.4  8.7 - 10.5 mg/dL Final    Anion Gap 07/03/2022 9  8 - 16 mmol/L Final    eGFR if   07/03/2022 >60.0  >60 mL/min/1.73 m^2 Final    eGFR if non African American 07/03/2022 >60.0  >60 mL/min/1.73 m^2 Final    WBC 07/03/2022 4.94  3.90 - 12.70 K/uL Final    RBC 07/03/2022 4.61  4.00 - 5.40 M/uL Final    Hemoglobin 07/03/2022 12.5  12.0 - 16.0 g/dL Final    Hematocrit 07/03/2022 41.0  37.0 - 48.5 % Final    MCV 07/03/2022 89  82 - 98 fL Final    MCH 07/03/2022 27.1  27.0 - 31.0 pg Final    MCHC 07/03/2022 30.5 (L)  32.0 - 36.0 g/dL Final    RDW 07/03/2022 14.4  11.5 - 14.5 % Final    Platelets 07/03/2022 179  150 - 450 K/uL Final    MPV 07/03/2022 10.8  9.2 - 12.9 fL Final    Immature Granulocytes 07/03/2022 0.4  0.0 - 0.5 % Final    Gran # (ANC) 07/03/2022 3.3  1.8 - 7.7 K/uL Final    Immature Grans (Abs) 07/03/2022 0.02  0.00 - 0.04 K/uL Final    Lymph # 07/03/2022 1.0  1.0 - 4.8 K/uL Final    Mono # 07/03/2022 0.4  0.3 - 1.0 K/uL Final    Eos # 07/03/2022 0.2  0.0 - 0.5 K/uL Final    Baso # 07/03/2022 0.02  0.00 - 0.20 K/uL Final    nRBC 07/03/2022 0  0 /100 WBC Final    Gran % 07/03/2022 67.0  38.0 - 73.0 % Final    Lymph % 07/03/2022 21.1  18.0 - 48.0 % Final    Mono % 07/03/2022 8.1  4.0 - 15.0 % Final    Eosinophil % 07/03/2022 3.0  0.0 - 8.0 % Final    Basophil % 07/03/2022 0.4  0.0 - 1.9 % Final    Differential Method 07/03/2022 Automated   Final    Magnesium 07/03/2022 2.2  1.6 - 2.6 mg/dL Final    Phosphorus 07/03/2022 3.9  2.7 - 4.5 mg/dL Final    HERB Screen 07/03/2022 Negative <1:80  Negative <1:80 Final    Anti-Centromere Antibody 07/03/2022 Negative  Negative Final    Centromere Antibody Titer 07/03/2022 Test Not Performed  <1:80 Titer Final    Sed Rate 07/03/2022 38 (H)  0 - 36 mm/Hr Final    CRP 07/03/2022 8.6 (H)  0.0 - 8.2 mg/L Final    CCP Antibodies 07/03/2022 <0.5  <5.0 U/mL Final    Scleroderma SCL- 07/03/2022 4  <20 UNITS Final    ds DNA Ab 07/03/2022 Negative 1:10  Negative 1:10 Final    POCT Glucose 07/02/2022 80  70 - 110 mg/dL Final    Sodium 07/04/2022 140   136 - 145 mmol/L Final    Potassium 07/04/2022 4.6  3.5 - 5.1 mmol/L Final    Chloride 07/04/2022 105  95 - 110 mmol/L Final    CO2 07/04/2022 28  23 - 29 mmol/L Final    Glucose 07/04/2022 117 (H)  70 - 110 mg/dL Final    BUN 07/04/2022 19  6 - 20 mg/dL Final    Creatinine 07/04/2022 0.8  0.5 - 1.4 mg/dL Final    Calcium 07/04/2022 9.4  8.7 - 10.5 mg/dL Final    Total Protein 07/04/2022 6.3  6.0 - 8.4 g/dL Final    Albumin 07/04/2022 3.5  3.5 - 5.2 g/dL Final    Total Bilirubin 07/04/2022 0.4  0.1 - 1.0 mg/dL Final    Alkaline Phosphatase 07/04/2022 116  55 - 135 U/L Final    AST 07/04/2022 18  10 - 40 U/L Final    ALT 07/04/2022 20  10 - 44 U/L Final    Anion Gap 07/04/2022 7 (L)  8 - 16 mmol/L Final    eGFR if African American 07/04/2022 >60.0  >60 mL/min/1.73 m^2 Final    eGFR if non African American 07/04/2022 >60.0  >60 mL/min/1.73 m^2 Final    WBC 07/04/2022 5.42  3.90 - 12.70 K/uL Final    RBC 07/04/2022 4.70  4.00 - 5.40 M/uL Final    Hemoglobin 07/04/2022 12.5  12.0 - 16.0 g/dL Final    Hematocrit 07/04/2022 40.1  37.0 - 48.5 % Final    MCV 07/04/2022 85  82 - 98 fL Final    MCH 07/04/2022 26.6 (L)  27.0 - 31.0 pg Final    MCHC 07/04/2022 31.2 (L)  32.0 - 36.0 g/dL Final    RDW 07/04/2022 14.4  11.5 - 14.5 % Final    Platelets 07/04/2022 198  150 - 450 K/uL Final    MPV 07/04/2022 10.5  9.2 - 12.9 fL Final    Immature Granulocytes 07/04/2022 0.2  0.0 - 0.5 % Final    Gran # (ANC) 07/04/2022 3.4  1.8 - 7.7 K/uL Final    Immature Grans (Abs) 07/04/2022 0.01  0.00 - 0.04 K/uL Final    Lymph # 07/04/2022 1.3  1.0 - 4.8 K/uL Final    Mono # 07/04/2022 0.5  0.3 - 1.0 K/uL Final    Eos # 07/04/2022 0.2  0.0 - 0.5 K/uL Final    Baso # 07/04/2022 0.03  0.00 - 0.20 K/uL Final    nRBC 07/04/2022 0  0 /100 WBC Final    Gran % 07/04/2022 63.0  38.0 - 73.0 % Final    Lymph % 07/04/2022 24.2  18.0 - 48.0 % Final    Mono % 07/04/2022 8.3  4.0 - 15.0 % Final    Eosinophil % 07/04/2022 3.7  0.0 - 8.0 % Final    Basophil %  07/04/2022 0.6  0.0 - 1.9 % Final    Differential Method 07/04/2022 Automated   Final    Magnesium 07/04/2022 2.0  1.6 - 2.6 mg/dL Final   Admission on 06/30/2022, Discharged on 07/02/2022   Component Date Value Ref Range Status    WBC 06/30/2022 6.08  3.90 - 12.70 K/uL Final    RBC 06/30/2022 4.41  4.00 - 5.40 M/uL Final    Hemoglobin 06/30/2022 11.6 (L)  12.0 - 16.0 g/dL Final    Hematocrit 06/30/2022 37.4  37.0 - 48.5 % Final    MCV 06/30/2022 85  82 - 98 fL Final    MCH 06/30/2022 26.3 (L)  27.0 - 31.0 pg Final    MCHC 06/30/2022 31.0 (L)  32.0 - 36.0 g/dL Final    RDW 06/30/2022 14.8 (H)  11.5 - 14.5 % Final    Platelets 06/30/2022 201  150 - 450 K/uL Final    MPV 06/30/2022 10.1  9.2 - 12.9 fL Final    Immature Granulocytes 06/30/2022 0.3  0.0 - 0.5 % Final    Gran # (ANC) 06/30/2022 4.2  1.8 - 7.7 K/uL Final    Immature Grans (Abs) 06/30/2022 0.02  0.00 - 0.04 K/uL Final    Lymph # 06/30/2022 1.2  1.0 - 4.8 K/uL Final    Mono # 06/30/2022 0.5  0.3 - 1.0 K/uL Final    Eos # 06/30/2022 0.2  0.0 - 0.5 K/uL Final    Baso # 06/30/2022 0.03  0.00 - 0.20 K/uL Final    nRBC 06/30/2022 0  0 /100 WBC Final    Gran % 06/30/2022 68.3  38.0 - 73.0 % Final    Lymph % 06/30/2022 20.1  18.0 - 48.0 % Final    Mono % 06/30/2022 7.7  4.0 - 15.0 % Final    Eosinophil % 06/30/2022 3.1  0.0 - 8.0 % Final    Basophil % 06/30/2022 0.5  0.0 - 1.9 % Final    Differential Method 06/30/2022 Automated   Final    Sodium 06/30/2022 139  136 - 145 mmol/L Final    Potassium 06/30/2022 3.9  3.5 - 5.1 mmol/L Final    Chloride 06/30/2022 103  95 - 110 mmol/L Final    CO2 06/30/2022 29  23 - 29 mmol/L Final    Glucose 06/30/2022 110  70 - 110 mg/dL Final    BUN 06/30/2022 22 (H)  6 - 20 mg/dL Final    Creatinine 06/30/2022 0.8  0.5 - 1.4 mg/dL Final    Calcium 06/30/2022 9.0  8.7 - 10.5 mg/dL Final    Total Protein 06/30/2022 7.0  6.0 - 8.4 g/dL Final    Albumin 06/30/2022 3.9  3.5 - 5.2 g/dL Final    Total Bilirubin 06/30/2022 0.3  0.1 - 1.0  mg/dL Final    Alkaline Phosphatase 06/30/2022 95  55 - 135 U/L Final    AST 06/30/2022 24  10 - 40 U/L Final    ALT 06/30/2022 24  10 - 44 U/L Final    Anion Gap 06/30/2022 7 (L)  8 - 16 mmol/L Final    eGFR if African American 06/30/2022 >60.0  >60 mL/min/1.73 m^2 Final    eGFR if non African American 06/30/2022 >60.0  >60 mL/min/1.73 m^2 Final    Troponin I 06/30/2022 <0.030  <=0.040 ng/mL Final    Troponin I 06/30/2022 <0.030  <=0.040 ng/mL Final    BNP 06/30/2022 120 (H)  0 - 99 pg/mL Final    Magnesium 06/30/2022 2.0  1.6 - 2.6 mg/dL Final    SARS-CoV-2 RNA, Amplification, Qual 06/30/2022 Negative  Negative Final    Hemoglobin A1C 07/01/2022 6.2  4.5 - 6.2 % Final    Estimated Avg Glucose 07/01/2022 131  68 - 131 mg/dL Final    Sodium 07/01/2022 139  136 - 145 mmol/L Final    Potassium 07/01/2022 3.6  3.5 - 5.1 mmol/L Final    Chloride 07/01/2022 103  95 - 110 mmol/L Final    CO2 07/01/2022 29  23 - 29 mmol/L Final    Glucose 07/01/2022 121 (H)  70 - 110 mg/dL Final    BUN 07/01/2022 22 (H)  6 - 20 mg/dL Final    Creatinine 07/01/2022 0.8  0.5 - 1.4 mg/dL Final    Calcium 07/01/2022 8.7  8.7 - 10.5 mg/dL Final    Anion Gap 07/01/2022 7 (L)  8 - 16 mmol/L Final    eGFR if African American 07/01/2022 >60.0  >60 mL/min/1.73 m^2 Final    eGFR if non African American 07/01/2022 >60.0  >60 mL/min/1.73 m^2 Final    Magnesium 07/01/2022 2.1  1.6 - 2.6 mg/dL Final    WBC 07/01/2022 5.43  3.90 - 12.70 K/uL Final    RBC 07/01/2022 4.32  4.00 - 5.40 M/uL Final    Hemoglobin 07/01/2022 11.5 (L)  12.0 - 16.0 g/dL Final    Hematocrit 07/01/2022 37.1  37.0 - 48.5 % Final    MCV 07/01/2022 86  82 - 98 fL Final    MCH 07/01/2022 26.6 (L)  27.0 - 31.0 pg Final    MCHC 07/01/2022 31.0 (L)  32.0 - 36.0 g/dL Final    RDW 07/01/2022 14.6 (H)  11.5 - 14.5 % Final    Platelets 07/01/2022 178  150 - 450 K/uL Final    MPV 07/01/2022 10.4  9.2 - 12.9 fL Final    Immature Granulocytes 07/01/2022 0.4  0.0 - 0.5 % Final    Gran # (ANC)  07/01/2022 3.5  1.8 - 7.7 K/uL Final    Immature Grans (Abs) 07/01/2022 0.02  0.00 - 0.04 K/uL Final    Lymph # 07/01/2022 1.2  1.0 - 4.8 K/uL Final    Mono # 07/01/2022 0.5  0.3 - 1.0 K/uL Final    Eos # 07/01/2022 0.2  0.0 - 0.5 K/uL Final    Baso # 07/01/2022 0.02  0.00 - 0.20 K/uL Final    nRBC 07/01/2022 0  0 /100 WBC Final    Gran % 07/01/2022 65.1  38.0 - 73.0 % Final    Lymph % 07/01/2022 22.3  18.0 - 48.0 % Final    Mono % 07/01/2022 8.5  4.0 - 15.0 % Final    Eosinophil % 07/01/2022 3.3  0.0 - 8.0 % Final    Basophil % 07/01/2022 0.4  0.0 - 1.9 % Final    Differential Method 07/01/2022 Automated   Final    BSA 07/01/2022 1.87  m2 Final    LVIDd 07/01/2022 4.30  3.5 - 6.0 cm Final    LVIDs 07/01/2022 2.82  2.1 - 4.0 cm Final    FS 07/01/2022 34  28 - 44 % Final    RVDD 07/01/2022 323.00  cm Final    AV mean gradient 07/01/2022 7  mmHg Final    AV valve area 07/01/2022 2.55  cm2 Final    AV index (prosthetic) 07/01/2022 0.72   Final    E/A ratio 07/01/2022 0.87   Final    E wave deceleration time 07/01/2022 215.02  msec Final    IVRT 07/01/2022 95.31  msec Final    Pulm vein S/D ratio 07/01/2022 1.24   Final    LVOT diameter 07/01/2022 2.13  cm Final    LVOT area 07/01/2022 3.6  cm2 Final    LVOT peak harley 07/01/2022 119.78  m/s Final    LVOT peak VTI 07/01/2022 29.05  cm Final    Ao VTI 07/01/2022 40.55  cm Final    LVOT stroke volume 07/01/2022 103.46  cm3 Final    MV Peak E Harley 07/01/2022 1.04  m/s Final    TR Max Harley 07/01/2022 2.56  m/s Final    MV Peak A Harley 07/01/2022 1.19  m/s Final    PV Peak S Harley 07/01/2022 71.96  m/s Final    PV Peak D Harley 07/01/2022 57.88  m/s Final    LV Systolic Volume 07/01/2022 22.37  mL Final    LV Systolic Volume Index 07/01/2022 12.5  mL/m2 Final    LV Diastolic Volume 07/01/2022 79.29  mL Final    LV Diastolic Volume Index 07/01/2022 44.30  mL/m2 Final    Triscuspid Valve Regurgitation Pea* 07/01/2022 26  mmHg Final    Right Atrial Pressure (from IVC) 07/01/2022 3  mmHg  Final    EF 07/01/2022 64  % Final    TV rest pulmonary artery pressure 07/01/2022 29  mmHg Final    Troponin I 07/01/2022 <0.030  <=0.040 ng/mL Final    Troponin I 07/01/2022 <0.030  <=0.040 ng/mL Final    Sodium 07/02/2022 138  136 - 145 mmol/L Final    Potassium 07/02/2022 4.1  3.5 - 5.1 mmol/L Final    Chloride 07/02/2022 104  95 - 110 mmol/L Final    CO2 07/02/2022 28  23 - 29 mmol/L Final    Glucose 07/02/2022 100  70 - 110 mg/dL Final    BUN 07/02/2022 23 (H)  6 - 20 mg/dL Final    Creatinine 07/02/2022 0.8  0.5 - 1.4 mg/dL Final    Calcium 07/02/2022 8.7  8.7 - 10.5 mg/dL Final    Anion Gap 07/02/2022 6 (L)  8 - 16 mmol/L Final    eGFR if African American 07/02/2022 >60.0  >60 mL/min/1.73 m^2 Final    eGFR if non African American 07/02/2022 >60.0  >60 mL/min/1.73 m^2 Final    Magnesium 07/02/2022 2.2  1.6 - 2.6 mg/dL Final    WBC 07/02/2022 5.20  3.90 - 12.70 K/uL Final    RBC 07/02/2022 4.39  4.00 - 5.40 M/uL Final    Hemoglobin 07/02/2022 11.5 (L)  12.0 - 16.0 g/dL Final    Hematocrit 07/02/2022 37.6  37.0 - 48.5 % Final    MCV 07/02/2022 86  82 - 98 fL Final    MCH 07/02/2022 26.2 (L)  27.0 - 31.0 pg Final    MCHC 07/02/2022 30.6 (L)  32.0 - 36.0 g/dL Final    RDW 07/02/2022 14.7 (H)  11.5 - 14.5 % Final    Platelets 07/02/2022 161  150 - 450 K/uL Final    MPV 07/02/2022 10.3  9.2 - 12.9 fL Final    Immature Granulocytes 07/02/2022 0.4  0.0 - 0.5 % Final    Gran # (ANC) 07/02/2022 3.3  1.8 - 7.7 K/uL Final    Immature Grans (Abs) 07/02/2022 0.02  0.00 - 0.04 K/uL Final    Lymph # 07/02/2022 1.2  1.0 - 4.8 K/uL Final    Mono # 07/02/2022 0.4  0.3 - 1.0 K/uL Final    Eos # 07/02/2022 0.2  0.0 - 0.5 K/uL Final    Baso # 07/02/2022 0.02  0.00 - 0.20 K/uL Final    nRBC 07/02/2022 0  0 /100 WBC Final    Gran % 07/02/2022 63.7  38.0 - 73.0 % Final    Lymph % 07/02/2022 23.7  18.0 - 48.0 % Final    Mono % 07/02/2022 8.1  4.0 - 15.0 % Final    Eosinophil % 07/02/2022 3.7  0.0 - 8.0 % Final    Basophil % 07/02/2022  0.4  0.0 - 1.9 % Final    Differential Method 07/02/2022 Automated   Final       Past Medical History:   Diagnosis Date    Broken heart syndrome     Hypertension      Past Surgical History:   Procedure Laterality Date    CLAVICLE SURGERY  2012    ENDOSCOPIC ULTRASOUND OF UPPER GASTROINTESTINAL TRACT N/A 11/29/2022    Procedure: ULTRASOUND, UPPER GI TRACT, ENDOSCOPIC;  Surgeon: Aries Saucedo III, MD;  Location: [facility];  Service: Endoscopy;  Laterality: N/A;    ESOPHAGOGASTRODUODENOSCOPY N/A 11/23/2022    Procedure: EGD (ESOPHAGOGASTRODUODENOSCOPY);  Surgeon: Aries Saucedo III, MD;  Location: [facility];  Service: Endoscopy;  Laterality: N/A;    TONSILLECTOMY       Family History   Problem Relation Age of Onset    Colon cancer Mother     Uterine cancer Mother     Hypertension Father     Testicular cancer Son        Marital Status:   Alcohol History:  reports current alcohol use.  Tobacco History:  reports that she has never smoked. She has never used smokeless tobacco.  Drug History:  reports no history of drug use.    Review of patient's allergies indicates:   Allergen Reactions    Crestor [rosuvastatin] Other (See Comments)     Paralysis    Codeine Nausea And Vomiting    Strawberries [strawberry] Hives    Adhesive tape-silicones Rash       Current Outpatient Medications:     esomeprazole (NEXIUM) 40 MG capsule, Take 1 capsule (40 mg total) by mouth before breakfast. Take 30 minutes before breakfast on empty stomach, Disp: 60 capsule, Rfl: 0    HYDROmorphone (DILAUDID) 4 MG tablet, Take 4 mg by mouth every 4 (four) hours as needed for Pain., Disp: , Rfl:     levothyroxine (SYNTHROID) 50 MCG tablet, Take 50 mcg by mouth every evening. UNITHROID, Disp: , Rfl:     lipase-protease-amylase 12,000-38,000-60,000 units (CREON) CpDR, Take 2 po with meals.  Take 1 po with snacks., Disp: 180 capsule, Rfl: 3    ondansetron (ZOFRAN-ODT) 4 MG TbDL, Take 2 tablets (8 mg total) by mouth every 8 (eight) hours as  needed (nausea)., Disp: 30 tablet, Rfl: 0    polyethylene glycol (GLYCOLAX) 17 gram PwPk, Take 17 g by mouth once daily., Disp: 30 each, Rfl: 0    atorvastatin (LIPITOR) 10 MG tablet, Take 1 tablet (10 mg total) by mouth every evening. (Patient not taking: Reported on 12/8/2022), Disp: 90 tablet, Rfl: 3    fentaNYL (DURAGESIC) 25 mcg/hr, Place 1 patch onto the skin every 72 hours. for 7 days, Disp: 5 patch, Rfl: 0    sucralfate (CARAFATE) 1 gram tablet, Take 1 tablet (1 g total) by mouth 2 (two) times daily as needed (reflux, dyspepsia). (Patient not taking: Reported on 12/8/2022), Disp: 180 tablet, Rfl: 3    Review of Systems   Constitutional:  Positive for appetite change (poor appetite). Negative for chills, fatigue, fever and unexpected weight change.   HENT:  Negative for congestion, ear pain, sinus pain, sore throat and trouble swallowing.    Eyes:  Negative for pain, discharge and visual disturbance.   Respiratory:  Negative for apnea, cough, shortness of breath and wheezing.    Cardiovascular:  Negative for chest pain, palpitations and leg swelling.   Gastrointestinal:  Positive for abdominal pain, constipation, nausea and vomiting. Negative for blood in stool and diarrhea.   Endocrine: Negative for heat intolerance, polydipsia and polyuria.   Genitourinary:  Negative for difficulty urinating, dyspareunia, dysuria, frequency, hematuria and menstrual problem.   Musculoskeletal:  Positive for back pain. Negative for arthralgias, gait problem, joint swelling and myalgias.   Allergic/Immunologic: Negative for environmental allergies, food allergies and immunocompromised state.   Neurological:  Negative for dizziness, tremors, seizures, numbness and headaches.   Psychiatric/Behavioral:  Negative for behavioral problems, confusion, hallucinations and suicidal ideas. The patient is not nervous/anxious.       Objective:      There were no vitals filed for this visit.  Physical Exam  Vitals and nursing note reviewed.    Constitutional:       General: She is not in acute distress.     Appearance: She is well-developed. She is obese. She is not toxic-appearing.   HENT:      Head: Normocephalic and atraumatic.      Right Ear: Tympanic membrane and external ear normal.      Left Ear: Tympanic membrane and external ear normal.      Nose: Nose normal.      Mouth/Throat:      Pharynx: Oropharynx is clear.   Eyes:      Pupils: Pupils are equal, round, and reactive to light.   Neck:      Thyroid: No thyromegaly.      Vascular: No carotid bruit.   Cardiovascular:      Rate and Rhythm: Normal rate and regular rhythm.      Heart sounds: Normal heart sounds. No murmur heard.  Pulmonary:      Effort: Pulmonary effort is normal.      Breath sounds: Normal breath sounds. No wheezing or rales.   Abdominal:      General: Bowel sounds are normal. There is no distension.      Palpations: Abdomen is soft.      Tenderness: There is abdominal tenderness (tender in the midepigastric area).   Musculoskeletal:         General: No tenderness or deformity. Normal range of motion.      Cervical back: Normal range of motion and neck supple.      Lumbar back: Normal. No spasms.      Comments: Bends 90 degrees at  waist   Lymphadenopathy:      Cervical: No cervical adenopathy.   Skin:     General: Skin is warm and dry.      Findings: No rash.   Neurological:      Mental Status: She is alert and oriented to person, place, and time. Mental status is at baseline.      Cranial Nerves: No cranial nerve deficit.      Coordination: Coordination normal.   Psychiatric:         Behavior: Behavior normal.         Thought Content: Thought content normal.         Judgment: Judgment normal.      Comments: Depressed and anxious over her medical condition       Assessment:       No diagnosis found.     Plan:       There are no diagnoses linked to this encounter.  Follow up in about 3 months (around 3/8/2023), or pancreatic  ca.

## 2022-12-12 ENCOUNTER — LAB VISIT (OUTPATIENT)
Dept: LAB | Facility: HOSPITAL | Age: 57
End: 2022-12-12
Attending: INTERNAL MEDICINE
Payer: COMMERCIAL

## 2022-12-12 ENCOUNTER — TELEPHONE (OUTPATIENT)
Dept: HEMATOLOGY/ONCOLOGY | Facility: CLINIC | Age: 57
End: 2022-12-12

## 2022-12-12 DIAGNOSIS — C25.9 PANCREATIC CANCER METASTASIZED TO LIVER: Primary | ICD-10-CM

## 2022-12-12 DIAGNOSIS — C25.9 PANCREATIC CANCER METASTASIZED TO LIVER: ICD-10-CM

## 2022-12-12 DIAGNOSIS — C78.7 PANCREATIC CANCER METASTASIZED TO LIVER: ICD-10-CM

## 2022-12-12 DIAGNOSIS — C78.7 PANCREATIC CANCER METASTASIZED TO LIVER: Primary | ICD-10-CM

## 2022-12-12 DIAGNOSIS — G89.3 CHRONIC PAIN DUE TO NEOPLASM: ICD-10-CM

## 2022-12-12 DIAGNOSIS — D64.9 SYMPTOMATIC ANEMIA: Primary | ICD-10-CM

## 2022-12-12 LAB
BASOPHILS # BLD AUTO: 0.07 K/UL (ref 0–0.2)
BASOPHILS NFR BLD: 0.4 % (ref 0–1.9)
DIFFERENTIAL METHOD: ABNORMAL
EOSINOPHIL # BLD AUTO: 0.8 K/UL (ref 0–0.5)
EOSINOPHIL NFR BLD: 5.1 % (ref 0–8)
ERYTHROCYTE [DISTWIDTH] IN BLOOD BY AUTOMATED COUNT: 15.9 % (ref 11.5–14.5)
HCT VFR BLD AUTO: 27.4 % (ref 37–48.5)
HGB BLD-MCNC: 8.4 G/DL (ref 12–16)
IMM GRANULOCYTES # BLD AUTO: 0.36 K/UL (ref 0–0.04)
IMM GRANULOCYTES NFR BLD AUTO: 2.2 % (ref 0–0.5)
LYMPHOCYTES # BLD AUTO: 1.2 K/UL (ref 1–4.8)
LYMPHOCYTES NFR BLD: 7.5 % (ref 18–48)
MCH RBC QN AUTO: 26.2 PG (ref 27–31)
MCHC RBC AUTO-ENTMCNC: 30.7 G/DL (ref 32–36)
MCV RBC AUTO: 85 FL (ref 82–98)
MONOCYTES # BLD AUTO: 1.1 K/UL (ref 0.3–1)
MONOCYTES NFR BLD: 6.4 % (ref 4–15)
NEUTROPHILS # BLD AUTO: 12.8 K/UL (ref 1.8–7.7)
NEUTROPHILS NFR BLD: 78.4 % (ref 38–73)
NRBC BLD-RTO: 0 /100 WBC
PLATELET # BLD AUTO: 325 K/UL (ref 150–450)
PMV BLD AUTO: 10.6 FL (ref 9.2–12.9)
RBC # BLD AUTO: 3.21 M/UL (ref 4–5.4)
WBC # BLD AUTO: 16.39 K/UL (ref 3.9–12.7)

## 2022-12-12 PROCEDURE — 36415 COLL VENOUS BLD VENIPUNCTURE: CPT | Performed by: INTERNAL MEDICINE

## 2022-12-12 PROCEDURE — 85025 COMPLETE CBC W/AUTO DIFF WBC: CPT | Performed by: INTERNAL MEDICINE

## 2022-12-12 RX ORDER — FUROSEMIDE 10 MG/ML
20 INJECTION INTRAMUSCULAR; INTRAVENOUS
Status: CANCELLED | OUTPATIENT
Start: 2022-12-12

## 2022-12-12 RX ORDER — HYDROCODONE BITARTRATE AND ACETAMINOPHEN 500; 5 MG/1; MG/1
TABLET ORAL ONCE
Status: CANCELLED | OUTPATIENT
Start: 2022-12-12 | End: 2022-12-12

## 2022-12-12 RX ORDER — HYDROMORPHONE HYDROCHLORIDE 4 MG/1
TABLET ORAL
Qty: 120 TABLET | Refills: 0 | Status: SHIPPED | OUTPATIENT
Start: 2022-12-12 | End: 2022-12-12 | Stop reason: SDUPTHER

## 2022-12-12 RX ORDER — HYDROMORPHONE HYDROCHLORIDE 4 MG/1
TABLET ORAL
Qty: 120 TABLET | Refills: 0 | Status: SHIPPED | OUTPATIENT
Start: 2022-12-12 | End: 2022-12-23

## 2022-12-12 NOTE — TELEPHONE ENCOUNTER
Spoke with patient's , Jack, who asked Dr Toribio to review patient's most recent labs. He also reports that patient's pain has been difficult to keep under control and over the weekend the on call oncologist instructed her to take 6 mg of dilaudid PRN and they would need a new prescription as they will run out tomorrow. Dr Toribio reviewed labs and per his verbal order, patient to get 2 units of PRBC tomorrow. Per his verbal order, orders changed for dilaudid and pended to him to review and sign. Returned Jack's call and explained above. ASU to reach out and schedule patient for her PRBC transfusion. Jack verbalized understanding.

## 2022-12-12 NOTE — TELEPHONE ENCOUNTER
Pts spouse called stating that pt had a lot of blood in the stool when she had a large bowel movement. Placed orders for a CBC and instructed pt to go get labs drawn. Verbalized understanding.

## 2022-12-13 ENCOUNTER — INFUSION (OUTPATIENT)
Dept: INFUSION THERAPY | Facility: HOSPITAL | Age: 57
End: 2022-12-13
Attending: INTERNAL MEDICINE
Payer: COMMERCIAL

## 2022-12-13 VITALS
SYSTOLIC BLOOD PRESSURE: 137 MMHG | OXYGEN SATURATION: 97 % | TEMPERATURE: 98 F | DIASTOLIC BLOOD PRESSURE: 70 MMHG | HEART RATE: 79 BPM | RESPIRATION RATE: 16 BRPM

## 2022-12-13 DIAGNOSIS — D64.9 SYMPTOMATIC ANEMIA: ICD-10-CM

## 2022-12-13 DIAGNOSIS — C25.9 PANCREATIC ADENOCARCINOMA: Primary | Chronic | ICD-10-CM

## 2022-12-13 DIAGNOSIS — B37.0 ORAL THRUSH: Primary | ICD-10-CM

## 2022-12-13 LAB
BLD PROD TYP BPU: NORMAL
BLD PROD TYP BPU: NORMAL
BLOOD UNIT EXPIRATION DATE: NORMAL
BLOOD UNIT EXPIRATION DATE: NORMAL
BLOOD UNIT TYPE CODE: 6200
BLOOD UNIT TYPE CODE: 6200
BLOOD UNIT TYPE: NORMAL
BLOOD UNIT TYPE: NORMAL
CODING SYSTEM: NORMAL
CODING SYSTEM: NORMAL
DISPENSE STATUS: NORMAL
DISPENSE STATUS: NORMAL
NUM UNITS TRANS PACKED RBC: NORMAL
NUM UNITS TRANS PACKED RBC: NORMAL

## 2022-12-13 PROCEDURE — P9016 RBC LEUKOCYTES REDUCED: HCPCS | Performed by: INTERNAL MEDICINE

## 2022-12-13 PROCEDURE — 96374 THER/PROPH/DIAG INJ IV PUSH: CPT

## 2022-12-13 PROCEDURE — 25000003 PHARM REV CODE 250: Performed by: INTERNAL MEDICINE

## 2022-12-13 PROCEDURE — 36430 TRANSFUSION BLD/BLD COMPNT: CPT

## 2022-12-13 RX ORDER — NYSTATIN 100000 [USP'U]/ML
4 SUSPENSION ORAL 4 TIMES DAILY
Qty: 160 ML | Refills: 1 | Status: SHIPPED | OUTPATIENT
Start: 2022-12-13 | End: 2022-12-23

## 2022-12-13 RX ORDER — HYDROCODONE BITARTRATE AND ACETAMINOPHEN 500; 5 MG/1; MG/1
TABLET ORAL ONCE
Status: COMPLETED | OUTPATIENT
Start: 2022-12-13 | End: 2022-12-13

## 2022-12-13 RX ORDER — FUROSEMIDE 10 MG/ML
20 INJECTION INTRAMUSCULAR; INTRAVENOUS
Status: DISPENSED | OUTPATIENT
Start: 2022-12-13

## 2022-12-13 RX ADMIN — SODIUM CHLORIDE: 0.9 INJECTION, SOLUTION INTRAVENOUS at 08:12

## 2022-12-13 NOTE — PATIENT INSTRUCTIONS
Follow up with Dr. Toribio as directed. Go to ER for any chest pain, shortness of breath or any other concerning symptom.

## 2022-12-15 RX ORDER — CLOTRIMAZOLE 10 MG/1
10 LOZENGE ORAL; TOPICAL
Qty: 30 TABLET | Refills: 0 | Status: SHIPPED | OUTPATIENT
Start: 2022-12-15 | End: 2022-12-21

## 2022-12-16 ENCOUNTER — CLINICAL SUPPORT (OUTPATIENT)
Dept: HEMATOLOGY/ONCOLOGY | Facility: CLINIC | Age: 57
End: 2022-12-16
Payer: COMMERCIAL

## 2022-12-16 ENCOUNTER — HOSPITAL ENCOUNTER (OUTPATIENT)
Dept: PREADMISSION TESTING | Facility: HOSPITAL | Age: 57
Discharge: HOME | End: 2022-12-16
Attending: INTERNAL MEDICINE
Payer: COMMERCIAL

## 2022-12-16 VITALS
BODY MASS INDEX: 32.79 KG/M2 | TEMPERATURE: 98 F | RESPIRATION RATE: 14 BRPM | DIASTOLIC BLOOD PRESSURE: 80 MMHG | OXYGEN SATURATION: 97 % | SYSTOLIC BLOOD PRESSURE: 118 MMHG | HEIGHT: 60 IN | WEIGHT: 167 LBS | HEART RATE: 82 BPM

## 2022-12-16 DIAGNOSIS — Z01.818 PREOP TESTING: Primary | ICD-10-CM

## 2022-12-16 RX ORDER — LOSARTAN POTASSIUM 100 MG/1
100 TABLET ORAL DAILY PRN
COMMUNITY
Start: 2022-12-09

## 2022-12-16 RX ORDER — ONDANSETRON 8 MG/1
16 TABLET, ORALLY DISINTEGRATING ORAL 2 TIMES DAILY
COMMUNITY

## 2022-12-16 NOTE — DISCHARGE INSTRUCTIONS
To confirm, Your doctor has instructed you that surgery is scheduled for: Monday 12/19/22    Endoscopy  will call the afternoon prior to surgery with the final arrival time.  today    Please report to Outpatient Registration the morning of surgery.     Do not eat or drink anything after midnight the night before your surgery - THIS INCLUDES  WATER, GUM, MINTS AND CANDY. Follow the preop given by the Doctor    YOU MAY BRUSH YOUR TEETH BUT DO NOT SWALLOW     TAKE ONLY THESE MEDICATIONS WITH A SMALL SIP OF WATER THE MORNING OF YOUR PROCEDURE: see medication list    PLEASE NOTE:  The surgery schedule has many variables which may affect the time of your surgery case.  Family members should be available if your surgery time changes.  Plan to be here the day of your procedure between 2-3 hours.    DO NOT TAKE THESE MEDICATIONS 5-7 DAYS PRIOR to your procedure or per your surgeon's request: ASPIRIN, ALEVE, ADVIL, IBUPROFEN,  AXEL SELTZER, BC , FISH OIL , VITAMIN E, HERBALS  (May take Tylenol)    ONLY if you are prescribed any types of blood thinners such as:  Aspirin, Coumadin, Plavix, Pradaxa, Xarelto, Aggrenox, Effient, Eliquis, Savasya, Brilinta, or any other, ask your surgeon whether you should stop taking them and how long before surgery you should stop.  You may also need to verify with the prescribing physician if it is ok to stop your medication.                                                       IMPORTANT INSTRUCTIONS    Do not smoke, vape or drink alcoholic beverages 24 hours prior to your procedure.  Shower the night before with  Dial antibacterial soap from the neck down.   You may use your own shampoo and face wash. This helps your skin to be as bacteria free as possible.    If you wear contact lenses, dentures, hearing aids or glasses, bring a container to put them in during surgery and give to a family member for safe keeping.    Please leave all jewelry, piercing's and valuables at home.   ONLY if you  wear home oxygen please bring your portable oxygen tank the day of your procedure.   ONLY for patients requiring bowel prep, written instructions will be given by your doctor's office.  Make arrangements in advance for transportation home by a responsible adult.  You must make arrangements for transportation, TAXI'S, UBER'S OR LYFTS ARE NOT ALLOWED.        If you have any questions about these instructions, call Pre-Op Admit  Nursing at [phone] or the Endoscopy Department at [phone]

## 2022-12-16 NOTE — PROGRESS NOTES
Medical Nutrition Therapy Oncology Progress Note      Patient's PCP:Sadiq Titus MD  Referring Provider: No ref. provider found  Subjective:        Patient ID: Brunilda Bob is a 57 y.o. female.    Chief Complaint: Pancreatic Cancer    Past Medical History:   Diagnosis Date    Broken heart syndrome     Cancer 11/23/2022    liver and pancreas    Hypertension        Past Surgical History:   Procedure Laterality Date    CLAVICLE SURGERY  2012    ENDOSCOPIC ULTRASOUND OF UPPER GASTROINTESTINAL TRACT N/A 11/29/2022    Procedure: ULTRASOUND, UPPER GI TRACT, ENDOSCOPIC;  Surgeon: Aries Saucedo III, MD;  Location: [facility];  Service: Endoscopy;  Laterality: N/A;    ESOPHAGOGASTRODUODENOSCOPY N/A 11/23/2022    Procedure: EGD (ESOPHAGOGASTRODUODENOSCOPY);  Surgeon: Aries Saucedo III, MD;  Location: [facility];  Service: Endoscopy;  Laterality: N/A;    TONSILLECTOMY         Social History     Socioeconomic History    Marital status:    Tobacco Use    Smoking status: Never    Smokeless tobacco: Never   Substance and Sexual Activity    Alcohol use: Yes     Comment: occ    Drug use: Never    Sexual activity: Yes     Partners: Male     Social Determinants of Health     Financial Resource Strain: Low Risk     Difficulty of Paying Living Expenses: Not hard at all   Food Insecurity: No Food Insecurity    Worried About Running Out of Food in the Last Year: Never true    Ran Out of Food in the Last Year: Never true   Transportation Needs: No Transportation Needs    Lack of Transportation (Medical): No    Lack of Transportation (Non-Medical): No   Physical Activity: Inactive    Days of Exercise per Week: 0 days    Minutes of Exercise per Session: 0 min   Stress: No Stress Concern Present    Feeling of Stress : Not at all   Social Connections: Socially Integrated    Frequency of Communication with Friends and Family: Twice a week    Frequency of Social Gatherings with  Friends and Family: Once a week    Attends [employer] Services: 1 to 4 times per year    Active Member of Clubs or Organizations: Yes    Attends Club or Organization Meetings: 1 to 4 times per year    Marital Status:    Housing Stability: Low Risk     Unable to Pay for Housing in the Last Year: No    Number of Places Lived in the Last Year: 1    Unstable Housing in the Last Year: No       Family History   Problem Relation Age of Onset    Colon cancer Mother     Uterine cancer Mother     Hypertension Father     Testicular cancer Son        Review of patient's allergies indicates:   Allergen Reactions    Crestor [rosuvastatin] Other (See Comments)     Paralysis    Codeine Nausea And Vomiting    Strawberries [strawberry] Hives    Adhesive tape-silicones Rash       Current Outpatient Medications:     atorvastatin (LIPITOR) 10 MG tablet, Take 1 tablet (10 mg total) by mouth every evening. (Patient not taking: Reported on 12/8/2022), Disp: 90 tablet, Rfl: 3    clotrimazole (MYCELEX) 10 mg danny, Take 1 tablet (10 mg total) by mouth 5 (five) times daily. Dissolve in mouth and swallow. DO NOT CHEW. for 30 doses, Disp: 30 tablet, Rfl: 0    esomeprazole (NEXIUM) 40 MG capsule, Take 1 capsule (40 mg total) by mouth before breakfast. Take 30 minutes before breakfast on empty stomach, Disp: 60 capsule, Rfl: 0    fentaNYL (DURAGESIC) 25 mcg/hr, Place 1 patch onto the skin every 72 hours. for 7 days, Disp: 5 patch, Rfl: 0    HYDROmorphone (DILAUDID) 4 MG tablet, Take 1 or 2 tablets by mouth every 4 hours as needed for pain., Disp: 120 tablet, Rfl: 0    levothyroxine (SYNTHROID) 50 MCG tablet, Take 50 mcg by mouth every evening. UNITHROID, Disp: , Rfl:     lipase-protease-amylase 12,000-38,000-60,000 units (CREON) CpDR, Take 2 po with meals.  Take 1 po with snacks., Disp: 180 capsule, Rfl: 3    nystatin (MYCOSTATIN) 100,000 unit/mL suspension, Take 4 mLs (400,000 Units total) by mouth 4 (four) times daily. for 10 days, Disp:  160 mL, Rfl: 1    polyethylene glycol (GLYCOLAX) 17 gram PwPk, Take 17 g by mouth once daily., Disp: 30 each, Rfl: 0    sucralfate (CARAFATE) 1 gram tablet, Take 1 tablet (1 g total) by mouth 2 (two) times daily as needed (reflux, dyspepsia). (Patient not taking: Reported on 12/8/2022), Disp: 180 tablet, Rfl: 3    Current Facility-Administered Medications:     furosemide injection 20 mg, 20 mg, Intravenous, PRN, R Parth Toribio MD    All medications and past history have been reviewed.    [No matching plan found]    Objective:      Wt Readings from Last 20 Encounters:   11/29/22 76.2 kg (167 lb 15.9 oz)   11/23/22 76.2 kg (168 lb)   11/18/22 76.2 kg (168 lb)   10/21/22 78 kg (172 lb)   10/13/22 78.7 kg (173 lb 9.6 oz)   10/13/22 78.5 kg (173 lb)   07/11/22 80.3 kg (177 lb)   07/04/22 79.9 kg (176 lb 2.4 oz)   07/02/22 82.2 kg (181 lb 3.5 oz)   07/02/22 82.2 kg (181 lb 3.5 oz)   07/01/22 82.6 kg (182 lb)   06/21/22 83 kg (183 lb)   06/02/22 81.6 kg (180 lb)   05/03/22 82.1 kg (181 lb)   04/27/22 85.3 kg (188 lb)   02/24/22 85.3 kg (188 lb)   01/31/22 86.2 kg (190 lb)   10/19/21 85.5 kg (188 lb 9.6 oz)   09/29/21 86.6 kg (191 lb)   08/10/21 85.3 kg (188 lb)       Last Labs:  Last Labs:  Glucose   Date Value Ref Range Status   12/05/2022 134 (H) 70 - 110 mg/dL Final   12/01/2022 104 70 - 110 mg/dL Final     BUN   Date Value Ref Range Status   12/05/2022 14 6 - 20 mg/dL Final   12/01/2022 14 6 - 20 mg/dL Final     Creatinine   Date Value Ref Range Status   12/05/2022 0.9 0.5 - 1.4 mg/dL Final   12/01/2022 0.6 0.5 - 1.4 mg/dL Final     Sodium   Date Value Ref Range Status   12/05/2022 134 (L) 136 - 145 mmol/L Final   12/01/2022 134 (L) 136 - 145 mmol/L Final     Potassium   Date Value Ref Range Status   12/05/2022 3.9 3.5 - 5.1 mmol/L Final   12/01/2022 4.0 3.5 - 5.1 mmol/L Final     Phosphorus   Date Value Ref Range Status   12/01/2022 3.0 2.7 - 4.5 mg/dL Final   07/03/2022 3.9 2.7 - 4.5 mg/dL Final     Calcium   Date  Value Ref Range Status   12/05/2022 8.9 8.7 - 10.5 mg/dL Final   12/01/2022 8.2 (L) 8.7 - 10.5 mg/dL Final     No results found for: PREALBUMIN  Total Protein   Date Value Ref Range Status   12/05/2022 6.6 6.0 - 8.4 g/dL Final   11/30/2022 5.7 (L) 6.0 - 8.4 g/dL Final     Cholesterol   Date Value Ref Range Status   02/23/2022 144 <200 mg/dL Final   05/10/2008 200 (H) 120 - 199 mg/dL Final     Comment:     The National Cholesterol Education Program (NCEP) has set the  following guidelines (reference ranges) for Cholesterol:  Optimal.....................<200 mg/dL  Borderline High.............200-239 mg/dL  High........................> or = 240 mg/dL       Hemoglobin A1C   Date Value Ref Range Status   11/23/2022 6.5 (H) 4.5 - 6.2 % Final     Comment:     According to ADA guidelines, hemoglobin A1C <7.0% represents  optimal control in non-pregnant diabetic patients.  Different  metrics may apply to specific populations.   Standards of Medical Care in Diabetes - 2016.    For the purpose of screening for the presence of diabetes:  <5.7%     Consistent with the absence of diabetes  5.7-6.4%  Consistent with increasing risk for diabetes   (prediabetes)  >or=6.5%  Consistent with diabetes    Currently no consensus exists for use of hemoglobin A1C  for diagnosis of diabetes for children.     07/01/2022 6.2 4.5 - 6.2 % Final     Comment:     According to ADA guidelines, hemoglobin A1C <7.0% represents  optimal control in non-pregnant diabetic patients.  Different  metrics may apply to specific populations.   Standards of Medical Care in Diabetes - 2016.    For the purpose of screening for the presence of diabetes:  <5.7%     Consistent with the absence of diabetes  5.7-6.4%  Consistent with increasing risk for diabetes   (prediabetes)  >or=6.5%  Consistent with diabetes    Currently no consensus exists for use of hemoglobin A1C  for diagnosis of diabetes for children.       Hemoglobin   Date Value Ref Range Status    12/12/2022 8.4 (L) 12.0 - 16.0 g/dL Final   12/05/2022 9.3 (L) 12.0 - 16.0 g/dL Final     POC Hematocrit   Date Value Ref Range Status   10/30/2019 36 36 - 54 %PCV Final     Hematocrit   Date Value Ref Range Status   12/12/2022 27.4 (L) 37.0 - 48.5 % Final   12/05/2022 30.1 (L) 37.0 - 48.5 % Final     Iron   Date Value Ref Range Status   12/02/2022 20 (L) 30 - 160 ug/dL Final     No components found for: FROLATE  Vit D, 25-Hydroxy   Date Value Ref Range Status   10/13/2022 46 30 - 96 ng/mL Final     Comment:     Vitamin D deficiency.........<10 ng/mL                              Vitamin D insufficiency......10-29 ng/mL       Vitamin D sufficiency........> or equal to 30 ng/mL  Vitamin D toxicity............>100 ng/mL       WBC   Date Value Ref Range Status   12/12/2022 16.39 (H) 3.90 - 12.70 K/uL Final   12/05/2022 13.44 (H) 3.90 - 12.70 K/uL Final       Assessment:     Nutrition/Diet History     Patient Reported Diet/Restrictions/Preferences: low fat, mostly pureed foods  Food Allergies: Strawberries  Factors Affecting Nutritional Intake: abdominal pain, early satiety    Estimated/Assessed Needs     Weight Used For Calorie Calculations: 76 kg (167 lb)  Energy Calorie Requirements (kcal): 3243-4006 kcal/day   Energy Need Method: 30-35 Kcal/kg  Protein Requirements:  g/day   Protein Need Method: 1.2-1.5 g/kg  Fluid Requirements: 2000 ml/day  Estimated Fluid Requirement Method: 1ml/kcal      Nutrition Support  N/A    Evaluation of Received Nutrient/Fluid Intake     Calorie Intake: not meeting needs  Protein Intake: not meeting needs  Fluid Intake: meeting needs  Tolerance: tolerating  % Intake of Estimated Energy Needs: 50-75 %      Nutrition Diagnosis Related to (Etiology) As Evidenced By (Signs/Symptoms)   Inadequate energy intake Decreased ability to consume sufficient energy Early satiety, gas, bloating, abdominal pain, decreased appetite; unintentional weight loss     RD Notes  Ric Brunilda Maria Ines  Paulino is a 58y/o female with stage IV pancreatic cancer. Pt has been receiving 10 fxns of palliative XRT to relieve pain and bleeding. She is going on vacation for the holidays but will be starting chemotherapy soon after. She and her  present today with questions regarding optimizing intake to prevent weight loss. She is taking Creon as prescribed. She reports excellent hydration. She has been eating soups, sandwiches and pastas with chicken an veal. Pt  reports he pureed some of her foods due to feeling of getting stuck/early satiety and she can usually eat more if foods are pureed. She has been drinking smoothies from [employer] with supplemental protein. CW: 167#    Nutrition Intervention:      Nutrition Intervention Fat-modified diet   Goals/Expected Outcomes Initiate low fat, low sugar diet to prevent pain/discomfort after eating and minimize s/s EPI   Progress Initial     Nutrition Intervention Energy-modified diet, Protein-modified diet, and Schedule of food/fluids   Goals/Expected Outcomes Initiate small, frequent meals and snacks with high protein/calorie food choices to meet estimated nutritional needs   Progress Initial     Plan  Provided Pancreatic Cancer Nutrition Informational packet and reviewed with patient  Discussed importance of small, frequent meals and snacks with high protein/calorie foods to prevent weight loss  Discussed minimized fat and sugar as much as possible to prevent s/s EPI and pain/discomfort after eating  Reviewed examples of high protein/calorie food choices  Continue excellent hydration  Discussed strategies to combat early satiety and sticking feeling while eating including small bites with extra mastication  Answered pt questions to the best of my ability and to the patient's satisfaction  Provided RD contact info. Encouraged pt to call with questions or concerns     Monitoring/Evaluation:     Monitor: po intake, weight, BMs, s/s EPI    Next Visit: f/u at  first infusion      I have explained and the patient understands all of  the current recommendation(s). I have answered all of their questions to the best of my ability and to their complete satisfaction.   The patient is to continue with the current management plan.    Electronically signed by: Claudia Feliciano MBA, AGUSTINN, LDN

## 2022-12-19 ENCOUNTER — ANESTHESIA EVENT (OUTPATIENT)
Dept: SURGERY | Facility: HOSPITAL | Age: 57
End: 2022-12-19
Payer: COMMERCIAL

## 2022-12-19 ENCOUNTER — HOSPITAL ENCOUNTER (OUTPATIENT)
Facility: HOSPITAL | Age: 57
Discharge: HOME | End: 2022-12-19
Attending: INTERNAL MEDICINE | Admitting: INTERNAL MEDICINE
Payer: COMMERCIAL

## 2022-12-19 ENCOUNTER — TREATMENT (OUTPATIENT)
Dept: RADIATION ONCOLOGY | Facility: CLINIC | Age: 57
End: 2022-12-19
Payer: COMMERCIAL

## 2022-12-19 ENCOUNTER — ANESTHESIA (OUTPATIENT)
Dept: SURGERY | Facility: HOSPITAL | Age: 57
End: 2022-12-19
Payer: COMMERCIAL

## 2022-12-19 VITALS
OXYGEN SATURATION: 95 % | SYSTOLIC BLOOD PRESSURE: 112 MMHG | RESPIRATION RATE: 14 BRPM | HEART RATE: 78 BPM | TEMPERATURE: 97 F | DIASTOLIC BLOOD PRESSURE: 63 MMHG

## 2022-12-19 DIAGNOSIS — R11.2 DRUG-INDUCED NAUSEA AND VOMITING: Primary | ICD-10-CM

## 2022-12-19 DIAGNOSIS — T50.905A DRUG-INDUCED NAUSEA AND VOMITING: Primary | ICD-10-CM

## 2022-12-19 PROCEDURE — 27202053 HC NEEDLE BIOPSY EUS: Performed by: INTERNAL MEDICINE

## 2022-12-19 PROCEDURE — 63600175 PHARM REV CODE 636 W HCPCS: Performed by: INTERNAL MEDICINE

## 2022-12-19 PROCEDURE — 25000003 PHARM REV CODE 250: Performed by: NURSE ANESTHETIST, CERTIFIED REGISTERED

## 2022-12-19 PROCEDURE — 63600175 PHARM REV CODE 636 W HCPCS: Performed by: NURSE ANESTHETIST, CERTIFIED REGISTERED

## 2022-12-19 PROCEDURE — 77336 RADIATION PHYSICS CONSULT: CPT | Mod: S$GLB,,, | Performed by: RADIOLOGY

## 2022-12-19 PROCEDURE — G6013 RADIATION TREATMENT DELIVERY: HCPCS | Mod: S$GLB,,, | Performed by: RADIOLOGY

## 2022-12-19 PROCEDURE — 77336 PR  RADN PHYSICS CONSULT CONTINUING: ICD-10-PCS | Mod: S$GLB,,, | Performed by: RADIOLOGY

## 2022-12-19 PROCEDURE — G6002 STEREOSCOPIC X-RAY GUIDANCE: HCPCS | Mod: S$GLB,,, | Performed by: RADIOLOGY

## 2022-12-19 PROCEDURE — C9399 UNCLASSIFIED DRUGS OR BIOLOG: HCPCS | Performed by: INTERNAL MEDICINE

## 2022-12-19 PROCEDURE — 25000003 PHARM REV CODE 250: Performed by: INTERNAL MEDICINE

## 2022-12-19 PROCEDURE — 37000008 HC ANESTHESIA 1ST 15 MINUTES: Performed by: INTERNAL MEDICINE

## 2022-12-19 PROCEDURE — G6002 PR STEREOSCOPIC XRAY GUIDE FOR RADIATION TX DELIV: ICD-10-PCS | Mod: S$GLB,,, | Performed by: RADIOLOGY

## 2022-12-19 PROCEDURE — G6013 PR RADN TX DELIVERY, 11-19 MEV, >= 3 TX AREAS: ICD-10-PCS | Mod: S$GLB,,, | Performed by: RADIOLOGY

## 2022-12-19 PROCEDURE — 43253 EGD US TRANSMURAL INJXN/MARK: CPT | Performed by: INTERNAL MEDICINE

## 2022-12-19 PROCEDURE — 37000009 HC ANESTHESIA EA ADD 15 MINS: Performed by: INTERNAL MEDICINE

## 2022-12-19 RX ORDER — ONDANSETRON 4 MG/1
8 TABLET, ORALLY DISINTEGRATING ORAL EVERY 8 HOURS PRN
Qty: 90 TABLET | Refills: 0 | Status: SHIPPED | OUTPATIENT
Start: 2022-12-19

## 2022-12-19 RX ORDER — PROPOFOL 10 MG/ML
VIAL (ML) INTRAVENOUS
Status: DISCONTINUED | OUTPATIENT
Start: 2022-12-19 | End: 2022-12-19

## 2022-12-19 RX ORDER — BUPIVACAINE HYDROCHLORIDE 2.5 MG/ML
20 INJECTION, SOLUTION EPIDURAL; INFILTRATION; INTRACAUDAL ONCE
Status: COMPLETED | OUTPATIENT
Start: 2022-12-19 | End: 2022-12-19

## 2022-12-19 RX ADMIN — PROPOFOL 50 MG: 10 INJECTION, EMULSION INTRAVENOUS at 12:12

## 2022-12-19 RX ADMIN — SODIUM CHLORIDE: 0.9 INJECTION, SOLUTION INTRAVENOUS at 12:12

## 2022-12-19 RX ADMIN — BUPIVACAINE HYDROCHLORIDE 20 ML: 2.5 INJECTION, SOLUTION EPIDURAL; INFILTRATION; INTRACAUDAL; PERINEURAL at 12:12

## 2022-12-19 RX ADMIN — ALCOHOL 20 ML: 1 INJECTION, SOLUTION PERCUTANEOUS at 12:12

## 2022-12-19 NOTE — ANESTHESIA POSTPROCEDURE EVALUATION
Anesthesia Post Evaluation    Patient: Brunilda Bob    Procedure(s) Performed: Procedure(s) (LRB):  ULTRASOUND, UPPER GI TRACT, ENDOSCOPIC (N/A)    Final Anesthesia Type: MAC      Patient location during evaluation: GI PACU  Patient participation: Yes- Able to Participate  Level of consciousness: awake and alert  Post-procedure vital signs: reviewed and stable  Pain management: adequate  Airway patency: patent    PONV status at discharge: No PONV  Anesthetic complications: no      Cardiovascular status: stable  Respiratory status: unassisted  Hydration status: euvolemic  Follow-up not needed.          Vitals Value Taken Time   /67 12/19/22 1320   Temp 36.3 °C (97.3 °F) 12/19/22 1245   Pulse 75 12/19/22 1321   Resp  12/19/22 1321   SpO2 99 % 12/19/22 1321   Vitals shown include unvalidated device data.      No case tracking events are documented in the log.      Pain/Nat Score: No data recorded

## 2022-12-19 NOTE — TELEPHONE ENCOUNTER
----- Message from Rona Phillip sent at 12/19/2022  9:42 AM CST -----  Pt's  Juan Carlos called to see when her chemo will begin. Doc had mentioned doing it soon. Are we waiting for an auth? They are leaving for [place] and will be back on New Year's Tahira. Please give him a call back.  [phone]
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
## 2022-12-19 NOTE — H&P
GASTROENTEROLOGY PRE-PROCEDURE H&P NOTE  Patient Name: Brunilda Bob  Patient MRN: [id number]  Patient : 1965    Service date: 2022    PCP: Sadiq Titus MD    No chief complaint on file.      HPI: Patient is a 57 y.o. female with PMHx as below here for evaluation of refractory abd pain w/ metastatic panc ca.     Past Medical History:  Past Medical History:   Diagnosis Date    Aneurysm     Broken heart syndrome 2010    Cancer 2022    liver and pancreas    DVT (deep venous thrombosis)     Hypertension     Pancreatic cancer metastasized to liver     Pulmonary embolism         Past Surgical History:  Past Surgical History:   Procedure Laterality Date    CLAVICLE SURGERY Right 2012    ENDOSCOPIC ULTRASOUND OF UPPER GASTROINTESTINAL TRACT N/A 2022    Procedure: ULTRASOUND, UPPER GI TRACT, ENDOSCOPIC;  Surgeon: Aries Saucedo III, MD;  Location: [facility];  Service: Endoscopy;  Laterality: N/A;    ESOPHAGOGASTRODUODENOSCOPY N/A 2022    Procedure: EGD (ESOPHAGOGASTRODUODENOSCOPY);  Surgeon: Aries Saucedo III, MD;  Location: [facility];  Service: Endoscopy;  Laterality: N/A;    TONSILLECTOMY      WISDOM TOOTH EXTRACTION          Home Medications:  Facility-Administered Medications Prior to Admission   Medication Dose Route Frequency Provider Last Rate Last Admin    furosemide injection 20 mg  20 mg Intravenous PRN R Parth Toribio MD         Medications Prior to Admission   Medication Sig Dispense Refill Last Dose    atorvastatin (LIPITOR) 10 MG tablet Take 1 tablet (10 mg total) by mouth every evening. (Patient not taking: Reported on 2022) 90 tablet 3     clotrimazole (MYCELEX) 10 mg danny Take 1 tablet (10 mg total) by mouth 5 (five) times daily. Dissolve in mouth and swallow. DO NOT CHEW. for 30 doses (Patient taking differently: Take 10 mg by mouth 5 (five) times daily. Dissolve in mouth and swallow. DO NOT CHEW.    Pt not sure what this is for) 30 tablet 0      esomeprazole (NEXIUM) 40 MG capsule Take 1 capsule (40 mg total) by mouth before breakfast. Take 30 minutes before breakfast on empty stomach 60 capsule 0     HYDROmorphone (DILAUDID) 4 MG tablet Take 1 or 2 tablets by mouth every 4 hours as needed for pain. 120 tablet 0     levothyroxine (SYNTHROID) 50 MCG tablet Take 50 mcg by mouth every evening. UNITHROID       lipase-protease-amylase 12,000-38,000-60,000 units (CREON) CpDR Take 2 po with meals.  Take 1 po with snacks. 180 capsule 3     losartan (COZAAR) 100 MG tablet Take 100 mg by mouth daily as needed. Prn for SBP >150       nystatin (MYCOSTATIN) 100,000 unit/mL suspension Take 4 mLs (400,000 Units total) by mouth 4 (four) times daily. for 10 days 160 mL 1     ondansetron (ZOFRAN-ODT) 4 MG TbDL Take 2 tablets (8 mg total) by mouth every 8 (eight) hours as needed (nausea). 90 tablet 0     ondansetron (ZOFRAN-ODT) 8 MG TbDL Take 16 mg by mouth 2 (two) times daily.       polyethylene glycol (GLYCOLAX) 17 gram PwPk Take 17 g by mouth once daily. 30 each 0     sucralfate (CARAFATE) 1 gram tablet Take 1 tablet (1 g total) by mouth 2 (two) times daily as needed (reflux, dyspepsia). (Patient not taking: Reported on 12/8/2022) 180 tablet 3        Inpatient Medications:        Review of patient's allergies indicates:   Allergen Reactions    Crestor [rosuvastatin] Other (See Comments)     Paralysis    Codeine Nausea And Vomiting    Strawberries [strawberry] Hives    Adhesive tape-silicones Rash       Social History:   Social History     Occupational History    Not on file   Tobacco Use    Smoking status: Never    Smokeless tobacco: Never   Substance and Sexual Activity    Alcohol use: Not Currently     Comment: occ    Drug use: Never    Sexual activity: Yes     Partners: Male       Family History:   Family History   Problem Relation Age of Onset    Colon cancer Mother     Uterine cancer Mother     Hypertension Father     Testicular cancer Son        Review of  Systems:  A 10 point review of systems was performed and was normal, except as mentioned in the HPI, including constitutional, HEENT, heme, lymph, cardiovascular, respiratory, gastrointestinal, genitourinary, neurologic, endocrine, psychiatric and musculoskeletal.      OBJECTIVE:    Physical Exam:  24 Hour Vital Sign Ranges:    Most recent vitals: LMP  (LMP Unknown)    GEN: well-developed, well-nourished, awake and alert, non-toxic appearing adult  HEENT: PERRL, sclera anicteric, oral mucosa pink and moist without lesion  NECK: trachea midline; Good ROM  CV: regular rate and rhythm, no murmurs or gallops  RESP: clear to auscultation bilaterally, no wheezes, rhonci or rales  ABD: soft, non-tender, non-distended, normal bowel sounds  EXT: no swelling or edema, 2+ pulses distally  SKIN: no rashes or jaundice  PSYCH: normal affect    Labs:   No results for input(s): WBC, MCV, PLT in the last 72 hours.    Invalid input(s): HGBAU  Recent Labs     12/16/22  1522   *   K 4.6   CL 92*   CO2 28   BUN 13        No results for input(s): ALB in the last 72 hours.    Invalid input(s): ALKP, SGOT, SGPT, TBIL, DBIL, TPRO  No results for input(s): PT, INR, PTT in the last 72 hours.      IMPRESSION / RECOMMENDATIONS:  EUS / neurolysis  with interventions as warranted.   RIsks, benefits, alternatives discussed in detail regarding upcoming procedures and sedation. Some of the more common endoscopic complications include but not limited to immediate or delayed perforation, bleeding, infections, pain, inadvertent injury to surrounding tissue / organs and possible need for surgical evaluation. Patient expressed understanding, all questions answered and will proceed with procedure as planned.     Aries REYEZ Dauterive III  12/19/2022  10:36 AM

## 2022-12-19 NOTE — [employer] PATIENT INSTRUCTIONS
Discharge Summary/Instructions after an Endoscopic Procedure  Patient Name: Brunilda Bob  Patient MRN: [id number]  Patient   YOB: 1965 Monday, December 19, 2022  Aries Saucedo III, MD  RESTRICTIONS:  During your procedure today, you received medications for sedation.  These   medications may affect your judgment, balance and coordination.  Therefore,   for 24 hours, you have the following restrictions:   - DO NOT drive a car, operate machinery, make legal/financial decisions,   sign important papers or drink alcohol.    ACTIVITY:  Today: no heavy lifting, straining or running due to procedural   sedation/anesthesia.  The following day: return to full activity including work.  DIET:  Eat and drink normally unless instructed otherwise.     TREATMENT FOR COMMON SIDE EFFECTS:  - Mild abdominal pain, nausea, belching, bloating or excessive gas:  rest,   eat lightly and use a heating pad.  - Sore Throat: treat with throat lozenges and/or gargle with warm salt   water.  - Because air was used during the procedure, expelling large amounts of air   from your rectum or belching is normal.  - If a bowel prep was taken, you may not have a bowel movement for 1-3 days.    This is normal.  SYMPTOMS TO WATCH FOR AND REPORT TO YOUR PHYSICIAN:  1. Abdominal pain or bloating, other than gas cramps.  2. Chest pain.  3. Back pain.  4. Signs of infection such as: chills or fever occurring within 24 hours   after the procedure.  5. Rectal bleeding, which would show as bright red, maroon, or black stools.   (A tablespoon of blood from the rectum is not serious, especially if   hemorrhoids are present.)  6. Vomiting.  7. Weakness or dizziness.  GO DIRECTLY TO THE NEAREST EMERGENCY ROOM IF YOU HAVE ANY OF THE FOLLOWING:      Difficulty breathing              Chills and/or fever over 101 F   Persistent vomiting and/or vomiting blood   Severe abdominal pain   Severe chest pain   Black, tarry stools   Bleeding- more  than one tablespoon   Any other symptom or condition that you feel may need urgent attention  Your doctor recommends these additional instructions:  If any biopsies were taken, your doctors clinic will contact you in 1 to 2   weeks with any results.  - Discharge patient to home.   - Patient has a contact number available for emergencies.  The signs and   symptoms of potential delayed complications were discussed with the   patient.  Return to normal activities tomorrow.  Written discharge   instructions were provided to the patient.   - Resume regular diet.   - Continue present medications.   - Monitor in recovery for any hypotension; Patient warned of possible bowel   changes. Repeat as warranted pending clinical response.  For questions, problems or results please call your physician - Aries Saucedo III, MD at Work:  [phone].  [facility], EMERGENCY ROOM PHONE NUMBER: [phone]  IF A COMPLICATION OR EMERGENCY SITUATION ARISES AND YOU ARE UNABLE TO REACH   YOUR PHYSICIAN - GO DIRECTLY TO THE EMERGENCY ROOM.  Aries Saucedo III, MD  12/19/2022 12:29:38 PM  This report has been verified and signed electronically.  Dear patient,  As a result of recent federal legislation (The Federal Cures Act), you may   receive lab or pathology results from your procedure in your [facility]   account before your physician is able to contact you. Your physician or   their representative will relay the results to you with their   recommendations at their soonest availability.  Thank you,  [employer]

## 2022-12-19 NOTE — TRANSFER OF CARE
Anesthesia Transfer of Care Note    Patient: Brunilda Bob    Procedure(s) Performed: Procedure(s) (LRB):  ULTRASOUND, UPPER GI TRACT, ENDOSCOPIC (N/A)    Patient location: GI    Anesthesia Type: MAC    Transport from OR: Transported from OR on room air with adequate spontaneous ventilation    Post pain: adequate analgesia    Post assessment: no apparent anesthetic complications    Post vital signs: stable    Level of consciousness: sedated    Nausea/Vomiting: no nausea/vomiting    Complications: none    Transfer of care protocol was followed      Last vitals:   Visit Vitals  /69   Pulse 85   Temp 37 °C (98.6 °F)   Resp 17   LMP  (LMP Unknown)   SpO2 99%   Breastfeeding No

## 2022-12-19 NOTE — DISCHARGE INSTRUCTIONS
No driving for 24 hours  No alcohol for 24 hours  Do not make any critical decisions or sign legal documents until tomorrow.     Call  if pain does not improve.

## 2022-12-20 ENCOUNTER — LAB VISIT (OUTPATIENT)
Dept: LAB | Facility: HOSPITAL | Age: 57
End: 2022-12-20
Attending: INTERNAL MEDICINE
Payer: COMMERCIAL

## 2022-12-20 ENCOUNTER — TELEPHONE (OUTPATIENT)
Dept: HEMATOLOGY/ONCOLOGY | Facility: CLINIC | Age: 57
End: 2022-12-20

## 2022-12-20 DIAGNOSIS — C78.7 PANCREATIC CANCER METASTASIZED TO LIVER: ICD-10-CM

## 2022-12-20 DIAGNOSIS — C78.7 PANCREATIC CANCER METASTASIZED TO LIVER: Primary | ICD-10-CM

## 2022-12-20 DIAGNOSIS — C25.9 PANCREATIC CANCER METASTASIZED TO LIVER: Primary | ICD-10-CM

## 2022-12-20 DIAGNOSIS — C25.9 PANCREATIC CANCER METASTASIZED TO LIVER: ICD-10-CM

## 2022-12-20 LAB
BASOPHILS # BLD AUTO: 0.06 K/UL (ref 0–0.2)
BASOPHILS NFR BLD: 0.4 % (ref 0–1.9)
DIFFERENTIAL METHOD: ABNORMAL
EOSINOPHIL # BLD AUTO: 0.4 K/UL (ref 0–0.5)
EOSINOPHIL NFR BLD: 2.7 % (ref 0–8)
ERYTHROCYTE [DISTWIDTH] IN BLOOD BY AUTOMATED COUNT: 17.2 % (ref 11.5–14.5)
HCT VFR BLD AUTO: 29.3 % (ref 37–48.5)
HGB BLD-MCNC: 9.1 G/DL (ref 12–16)
IMM GRANULOCYTES # BLD AUTO: 0.2 K/UL (ref 0–0.04)
IMM GRANULOCYTES NFR BLD AUTO: 1.3 % (ref 0–0.5)
LYMPHOCYTES # BLD AUTO: 0.7 K/UL (ref 1–4.8)
LYMPHOCYTES NFR BLD: 4.3 % (ref 18–48)
MCH RBC QN AUTO: 26.9 PG (ref 27–31)
MCHC RBC AUTO-ENTMCNC: 31.1 G/DL (ref 32–36)
MCV RBC AUTO: 87 FL (ref 82–98)
MONOCYTES # BLD AUTO: 1.3 K/UL (ref 0.3–1)
MONOCYTES NFR BLD: 8 % (ref 4–15)
NEUTROPHILS # BLD AUTO: 13.1 K/UL (ref 1.8–7.7)
NEUTROPHILS NFR BLD: 83.3 % (ref 38–73)
NRBC BLD-RTO: 0 /100 WBC
PLATELET # BLD AUTO: 210 K/UL (ref 150–450)
PMV BLD AUTO: 10.3 FL (ref 9.2–12.9)
RBC # BLD AUTO: 3.38 M/UL (ref 4–5.4)
WBC # BLD AUTO: 15.72 K/UL (ref 3.9–12.7)

## 2022-12-20 PROCEDURE — 36415 COLL VENOUS BLD VENIPUNCTURE: CPT | Performed by: INTERNAL MEDICINE

## 2022-12-20 PROCEDURE — 85025 COMPLETE CBC W/AUTO DIFF WBC: CPT | Performed by: INTERNAL MEDICINE

## 2022-12-22 ENCOUNTER — TELEPHONE (OUTPATIENT)
Dept: HEMATOLOGY/ONCOLOGY | Facility: CLINIC | Age: 57
End: 2022-12-22

## 2022-12-22 ENCOUNTER — PATIENT MESSAGE (OUTPATIENT)
Dept: HEMATOLOGY/ONCOLOGY | Facility: CLINIC | Age: 57
End: 2022-12-22

## 2022-12-22 DIAGNOSIS — C25.9 PANCREATIC CANCER METASTASIZED TO LIVER: ICD-10-CM

## 2022-12-22 DIAGNOSIS — G89.3 CHRONIC PAIN DUE TO NEOPLASM: ICD-10-CM

## 2022-12-22 DIAGNOSIS — C78.7 PANCREATIC CANCER METASTASIZED TO LIVER: ICD-10-CM

## 2022-12-22 NOTE — TELEPHONE ENCOUNTER
stopped at office to say that patient is having extreme pelvic pain, patient is in car and he is not sure what he should do. Dariana made aware, reviewed chart. Patient  instructed to take patient to ER due to extreme amount of pelvic pain she is having. Verbalized understanding.

## 2022-12-23 ENCOUNTER — LAB VISIT (OUTPATIENT)
Dept: LAB | Facility: HOSPITAL | Age: 57
End: 2022-12-23
Attending: INTERNAL MEDICINE
Payer: COMMERCIAL

## 2022-12-23 ENCOUNTER — OFFICE VISIT (OUTPATIENT)
Dept: HEMATOLOGY/ONCOLOGY | Facility: CLINIC | Age: 57
End: 2022-12-23
Payer: COMMERCIAL

## 2022-12-23 VITALS
BODY MASS INDEX: 33.35 KG/M2 | SYSTOLIC BLOOD PRESSURE: 116 MMHG | HEIGHT: 60 IN | TEMPERATURE: 98 F | RESPIRATION RATE: 18 BRPM | DIASTOLIC BLOOD PRESSURE: 77 MMHG | WEIGHT: 169.88 LBS | HEART RATE: 94 BPM

## 2022-12-23 DIAGNOSIS — G89.3 CANCER ASSOCIATED PAIN: Primary | Chronic | ICD-10-CM

## 2022-12-23 DIAGNOSIS — C25.9 PANCREATIC ADENOCARCINOMA: Chronic | ICD-10-CM

## 2022-12-23 DIAGNOSIS — C78.7 PANCREATIC CANCER METASTASIZED TO LIVER: ICD-10-CM

## 2022-12-23 DIAGNOSIS — B37.0 ORAL CANDIDA: ICD-10-CM

## 2022-12-23 DIAGNOSIS — D70.1 CHEMOTHERAPY-INDUCED NEUTROPENIA: ICD-10-CM

## 2022-12-23 DIAGNOSIS — C25.9 PANCREATIC CANCER METASTASIZED TO LIVER: ICD-10-CM

## 2022-12-23 DIAGNOSIS — T45.1X5A CHEMOTHERAPY-INDUCED NEUTROPENIA: ICD-10-CM

## 2022-12-23 DIAGNOSIS — G89.3 CHRONIC PAIN DUE TO NEOPLASM: ICD-10-CM

## 2022-12-23 DIAGNOSIS — C78.7 LIVER METASTASES: ICD-10-CM

## 2022-12-23 LAB
ABO + RH BLD: NORMAL
ALBUMIN SERPL BCP-MCNC: 2.9 G/DL (ref 3.5–5.2)
ALP SERPL-CCNC: 425 U/L (ref 55–135)
ALT SERPL W/O P-5'-P-CCNC: 70 U/L (ref 10–44)
ANION GAP SERPL CALC-SCNC: 10 MMOL/L (ref 8–16)
AST SERPL-CCNC: 135 U/L (ref 10–40)
BASOPHILS # BLD AUTO: 0.04 K/UL (ref 0–0.2)
BASOPHILS NFR BLD: 0.3 % (ref 0–1.9)
BILIRUB SERPL-MCNC: 1.6 MG/DL (ref 0.1–1)
BLD GP AB SCN CELLS X3 SERPL QL: NORMAL
BUN SERPL-MCNC: 15 MG/DL (ref 6–20)
CALCIUM SERPL-MCNC: 8.8 MG/DL (ref 8.7–10.5)
CHLORIDE SERPL-SCNC: 94 MMOL/L (ref 95–110)
CO2 SERPL-SCNC: 30 MMOL/L (ref 23–29)
CREAT SERPL-MCNC: 0.7 MG/DL (ref 0.5–1.4)
DIFFERENTIAL METHOD: ABNORMAL
EOSINOPHIL # BLD AUTO: 0.1 K/UL (ref 0–0.5)
EOSINOPHIL NFR BLD: 1 % (ref 0–8)
ERYTHROCYTE [DISTWIDTH] IN BLOOD BY AUTOMATED COUNT: 17.2 % (ref 11.5–14.5)
EST. GFR  (NO RACE VARIABLE): >60 ML/MIN/1.73 M^2
GLUCOSE SERPL-MCNC: 139 MG/DL (ref 70–110)
HCT VFR BLD AUTO: 29.4 % (ref 37–48.5)
HGB BLD-MCNC: 8.9 G/DL (ref 12–16)
IMM GRANULOCYTES # BLD AUTO: 0.12 K/UL (ref 0–0.04)
IMM GRANULOCYTES NFR BLD AUTO: 0.8 % (ref 0–0.5)
LYMPHOCYTES # BLD AUTO: 0.7 K/UL (ref 1–4.8)
LYMPHOCYTES NFR BLD: 4.6 % (ref 18–48)
MCH RBC QN AUTO: 26.7 PG (ref 27–31)
MCHC RBC AUTO-ENTMCNC: 30.3 G/DL (ref 32–36)
MCV RBC AUTO: 88 FL (ref 82–98)
MONOCYTES # BLD AUTO: 1.1 K/UL (ref 0.3–1)
MONOCYTES NFR BLD: 7.5 % (ref 4–15)
NEUTROPHILS # BLD AUTO: 12.2 K/UL (ref 1.8–7.7)
NEUTROPHILS NFR BLD: 85.8 % (ref 38–73)
NRBC BLD-RTO: 0 /100 WBC
PLATELET # BLD AUTO: 174 K/UL (ref 150–450)
PMV BLD AUTO: 10.3 FL (ref 9.2–12.9)
POTASSIUM SERPL-SCNC: 4.6 MMOL/L (ref 3.5–5.1)
PROT SERPL-MCNC: 6.6 G/DL (ref 6–8.4)
RBC # BLD AUTO: 3.33 M/UL (ref 4–5.4)
SODIUM SERPL-SCNC: 134 MMOL/L (ref 136–145)
WBC # BLD AUTO: 14.2 K/UL (ref 3.9–12.7)

## 2022-12-23 PROCEDURE — 1111F PR DISCHARGE MEDS RECONCILED W/ CURRENT OUTPATIENT MED LIST: ICD-10-PCS | Mod: CPTII,S$GLB,, | Performed by: INTERNAL MEDICINE

## 2022-12-23 PROCEDURE — 99214 PR OFFICE/OUTPT VISIT, EST, LEVL IV, 30-39 MIN: ICD-10-PCS | Mod: S$GLB,,, | Performed by: INTERNAL MEDICINE

## 2022-12-23 PROCEDURE — 3074F SYST BP LT 130 MM HG: CPT | Mod: CPTII,S$GLB,, | Performed by: INTERNAL MEDICINE

## 2022-12-23 PROCEDURE — 99214 OFFICE O/P EST MOD 30 MIN: CPT | Mod: S$GLB,,, | Performed by: INTERNAL MEDICINE

## 2022-12-23 PROCEDURE — 3008F PR BODY MASS INDEX (BMI) DOCUMENTED: ICD-10-PCS | Mod: CPTII,S$GLB,, | Performed by: INTERNAL MEDICINE

## 2022-12-23 PROCEDURE — 1159F MED LIST DOCD IN RCRD: CPT | Mod: CPTII,S$GLB,, | Performed by: INTERNAL MEDICINE

## 2022-12-23 PROCEDURE — 4010F PR ACE/ARB THEARPY RXD/TAKEN: ICD-10-PCS | Mod: CPTII,S$GLB,, | Performed by: INTERNAL MEDICINE

## 2022-12-23 PROCEDURE — 4010F ACE/ARB THERAPY RXD/TAKEN: CPT | Mod: CPTII,S$GLB,, | Performed by: INTERNAL MEDICINE

## 2022-12-23 PROCEDURE — 1111F DSCHRG MED/CURRENT MED MERGE: CPT | Mod: CPTII,S$GLB,, | Performed by: INTERNAL MEDICINE

## 2022-12-23 PROCEDURE — 3044F HG A1C LEVEL LT 7.0%: CPT | Mod: CPTII,S$GLB,, | Performed by: INTERNAL MEDICINE

## 2022-12-23 PROCEDURE — 3044F PR MOST RECENT HEMOGLOBIN A1C LEVEL <7.0%: ICD-10-PCS | Mod: CPTII,S$GLB,, | Performed by: INTERNAL MEDICINE

## 2022-12-23 PROCEDURE — 3078F DIAST BP <80 MM HG: CPT | Mod: CPTII,S$GLB,, | Performed by: INTERNAL MEDICINE

## 2022-12-23 PROCEDURE — 1159F PR MEDICATION LIST DOCUMENTED IN MEDICAL RECORD: ICD-10-PCS | Mod: CPTII,S$GLB,, | Performed by: INTERNAL MEDICINE

## 2022-12-23 PROCEDURE — 3078F PR MOST RECENT DIASTOLIC BLOOD PRESSURE < 80 MM HG: ICD-10-PCS | Mod: CPTII,S$GLB,, | Performed by: INTERNAL MEDICINE

## 2022-12-23 PROCEDURE — 3074F PR MOST RECENT SYSTOLIC BLOOD PRESSURE < 130 MM HG: ICD-10-PCS | Mod: CPTII,S$GLB,, | Performed by: INTERNAL MEDICINE

## 2022-12-23 PROCEDURE — 86901 BLOOD TYPING SEROLOGIC RH(D): CPT | Performed by: INTERNAL MEDICINE

## 2022-12-23 PROCEDURE — 80053 COMPREHEN METABOLIC PANEL: CPT | Performed by: INTERNAL MEDICINE

## 2022-12-23 PROCEDURE — 85025 COMPLETE CBC W/AUTO DIFF WBC: CPT | Performed by: INTERNAL MEDICINE

## 2022-12-23 PROCEDURE — 3008F BODY MASS INDEX DOCD: CPT | Mod: CPTII,S$GLB,, | Performed by: INTERNAL MEDICINE

## 2022-12-23 RX ORDER — HYDROMORPHONE HYDROCHLORIDE 4 MG/1
TABLET ORAL
Qty: 120 TABLET | Refills: 0 | OUTPATIENT
Start: 2022-12-23

## 2022-12-23 RX ORDER — FLUCONAZOLE 100 MG/1
TABLET ORAL
Qty: 10 TABLET | Refills: 3 | Status: SHIPPED | OUTPATIENT
Start: 2022-12-23

## 2022-12-23 RX ORDER — HYDROMORPHONE HYDROCHLORIDE 4 MG/1
TABLET ORAL
Qty: 180 TABLET | Refills: 0 | Status: SHIPPED | OUTPATIENT
Start: 2022-12-23

## 2022-12-23 NOTE — TELEPHONE ENCOUNTER
Spoke to . Stated that we would refill pain prescription. Ordered labs to be done this morning before appt. Pt RTC today and will review labs with him. Verbalized understanding.

## 2022-12-23 NOTE — TELEPHONE ENCOUNTER
----- Message from Amy Starr RN sent at 12/22/2022  3:23 PM CST -----    ----- Message -----  From: Rona Phillip  Sent: 12/22/2022   2:25 PM CST  To: Verenice Rendon Staff    Pt's  Juan Carlos would like a refill on pain meds. He said that she got an RX for 120 pills. She is taking 2 q 3.0. She is on day 10 of meds. She takes 16 per day. He has 14 right now and will only have 8 to start the day tomorrow. He would like it to be sent to [facility] on [address].      [phone]

## 2022-12-24 ENCOUNTER — TELEPHONE (OUTPATIENT)
Dept: HEMATOLOGY/ONCOLOGY | Facility: CLINIC | Age: 57
End: 2022-12-24

## 2022-12-24 PROBLEM — D70.1 CHEMOTHERAPY-INDUCED NEUTROPENIA: Status: ACTIVE | Noted: 2022-12-24

## 2022-12-24 PROBLEM — T45.1X5A CHEMOTHERAPY-INDUCED NEUTROPENIA: Status: ACTIVE | Noted: 2022-12-24

## 2022-12-24 RX ORDER — DIPHENHYDRAMINE HYDROCHLORIDE 50 MG/ML
25 INJECTION INTRAMUSCULAR; INTRAVENOUS ONCE
Status: CANCELLED
Start: 2023-01-04

## 2022-12-24 RX ORDER — HEPARIN 100 UNIT/ML
500 SYRINGE INTRAVENOUS
Status: CANCELLED | OUTPATIENT
Start: 2023-01-11

## 2022-12-24 RX ORDER — HEPARIN 100 UNIT/ML
500 SYRINGE INTRAVENOUS
Status: CANCELLED | OUTPATIENT
Start: 2023-01-18

## 2022-12-24 RX ORDER — DIPHENHYDRAMINE HYDROCHLORIDE 50 MG/ML
25 INJECTION INTRAMUSCULAR; INTRAVENOUS ONCE
Status: CANCELLED
Start: 2023-01-11

## 2022-12-24 RX ORDER — SODIUM CHLORIDE 0.9 % (FLUSH) 0.9 %
10 SYRINGE (ML) INJECTION
Status: CANCELLED | OUTPATIENT
Start: 2023-01-04

## 2022-12-24 RX ORDER — ONDANSETRON 2 MG/ML
16 INJECTION INTRAMUSCULAR; INTRAVENOUS ONCE
Status: CANCELLED | OUTPATIENT
Start: 2023-01-04

## 2022-12-24 RX ORDER — ONDANSETRON 2 MG/ML
16 INJECTION INTRAMUSCULAR; INTRAVENOUS ONCE
Status: CANCELLED | OUTPATIENT
Start: 2023-01-11

## 2022-12-24 RX ORDER — HEPARIN 100 UNIT/ML
500 SYRINGE INTRAVENOUS
Status: CANCELLED | OUTPATIENT
Start: 2023-01-04

## 2022-12-24 RX ORDER — DIPHENHYDRAMINE HYDROCHLORIDE 50 MG/ML
25 INJECTION INTRAMUSCULAR; INTRAVENOUS ONCE
Status: CANCELLED
Start: 2023-01-18

## 2022-12-24 RX ORDER — FAMOTIDINE 10 MG/ML
20 INJECTION INTRAVENOUS
Status: CANCELLED
Start: 2023-01-18

## 2022-12-24 RX ORDER — ONDANSETRON 2 MG/ML
16 INJECTION INTRAMUSCULAR; INTRAVENOUS ONCE
Status: CANCELLED | OUTPATIENT
Start: 2023-01-18

## 2022-12-24 RX ORDER — FAMOTIDINE 10 MG/ML
20 INJECTION INTRAVENOUS
Status: CANCELLED
Start: 2023-01-11

## 2022-12-24 RX ORDER — FAMOTIDINE 10 MG/ML
20 INJECTION INTRAVENOUS
Status: CANCELLED
Start: 2023-01-04

## 2022-12-24 RX ORDER — SODIUM CHLORIDE 0.9 % (FLUSH) 0.9 %
10 SYRINGE (ML) INJECTION
Status: CANCELLED | OUTPATIENT
Start: 2023-01-11

## 2022-12-24 RX ORDER — SODIUM CHLORIDE 0.9 % (FLUSH) 0.9 %
10 SYRINGE (ML) INJECTION
Status: CANCELLED | OUTPATIENT
Start: 2023-01-18

## 2022-12-24 NOTE — TELEPHONE ENCOUNTER
Orders placed for abraxane gemzar for   Stage 4 pancreatic cancer.    D1C1 1/4/23  Get date and time    Get auth, she will need Udena or neupogen support likely.    Chemo school 1/3/23    Co pay assistance.    I will see her 1/4/23.

## 2022-12-24 NOTE — PROGRESS NOTES
[facility] Hematology Oncology In Office Subsequent Encounter Note  12/23/22      Patient Name: Brunilda Bob  MRN: [id number]  Referring Provider: Dr. Saucedo  Primary Care Physician: Sadiq Titus MD      Subjective:     Chief Complaint: pancreatic cancer        History Present Illness:  57 y.o. female upper abdominal pain and bloating x's 1-2 months.  Admits to mid back pain.  CAT 4.2 cm mass head of pancreas, liver masses.  GI bleed on and off Eliquis.  Hgb 7.5, 2 units of pRBC being given.  CAT of chest she had small PE.    She returns to the office today, December 23rd 2022.  She completed 10 radiation treatments on December 19, 2022.  She also had a nerve block procedure per Dr. Saucedo December 19, 2022.  Pancreatic cancer pain has decreased somewhat.  She complains of nausea and fatigue.    She does have pain symptoms at the right lower back and right inguinal areas, where she had the  access for the inferior vena cava filter placement per Dr. Uribe.    GI bleeding appears controlled, hemoglobin is stable at 9.    Pain medications have been reviewed with her.  She is on Duragesic patch Q 3 days.  Dilaudid 4 mg 2 p.o. Q 3 hours for pain control is being taken.  She has Diflucan 100 mg 1 p.o. daily for 7-10 days for oral yeast.  She also has Zofran p.r.n. nausea.    She is traveling to the [facility] with her family for [place] through new year's.  She returns January 2nd.    Have discussed with her again the administration of Gemzar Abraxane weekly for her palliative treatment of her metastatic pancreatic cancer condition.  She will have her lab work done Mondays starting January 2nd and her chemotherapy will be given weekly as tolerated starting January 4th     She is 5 ft 0 in tall and weighs 167 lb.  Chemo school  Co pay assistance?  RTC 1/4/23.      Past Medical/Surgical History:  Past Medical History:   Diagnosis Date    Aneurysm     Broken heart syndrome 2010     Cancer 11/23/2022    liver and pancreas    DVT (deep venous thrombosis)     Hypertension     Pancreatic cancer metastasized to liver     Pulmonary embolism      Past Surgical History:   Procedure Laterality Date    CLAVICLE SURGERY Right 2012    ENDOSCOPIC ULTRASOUND OF UPPER GASTROINTESTINAL TRACT N/A 11/29/2022    Procedure: ULTRASOUND, UPPER GI TRACT, ENDOSCOPIC;  Surgeon: Aries Saucedo III, MD;  Location: [facility];  Service: Endoscopy;  Laterality: N/A;    ENDOSCOPIC ULTRASOUND OF UPPER GASTROINTESTINAL TRACT N/A 12/19/2022    Procedure: ULTRASOUND, UPPER GI TRACT, ENDOSCOPIC;  Surgeon: Aries Saucedo III, MD;  Location: [facility];  Service: Endoscopy;  Laterality: N/A;    ESOPHAGOGASTRODUODENOSCOPY N/A 11/23/2022    Procedure: EGD (ESOPHAGOGASTRODUODENOSCOPY);  Surgeon: Aries Saucedo III, MD;  Location: [facility];  Service: Endoscopy;  Laterality: N/A;    TONSILLECTOMY      WISDOM TOOTH EXTRACTION         Allergies:  Review of patient's allergies indicates:   Allergen Reactions    Crestor [rosuvastatin] Other (See Comments)     Paralysis    Codeine Nausea And Vomiting    Strawberries [strawberry] Hives    Adhesive tape-silicones Rash       Social/Family History:  Social History     Socioeconomic History    Marital status:    Tobacco Use    Smoking status: Never    Smokeless tobacco: Never   Substance and Sexual Activity    Alcohol use: Not Currently     Comment: occ    Drug use: Never    Sexual activity: Yes     Partners: Male     Social Determinants of Health     Financial Resource Strain: Low Risk     Difficulty of Paying Living Expenses: Not hard at all   Food Insecurity: No Food Insecurity    Worried About Running Out of Food in the Last Year: Never true    Ran Out of Food in the Last Year: Never true   Transportation Needs: No Transportation Needs    Lack of Transportation (Medical): No    Lack of Transportation (Non-Medical): No   Physical Activity: Inactive    Days of Exercise  per Week: 0 days    Minutes of Exercise per Session: 0 min   Stress: No Stress Concern Present    Feeling of Stress : Not at all   Social Connections: Socially Integrated    Frequency of Communication with Friends and Family: Twice a week    Frequency of Social Gatherings with Friends and Family: Once a week    Attends Yarsani Services: 1 to 4 times per year    Active Member of Clubs or Organizations: Yes    Attends Club or Organization Meetings: 1 to 4 times per year    Marital Status:    Housing Stability: Low Risk     Unable to Pay for Housing in the Last Year: No    Number of Places Lived in the Last Year: 1    Unstable Housing in the Last Year: No     Family History   Problem Relation Age of Onset    Colon cancer Mother     Uterine cancer Mother     Hypertension Father     Testicular cancer Son        ROS:    GEN: normal without any fever, night sweats or weight loss  HEENT:See HPI  CV: normal with no CP, SOB, PND, LEONARD or orthopnea  PULM: normal with no SOB, cough, hemoptysis, sputum or pleuritic pain  GI: See HPI  : normal with no hematuria, dysuria  BREAST: normal with no mass, discharge, pain  SKIN: normal with no rash, erythema, bruising, or swelling        Medications:  Continuous Infusions:  Scheduled Meds:      PRN Meds:furosemide (LASIX) injection     Objective:     Vitals:  Blood pressure 116/77, pulse 94, temperature 97.8 °F (36.6 °C), resp. rate 18, height 5' (1.524 m), weight 77.1 kg (169 lb 14.4 oz).    Physical Exam:  GEN: no apparent distress, comfortable  HEAD: atraumatic and normocephalic  EYES: + pallor, no icterus  ENT: no pharyngeal erythema, external ears WNL; no nasal discharge  NECK: no masses, thyroid normal, trachea midline, no LAD/LN's, supple  CV: RRR with no murmur; normal pulse; normal S1 and S2; no pedal edema  CHEST: Normal respiratory effort; CTAB; normal breath sounds; no wheeze or crackles  ABDOM: nontender and nondistended; soft; no rebound/guarding, L/S  "NP  MUSC/Skeletal: ROM normal; no crepitus; joints normal  EXTREM: no clubbing, cyanosis, inflammation or swelling  SKIN: no rashes, lesions, ulcers, petechiae   : no cvat  NEURO: grossly intact; motor/sensory WNL;  no tremors  PSYCH: normal mood, affect and behavior  LYMPH: normal cervical, supraclavicular, axillary  LN's    Hgb 8.9.    CT of chest addendum, PE seen    CT of abdomin 2.4 cm pseudoaneurism at the ventricular apex, multiple liver masses up to 3.6 cm, and 4.5 cm mass at body of pancreas, abutting splenic artery, common hepatic artery.  Dilation of distal pancreatic duct seen.  Superior mesenteric artery and vein are patent.         (1) 57 y.o. female with diagnosis of blood loss anemia 2nd acute and chronic GI bleed, from her primary pancreatic cancer mass.  Status post 10 fractions of radiation.  The hemoglobin appears stable at 9.  I believe the GI bleeding has decreased or resolved following administration of radiation to the pancreatic mass.    (2) pancreatic cancer, liver metastases, status post next generation sequencing studies.  Stage IV disease.  Begin palliative treatment with Gemzar and Abraxane on January 4th.  Await results of NSG studies.  May consider the addition of Opdivo or Keytruda to the regimen.    (3) Chronic pain 2nd liver metastatic dx, and likely nerve encroachment per pancreatic mass.  The nerve block procedure Per Dr. Saucedo completed December 19.  Pain is somewhat improved.  She is still on Duragesic patch and Dilaudid 4 mg 2 p.o. Q 3 hours for pain control.    (4)She has cardiac apex pseudoaneurism,  Hx of "broken heart" and ejection fraction now at 60%, suspected pancreatic cancer.  She is at increased risk of thromboembolic complications, CVA, PE, DVT.    RTC 1/4/23.      YANICK Toribio MD        "

## 2024-12-12 NOTE — TELEPHONE ENCOUNTER
Advised patient to get covid per dr rahman if is available to her. She needs to contact her pcp to see if they are offering it there.   chest pain

## (undated) DEVICE — GUIDEWIRE DOUBLE ENDED .035 DIA. 150CML